# Patient Record
Sex: FEMALE | Race: WHITE | NOT HISPANIC OR LATINO | Employment: UNEMPLOYED | URBAN - METROPOLITAN AREA
[De-identification: names, ages, dates, MRNs, and addresses within clinical notes are randomized per-mention and may not be internally consistent; named-entity substitution may affect disease eponyms.]

---

## 2016-12-23 RX ORDER — MULTIVIT-MIN/IRON/FOLIC ACID/K 18-600-40
CAPSULE ORAL
COMMUNITY
End: 2018-02-06 | Stop reason: SDUPTHER

## 2016-12-23 RX ORDER — LANOLIN ALCOHOL/MO/W.PET/CERES
1000 CREAM (GRAM) TOPICAL DAILY
COMMUNITY

## 2017-01-03 ENCOUNTER — ANESTHESIA (OUTPATIENT)
Dept: PERIOP | Facility: HOSPITAL | Age: 62
End: 2017-01-03
Payer: COMMERCIAL

## 2017-01-30 ENCOUNTER — ALLSCRIPTS OFFICE VISIT (OUTPATIENT)
Dept: OTHER | Facility: OTHER | Age: 62
End: 2017-01-30

## 2017-01-31 ENCOUNTER — HOSPITAL ENCOUNTER (OUTPATIENT)
Facility: HOSPITAL | Age: 62
Setting detail: OUTPATIENT SURGERY
Discharge: HOME | End: 2017-01-31
Attending: ORTHOPAEDIC SURGERY | Admitting: ORTHOPAEDIC SURGERY
Payer: COMMERCIAL

## 2017-01-31 VITALS
OXYGEN SATURATION: 94 % | DIASTOLIC BLOOD PRESSURE: 80 MMHG | TEMPERATURE: 99.1 F | HEIGHT: 62 IN | SYSTOLIC BLOOD PRESSURE: 162 MMHG | BODY MASS INDEX: 36.25 KG/M2 | HEART RATE: 114 BPM | WEIGHT: 197 LBS | RESPIRATION RATE: 16 BRPM

## 2017-01-31 DIAGNOSIS — R20.2 PARESTHESIA OF SKIN: ICD-10-CM

## 2017-01-31 DIAGNOSIS — M77.8 OTHER ENTHESOPATHIES, NOT ELSEWHERE CLASSIFIED: ICD-10-CM

## 2017-01-31 PROBLEM — G56.01 RIGHT CARPAL TUNNEL SYNDROME: Status: ACTIVE | Noted: 2017-01-31

## 2017-01-31 LAB
GLUCOSE SERPL-MCNC: 218 MG/DL (ref 65–140)
GLUCOSE SERPL-MCNC: 292 MG/DL (ref 65–140)
GLUCOSE SERPL-MCNC: 344 MG/DL (ref 65–140)

## 2017-01-31 PROCEDURE — C9356 TENOGLIDE TENDON PROT, CM2: HCPCS | Performed by: ORTHOPAEDIC SURGERY

## 2017-01-31 PROCEDURE — 88305 TISSUE EXAM BY PATHOLOGIST: CPT | Performed by: ORTHOPAEDIC SURGERY

## 2017-01-31 PROCEDURE — 82948 REAGENT STRIP/BLOOD GLUCOSE: CPT

## 2017-01-31 DEVICE — TENOGLIDE TENDON PROTECTOR SHEET 4 IN X 5 IN (10CM X 12.5CM)
Type: IMPLANTABLE DEVICE | Site: HAND | Status: FUNCTIONAL
Brand: TENOGLIDE®

## 2017-01-31 RX ORDER — SODIUM CHLORIDE, SODIUM LACTATE, POTASSIUM CHLORIDE, CALCIUM CHLORIDE 600; 310; 30; 20 MG/100ML; MG/100ML; MG/100ML; MG/100ML
20 INJECTION, SOLUTION INTRAVENOUS CONTINUOUS
Status: DISCONTINUED | OUTPATIENT
Start: 2017-01-31 | End: 2017-01-31

## 2017-01-31 RX ORDER — HYDROCODONE BITARTRATE AND ACETAMINOPHEN 5; 325 MG/1; MG/1
1 TABLET ORAL EVERY 6 HOURS PRN
Status: DISCONTINUED | OUTPATIENT
Start: 2017-01-31 | End: 2017-01-31 | Stop reason: HOSPADM

## 2017-01-31 RX ORDER — ONDANSETRON 2 MG/ML
INJECTION INTRAMUSCULAR; INTRAVENOUS AS NEEDED
Status: DISCONTINUED | OUTPATIENT
Start: 2017-01-31 | End: 2017-01-31 | Stop reason: SURG

## 2017-01-31 RX ORDER — ONDANSETRON 2 MG/ML
4 INJECTION INTRAMUSCULAR; INTRAVENOUS EVERY 4 HOURS PRN
Status: DISCONTINUED | OUTPATIENT
Start: 2017-01-31 | End: 2017-01-31 | Stop reason: HOSPADM

## 2017-01-31 RX ORDER — CLINDAMYCIN PHOSPHATE 900 MG/50ML
900 INJECTION INTRAVENOUS ONCE
Status: COMPLETED | OUTPATIENT
Start: 2017-01-31 | End: 2017-01-31

## 2017-01-31 RX ORDER — LABETALOL HYDROCHLORIDE 5 MG/ML
5 INJECTION, SOLUTION INTRAVENOUS
Status: DISCONTINUED | OUTPATIENT
Start: 2017-01-31 | End: 2017-01-31 | Stop reason: HOSPADM

## 2017-01-31 RX ORDER — FENTANYL CITRATE/PF 50 MCG/ML
25 SYRINGE (ML) INJECTION
Status: DISCONTINUED | OUTPATIENT
Start: 2017-01-31 | End: 2017-01-31 | Stop reason: HOSPADM

## 2017-01-31 RX ORDER — ONDANSETRON 2 MG/ML
4 INJECTION INTRAMUSCULAR; INTRAVENOUS ONCE AS NEEDED
Status: DISCONTINUED | OUTPATIENT
Start: 2017-01-31 | End: 2017-01-31 | Stop reason: HOSPADM

## 2017-01-31 RX ORDER — SODIUM CHLORIDE, SODIUM LACTATE, POTASSIUM CHLORIDE, CALCIUM CHLORIDE 600; 310; 30; 20 MG/100ML; MG/100ML; MG/100ML; MG/100ML
100 INJECTION, SOLUTION INTRAVENOUS CONTINUOUS
Status: DISCONTINUED | OUTPATIENT
Start: 2017-01-31 | End: 2017-01-31 | Stop reason: HOSPADM

## 2017-01-31 RX ORDER — HYDROCODONE BITARTRATE AND ACETAMINOPHEN 5; 325 MG/1; MG/1
1 TABLET ORAL EVERY 4 HOURS PRN
Qty: 20 TABLET | Refills: 0 | Status: SHIPPED | OUTPATIENT
Start: 2017-01-31 | End: 2017-02-10

## 2017-01-31 RX ORDER — SODIUM CHLORIDE, SODIUM LACTATE, POTASSIUM CHLORIDE, CALCIUM CHLORIDE 600; 310; 30; 20 MG/100ML; MG/100ML; MG/100ML; MG/100ML
125 INJECTION, SOLUTION INTRAVENOUS CONTINUOUS
Status: DISCONTINUED | OUTPATIENT
Start: 2017-01-31 | End: 2017-01-31 | Stop reason: HOSPADM

## 2017-01-31 RX ORDER — ALBUTEROL SULFATE 2.5 MG/3ML
2.5 SOLUTION RESPIRATORY (INHALATION) ONCE AS NEEDED
Status: DISCONTINUED | OUTPATIENT
Start: 2017-01-31 | End: 2017-01-31 | Stop reason: HOSPADM

## 2017-01-31 RX ORDER — METOCLOPRAMIDE HYDROCHLORIDE 5 MG/ML
INJECTION INTRAMUSCULAR; INTRAVENOUS AS NEEDED
Status: DISCONTINUED | OUTPATIENT
Start: 2017-01-31 | End: 2017-01-31 | Stop reason: SURG

## 2017-01-31 RX ORDER — MEPERIDINE HYDROCHLORIDE 25 MG/ML
12.5 INJECTION INTRAMUSCULAR; INTRAVENOUS; SUBCUTANEOUS AS NEEDED
Status: DISCONTINUED | OUTPATIENT
Start: 2017-01-31 | End: 2017-01-31 | Stop reason: HOSPADM

## 2017-01-31 RX ORDER — KETOROLAC TROMETHAMINE 30 MG/ML
INJECTION, SOLUTION INTRAMUSCULAR; INTRAVENOUS AS NEEDED
Status: DISCONTINUED | OUTPATIENT
Start: 2017-01-31 | End: 2017-01-31 | Stop reason: SURG

## 2017-01-31 RX ADMIN — DEXAMETHASONE SODIUM PHOSPHATE 10 MG: 10 INJECTION INTRAMUSCULAR; INTRAVENOUS at 09:31

## 2017-01-31 RX ADMIN — CLINDAMYCIN PHOSPHATE 900 MG: 18 INJECTION, SOLUTION INTRAVENOUS at 09:25

## 2017-01-31 RX ADMIN — ONDANSETRON 4 MG: 2 INJECTION INTRAMUSCULAR; INTRAVENOUS at 09:31

## 2017-01-31 RX ADMIN — KETOROLAC TROMETHAMINE 30 MG: 30 INJECTION, SOLUTION INTRAMUSCULAR at 10:38

## 2017-01-31 RX ADMIN — SODIUM CHLORIDE, SODIUM LACTATE, POTASSIUM CHLORIDE, AND CALCIUM CHLORIDE: .6; .31; .03; .02 INJECTION, SOLUTION INTRAVENOUS at 10:32

## 2017-01-31 RX ADMIN — SODIUM CHLORIDE, SODIUM LACTATE, POTASSIUM CHLORIDE, AND CALCIUM CHLORIDE 125 ML/HR: .6; .31; .03; .02 INJECTION, SOLUTION INTRAVENOUS at 10:50

## 2017-01-31 RX ADMIN — METOCLOPRAMIDE HYDROCHLORIDE 10 MG: 5 INJECTION INTRAMUSCULAR; INTRAVENOUS at 09:31

## 2017-01-31 RX ADMIN — SODIUM CHLORIDE, SODIUM LACTATE, POTASSIUM CHLORIDE, AND CALCIUM CHLORIDE 20 ML/HR: .6; .31; .03; .02 INJECTION, SOLUTION INTRAVENOUS at 08:00

## 2017-01-31 RX ADMIN — INSULIN HUMAN 10 UNITS: 100 INJECTION, SOLUTION PARENTERAL at 07:58

## 2017-02-08 ENCOUNTER — ALLSCRIPTS OFFICE VISIT (OUTPATIENT)
Dept: OTHER | Facility: OTHER | Age: 62
End: 2017-02-08

## 2017-02-08 DIAGNOSIS — Z47.89 ENCOUNTER FOR OTHER ORTHOPEDIC AFTERCARE: ICD-10-CM

## 2017-03-04 ENCOUNTER — GENERIC CONVERSION - ENCOUNTER (OUTPATIENT)
Dept: OTHER | Facility: OTHER | Age: 62
End: 2017-03-04

## 2017-03-22 ENCOUNTER — GENERIC CONVERSION - ENCOUNTER (OUTPATIENT)
Dept: OTHER | Facility: OTHER | Age: 62
End: 2017-03-22

## 2017-04-24 ENCOUNTER — ALLSCRIPTS OFFICE VISIT (OUTPATIENT)
Dept: OTHER | Facility: OTHER | Age: 62
End: 2017-04-24

## 2017-05-01 ENCOUNTER — HOSPITAL ENCOUNTER (OUTPATIENT)
Dept: RADIOLOGY | Age: 62
Discharge: HOME | End: 2017-05-01
Payer: COMMERCIAL

## 2017-05-01 DIAGNOSIS — I10 ESSENTIAL (PRIMARY) HYPERTENSION: ICD-10-CM

## 2017-05-01 DIAGNOSIS — Z12.31 ENCOUNTER FOR SCREENING MAMMOGRAM FOR MALIGNANT NEOPLASM OF BREAST: ICD-10-CM

## 2017-05-01 DIAGNOSIS — K21.9 GASTRO-ESOPHAGEAL REFLUX DISEASE WITHOUT ESOPHAGITIS: ICD-10-CM

## 2017-05-01 DIAGNOSIS — E11.65 TYPE 2 DIABETES MELLITUS WITH HYPERGLYCEMIA (HCC): ICD-10-CM

## 2017-05-01 PROCEDURE — G0202 SCR MAMMO BI INCL CAD: HCPCS

## 2017-05-04 ENCOUNTER — ALLSCRIPTS OFFICE VISIT (OUTPATIENT)
Dept: OTHER | Facility: OTHER | Age: 62
End: 2017-05-04

## 2017-08-22 ENCOUNTER — ALLSCRIPTS OFFICE VISIT (OUTPATIENT)
Dept: OTHER | Facility: OTHER | Age: 62
End: 2017-08-22

## 2017-08-22 DIAGNOSIS — E11.65 TYPE 2 DIABETES MELLITUS WITH HYPERGLYCEMIA (HCC): ICD-10-CM

## 2017-08-22 DIAGNOSIS — R10.13 EPIGASTRIC PAIN: ICD-10-CM

## 2017-08-22 DIAGNOSIS — K21.9 GASTRO-ESOPHAGEAL REFLUX DISEASE WITHOUT ESOPHAGITIS: ICD-10-CM

## 2017-08-22 DIAGNOSIS — E03.9 HYPOTHYROIDISM: ICD-10-CM

## 2017-08-23 ENCOUNTER — TRANSCRIBE ORDERS (OUTPATIENT)
Dept: LAB | Age: 62
End: 2017-08-23

## 2017-08-23 ENCOUNTER — APPOINTMENT (OUTPATIENT)
Dept: LAB | Age: 62
End: 2017-08-23
Payer: COMMERCIAL

## 2017-08-23 DIAGNOSIS — E03.9 HYPOTHYROIDISM: ICD-10-CM

## 2017-08-23 DIAGNOSIS — K21.9 GASTRO-ESOPHAGEAL REFLUX DISEASE WITHOUT ESOPHAGITIS: ICD-10-CM

## 2017-08-23 DIAGNOSIS — R10.13 EPIGASTRIC PAIN: ICD-10-CM

## 2017-08-23 DIAGNOSIS — E11.65 TYPE 2 DIABETES MELLITUS WITH HYPERGLYCEMIA (HCC): ICD-10-CM

## 2017-08-23 LAB
25(OH)D3 SERPL-MCNC: 33.3 NG/ML (ref 30–100)
ALBUMIN SERPL BCP-MCNC: 3.3 G/DL (ref 3.5–5)
ALP SERPL-CCNC: 66 U/L (ref 46–116)
ALT SERPL W P-5'-P-CCNC: 35 U/L (ref 12–78)
AMYLASE SERPL-CCNC: 28 IU/L (ref 25–115)
ANION GAP SERPL CALCULATED.3IONS-SCNC: 6 MMOL/L (ref 4–13)
AST SERPL W P-5'-P-CCNC: 19 U/L (ref 5–45)
BILIRUB SERPL-MCNC: 0.46 MG/DL (ref 0.2–1)
BUN SERPL-MCNC: 14 MG/DL (ref 5–25)
CALCIUM SERPL-MCNC: 8.9 MG/DL (ref 8.3–10.1)
CHLORIDE SERPL-SCNC: 104 MMOL/L (ref 100–108)
CO2 SERPL-SCNC: 29 MMOL/L (ref 21–32)
CREAT SERPL-MCNC: 0.65 MG/DL (ref 0.6–1.3)
CREAT UR-MCNC: 208 MG/DL
ERYTHROCYTE [DISTWIDTH] IN BLOOD BY AUTOMATED COUNT: 14.5 % (ref 11.6–15.1)
EST. AVERAGE GLUCOSE BLD GHB EST-MCNC: 206 MG/DL
GFR SERPL CREATININE-BSD FRML MDRD: 95 ML/MIN/1.73SQ M
GLUCOSE P FAST SERPL-MCNC: 187 MG/DL (ref 65–99)
HBA1C MFR BLD: 8.8 % (ref 4.2–6.3)
HCT VFR BLD AUTO: 38.4 % (ref 34.8–46.1)
HGB BLD-MCNC: 12.8 G/DL (ref 11.5–15.4)
LIPASE SERPL-CCNC: 106 U/L (ref 73–393)
MCH RBC QN AUTO: 27.2 PG (ref 26.8–34.3)
MCHC RBC AUTO-ENTMCNC: 33.3 G/DL (ref 31.4–37.4)
MCV RBC AUTO: 82 FL (ref 82–98)
MICROALBUMIN UR-MCNC: 20.5 MG/L (ref 0–20)
MICROALBUMIN/CREAT 24H UR: 10 MG/G CREATININE (ref 0–30)
PLATELET # BLD AUTO: 233 THOUSANDS/UL (ref 149–390)
PMV BLD AUTO: 10.5 FL (ref 8.9–12.7)
POTASSIUM SERPL-SCNC: 3.7 MMOL/L (ref 3.5–5.3)
PROT SERPL-MCNC: 7.5 G/DL (ref 6.4–8.2)
RBC # BLD AUTO: 4.71 MILLION/UL (ref 3.81–5.12)
SODIUM SERPL-SCNC: 139 MMOL/L (ref 136–145)
TSH SERPL DL<=0.05 MIU/L-ACNC: 12.3 UIU/ML (ref 0.36–3.74)
WBC # BLD AUTO: 5.5 THOUSAND/UL (ref 4.31–10.16)

## 2017-08-23 PROCEDURE — 82150 ASSAY OF AMYLASE: CPT

## 2017-08-23 PROCEDURE — 84443 ASSAY THYROID STIM HORMONE: CPT

## 2017-08-23 PROCEDURE — 85027 COMPLETE CBC AUTOMATED: CPT

## 2017-08-23 PROCEDURE — 82570 ASSAY OF URINE CREATININE: CPT

## 2017-08-23 PROCEDURE — 83690 ASSAY OF LIPASE: CPT

## 2017-08-23 PROCEDURE — 82043 UR ALBUMIN QUANTITATIVE: CPT

## 2017-08-23 PROCEDURE — 36415 COLL VENOUS BLD VENIPUNCTURE: CPT

## 2017-08-23 PROCEDURE — 82306 VITAMIN D 25 HYDROXY: CPT

## 2017-08-23 PROCEDURE — 80053 COMPREHEN METABOLIC PANEL: CPT

## 2017-08-23 PROCEDURE — 83036 HEMOGLOBIN GLYCOSYLATED A1C: CPT

## 2017-09-05 ENCOUNTER — ALLSCRIPTS OFFICE VISIT (OUTPATIENT)
Dept: OTHER | Facility: OTHER | Age: 62
End: 2017-09-05

## 2017-11-06 ENCOUNTER — ALLSCRIPTS OFFICE VISIT (OUTPATIENT)
Dept: OTHER | Facility: OTHER | Age: 62
End: 2017-11-06

## 2017-11-06 ENCOUNTER — TRANSCRIBE ORDERS (OUTPATIENT)
Dept: ADMINISTRATIVE | Facility: HOSPITAL | Age: 62
End: 2017-11-06

## 2017-11-06 DIAGNOSIS — R11.2 NAUSEA AND VOMITING, INTRACTABILITY OF VOMITING NOT SPECIFIED, UNSPECIFIED VOMITING TYPE: Primary | ICD-10-CM

## 2017-11-07 NOTE — PROGRESS NOTES
Assessment  1  Nausea with vomiting (787 01) (R11 2)    Plan  Diabetes type 2, uncontrolled    · Januvia 100 MG Oral Tablet   · NovoLIN N 100 UNIT/ML Subcutaneous Suspension  Diabetic gastroparesis    · From  Metoclopramide HCl - 5 MG Oral Tablet TAKE 1 TABLET 30 MINUTES  BEFORE MEALS AND AT BEDTIME To Metoclopramide HCl - 10 MG Oral Tablet (Reglan)  TAKE 1 TABLET BY MOUTH AC AND HS  GERD (gastroesophageal reflux disease)    · Omeprazole 40 MG Oral Capsule Delayed Release; TAKE 1 CAPSULE DAILY  Nausea with vomiting    · * CT ABDOMEN PELVIS W CONTRAST; Status:Need Information - Financial Authorization; Requested [id number];     Discussion/Summary  The patient was counseled regarding diagnostic results,-- prognosis,-- impressions  Chief Complaint  pt  presents for vomiting and nausea  pt  states has been ongoing has gotten worse over the past month  pt  states not able to go to work ,feels nauseas  pt  has been injecting Novolin 10 units twice a day      History of Present Illness  Nausea:   Jane Xiong presents with complaints of gradual onset of constant episodes of moderate nausea  Symptoms are improved by meals  Associated symptoms include queasiness,-- emesis,-- abdominal pain-- and-- diarrhea, but-- no regurgitation,-- no anorexia,-- no bloating,-- no dehydration,-- no hematemesis,-- no melena,-- no jaundice,-- no vertigo,-- no pregnancy symptoms,-- no fatigue,-- no fever,-- no weight loss,-- no chest pain,-- no headache,-- no stiff neck,-- no weakness-- and-- no neurologic symptoms  Abdominal Pain:   WILLIAM BURSLEM presents with complaints of occasional episodes of moderate abdominal pain  Symptoms are worsening  Pertinent Medical History: GERD     Associated symptoms include nausea,-- vomiting-- and-- diarrhea, but-- no vaginal bleeding,-- no vaginal discharge,-- no missed menstrual period,-- no confirmed pregnancy,-- no fever,-- no anorexia,-- no jaundice,-- no hematemesis,-- no melena,-- no bloody stool,-- no flatulence,-- no dysuria,-- no hematuria-- and-- no dark urine  Review of Systems    Constitutional: no fever,-- not feeling poorly,-- no recent weight gain,-- no chills,-- not feeling tired-- and-- no recent weight loss  Eyes: no eye pain,-- no eyesight problems,-- no dryness of the eyes,-- eyes not red,-- no purulent discharge from the eyes-- and-- no itching of the eyes  ENT: no earache,-- no nosebleeds,-- no sore throat,-- no hearing loss,-- no nasal discharge-- and-- no hoarseness  Cardiovascular: no chest pain,-- no intermittent leg claudication,-- no palpitations-- and-- no lower extremity edema  Respiratory: no shortness of breath,-- no cough,-- no orthopnea,-- no wheezing,-- no shortness of breath during exertion-- and-- no PND  Gastrointestinal: no abdominal pain,-- no nausea,-- no vomiting,-- no constipation-- and-- no diarrhea  Musculoskeletal: no arthralgias,-- no joint swelling,-- no limb pain,-- no myalgias,-- no joint stiffness-- and-- no limb swelling  Integumentary: no rashes,-- no itching,-- no skin lesions-- and-- no skin wound  Neurological: no headache,-- no numbness,-- no tingling,-- no confusion,-- no dizziness,-- no limb weakness,-- no convulsions,-- no fainting-- and-- no difficulty walking  Psychiatric: not suicidal,-- no anxiety-- and-- no depression  Endocrine: no muscle weakness  Hematologic/Lymphatic: no tendency for easy bleeding-- and-- no tendency for easy bruising  Active Problems  1  Aftercare following surgery of the musculoskeletal system (V58 78) (Z47 89)   2  Anxiety (300 00) (F41 9)   3  Benign diastolic hypertension (358 5) (I10)   4  Delayed gastric emptying (536 8) (K30)   5  Diabetes type 2, uncontrolled (250 02) (E11 65)   6  Diabetic gastroparesis (250 60,536 3) (O38 25,P23 34)   7  Epigastric pain (789 06) (R10 13)   8  GERD (gastroesophageal reflux disease) (530 81) (K21 9)   9   Hand paresthesia (782 0) (R20 2) 10  Hypothyroidism (244 9) (E03 9)   11  Irritable bowel syndrome (564 1) (K58 9)   12  Osteoarthritis, hip, bilateral (715 95) (M16 0)   13  Tendonitis of wrist, right (727 05) (M77 8)   14  Trigger little finger of right hand (727 03) (M65 351)   15  Trigger middle finger of right hand (727 03) (M65 331)   16  Vitamin D insufficiency (268 9) (E55 9)    Social History   · Former smoker (V15 82) (U85 761)   · No alcohol use   · No illicit drug use    Current Meds   1  BD Insulin Syringe Ultrafine 30G X 1/2 0 3 ML Miscellaneous; USE WITH NOVILIN   INSULIN TWICE DAILY - DX  E11 65; Therapy: 04AJI1797 to (Last MX:90HJA0946)  Requested for: 14WIU0953 Ordered   2  FreeStyle Lancets Miscellaneous; TEST BLOOD GLUCOSE 3 TIMES DAILY - DX  E11 65; Therapy: 77PPL5477 to (Evaluate:87Omm8168)  Requested for: 10XTE4909; Last   AF:37IQX0973 Ordered   3  FreeStyle Lite Test In Vitro Strip; TEST BLOOD GLUCOSE 3 TIMES DAILY - DX  E11 65; Therapy: 29FUB2485 to (Evaluate:71Avx7969)  Requested for: 81ZHO3928; Last   EI:49ATN0946 Ordered   4  FreeStyle System KIT; USE AS DIRECTED 3-4 TIMES DAILY  DX:  E11 65; Therapy: 83YHW2332 to (Last Rx:12Ove1344)  Requested for: 35Zcg1166 Ordered   5  Glimepiride 4 MG Oral Tablet; TAKE 1 TABLET TWICE DAILY; Therapy: 99ZGA6074 to (Woodford Scarlet)  Requested for: 60Sxr6373; Last   Rx:96Pey9184 Ordered   6  Januvia 100 MG Oral Tablet; Take 1 tablet daily; Therapy: 90XQM7266 to (Perry Scarlet)  Requested for: 28Sur6830; Last   Rx:35Ovh7104 Ordered   7  Levoxyl 100 MCG Oral Tablet; TAKE 1 TABLET DAILY; Therapy: 78ANV1451 to (Evaluate:34Czv7872)  Requested for: 80Snb2283; Last   Rx:09Uqx2512 Ordered   8  Lisinopril-Hydrochlorothiazide 20-12 5 MG Oral Tablet; TAKE 1/2 TABLET DAILY; Therapy: 89ATJ9547 to (Perry Sun)  Requested for: 86Osg6418; Last   Rx:63Wad8496 Ordered   9   Metoclopramide HCl - 5 MG Oral Tablet; TAKE 1 TABLET 30 MINUTES BEFORE MEALS   AND AT BEDTIME; Therapy: 66Zfe0526 to ()  Requested for: 49Hge6403; Last   Rx:79Myw0688 Ordered   10  NovoLIN N 100 UNIT/ML Subcutaneous Suspension; INJECT 10 UNITS TWICE DAILY; Therapy: (169 9374) to Recorded   11  NovoLIN N 100 UNIT/ML Subcutaneous Suspension; INJECT 22  UNITS TWICE DAILY -    DX  E11 65; Therapy: 11ZAE2242 to (Juan Singh)  Requested for: 94Arr2495 Ordered   12  Omeprazole 40 MG Oral Capsule Delayed Release; TAKE 1 CAPSULE DAILY; Therapy: 12IWS5129 to (Millie Rm)  Requested for: 27Col2691; Last    Rx:14Nlo3653; Status: ACTIVE - Renewal Denied Ordered   13  Sertraline HCl - 100 MG Oral Tablet; Take 1 tablet daily; Therapy: 94RPG1101 to (Millie Rm)  Requested for: 90Sfb7282; Last    Rx:13Fey8324 Ordered   14  Vitamin D3 2000 UNIT Oral Capsule; take 1 capsule daily; Therapy: 91Vpt4751 to (Evaluate:07Zmc5742); Last VL:50ESI7192 Ordered    Allergies  1  MetFORMIN HCl TABS   2  Penicillins    Vitals   Recorded: 54PXA9254 01:21PM   Temperature 98 4 F, Tympanic   Heart Rate 65   Systolic 665, LUE, Sitting   Diastolic 82, LUE, Sitting   Height 5 ft 2 5 in   Weight 193 lb 8 oz   BMI Calculated 34 83   BSA Calculated 1 9   O2 Saturation 98, RA     Physical Exam    Constitutional   General appearance: No acute distress, well appearing and well nourished  well developed,-- well nourished-- and-- well hydrated  Eyes   Conjunctiva and lids: No swelling, erythema or discharge  Conjunctiva Findings: normal in both eyes  Eye Lids: normal bilaterally  Pupils and irises: Equal, round and reactive to light  Pupils: equal, round, and reactive to light bilaterally  Cornea, Lens, and Sclera:   Ears, Nose, Mouth, and Throat   Nasal mucosa, septum, and turbinates: Normal without edema or erythema  no nasal discharge-- and-- normal nasal mucosa  Oropharynx: Normal with no erythema, edema, exudate or lesions    The posterior pharynx was not erythematous-- and-- did not have an exudate  Pulmonary   Respiratory effort: No increased work of breathing or signs of respiratory distress  Respiratory rate: normal  Assessment of respiratory effort revealed normal rhythm and effort  Auscultation of lungs: Clear to auscultation  no rales or crackles were heard bilaterally  no rhonchi  no friction rub  no wheezing  no diminished breath sounds  no bronchial breath sounds  Cardiovascular   Auscultation of heart: Normal rate and rhythm, normal S1 and S2, without murmurs  The heart rate was normal  The rhythm was regular  Heart sounds: normal S1-- and-- normal S2  no murmurs were heard  Examination of extremities for edema and/or varicosities: Normal   no edema  Abdomen   Abdomen: Non-tender, no masses  Bowel sounds were normal    Lymphatic   Palpation of lymph nodes in neck: No lymphadenopathy  no anterior cervical node enlargement,-- no posterior cervical node enlargement,-- no submandibular node enlargement-- and-- no supraclavicular node enlargement  Musculoskeletal   Gait and station: Normal   Gait evaluation demonstrated a normal gait  Digits and nails: Normal without clubbing or cyanosis  Inspection/palpation of joints, bones, and muscles: Normal     Skin   Skin and subcutaneous tissue: Normal without rashes or lesions  Neurologic   Sensation: No sensory loss  Sensory exam: intact to light touch  Psychiatric   Orientation to person, place, and time: Normal   Mental Status: oriented to person, place, and time,-- oriented to person,-- oriented to place-- and-- oriented to time  Mood and affect: Normal   Mood and Affect: appropriate mood-- and-- appropriate affect          Future Appointments    Date/Time Provider Specialty Site   12/05/2017 03:45 PM Zachary Russell MD Internal Medicine [facility]     Signatures   Electronically signed by : Tony Bhakta MD; Nov 6 2017  1:40PM EST                       (Author)

## 2017-11-13 ENCOUNTER — HOSPITAL ENCOUNTER (OUTPATIENT)
Dept: RADIOLOGY | Age: 62
Discharge: HOME | End: 2017-11-13
Payer: COMMERCIAL

## 2017-11-13 DIAGNOSIS — R11.2 NAUSEA AND VOMITING, INTRACTABILITY OF VOMITING NOT SPECIFIED, UNSPECIFIED VOMITING TYPE: ICD-10-CM

## 2017-11-13 PROCEDURE — 74177 CT ABD & PELVIS W/CONTRAST: CPT

## 2017-11-13 RX ADMIN — IOHEXOL 100 ML: 350 INJECTION, SOLUTION INTRAVENOUS at 08:54

## 2017-12-05 ENCOUNTER — GENERIC CONVERSION - ENCOUNTER (OUTPATIENT)
Dept: OTHER | Facility: OTHER | Age: 62
End: 2017-12-05

## 2017-12-05 DIAGNOSIS — E11.43 TYPE 2 DIABETES MELLITUS WITH AUTONOMIC NEUROPATHY (HCC): ICD-10-CM

## 2018-01-10 NOTE — PROGRESS NOTES
Plan    1  DSMT/MNT Time Record; Status:Complete;   Done: 23DCR1172 12:27PM    Discussion/Summary    PATIENT EDUCATION RECORD   Indication for Services: type 2 Diabetes Mellitus  Living Well with Diabetes Class 2 Education Content: Macronutrients, Carbohydrate sources, What one serving of carbohydrate equals, Effects of diet on blood glucose levels, effects of carbohydrates on blood glucose levels, Basics of meal planning: balance, portions, and meal times, Measurements, Reading food labels to determine carbohydrates       Active Problems    1  Anxiety (300 00) (F41 9)   2  Cataract (366 9) (H26 9)   3  Depression screening (V79 0) (Z13 89)   4  Diabetes type 2, uncontrolled (250 02) (E11 65)   5  Diarrhea (787 91) (R19 7)   6  GERD (gastroesophageal reflux disease) (530 81) (K21 9)   7  Greater trochanteric bursitis of right hip (726 5) (M70 61)   8  Hypertension (401 9) (I10)   9  Hypothyroidism (244 9) (E03 9)   10  Irritable bowel syndrome (564 1) (K58 9)   11  Lateral pain of right hip (719 45) (M25 551)   12  Nausea with vomiting (787 01) (R11 2)   13  Need for influenza vaccination (V04 81) (Z23)   14  Osteoarthritis, hip, bilateral (715 95) (M16 0)   15  Screening for breast cancer (V76 10) (Z12 39)   16  Screening for colon cancer (V76 51) (Z12 11)    Past Medical History    1  History of Anxiety (300 00) (F41 9)   2  History of Dental infection (522 4) (K04 7)   3  History of esophageal reflux (V12 79) (Z87 19)    Surgical History    1  History of Cholecystotomy   2  History of Complete Colonoscopy   3  History of Dilation And Curettage   4  History of Hand Incision Tendon Sheath Of A Finger   5  History of Iridectomy By Laser   6  History of Total Abdominal Hysterectomy   7  History of Total Abdominal Hysterectomy With Removal Of Both Ovaries   8  History of Tubal Ligation    Family History    1  Family history of     2  Family history of    3   Family history of diabetes mellitus (V18 0) (Z83 3)    4  Family history of diabetes mellitus (V18 0) (Z83 3)    5  Family history of diabetes mellitus (V18 0) (Z83 3)    Social History    · Former smoker (V15 82) (U21 841)   · No alcohol use   · No drug use    Current Meds   1  Dicyclomine HCl - 20 MG Oral Tablet (Bentyl); TAKE 1 TABLET EVERY 6 HOURS AS   NEEDED; Therapy: 74PJR9695 to (Evaluate:47Jkk9072)  Requested for: 59XSL0890; Last   Rx:29Zhs3750 Ordered   2  FreeStyle Lite Test In Vitro Strip; TEST 3 TIMES DAILY; Therapy: 70QZY2194 to (Nathan Valles)  Requested for: 86IKZ8790; Last   Rx:18Nov2015 Ordered   3  Glimepiride 2 MG Oral Tablet; Take 1 tablet twice daily; Therapy: 66KVD9878 to (Sona Baeza)  Requested for: 93WTQ0818; Last   Rx:68Bhw7661 Ordered   4  Januvia 100 MG Oral Tablet; Take 1 tablet daily; Therapy: 45VEU9202 to (Evaluate:73Ine9019)  Requested for: 20Jan2016; Last   Rx:20Jan2016 Ordered   5  Levothyroxine Sodium 75 MCG Oral Tablet; TAKE 1 TABLET DAILY; Therapy: 62ECW5325 to (Evaluate:07Jun2016)  Requested for: 19CBW1749; Last   Rx:66Mkp1908 Ordered   6  Lisinopril-Hydrochlorothiazide 20-12 5 MG Oral Tablet; TAKE 1 TABLET DAILY; Therapy: 27KHQ5382 to (Evaluate:07Uzs7093) Recorded   7  Meloxicam 7 5 MG Oral Tablet (Mobic); TAKE 1 TABLET TWICE DAILY AS NEEDED; Therapy: 99FXD1624 to (Evaluate:09Jan2016)  Requested for: 20IDV9344; Last   Rx:87Qdf8867 Ordered   8  MoviPrep 100 GM Oral Solution Reconstituted; USE AS DIRECTED; Therapy: 04AZO1676 to (Last Rx:09Dec2015)  Requested for: 86ERR2524 Ordered   9  NovoLIN N 100 UNIT/ML Subcutaneous Suspension; INJECT 12 UNIT Twice daily; Therapy: 73TOZ5746 to (Last Rx:10Dec2015) Ordered   10  Omeprazole 40 MG Oral Capsule Delayed Release; TAKE 1 CAPSULE DAILY; Therapy: 10JLO2134 to (Sona Baeza)  Requested for: 23HCV4243; Last    Rx:37Ksh5827 Ordered   11  Ondansetron HCl - 4 MG Oral Tablet (Zofran); TAKE 1 TABLET Every 8 hours PRN;     Therapy: 74TNA9777 to (Evaluate:19Dec2015)  Requested for: 99JLL2383; Last    Rx:09Dec2015 Ordered   12  Sertraline HCl - 100 MG Oral Tablet (Zoloft); Take 1 tablet daily; Therapy: 29BVP2033 to (Last Rx:25Jan2016)  Requested for: 64PIX6815 Ordered    Allergies    1  MetFORMIN HCl TABS   2  Penicillins    End of Encounter Meds    1  Sertraline HCl - 100 MG Oral Tablet (Zoloft); Take 1 tablet daily; Therapy: 30DIC8196 to (Last Rx:25Jan2016)  Requested for: 25Jan2016 Ordered    2  FreeStyle Lite Test In Vitro Strip; TEST 3 TIMES DAILY; Therapy: 62NJX2337 to (Kendall Cantor)  Requested for: 45BPR8732; Last   Rx:18Nov2015 Ordered   3  NovoLIN N 100 UNIT/ML Subcutaneous Suspension; INJECT 12 UNIT Twice daily; Therapy: 79LUN8857 to (Last Rx:10Dec2015) Ordered    4  Januvia 100 MG Oral Tablet; Take 1 tablet daily; Therapy: 83IFT9937 to (Evaluate:42Rvq9734)  Requested for: 20Jan2016; Last   Rx:20Jan2016 Ordered    5  MoviPrep 100 GM Oral Solution Reconstituted; USE AS DIRECTED; Therapy: 41AGY0105 to (Last Rx:09Dec2015)  Requested for: 55ATZ1194 Ordered    6  Glimepiride 2 MG Oral Tablet; Take 1 tablet twice daily; Therapy: 24IDK5752 to (Meri Burkitt)  Requested for: 22SZF0705; Last   Rx:10Dec2015 Ordered   7  Lisinopril-Hydrochlorothiazide 20-12 5 MG Oral Tablet; TAKE 1 TABLET DAILY; Therapy: 92GMG2612 to (Evaluate:31Lqi7867) Recorded    8  Omeprazole 40 MG Oral Capsule Delayed Release; TAKE 1 CAPSULE DAILY; Therapy: 90OKM3033 to (Meri Burkitt)  Requested for: 85DYG3268; Last   Rx:19Cff5063 Ordered    9  Levothyroxine Sodium 75 MCG Oral Tablet; TAKE 1 TABLET DAILY; Therapy: 83STJ3357 to (Evaluate:07Jun2016)  Requested for: 52IZR6706; Last   Rx:38Ttl4018 Ordered    10  Dicyclomine HCl - 20 MG Oral Tablet (Bentyl); TAKE 1 TABLET EVERY 6 HOURS AS    NEEDED; Therapy: 15IPP5811 to (Evaluate:19Nwg6535)  Requested for: 19QAH4827; Last    Rx:25Gfv9128 Ordered    11   Ondansetron HCl - 4 MG Oral Tablet (Zofran); TAKE 1 TABLET Every 8 hours PRN; Therapy: 74BRU4777 to (Evaluate:91Tsv2831)  Requested for: 60CKD1059; Last    Rx:72Cxh3391 Ordered    12  Meloxicam 7 5 MG Oral Tablet (Mobic); TAKE 1 TABLET TWICE DAILY AS NEEDED; Therapy: 13KPZ7181 to (Evaluate:09Jan2016)  Requested for: 87DDM7320;  Last    Rx:34Vcm8532 Ordered    Future Appointments    Date/Time Provider Specialty Site   02/23/2016 09:30 AM Rom Angelo RD Diabetes Educator [address] DIABETES EDUCATION   02/16/2016 09:30 AM MARIE Fitzpatrick Diabetes Educator [address] DIABETES EDUCATION   [facility]26[facility]2016 02[facility]   02/11/2016 02:00 PM Li Rojas [facility] Gastroenterology Adult [place] [address]   Electronically signed by : Bethel Rivas RD; Feb 9 2016 12:28PM EST                       (Author)    Electronically signed by : JACINTA King ; Feb 10 2016 10:21AM EST

## 2018-01-11 NOTE — RESULT NOTES
Verified Results  (1) TISSUE EXAM [id number] 08:14AM Jayjay Desir   Gastric body - rule out H Pylori - Hx Dyspepsia     Test Name Result Flag Reference   LAB AP CASE REPORT (Report)     Surgical Pathology Report             Case: [id number]                   Authorizing Provider: Sabrina Mercer MD       Collected:      01/28/2016 0814        Ordering Location:   [address] [place] [address]   Received:      01/28/2016 [address] Endoscopy                               Pathologist:      Marcelino Mitchell MD                             Specimens:  A) - Stomach, Gastric Body - cold biopsy                                B) - Colon, Random Colon Biopsy - cold biopsy   LAB AP FINAL DIAGNOSIS (Report)     A  Gastric body, biopsy:    - Mild chronic inactive gastritis  - Negative for Helicobacter pylori organisms (immunohistochemical stain   with appropriate positive control)  - Negative for intestinal metaplasia, dysplasia and malignancy  B  Random colon, biopsy:    - Colonic mucosa without significant histologic abnormality     - Negative for active inflammation, features of microscopic colitis and   chronic inflammatory bowel disease     - Negative for dysplasia and malignancy  LAB AP SURGICAL ADDITIONAL INFORMATION (Report)     These tests were developed and their performance characteristics   determined by [address] [place] [address] or [address] [place] [address]  They may not be cleared or approved by the [place]  Food and   Drug Administration  The FDA has determined that such clearance or   approval is not necessary  These tests are used for clinical purposes  They should not be regarded as investigational or for research  This   laboratory has been approved by [place] [address], designated as a high-complexity   laboratory and is qualified to perform these tests  LAB AP GROSS DESCRIPTION (Report)     A   The specimen is received in formalin, labeled with the patient's name   and hospital number, and is designated gastric body biopsy  The   specimen consists of multiple tan soft tissue fragments measuring in   aggregate 0 3 x 0 3 x 0 1 cm  Entirely submitted  One cassette  Note: The estimated total formalin fixation time based upon information   provided by the submitting clinician and the standard processing schedule   is 20 25 hours  B  The specimen is received in formalin, labeled with the patient's name   and hospital number, and is designated random colon biopsy  The   specimen consists of multiple tan soft tissue fragments measuring in   aggregate 0 7 x 0 6 x 0 1 cm  Entirely submitted  One cassette  Note: The estimated total formalin fixation time based upon information   provided by the submitting clinician and the standard processing schedule   is 20 0 hours  MAC   LAB AP CLINICAL INFORMATION      Gastric body - rule out H Pylori - Hx Dyspepsia       Discussion/Summary   Biopsies in the stomach were negative for infection  Colon was normal and biopsies were negative for any inflammation  We will follow-up with a colonoscopy in 10 years  If symptoms are improved she can hold off on the follow-up with us and continue to follow-up with Dr Rayray Harrison       Signatures   Electronically signed by : Laureano Mcpherson MD; Feb 4 2016  1:11PM EST                       (Author)

## 2018-01-12 VITALS
DIASTOLIC BLOOD PRESSURE: 80 MMHG | TEMPERATURE: 98.6 F | SYSTOLIC BLOOD PRESSURE: 138 MMHG | HEART RATE: 72 BPM | WEIGHT: 197 LBS | RESPIRATION RATE: 18 BRPM | BODY MASS INDEX: 36.25 KG/M2 | HEIGHT: 62 IN | OXYGEN SATURATION: 97 %

## 2018-01-13 VITALS
TEMPERATURE: 98.4 F | HEART RATE: 65 BPM | OXYGEN SATURATION: 98 % | HEIGHT: 63 IN | SYSTOLIC BLOOD PRESSURE: 138 MMHG | WEIGHT: 193.5 LBS | DIASTOLIC BLOOD PRESSURE: 82 MMHG | BODY MASS INDEX: 34.29 KG/M2

## 2018-01-13 VITALS
BODY MASS INDEX: 36.64 KG/M2 | HEART RATE: 80 BPM | WEIGHT: 199.13 LBS | HEIGHT: 62 IN | SYSTOLIC BLOOD PRESSURE: 151 MMHG | DIASTOLIC BLOOD PRESSURE: 79 MMHG

## 2018-01-13 VITALS
HEIGHT: 63 IN | DIASTOLIC BLOOD PRESSURE: 70 MMHG | HEART RATE: 74 BPM | SYSTOLIC BLOOD PRESSURE: 124 MMHG | BODY MASS INDEX: 34.95 KG/M2 | WEIGHT: 197.25 LBS | TEMPERATURE: 98.7 F | OXYGEN SATURATION: 98 %

## 2018-01-14 VITALS
HEART RATE: 72 BPM | BODY MASS INDEX: 36.32 KG/M2 | DIASTOLIC BLOOD PRESSURE: 78 MMHG | WEIGHT: 197.38 LBS | HEIGHT: 62 IN | SYSTOLIC BLOOD PRESSURE: 138 MMHG

## 2018-01-14 VITALS
DIASTOLIC BLOOD PRESSURE: 62 MMHG | HEART RATE: 88 BPM | SYSTOLIC BLOOD PRESSURE: 108 MMHG | TEMPERATURE: 98.2 F | WEIGHT: 193.25 LBS | HEIGHT: 63 IN | BODY MASS INDEX: 34.24 KG/M2 | OXYGEN SATURATION: 95 %

## 2018-01-14 NOTE — PROGRESS NOTES
Assessment    1  Greater trochanteric bursitis of right hip (726 5) (M70 61)   2  Osteoarthritis, hip, bilateral (715 95) (M16 0)    Discussion/Summary  Impression: right hip pain and osteoarthritis of the right hip  Currently, the condition is mild  Treatment plan includes activity modification, physical therapy and corticosteroid injection  Patient discussion: discussed with the patient  Patient is doing much better  We recommended that she continue her PT for her hip and do home exercises after that  Patient can f/u prn for an injection for her trochanteric bursitis if she wishes  Patient is in agreement with our plan and we will have her f/u prn  Possible side effects of new medications were reviewed with the patient/guardian today  The treatment plan was reviewed with the patient/guardian  The patient/guardian understands and agrees with the treatment plan       The patient was counseled regarding instructions for management, risk factor reductions, prognosis, patient and family education, risks and benefits of treatment options, importance of compliance with treatment  Chief Complaint    1  Hip Problem    History of Present Illness  HPI: Patient is here for right hip f/u  Patient has been going to PT and had a cortisone shot this past December  Patient is having mild pain on the outside of her right hip in the similar spot, about 3/10 in intensity  SHe is able to sleep on it, it does not wake her up at night  Pain is burning in intensity, but again mild in nature  Pain is better with stretching it and heat, worse with overuse  No more radiation  No loss of bowel/bladder control  Patient was seen here this past December for a right hip injury  She had rIght hip pain for about 2 months prior  Patient was raking leaves and on uneven ground  She twisted and her right hip started hurting  Pain is mostly over the side of her hip and radiates into her groin         Hospital Based Practices Required Assessment:   Pain Assessment   the patient states they have pain  The patient describes the pain as burning  (on a scale of 0 to 10, the patient rates the pain at 3 )       Review of Systems    Constitutional: No fever, no chills, feels well, no tiredness, no recent weight gain or loss  Eyes: No complaints of eyesight problems, no red eyes  ENT: no loss of hearing, no nosebleeds, no sore throat  Cardiovascular: No complaints of chest pain, no palpitations, no leg claudication or lower extremity edema  Respiratory: no compliants of shortness of breath, no wheezing, no cough  Gastrointestinal: no complaints of abdominal pain, no constipation, no nausea or diarrhea, no vomiting, no bloody stools  Genitourinary: no complaints of dysuria, no incontinence  Musculoskeletal: as noted in HPI  Integumentary: no complaints of skin rash or lesion, no itching or dry skin, no skin wounds  Neurological: no complaints of headache, no confusion, no numbness or tingling, no dizziness  Endocrine: No complaints of muscle weakness, no feelings of weakness, no frequent urination, no excessive thirst    Psychiatric: anxiety  ROS reviewed  Active Problems    1  Anxiety (300 00) (F41 9)   2  Cataract (366 9) (H26 9)   3  Depression screening (V79 0) (Z13 89)   4  Diabetes type 2, uncontrolled (250 02) (E11 65)   5  Diarrhea (787 91) (R19 7)   6  GERD (gastroesophageal reflux disease) (530 81) (K21 9)   7  Greater trochanteric bursitis of right hip (726 5) (M70 61)   8  Hypertension (401 9) (I10)   9  Hypothyroidism (244 9) (E03 9)   10  Irritable bowel syndrome (564 1) (K58 9)   11  Lateral pain of right hip (719 45) (M25 551)   12  Nausea with vomiting (787 01) (R11 2)   13  Need for influenza vaccination (V04 81) (Z23)   14  Osteoarthritis, hip, bilateral (715 95) (M16 0)   15  Screening for breast cancer (V76 10) (Z12 39)   16   Screening for colon cancer (V76 51) (Z12 11)    Past Medical History    · History of Anxiety (300 00) (F41 9)   · History of Dental infection (522 4) (K04 7)   · History of esophageal reflux (V12 79) (Z87 19)    The active problems and past medical history were reviewed and updated today  Surgical History    · History of Cholecystotomy   · History of Complete Colonoscopy   · History of Dilation And Curettage   · History of Hand Incision Tendon Sheath Of A Finger   · History of Iridectomy By Laser   · History of Total Abdominal Hysterectomy   · History of Total Abdominal Hysterectomy With Removal Of Both Ovaries   · History of Tubal Ligation    The surgical history was reviewed and updated today  Family History    · Family history of     · Family history of    · Family history of diabetes mellitus (V18 0) (Z83 3)    · Family history of diabetes mellitus (V18 0) (Z83 3)    · Family history of diabetes mellitus (V18 0) (Z83 3)    The family history was reviewed and updated today  Social History    · Former smoker (P77 08) (K33 681)   · No alcohol use   · No drug use  The social history was reviewed and updated today  The social history was reviewed and is unchanged  Current Meds   1  Dicyclomine HCl - 20 MG Oral Tablet; TAKE 1 TABLET EVERY 6 HOURS AS NEEDED; Therapy: 02KKH6372 to (Evaluate:77Wez9462)  Requested for: 87KCL4030; Last   Rx:83Hgr4952 Ordered   2  FreeStyle Lite Test In Vitro Strip; TEST 3 TIMES DAILY; Therapy: 64YSN4993 to (Ernie Seip)  Requested for: 47VRV3962; Last   Rx:58Uxs5174 Ordered   3  Glimepiride 2 MG Oral Tablet; Take 1 tablet twice daily; Therapy: 70QDQ3663 to (Dheeraj Barrera)  Requested for: 97IHQ7816; Last   Rx:95Fxy4600 Ordered   4  Januvia 100 MG Oral Tablet; Take 1 tablet daily; Therapy: 09IXM8573 to (Evaluate:93Knw0098)  Requested for: 2016; Last   Rx:2016 Ordered   5  Levothyroxine Sodium 75 MCG Oral Tablet; TAKE 1 TABLET DAILY;    Therapy: 87RGY9015 to (Evaluate:2016) Requested for: 18HXL4411; Last   Rx:89Uke2604 Ordered   6  Lisinopril-Hydrochlorothiazide 20-12 5 MG Oral Tablet; TAKE 1 TABLET DAILY; Therapy: 06OHD4858 to (Evaluate:49Xem7217) Recorded   7  Meloxicam 7 5 MG Oral Tablet; TAKE 1 TABLET TWICE DAILY AS NEEDED; Therapy: 54WFQ9753 to (Evaluate:09Jan2016)  Requested for: 58PGN1901; Last   Rx:44Uim0704 Ordered   8  MoviPrep 100 GM Oral Solution Reconstituted; USE AS DIRECTED; Therapy: 21TAR6757 to (Last Rx:09Dec2015)  Requested for: 03QNR3703 Ordered   9  NovoLIN N 100 UNIT/ML Subcutaneous Suspension; INJECT 12 UNIT Twice daily; Therapy: 99YBH3397 to (Last Rx:10Dec2015) Ordered   10  Omeprazole 40 MG Oral Capsule Delayed Release; TAKE 1 CAPSULE DAILY; Therapy: 65DGT2172 to (Soledad Dent)  Requested for: 20FOW7193; Last    Rx:70Hzx3442 Ordered   11  Ondansetron HCl - 4 MG Oral Tablet; TAKE 1 TABLET Every 8 hours PRN; Therapy: 34ETN4052 to (Evaluate:19Dec2015)  Requested for: 97OWW6140; Last    Rx:96Ria8534 Ordered   12  Sertraline HCl - 100 MG Oral Tablet; Take 1 tablet daily; Therapy: 40YEA5345 to (Last Rx:25Jan2016)  Requested for: 25Jan2016 Ordered    The medication list was reviewed and updated today  Allergies    1  MetFORMIN HCl TABS   2  Penicillins    Vitals   Recorded: 63IIB4141 01:29PM   Heart Rate 76   Systolic 570   Diastolic 80   Height 5 ft 2 52 in   Weight 196 lb 3 33 oz   BMI Calculated 35 29   BSA Calculated 1 91   Pain Scale 3     Physical Exam    Right Hip: Appearance: Normal  Tenderness: gluteus queenie and greater trochanter and bursa, but not the AIIS, not the anterior hip joint, not the ASIS and not the ischeal tuberosity  Palpatory findings include no palpable clunk, no crepitus, no warmth and no snapping  ROM:  Full   Motor: Normal  Special Tests: negative REJI test and negative FADDIR test    Constitutional - General appearance: Normal    Musculoskeletal - Gait and station: Normal  Digits and nails: Normal  Cardiovascular - Pulses: Normal    Skin - Skin and subcutaneous tissue: Normal    Neurologic - Sensation: Normal    Psychiatric - Orientation to person, place, and time: Normal  Mood and affect: Normal    Eyes   Conjunctiva and lids: Normal        Attending Note  Attending Note [facility]: Attending Note: I interviewed, took the history and examined the patient, I discussed the case with the Resident and reviewed the Resident's note, I supervised the Resident and I agree with the Resident management plan as it was presented to me  Level of Participation: I was present in clinic and examined the patient  I agree with the Resident's note  Future Appointments    Date/Time Provider Specialty Site   02/09/2016 09:30 AM Rosy Concepcion RD Diabetes Educator [address] DIABETES EDUCATION   02/23/2016 09:30 AM Rosy Concepcion RD Diabetes Educator [address] DIABETES EDUCATION   02/16/2016 09:30 AM MARIE Hillman Diabetes Educator [address] DIABETES EDUCATION   02/26/2016 02:15 PM Olean Barthel, [facility] PRIMARY CARE   02/11/2016 02:00 PM Roseann Polo [facility] [address]     Signatures   Electronically signed by : Carly Hawthorne MD; Feb 1 2016  1:43PM EST                       (Author)    Electronically signed by :  JACINTA Russell ; Feb  3 2016  1:20PM EST                       (Author)

## 2018-01-15 NOTE — RESULT NOTES
Verified Results  (1) COMPREHENSIVE METABOLIC PANEL 80WRR7112 01:48KC Antonieta Ayon     Test Name Result Flag Reference   GLUCOSE 259 mg/dL H 65-99   Fasting reference interval   UREA NITROGEN (BUN) 12 mg/dL  7-25   CREATININE 0 61 mg/dL  0 50-0 99   For patients >52years of age, the reference limit  for Creatinine is approximately 13% higher for people  identified as -American  eGFR NON-AFR  AMERICAN 97 mL/min/1 73m2  > OR = 60   eGFR AFRICAN AMERICAN 113 mL/min/1 73m2  > OR = 60   BUN/CREATININE RATIO   8-31   NOT APPLICABLE (calc)   SODIUM 138 mmol/L  135-146   POTASSIUM 4 2 mmol/L  3 5-5 3   CHLORIDE 100 mmol/L     CARBON DIOXIDE 30 mmol/L  20-31   CALCIUM 9 1 mg/dL  8 6-10 4   PROTEIN, TOTAL 7 1 g/dL  6 1-8 1   ALBUMIN 4 0 g/dL  3 6-5 1   GLOBULIN 3 1 g/dL (calc)  1 9-3 7   ALBUMIN/GLOBULIN RATIO 1 3 (calc)  1 0-2 5   BILIRUBIN, TOTAL 0 3 mg/dL  0 2-1 2   ALKALINE PHOSPHATASE 60 U/L     AST 17 U/L  10-35   ALT 22 U/L  6-29     (Q) LIPID PANEL WITH REFLEX TO DIRECT LDL 26GSK4267 08:05AM Antonieta Ayon     Test Name Result Flag Reference   CHOLESTEROL, TOTAL 180 mg/dL  125-200   HDL CHOLESTEROL 44 mg/dL L > OR = 46   TRIGLICERIDES 98 mg/dL  <431   LDL-CHOLESTEROL 116 mg/dL (calc)  <130   Desirable range <100 mg/dL for patients with CHD or  diabetes and <70 mg/dL for diabetic patients with  known heart disease  CHOL/HDLC RATIO 4 1 (calc)  < OR = 5 0   NON HDL CHOLESTEROL 136 mg/dL (calc)     Target for non-HDL cholesterol is 30 mg/dL higher than   LDL cholesterol target       (Q) CBC (H/H, RBC, INDICES, WBC, PLT) 19GGL0353 08:05AM Antonieta Ayon   REPORT COMMENT:  FASTING:YES     Test Name Result Flag Reference   WHITE BLOOD CELL COUNT 6 3 Thousand/uL  3 8-10 8   RED BLOOD CELL COUNT 4 64 Million/uL  3 80-5 10   HEMOGLOBIN 12 3 g/dL  11 7-15 5   HEMATOCRIT 37 8 %  35 0-45 0   MCV 81 5 fL  80 0-100 0   MCH 26 4 pg L 27 0-33 0   MCHC 32 4 g/dL  32 0-36 0   RDW 14 9 %  11 0-15 0   PLATELET COUNT 204 Thousand/uL  140-400   MPV 8 1 fL  7 5-11 5     (Q) LIPID PANEL WITH REFLEX TO DIRECT LDL 72RSR1408 08:05AM PROFICIO     Test Name Result Flag Reference   CHOLESTEROL, TOTAL 180 mg/dL  125-200   HDL CHOLESTEROL 44 mg/dL L > OR = 46   TRIGLICERIDES 98 mg/dL  <129   LDL-CHOLESTEROL 116 mg/dL (calc)  <130   Desirable range <100 mg/dL for patients with CHD or  diabetes and <70 mg/dL for diabetic patients with  known heart disease  CHOL/HDLC RATIO 4 1 (calc)  < OR = 5 0   NON HDL CHOLESTEROL 136 mg/dL (calc)     Target for non-HDL cholesterol is 30 mg/dL higher than   LDL cholesterol target  (Q) CBC (H/H, RBC, INDICES, WBC, PLT) 44FJJ0092 08:05AM PROF[facility]IO     Test Name Result Flag Reference   WHITE BLOOD CELL COUNT 6 3 Thousand/uL  3 8-10 8   RED BLOOD CELL COUNT 4 64 Million/uL  3 80-5 10   HEMOGLOBIN 12 3 g/dL  11 7-15 5   HEMATOCRIT 37 8 %  35 0-45 0   MCV 81 5 fL  80 0-100 0   MCH 26 4 pg L 27 0-33 0   MCHC 32 4 g/dL  32 0-36 0   RDW 14 9 %  11 0-15 0   PLATELET COUNT 068 Thousand/uL  140-400   MPV 8 1 fL  7 5-11 5     (Q) TSH, 3RD GENERATION 10QII0179 08:05AM PROFICIO     Test Name Result Flag Reference   TSH 3 84 mIU/L  0 40-4 50     *(Q) VITAMIN D, 25-HYDROXY, LC/MS/MS 25IGX0293 08:05AM PROFICIO     Test Name Result Flag Reference   VITAMIN D, 25-OH, TOTAL 26 ng/mL L    Vitamin D Status         25-OH Vitamin D:     Deficiency:                    <20 ng/mL  Insufficiency:             20 - 29 ng/mL  Optimal:                 > or = 30 ng/mL     For 25-OH Vitamin D testing on patients on   D2-supplementation and patients for whom quantitation   of D2 and D3 fractions is required, the QuestAssureD(TM)  25-OH VIT D, (D2,D3), LC/MS/MS is recommended: order   code 79528 (patients >2yrs)  For more information on this test, go to:  http://ihush.com/faq/KLW537  (This link is being provided for   informational/educational purposes only )     (Q) HEMOGLOBIN A1c [id number] 08: Trena Sicard   REPORT COMMENT:  FASTING:YES     Test Name Result Flag Reference   HEMOGLOBIN A1c 8 9 % of total Hgb H <5 7   According to ADA guidelines, hemoglobin A1c <7 0%  represents optimal control in non-pregnant diabetic  patients  Different metrics may apply to specific  patient populations  Standards of Medical Care in    Diabetes Care  2013;36:s11-s66     For the purpose of screening for the presence of  diabetes  <5 7%       Consistent with the absence of diabetes  5 7-6 4%    Consistent with increased risk for diabetes              (prediabetes)  >or=6 5%    Consistent with diabetes     This assay result is consistent with diabetes  mellitus  Currently, no consensus exists for use of hemoglobin  A1c for diagnosis of diabetes for children

## 2018-01-16 NOTE — PROGRESS NOTES
Plan    1  DSMT/MNT Time Record; Status:Complete;   Done: 16XJD8094 08:17AM    Discussion/Summary    PATIENT EDUCATION RECORD   Indication for Services: type 2 Diabetes Mellitus  Living Well with Diabetes Class 4 Education Content: Types of cholesterol, Dietary sources of cholesterol, Types of fat, Types of fiber, Reading food labels- in depth, Healthy choices when dining out        Active Problems    1  Anxiety (300 00) (F41 9)   2  Cataract (366 9) (H26 9)   3  Delayed gastric emptying (536 8) (K30)   4  Depression screening (V79 0) (Z13 89)   5  Diabetes type 2, uncontrolled (250 02) (E11 65)   6  Diarrhea (787 91) (R19 7)   7  GERD (gastroesophageal reflux disease) (530 81) (K21 9)   8  Greater trochanteric bursitis of right hip (726 5) (M70 61)   9  Hypertension (401 9) (I10)   10  Hypothyroidism (244 9) (E03 9)   11  Irritable bowel syndrome (564 1) (K58 9)   12  Lateral pain of right hip (719 45) (M25 551)   13  Nausea with vomiting (787 01) (R11 2)   14  Need for influenza vaccination (V04 81) (Z23)   15  Osteoarthritis, hip, bilateral (715 95) (M16 0)   16  Screening for breast cancer (V76 10) (Z12 39)   17  Screening for colon cancer (V76 51) (Z12 11)    Past Medical History    1  History of Anxiety (300 00) (F41 9)   2  History of Dental infection (522 4) (K04 7)   3  History of esophageal reflux (V12 79) (Z87 19)    Surgical History    1  History of Cholecystotomy   2  History of Complete Colonoscopy   3  History of Dilation And Curettage   4  History of Hand Incision Tendon Sheath Of A Finger   5  History of Iridectomy By Laser   6  History of Total Abdominal Hysterectomy   7  History of Total Abdominal Hysterectomy With Removal Of Both Ovaries   8  History of Tubal Ligation    Family History    1  Family history of     2  Family history of    3  Family history of diabetes mellitus (V18 0) (Z83 3)    4  Family history of diabetes mellitus (V18 0) (Z83 3)    5   Family history of diabetes mellitus (V18 0) (Z83 3)    Social History    · Former smoker (V15 82) (E33 958)   · No alcohol use   · No drug use    Current Meds   1  Dicyclomine HCl - 20 MG Oral Tablet (Bentyl); TAKE 1 TABLET EVERY 6 HOURS AS   NEEDED; Therapy: 38MUX9793 to (Evaluate:95Wsv8473)  Requested for: 14IBO4508; Last   Rx:14Bms8162 Ordered   2  FreeStyle Lite Test In Vitro Strip; TEST 3 TIMES DAILY; Therapy: 02DSJ7031 to (Kwasi Lewis)  Requested for: 81BBN8295; Last   Rx:27Bed3989 Ordered   3  Glimepiride 2 MG Oral Tablet; Take 1 tablet twice daily; Therapy: 55VPQ2711 to (Amada Root)  Requested for: 48LBN9706; Last   Rx:20Jvt9224 Ordered   4  Januvia 100 MG Oral Tablet; Take 1 tablet daily; Therapy: 82WYP7157 to (Evaluate:00Ctw5852)  Requested for: 20Jan2016; Last   Rx:20Jan2016 Ordered   5  Levothyroxine Sodium 75 MCG Oral Tablet; TAKE 1 TABLET DAILY; Therapy: 94HXB3906 to (Evaluate:07Jun2016)  Requested for: 46JOX0474; Last   Rx:86Lta5173 Ordered   6  Lisinopril-Hydrochlorothiazide 20-12 5 MG Oral Tablet; TAKE 1 TABLET DAILY; Therapy: 71KXJ4366 to (Evaluate:26Num4416) Recorded   7  Meloxicam 7 5 MG Oral Tablet (Mobic); TAKE 1 TABLET TWICE DAILY AS NEEDED; Therapy: 71FMD5452 to (Evaluate:09Jan2016)  Requested for: 53ISE2926; Last   Rx:51Edc5402 Ordered   8  NovoLIN N 100 UNIT/ML Subcutaneous Suspension; INJECT 12 UNIT Twice daily; Therapy: 90SUN3913 to (Last Rx:08Wgf5648) Ordered   9  Omeprazole 40 MG Oral Capsule Delayed Release; TAKE 1 CAPSULE DAILY; Therapy: 42YVV5882 to (Bill Guzman)  Requested for: 99LZW9692; Last   Rx:57Huo3755 Ordered   10  Ondansetron HCl - 4 MG Oral Tablet (Zofran); TAKE 1 TABLET Every 8 hours PRN; Therapy: 03LYL6562 to (Evaluate:79Vzu5311)  Requested for: 59WWX4063; Last    Rx:65Vpn3603 Ordered   11  Sertraline HCl - 100 MG Oral Tablet (Zoloft); Take 1 tablet daily;     Therapy: 74DMB3684 to (Last Rx:83Zgw6818)  Requested for: 44VCP1215 Ordered    Allergies    1  MetFORMIN HCl TABS   2  Penicillins    End of Encounter Meds    1  Sertraline HCl - 100 MG Oral Tablet (Zoloft); Take 1 tablet daily; Therapy: 00HGR4620 to (Last Rx:17Zoe9906)  Requested for: 91MHF1789 Ordered    2  FreeStyle Lite Test In Vitro Strip; TEST 3 TIMES DAILY; Therapy: 03IAT7409 to (Kendall Cantor)  Requested for: 85AVH7866; Last   Rx:18Nov2015 Ordered   3  NovoLIN N 100 UNIT/ML Subcutaneous Suspension; INJECT 12 UNIT Twice daily; Therapy: 23TUC2228 to (Last Rx:06Dkg6697) Ordered    4  Januvia 100 MG Oral Tablet; Take 1 tablet daily; Therapy: 51DRE0241 to (Evaluate:45Xdd5533)  Requested for: 20Jan2016; Last   Rx:20Jan2016 Ordered    5  Glimepiride 2 MG Oral Tablet; Take 1 tablet twice daily; Therapy: 84QBT4699 to (Юлия Rubio)  Requested for: 87KEZ3553; Last   Rx:92Ulz8377 Ordered   6  Lisinopril-Hydrochlorothiazide 20-12 5 MG Oral Tablet; TAKE 1 TABLET DAILY; Therapy: 18IYM8973 to (Evaluate:35Xsv4697) Recorded    7  Omeprazole 40 MG Oral Capsule Delayed Release; TAKE 1 CAPSULE DAILY; Therapy: 48ZNT4796 to (Юлия Rubio)  Requested for: 07CPK2057; Last   Rx:16Xtz7092 Ordered    8  Levothyroxine Sodium 75 MCG Oral Tablet; TAKE 1 TABLET DAILY; Therapy: 26GHK2415 to (Evaluate:07Jun2016)  Requested for: 33JMW5849; Last   Rx:78Vle8002 Ordered    9  Dicyclomine HCl - 20 MG Oral Tablet (Bentyl); TAKE 1 TABLET EVERY 6 HOURS AS   NEEDED; Therapy: 43VBL8331 to (Evaluate:11Hmr3135)  Requested for: 50RJP5662; Last   Rx:34Nrq2699 Ordered    10  Ondansetron HCl - 4 MG Oral Tablet (Zofran); TAKE 1 TABLET Every 8 hours PRN; Therapy: 78PRT9919 to (Evaluate:23Fnn5194)  Requested for: 08XLR4214; Last    Rx:45Ghp3409 Ordered    11  Meloxicam 7 5 MG Oral Tablet (Mobic); TAKE 1 TABLET TWICE DAILY AS NEEDED; Therapy: 08QCR5080 to (Evaluate:09Jan2016)  Requested for: 74ZBM4384;  Last    Rx:29Trl0044 Ordered    Future Appointments    Date/Time Provider Specialty Site   02/26/2016 02:15 PM Aaron Ibarra [facility] Internal [facility] PRIMARY CARE     Signatures   Electronically signed by : Danyell Benavides RD; Feb 24 2016  8:18AM EST                       (Author)    Electronically signed by : JACINTA Condon ; Feb 24 2016 10:41AM EST

## 2018-01-17 NOTE — PROGRESS NOTES
Plan    1  DSMT/MNT Time Record; Status:Complete;   Done: [id number] 02:51PM    Discussion/Summary    PATIENT EDUCATION RECORD   Indication for Services: type 2 Diabetes Mellitus  She is ready to learn  She has no barriers to learning  Living Well with Diabetes Class 1 Education Content: What is diabetes, Types of diabetes, How is diabetes diagnosed, Management skills, Role of exercise, Glucose monitoring, Target range goals        Chief Complaint  Type 2 diabetes education      Active Problems    1  Anxiety (300 00) (F41 9)   2  Cataract (366 9) (H26 9)   3  Depression screening (V79 0) (Z13 89)   4  Diabetes type 2, uncontrolled (250 02) (E11 65)   5  Diarrhea (787 91) (R19 7)   6  GERD (gastroesophageal reflux disease) (530 81) (K21 9)   7  Greater trochanteric bursitis of right hip (726 5) (M70 61)   8  Hypertension (401 9) (I10)   9  Hypothyroidism (244 9) (E03 9)   10  Irritable bowel syndrome (564 1) (K58 9)   11  Lateral pain of right hip (719 45) (M25 551)   12  Nausea with vomiting (787 01) (R11 2)   13  Need for influenza vaccination (V04 81) (Z23)   14  Osteoarthritis, hip, bilateral (715 95) (M16 0)   15  Screening for breast cancer (V76 10) (Z12 39)   16  Screening for colon cancer (V76 51) (Z12 11)    Past Medical History    1  History of Anxiety (300 00) (F41 9)   2  History of Dental infection (522 4) (K04 7)   3  History of esophageal reflux (V12 79) (Z87 19)    Surgical History    1  History of Cholecystotomy   2  History of Complete Colonoscopy   3  History of Dilation And Curettage   4  History of Hand Incision Tendon Sheath Of A Finger   5  History of Iridectomy By Laser   6  History of Total Abdominal Hysterectomy   7  History of Total Abdominal Hysterectomy With Removal Of Both Ovaries   8  History of Tubal Ligation    Family History    1  Family history of     2  Family history of    3  Family history of diabetes mellitus (V18 0) (Z83 3)    4   Family history of diabetes mellitus (V18 0) (Z83 3)    5  Family history of diabetes mellitus (V18 0) (Z83 3)    Social History    · Former smoker (V15 82) (Z81 926)   · No alcohol use   · No drug use    Current Meds   1  Dicyclomine HCl - 20 MG Oral Tablet (Bentyl); TAKE 1 TABLET EVERY 6 HOURS AS   NEEDED; Therapy: 20WLH8449 to (Evaluate:41Oom0414)  Requested for: 86TOG7923; Last   Rx:17Qwo8884 Ordered   2  FreeStyle Lite Test In Vitro Strip; TEST 3 TIMES DAILY; Therapy: 33LRU4349 to (Nguyen Naidu)  Requested for: 65EKX4840; Last   Rx:18Nov2015 Ordered   3  Glimepiride 2 MG Oral Tablet; Take 1 tablet twice daily; Therapy: 53TJW4673 to (Milad Best)  Requested for: 40BVC5731; Last   Rx:31Oso7160 Ordered   4  Januvia 100 MG Oral Tablet; Take 1 tablet daily; Therapy: 02WDD6642 to (Evaluate:39Bic3502)  Requested for: 20Jan2016; Last   Rx:20Jan2016 Ordered   5  Levothyroxine Sodium 75 MCG Oral Tablet; TAKE 1 TABLET DAILY; Therapy: 36MTC8111 to (Evaluate:07Jun2016)  Requested for: 79LQI6273; Last   Rx:93Xkz7628 Ordered   6  Lisinopril-Hydrochlorothiazide 20-12 5 MG Oral Tablet; TAKE 1 TABLET DAILY; Therapy: 25AVJ4288 to (Evaluate:96Olp4910) Recorded   7  Meloxicam 7 5 MG Oral Tablet (Mobic); TAKE 1 TABLET TWICE DAILY AS NEEDED; Therapy: 48ECH5295 to (Evaluate:09Jan2016)  Requested for: 40BXJ3967; Last   Rx:94Zgw6846 Ordered   8  MoviPrep 100 GM Oral Solution Reconstituted; USE AS DIRECTED; Therapy: 89ETI0886 to (Last Rx:09Nte4057)  Requested for: 56BIL8105 Ordered   9  NovoLIN N 100 UNIT/ML Subcutaneous Suspension; INJECT 12 UNIT Twice daily; Therapy: 75OKS3516 to (Last Rx:89Dcf5759) Ordered   10  Omeprazole 40 MG Oral Capsule Delayed Release; TAKE 1 CAPSULE DAILY; Therapy: 48XUO3049 to (Milad Best)  Requested for: 02SLT0168; Last    Rx:59Uim2110 Ordered   11  Ondansetron HCl - 4 MG Oral Tablet (Zofran); TAKE 1 TABLET Every 8 hours PRN;     Therapy: 86VVC5740 to (Evaluate:74Nwz4828)  Requested for: 16YWP7078; Last    Rx:09Dec2015 Ordered   12  Sertraline HCl - 100 MG Oral Tablet (Zoloft); Take 1 tablet daily; Therapy: 96CYU1589 to (Last Rx:25Jan2016)  Requested for: 76DTD4722 Ordered    Allergies    1  MetFORMIN HCl TABS   2  Penicillins    Results/Data  Encounter Results   DSMT/MNT Time Record [id number] 02:51PM Murtaza Schafer     Test Name Result Flag Reference   Date of Service 2/2/16     Start - Stop Time 9:30 am - 11:30 am     Total MInutes 120     Group Or Individual Instruction grp       End of Encounter Meds    1  Sertraline HCl - 100 MG Oral Tablet (Zoloft); Take 1 tablet daily; Therapy: 81GTH3541 to (Last Rx:25Jan2016)  Requested for: 25Jan2016 Ordered    2  FreeStyle Lite Test In Vitro Strip; TEST 3 TIMES DAILY; Therapy: 51PMZ8128 to (Gil Plaza)  Requested for: 26ELL3590; Last   Rx:18Nov2015 Ordered   3  NovoLIN N 100 UNIT/ML Subcutaneous Suspension; INJECT 12 UNIT Twice daily; Therapy: 86HET6646 to (Last Rx:10Dec2015) Ordered    4  Januvia 100 MG Oral Tablet; Take 1 tablet daily; Therapy: 78ZRX1185 to (Evaluate:47Zkq4693)  Requested for: 20Jan2016; Last   Rx:20Jan2016 Ordered    5  MoviPrep 100 GM Oral Solution Reconstituted; USE AS DIRECTED; Therapy: 98BZT9434 to (Last Rx:88Nzs2737)  Requested for: 23LST8957 Ordered    6  Glimepiride 2 MG Oral Tablet; Take 1 tablet twice daily; Therapy: 58YNR7690 to (Sera Smallwood)  Requested for: 36MOF2853; Last   Rx:10Dec2015 Ordered   7  Lisinopril-Hydrochlorothiazide 20-12 5 MG Oral Tablet; TAKE 1 TABLET DAILY; Therapy: 76BVS5780 to (Evaluate:66Ypd2491) Recorded    8  Omeprazole 40 MG Oral Capsule Delayed Release; TAKE 1 CAPSULE DAILY; Therapy: 71RKQ6300 to (Sera Smallwood)  Requested for: 26JWF9496; Last   Rx:09Dec2015 Ordered    9  Levothyroxine Sodium 75 MCG Oral Tablet; TAKE 1 TABLET DAILY; Therapy: 57GDJ9533 to (Evaluate:07Jun2016)  Requested for: 14GLA1424; Last   Rx:00Ftz9869 Ordered    10   Dicyclomine HCl - 20 MG Oral Tablet (Bentyl); TAKE 1 TABLET EVERY 6 HOURS AS    NEEDED; Therapy: 10JLD4037 to (Evaluate:09Jev4122)  Requested for: 67BAU9265; Last    Rx:35Ezx6694 Ordered    11  Ondansetron HCl - 4 MG Oral Tablet (Zofran); TAKE 1 TABLET Every 8 hours PRN; Therapy: 30SKF9870 to (Evaluate:75Oti5727)  Requested for: 07CXA1946; Last    Rx:53Swr9814 Ordered    12  Meloxicam 7 5 MG Oral Tablet (Mobic); TAKE 1 TABLET TWICE DAILY AS NEEDED; Therapy: 65MKI6652 to (Evaluate:09Jan2016)  Requested for: 17VZK3908; Last    Rx:10Tai4535 Ordered    Future Appointments    Date/Time Provider Specialty Site   02/09/2016 09:30 AM Susy Tavarez RD [facility] DIABETES EDUCATION   02/23/2016 09:30 AM Susy Tavarez RD [facility] DIABETES EDUCATION   02/16/2016 09:30 AM MARIE Luna [facility] DIABETES EDUCATION   [facility]26[facility] 02[facility]   02/11/2016 02:00 PM [facility] [address]     Signatures   Electronically signed by : MARIE Almazan;  Feb 2 2016  2:54PM EST                       (Author)    Electronically signed by : JACINTA Condon ; Feb 2 2016  8:49PM EST

## 2018-01-17 NOTE — RESULT NOTES
Verified Results  (Q) CULTURE, URINE, SPECIAL [id number] 12:00AM Naomy Apgar     Test Name Result Flag Reference   CULTURE, URINE, SPECIAL  A    CULTURE, URINE, SPECIAL         MICRO NUMBER:      [id number]    TEST STATUS:       FINAL    SPECIMEN SOURCE:   URINE    SPECIMEN QUALITY:  ADEQUATE    RESULT:            50,000-100,000 CFU/mL of Escherichia coli                            E coli                            ----------------                            INT   STEPHANIE     AMOX/CLAVULANATE       S     4 0     AMPICILLIN             S     8 0     AMP/SULBACTAM          S     <=2 0     CEFAZOLIN              NR    <=4 0 **1     CEFEPIME               S     <=1 0     CEFTRIAXONE            S     <=1 0     CIPROFLOXACIN          S     <=0 25     ERTAPENEM              S     <=0 5     GENTAMICIN             S     <=1 0     IMIPENEM               S     <=0 25     LEVOFLOXACIN           S     <=0 12     NITROFURANTOIN         S     <=16 0     PIP/TAZOBACTAM         S     <=4 0     TOBRAMYCIN             S     <=1 0     TRIMETHOPRIM/SULFA     S     <=20 0  S=Susceptible  I=Intermediate  R=Resistant  * = Not Tested  NR = Not Reported  **NN = See Therapy Comments  THERAPY COMMENTS      Note 1:      ORAL therapy: A cefazolin STEPHANIE of < 32 predicts      susceptibility to the oral agents cefaclor,      cefdinir, cefpodoxime, cefprozil, cefuroxime,      cephalexin, and loracarbef when used for therapy      of uncomplicated UTIs due to E  coli,      K  pneumoniae, and P  mirabilis  PARENTERAL therapy: A cefazolin STEPHANIE of > 8      indicates resistance to parenteral cefazolin  An alternate test method must be performed to      to confirm susceptibility to parenteral cefazolin

## 2018-01-18 NOTE — MISCELLANEOUS
Message  spoke to pt about mild delay of gastric emptying at 2 hrs (normal rate at 4 hours)  She feels much better with zofran/bentyl  Explained importance of glycemic control  Will not start any prokinetic tx at this time  Has f/u appt in 2/11/15        Signatures   Electronically signed by : Isabell Chaidez, [facility]; Jan 11 2016 11:55AM EST                       (Author)

## 2018-01-24 VITALS
DIASTOLIC BLOOD PRESSURE: 68 MMHG | HEART RATE: 68 BPM | SYSTOLIC BLOOD PRESSURE: 124 MMHG | BODY MASS INDEX: 35.4 KG/M2 | OXYGEN SATURATION: 97 % | HEIGHT: 62 IN | WEIGHT: 192.38 LBS | TEMPERATURE: 97.9 F

## 2018-01-30 DIAGNOSIS — Z79.4 TYPE 2 DIABETES MELLITUS WITHOUT COMPLICATION, WITH LONG-TERM CURRENT USE OF INSULIN (HCC): Primary | ICD-10-CM

## 2018-01-30 DIAGNOSIS — E11.9 TYPE 2 DIABETES MELLITUS WITHOUT COMPLICATION, WITH LONG-TERM CURRENT USE OF INSULIN (HCC): Primary | ICD-10-CM

## 2018-02-01 RX ORDER — HUMAN INSULIN 100 [IU]/ML
INJECTION, SUSPENSION SUBCUTANEOUS
Qty: 10 ML | Refills: 0 | Status: SHIPPED | OUTPATIENT
Start: 2018-02-01 | End: 2018-03-05 | Stop reason: SDUPTHER

## 2018-02-02 DIAGNOSIS — E11.9 TYPE 2 DIABETES MELLITUS WITHOUT COMPLICATION, WITHOUT LONG-TERM CURRENT USE OF INSULIN (HCC): Primary | ICD-10-CM

## 2018-02-03 RX ORDER — SYRING-NEEDL,DISP,INSUL,0.3 ML 30 G X1/2"
SYRINGE, EMPTY DISPOSABLE MISCELLANEOUS
Qty: 90 EACH | Refills: 0 | Status: SHIPPED | OUTPATIENT
Start: 2018-02-03 | End: 2018-02-12 | Stop reason: SDUPTHER

## 2018-02-03 RX ORDER — LANCETS 28 GAUGE
EACH MISCELLANEOUS
Qty: 90 EACH | Refills: 0 | Status: SHIPPED | OUTPATIENT
Start: 2018-02-03 | End: 2019-11-08 | Stop reason: SDUPTHER

## 2018-02-05 ENCOUNTER — LAB (OUTPATIENT)
Dept: LAB | Age: 63
End: 2018-02-05
Payer: COMMERCIAL

## 2018-02-05 ENCOUNTER — TRANSCRIBE ORDERS (OUTPATIENT)
Dept: LAB | Age: 63
End: 2018-02-05

## 2018-02-05 DIAGNOSIS — E11.43 TYPE 2 DIABETES MELLITUS WITH AUTONOMIC NEUROPATHY (HCC): ICD-10-CM

## 2018-02-05 LAB
ANION GAP SERPL CALCULATED.3IONS-SCNC: 5 MMOL/L (ref 4–13)
BUN SERPL-MCNC: 11 MG/DL (ref 5–25)
CALCIUM SERPL-MCNC: 9.1 MG/DL (ref 8.3–10.1)
CHLORIDE SERPL-SCNC: 102 MMOL/L (ref 100–108)
CO2 SERPL-SCNC: 31 MMOL/L (ref 21–32)
CREAT SERPL-MCNC: 0.58 MG/DL (ref 0.6–1.3)
EST. AVERAGE GLUCOSE BLD GHB EST-MCNC: 203 MG/DL
GFR SERPL CREATININE-BSD FRML MDRD: 98 ML/MIN/1.73SQ M
GLUCOSE P FAST SERPL-MCNC: 238 MG/DL (ref 65–99)
HBA1C MFR BLD: 8.7 % (ref 4.2–6.3)
POTASSIUM SERPL-SCNC: 4.2 MMOL/L (ref 3.5–5.3)
SODIUM SERPL-SCNC: 138 MMOL/L (ref 136–145)

## 2018-02-05 PROCEDURE — 83036 HEMOGLOBIN GLYCOSYLATED A1C: CPT

## 2018-02-05 PROCEDURE — 80048 BASIC METABOLIC PNL TOTAL CA: CPT

## 2018-02-05 PROCEDURE — 36415 COLL VENOUS BLD VENIPUNCTURE: CPT

## 2018-02-06 ENCOUNTER — OFFICE VISIT (OUTPATIENT)
Dept: INTERNAL MEDICINE CLINIC | Age: 63
End: 2018-02-06
Payer: COMMERCIAL

## 2018-02-06 VITALS
SYSTOLIC BLOOD PRESSURE: 136 MMHG | HEIGHT: 62 IN | HEART RATE: 75 BPM | DIASTOLIC BLOOD PRESSURE: 78 MMHG | TEMPERATURE: 98.1 F | OXYGEN SATURATION: 97 % | BODY MASS INDEX: 36.33 KG/M2 | WEIGHT: 197.4 LBS

## 2018-02-06 DIAGNOSIS — Z23 NEED FOR INFLUENZA VACCINATION: ICD-10-CM

## 2018-02-06 DIAGNOSIS — E11.8 TYPE 2 DIABETES MELLITUS WITH COMPLICATION, UNSPECIFIED LONG TERM INSULIN USE STATUS: Primary | ICD-10-CM

## 2018-02-06 DIAGNOSIS — E03.9 ACQUIRED HYPOTHYROIDISM: ICD-10-CM

## 2018-02-06 PROBLEM — K31.84 DIABETIC GASTROPARESIS (HCC): Status: ACTIVE | Noted: 2017-08-22

## 2018-02-06 PROBLEM — E11.43 DIABETIC GASTROPARESIS (HCC): Status: ACTIVE | Noted: 2017-08-22

## 2018-02-06 PROCEDURE — 90686 IIV4 VACC NO PRSV 0.5 ML IM: CPT

## 2018-02-06 PROCEDURE — 90471 IMMUNIZATION ADMIN: CPT

## 2018-02-06 PROCEDURE — 99214 OFFICE O/P EST MOD 30 MIN: CPT | Performed by: INTERNAL MEDICINE

## 2018-02-06 RX ORDER — METOCLOPRAMIDE 10 MG/1
1 TABLET ORAL 2 TIMES DAILY PRN
COMMUNITY
Start: 2017-08-22 | End: 2018-05-30 | Stop reason: SDUPTHER

## 2018-02-06 RX ORDER — BLOOD-GLUCOSE METER
KIT MISCELLANEOUS
COMMUNITY
Start: 2018-02-05 | End: 2018-03-05 | Stop reason: SDUPTHER

## 2018-02-06 RX ORDER — LANCETS 28 GAUGE
EACH MISCELLANEOUS
COMMUNITY
Start: 2017-01-03 | End: 2018-02-06 | Stop reason: SDUPTHER

## 2018-02-06 RX ORDER — GLIMEPIRIDE 4 MG/1
4 TABLET ORAL 2 TIMES DAILY
COMMUNITY
Start: 2018-01-25 | End: 2018-05-30 | Stop reason: SDUPTHER

## 2018-02-06 RX ORDER — LISINOPRIL AND HYDROCHLOROTHIAZIDE 20; 12.5 MG/1; MG/1
1 TABLET ORAL DAILY
COMMUNITY
Start: 2018-02-05 | End: 2018-05-01 | Stop reason: SDUPTHER

## 2018-02-06 RX ORDER — OMEPRAZOLE 40 MG/1
1 CAPSULE, DELAYED RELEASE ORAL DAILY
COMMUNITY
Start: 2015-12-09 | End: 2018-03-05 | Stop reason: SDUPTHER

## 2018-02-06 RX ORDER — BLOOD-GLUCOSE METER
KIT MISCELLANEOUS
COMMUNITY
Start: 2016-07-26 | End: 2018-03-05 | Stop reason: CLARIF

## 2018-02-06 RX ORDER — SERTRALINE HYDROCHLORIDE 100 MG/1
1 TABLET, FILM COATED ORAL DAILY
COMMUNITY
Start: 2015-02-25 | End: 2018-03-05 | Stop reason: SDUPTHER

## 2018-02-06 RX ORDER — BLOOD-GLUCOSE METER
KIT MISCELLANEOUS
Qty: 1 EACH | Refills: 0 | Status: CANCELLED | OUTPATIENT
Start: 2018-02-06

## 2018-02-06 RX ORDER — LEVOTHYROXINE SODIUM 0.1 MG/1
1 TABLET ORAL DAILY
COMMUNITY
Start: 2015-12-10 | End: 2018-03-05 | Stop reason: SDUPTHER

## 2018-02-06 RX ORDER — ACETAMINOPHEN 160 MG
1 TABLET,DISINTEGRATING ORAL DAILY
COMMUNITY
Start: 2016-08-01 | End: 2018-05-01 | Stop reason: SDUPTHER

## 2018-02-06 RX ORDER — BLOOD-GLUCOSE METER
KIT MISCELLANEOUS
Qty: 150 EACH | Refills: 5 | Status: CANCELLED | OUTPATIENT
Start: 2018-02-06

## 2018-02-06 NOTE — PROGRESS NOTES
Assessment/Plan:         Diagnoses and all orders for this visit:    Type 2 diabetes mellitus with complication, unspecified long term insulin use status (HCC)  -     glucose monitoring kit (FREESTYLE) monitoring kit; by Does not apply route 4 (four) times a day (before meals and at bedtime)  -     FREESTYLE LITE test strip; Test glucose 4 times daily before meals and at bedtime    Need for influenza vaccination    Other orders  -     Discontinue: Insulin Syringe-Needle U-100 (B-D INS SYR ULTRAFINE  3CC/30G) 30G X 1/2" 0 3 ML MISC; by Does not apply route  -     Discontinue: Lancets (FREESTYLE) lancets; by Does not apply route  -     Discontinue: Glucose Blood (FREESTYLE LITE TEST VI); by In Vitro route 3 (three) times a day  -     glucose monitoring kit (FREESTYLE) monitoring kit; by Does not apply route  -     glimepiride (AMARYL) 4 mg tablet;   -     FREESTYLE LITE test strip;   -     lisinopril-hydrochlorothiazide (PRINZIDE,ZESTORETIC) 20-12 5 MG per tablet;   -     metoclopramide (REGLAN) 10 mg tablet; Take 1 tablet by mouth  -     Discontinue: insulin NPH (NOVOLIN N) 100 Units/mL subcutaneous injection; Inject under the skin  -     omeprazole (PriLOSEC) 40 MG capsule; Take 1 capsule by mouth daily  -     sertraline (ZOLOFT) 100 mg tablet; Take 1 tablet by mouth daily  -     Cholecalciferol (VITAMIN D3) 2000 units capsule; Take 1 capsule by mouth daily  -     levothyroxine (LEVOXYL) 100 mcg tablet; Take 1 tablet by mouth daily          Subjective:      Patient ID: Zhou Sow is a 60 y o  female  Type 2 diabetes mellitus poor controlled hemoglobin A1c is slightly and slowly coming down but not hot which should be it is 8 1 now  Patient was unable to tolerate Januvia because of the diabetic gastroparesis  We will try Jardance  For control of diabetes mellitus    2  Hypertension is controlled    3  Diabetic gastroparesis her symptoms are much better than before    4  Hypothyroidism TSH is normal    5  Anxiety is stable        The following portions of the patient's history were reviewed and updated as appropriate: allergies, current medications, past family history, past medical history, past social history, past surgical history and problem list     Review of Systems   Constitutional: Negative for chills and fatigue  HENT: Positive for sore throat  Negative for congestion, ear pain, hearing loss, postnasal drip, sinus pressure and voice change  Eyes: Negative for pain, discharge and visual disturbance  Respiratory: Negative for chest tightness and shortness of breath  Cardiovascular: Negative for chest pain, palpitations and leg swelling  Gastrointestinal: Negative for abdominal pain, blood in stool, diarrhea, nausea and rectal pain  Genitourinary: Negative for difficulty urinating, dysuria and urgency  Musculoskeletal: Negative for arthralgias and joint swelling  Skin: Negative for rash  Allergic/Immunologic: Negative for environmental allergies and food allergies  Neurological: Negative for dizziness, tremors, weakness, numbness and headaches  Hematological: Negative for adenopathy  Psychiatric/Behavioral: Negative for behavioral problems and hallucinations           Past Medical History:   Diagnosis Date    Acid reflux     Anxiety     Diabetes mellitus ([facility] [address] )     Hypertension     Hypothyroid          Current Outpatient Prescriptions:     B-D INS SYR ULTRAFINE  3CC/30G 30G X 1/2" 0 3 ML MISC, USE WITH INSULIN TWICE DAILY, Disp: 90 each, Rfl: 0    Cholecalciferol (VITAMIN D3) 2000 units capsule, Take 1 capsule by mouth daily, Disp: , Rfl:     cyanocobalamin (VITAMIN B-12) 1,000 mcg tablet, Take 1,000 mcg by mouth daily, Disp: , Rfl:     FREESTYLE LITE test strip, , Disp: , Rfl:     glimepiride (AMARYL) 4 mg tablet, , Disp: , Rfl:     glucose monitoring kit (FREESTYLE) monitoring kit, by Does not apply route, Disp: , Rfl:     Lancets (FREESTYLE) lancets, TEST BLOOD GLUCOSE 3 TIMES A DAY, Disp: 90 each, Rfl: 0    levothyroxine (LEVOXYL) 100 mcg tablet, Take 1 tablet by mouth daily, Disp: , Rfl:     lisinopril-hydrochlorothiazide (PRINZIDE,ZESTORETIC) 20-12 5 MG per tablet, , Disp: , Rfl:     metoclopramide (REGLAN) 10 mg tablet, Take 1 tablet by mouth, Disp: , Rfl:     NOVOLIN N 100 UNIT/ML subcutaneous injection, INJECT 10 UNITS SUBCUTANEOUSLY TWICE DAILY, Disp: 10 mL, Rfl: 0    omeprazole (PriLOSEC) 40 MG capsule, Take 1 capsule by mouth daily, Disp: , Rfl:     sertraline (ZOLOFT) 100 mg tablet, Take 1 tablet by mouth daily, Disp: , Rfl:     sitaGLIPtin (JANUVIA) 100 mg tablet, Take 100 mg by mouth daily  , Disp: , Rfl:     Allergies   Allergen Reactions    Metformin      Other reaction(s): Diarrhea  Other reaction(s): Diarrhea    Metformin And Related GI Intolerance    Penicillins Rash     Other reaction(s): Rash  Other reaction(s): Rash       Social History   Past Surgical History:   Procedure Laterality Date    CHOLECYSTECTOMY      DILATION AND CURETTAGE OF UTERUS      ESOPHAGOGASTRODUODENOSCOPY      HYSTERECTOMY      OK COLONOSCOPY FLX DX W/COLLJ SPEC WHEN PFRMD N/A 1/28/2016    Procedure: EGD AND COLONOSCOPY;  Surgeon: Kevin Ku MD;  Location: [facility];   Service: Gastroenterology    OK INCISE FINGER TENDON SHEATH Right 1/31/2017    Procedure: LONG FINGER TRIGGER RELEASE ;  Surgeon: Ernesto Sanchez MD;  [facility];  Service: Orthopedics    OK INCISE FINGER TENDON SHEATH Right 7/21/2016    Procedure: RELEASE TRIGGER FINGER - LONG FINGER;  Surgeon: Ernesto Sanchez MD;  Location:  MAIN OR;  Service: Orthopedics    OK REVISE MEDIAN N/CARPAL TUNNEL SURG Right 1/31/2017    Procedure: WRIST CARPAL TUNNEL RELEASE ; TENOLYSIS OF FPL, FDS 2-5, FDP 2-5;  APPLICATION OF Charm Dome;  Surgeon: Ernesto Sanchez MD;  [facility];  Service: Orthopedics     Family History   Problem Relation Age of Onset    Cancer Father     Arthritis Maternal Grandmother     No Known Problems Paternal Grandmother     Heart disease Paternal Grandfather     Stroke Paternal Grandfather        Objective:  /78 (BP Location: Left arm, Patient Position: Sitting, Cuff Size: Standard)   Pulse 75   Temp 98 1 °F (36 7 °C) (Tympanic)   Ht 5' 2 36" (1 584 m)   Wt 89 5 kg (197 lb 6 4 oz)   SpO2 97%   BMI 35 69 kg/m²        Physical Exam   Constitutional: She is oriented to person, place, and time  She appears well-developed and well-nourished  HENT:   Right Ear: External ear normal    Mouth/Throat: Oropharynx is clear and moist    Eyes: Conjunctivae and EOM are normal  Pupils are equal, round, and reactive to light  Neck: Normal range of motion  No JVD present  No thyromegaly present  Cardiovascular: Normal rate, regular rhythm, normal heart sounds and intact distal pulses  Pulmonary/Chest: Breath sounds normal    Abdominal: Soft  Bowel sounds are normal    Musculoskeletal: Normal range of motion  Lymphadenopathy:     She has no cervical adenopathy  Neurological: She is alert and oriented to person, place, and time  She has normal reflexes  Skin: Skin is dry  Psychiatric: She has a normal mood and affect   Her behavior is normal  Judgment and thought content normal

## 2018-02-12 DIAGNOSIS — E11.65 TYPE 2 DIABETES MELLITUS WITH HYPERGLYCEMIA, WITHOUT LONG-TERM CURRENT USE OF INSULIN (HCC): Primary | ICD-10-CM

## 2018-02-12 DIAGNOSIS — E11.9 TYPE 2 DIABETES MELLITUS WITHOUT COMPLICATION, WITHOUT LONG-TERM CURRENT USE OF INSULIN (HCC): ICD-10-CM

## 2018-02-12 RX ORDER — SYRING-NEEDL,DISP,INSUL,0.3 ML 30 G X1/2"
SYRINGE, EMPTY DISPOSABLE MISCELLANEOUS
Qty: 70 EACH | Refills: 0 | Status: SHIPPED | OUTPATIENT
Start: 2018-02-12 | End: 2018-04-09 | Stop reason: SDUPTHER

## 2018-03-05 DIAGNOSIS — K21.9 GASTROESOPHAGEAL REFLUX DISEASE, ESOPHAGITIS PRESENCE NOT SPECIFIED: ICD-10-CM

## 2018-03-05 DIAGNOSIS — E11.9 TYPE 2 DIABETES MELLITUS WITHOUT COMPLICATION, WITH LONG-TERM CURRENT USE OF INSULIN (HCC): ICD-10-CM

## 2018-03-05 DIAGNOSIS — E11.8 TYPE 2 DIABETES MELLITUS WITH COMPLICATION, WITH LONG-TERM CURRENT USE OF INSULIN (HCC): Primary | ICD-10-CM

## 2018-03-05 DIAGNOSIS — Z79.4 TYPE 2 DIABETES MELLITUS WITH COMPLICATION, WITH LONG-TERM CURRENT USE OF INSULIN (HCC): Primary | ICD-10-CM

## 2018-03-05 DIAGNOSIS — Z79.4 TYPE 2 DIABETES MELLITUS WITHOUT COMPLICATION, WITH LONG-TERM CURRENT USE OF INSULIN (HCC): ICD-10-CM

## 2018-03-05 DIAGNOSIS — F41.9 ANXIETY: ICD-10-CM

## 2018-03-05 DIAGNOSIS — E03.9 HYPOTHYROIDISM, UNSPECIFIED TYPE: ICD-10-CM

## 2018-03-05 RX ORDER — SERTRALINE HYDROCHLORIDE 100 MG/1
100 TABLET, FILM COATED ORAL DAILY
Qty: 90 TABLET | Refills: 1 | Status: SHIPPED | OUTPATIENT
Start: 2018-03-05 | End: 2018-08-16 | Stop reason: SDUPTHER

## 2018-03-05 RX ORDER — BLOOD-GLUCOSE METER
KIT MISCELLANEOUS
Qty: 450 EACH | Refills: 1 | Status: SHIPPED | OUTPATIENT
Start: 2018-03-05 | End: 2018-10-22 | Stop reason: SDUPTHER

## 2018-03-05 RX ORDER — BLOOD-GLUCOSE METER
KIT MISCELLANEOUS 4 TIMES DAILY
Qty: 1 EACH | Refills: 0 | Status: SHIPPED | OUTPATIENT
Start: 2018-03-05 | End: 2019-11-07

## 2018-03-05 RX ORDER — LEVOTHYROXINE SODIUM 0.1 MG/1
100 TABLET ORAL DAILY
Qty: 90 TABLET | Refills: 1 | Status: SHIPPED | OUTPATIENT
Start: 2018-03-05 | End: 2018-08-16 | Stop reason: SDUPTHER

## 2018-03-05 RX ORDER — OMEPRAZOLE 40 MG/1
40 CAPSULE, DELAYED RELEASE ORAL DAILY
Qty: 90 CAPSULE | Refills: 1 | Status: SHIPPED | OUTPATIENT
Start: 2018-03-05 | End: 2018-08-16 | Stop reason: SDUPTHER

## 2018-03-14 ENCOUNTER — TELEPHONE (OUTPATIENT)
Dept: INTERNAL MEDICINE CLINIC | Facility: CLINIC | Age: 63
End: 2018-03-14

## 2018-03-14 NOTE — TELEPHONE ENCOUNTER
Patient called and is stating that the Jardiance for her DM----she is having problem with going to the bathroom a lot----urinary incontinence---takes down her numbers but has to go to the bathroom a lot  Started the new med about a month ago  [facility]

## 2018-03-15 NOTE — TELEPHONE ENCOUNTER
Patient called states she stopped her jardiance proximally 1 month ago due to increased urinary frequency and incontinence at nighttime  Reports that her blood sugars have been elevated since stopping  She is currently taking  Novolin 20 units a m  And 20 units p m  Review patient's medication list additionally on Amaryl 4 mg and Januvia  Discussed with patient possibility of needing to start long-term insulin  Patient to schedule follow-up to review closer and discuss options  She will call back to schedule appointment    No questions

## 2018-04-02 RX ORDER — CHOLECALCIFEROL (VITAMIN D3) 50 MCG
TABLET ORAL
Qty: 30 TABLET | Refills: 0 | OUTPATIENT
Start: 2018-04-02

## 2018-04-02 RX ORDER — METOCLOPRAMIDE 10 MG/1
TABLET ORAL
Qty: 120 TABLET | Refills: 0 | OUTPATIENT
Start: 2018-04-02

## 2018-04-02 RX ORDER — LISINOPRIL AND HYDROCHLOROTHIAZIDE 20; 12.5 MG/1; MG/1
TABLET ORAL
Qty: 15 TABLET | Refills: 0 | OUTPATIENT
Start: 2018-04-02

## 2018-04-03 ENCOUNTER — OFFICE VISIT (OUTPATIENT)
Dept: INTERNAL MEDICINE CLINIC | Age: 63
End: 2018-04-03

## 2018-04-03 VITALS
WEIGHT: 198.2 LBS | HEART RATE: 71 BPM | OXYGEN SATURATION: 97 % | TEMPERATURE: 98.1 F | SYSTOLIC BLOOD PRESSURE: 120 MMHG | BODY MASS INDEX: 35.83 KG/M2 | DIASTOLIC BLOOD PRESSURE: 70 MMHG

## 2018-04-03 DIAGNOSIS — IMO0002 UNCONTROLLED TYPE 2 DIABETES MELLITUS WITH OTHER SPECIFIED COMPLICATION, WITH LONG-TERM CURRENT USE OF INSULIN: Primary | ICD-10-CM

## 2018-04-03 PROBLEM — R11.2 NAUSEA AND VOMITING: Status: ACTIVE | Noted: 2017-11-06

## 2018-04-03 PROBLEM — K76.0 FATTY LIVER: Status: ACTIVE | Noted: 2017-12-05

## 2018-04-03 PROBLEM — R16.1 SPLENOMEGALY: Status: ACTIVE | Noted: 2017-12-05

## 2018-04-03 PROBLEM — R10.13 EPIGASTRIC PAIN: Status: ACTIVE | Noted: 2017-08-22

## 2018-04-03 LAB
ALBUMIN SERPL-MCNC: 4 G/DL (ref 3.6–5.1)
ALBUMIN/GLOB SERPL: 1.2 (CALC) (ref 1–2.5)
ALP SERPL-CCNC: 66 U/L (ref 33–130)
ALT SERPL-CCNC: 22 U/L (ref 6–29)
AST SERPL-CCNC: 19 U/L (ref 10–35)
BILIRUB SERPL-MCNC: 0.4 MG/DL (ref 0.2–1.2)
BUN SERPL-MCNC: 16 MG/DL (ref 7–25)
BUN/CREAT SERPL: ABNORMAL (CALC) (ref 6–22)
CALCIUM SERPL-MCNC: 9.4 MG/DL (ref 8.6–10.4)
CHLORIDE SERPL-SCNC: 101 MMOL/L (ref 98–110)
CO2 SERPL-SCNC: 32 MMOL/L (ref 20–31)
CREAT SERPL-MCNC: 0.64 MG/DL (ref 0.5–0.99)
GLOBULIN SER CALC-MCNC: 3.4 G/DL (CALC) (ref 1.9–3.7)
GLUCOSE SERPL-MCNC: 223 MG/DL (ref 65–99)
POTASSIUM SERPL-SCNC: 4.3 MMOL/L (ref 3.5–5.3)
PROT SERPL-MCNC: 7.4 G/DL (ref 6.1–8.1)
SL AMB EGFR AFRICAN AMERICAN: 110 ML/MIN/1.73M2
SL AMB EGFR NON AFRICAN AMERICAN: 95 ML/MIN/1.73M2
SODIUM SERPL-SCNC: 140 MMOL/L (ref 135–146)

## 2018-04-03 PROCEDURE — 99213 OFFICE O/P EST LOW 20 MIN: CPT | Performed by: NURSE PRACTITIONER

## 2018-04-03 NOTE — PATIENT INSTRUCTIONS
Will have patient keep a blood sugar log and return in 3-7 days with lab results when they return  Patient in agreement with this plan and strategy with regards to glucose control can be developed  Return sooner as needed

## 2018-04-03 NOTE — PROGRESS NOTES
Assessment/Plan:    No problem-specific Assessment & Plan notes found for this encounter  Diagnoses and all orders for this visit:    Uncontrolled type 2 diabetes mellitus with other specified complication, with long-term current use of insulin ([facility] )      Will have patient keep a blood sugar log and return in 3-7 days with lab results when they return  Patient in agreement with this plan and strategy with regards to glucose control can be developed  Return sooner as needed  Subjective:      Patient ID: Gerard Arango is a 60 y o  female  Patient presents for a follow-up visit for diabetes  She reports that she saw Dr Key Holmna and was prescribed Jardiance in February  She reports that she was urinating too much with it and she was incontinent of urine at night with it  She was very unhappy with this effect and stopped taking it  She checked her blood sugars 4 times daily and her blood sugars have been in the 200's since stopping the Jardiance  She got labs done today at [place] and those results are not available yet  Diabetes   She presents for her follow-up diabetic visit  She has type 2 diabetes mellitus  Pertinent negatives for hypoglycemia include no confusion, dizziness, headaches, mood changes, nervousness/anxiousness or speech difficulty  (Patient reports that her hypoglycemia and hyperglycemia symptoms are very similar  ) Associated symptoms include fatigue, polydipsia and polyphagia  Pertinent negatives for diabetes include no blurred vision, no chest pain, no foot paresthesias, no foot ulcerations, no polyuria (has returned to baseline since d/c'ing jardiance per ector), no visual change and no weakness  Pertinent negatives for hypoglycemia complications include no blackouts and no required glucagon injection  Symptoms are worsening  Pertinent negatives for diabetic complications include no CVA or nephropathy   Risk factors for coronary artery disease include diabetes mellitus, obesity, post-menopausal and hypertension  Current diabetic treatment includes insulin injections and oral agent (monotherapy)  She is compliant with treatment all of the time  Her weight is stable  She is following a generally healthy diet  An ACE inhibitor/angiotensin II receptor blocker is being taken  The following portions of the patient's history were reviewed and updated as appropriate: allergies, current medications, past family history, past medical history, past social history, past surgical history and problem list     Review of Systems   Constitutional: Positive for fatigue  Negative for chills and fever  HENT: Negative for trouble swallowing  Eyes: Negative for blurred vision and pain  Respiratory: Negative for shortness of breath and wheezing  Cardiovascular: Negative for chest pain, palpitations and leg swelling  Gastrointestinal: Negative for abdominal pain, diarrhea, nausea and vomiting  Endocrine: Positive for polydipsia and polyphagia  Negative for polyuria (has returned to baseline since d/c'ing jardiance per patietn)  Genitourinary: Negative for dysuria  Musculoskeletal: Negative for gait problem  Skin: Negative for rash  Neurological: Negative for dizziness, speech difficulty, weakness and headaches  Psychiatric/Behavioral: Negative for confusion  The patient is not nervous/anxious            Past Medical History:   Diagnosis Date    Acid reflux     Anxiety     Diabetes mellitus ([facility] )     Hypertension     Hypothyroid          Current Outpatient Prescriptions:     B-D INS SYR ULTRAFINE  3CC/30G 30G X 1/2" 0 3 ML MISC, USE WITH INSULIN TWICE DAILY, Disp: 70 each, Rfl: 0    Blood Glucose Monitoring Suppl (FREESTYLE LITE) [facility], by Does not apply route 4 (four) times a day, Disp: 1 each, Rfl: 0    Cholecalciferol (VITAMIN D3) 2000 units capsule, Take 1 capsule by mouth daily, Disp: , Rfl:     cyanocobalamin (VITAMIN B-12) 1,000 mcg tablet, Take 1,000 mcg by mouth daily, Disp: , Rfl:     FREESTYLE LITE test strip, Test blood glucose 4 times daily, Disp: 450 each, Rfl: 1    glimepiride (AMARYL) 4 mg tablet, , Disp: , Rfl:     insulin NPH (NOVOLIN N) 100 Units/mL subcutaneous injection, Inject 10 units subcutaneously twice daily, Disp: 10 mL, Rfl: 1    Lancets (FREESTYLE) lancets, TEST BLOOD GLUCOSE 3 TIMES A DAY, Disp: 90 each, Rfl: 0    levothyroxine (LEVOXYL) 100 mcg tablet, Take 1 tablet (100 mcg total) by mouth daily, Disp: 90 tablet, Rfl: 1    lisinopril-hydrochlorothiazide (PRINZIDE,ZESTORETIC) 20-12 5 MG per tablet, , Disp: , Rfl:     metoclopramide (REGLAN) 10 mg tablet, Take 1 tablet by mouth, Disp: , Rfl:     omeprazole (PriLOSEC) 40 MG capsule, Take 1 capsule (40 mg total) by mouth daily, Disp: 90 capsule, Rfl: 1    sertraline (ZOLOFT) 100 mg tablet, Take 1 tablet (100 mg total) by mouth daily, Disp: 90 tablet, Rfl: 1    Allergies   Allergen Reactions    Metformin      Other reaction(s): Diarrhea  Other reaction(s): Diarrhea    Metformin And Related GI Intolerance    Penicillins Rash     Other reaction(s): Rash  Other reaction(s): Rash       Social History   Past Surgical History:   Procedure Laterality Date    CHOLECYSTECTOMY      DILATION AND CURETTAGE OF UTERUS      ESOPHAGOGASTRODUODENOSCOPY      HYSTERECTOMY      WY COLONOSCOPY FLX DX W/COLLJ SPEC WHEN PFRMD N/A 1/28/2016    Procedure: EGD AND COLONOSCOPY;  Surgeon: Jean Claude Jesus MD;  Location: [facility];   Service: Gastroenterology    WY INCISE FINGER TENDON SHEATH Right 1/31/2017    Procedure: LONG FINGER TRIGGER RELEASE ;  Surgeon: Cynthia Perez MD;  [facility];  Service: Orthopedics    WY INCISE FINGER TENDON SHEATH Right 7/21/2016    Procedure: RELEASE TRIGGER FINGER - LONG FINGER;  Surgeon: Cynthia Perez MD;  [facility];  Service: Orthopedics    WY REVISE MEDIAN N/CARPAL TUNNEL SURG Right 1/31/2017    Procedure: WRIST CARPAL TUNNEL RELEASE ; TENOLYSIS OF FPL, FDS 2-5, FDP 2-5;  APPLICATION OF Dante Chroman;  Surgeon: Sanam Lorenzo MD;  [facility];  Service: Orthopedics     Family History   Problem Relation Age of Onset    Cancer Father     Arthritis Maternal Grandmother     No Known Problems Paternal Grandmother     Heart disease Paternal Grandfather     Stroke Paternal Grandfather        Objective:  /70 (BP Location: Left arm, Patient Position: Sitting, Cuff Size: Standard)   Pulse 71   Temp 98 1 °F (36 7 °C) (Tympanic)   Wt 89 9 kg (198 lb 3 2 oz)   SpO2 97%   BMI 35 83 kg/m²        Physical Exam   Constitutional: She is oriented to person, place, and time  She appears well-developed and well-nourished  No distress  HENT:   Head: Normocephalic and atraumatic  Right Ear: External ear normal    Left Ear: External ear normal    Mouth/Throat: Oropharynx is clear and moist    Eyes: Conjunctivae are normal  Pupils are equal, round, and reactive to light  No scleral icterus  Neck: Normal range of motion  Neck supple  No thyromegaly present  Cardiovascular: Normal rate, regular rhythm and normal heart sounds  Pulmonary/Chest: Effort normal and breath sounds normal  No respiratory distress  Abdominal: Soft  Bowel sounds are normal  She exhibits no distension  Musculoskeletal: Normal range of motion  She exhibits no edema  Neurological: She is alert and oriented to person, place, and time  Skin: Skin is warm and dry  Psychiatric: She has a normal mood and affect  Her behavior is normal  Judgment and thought content normal    Vitals reviewed

## 2018-04-09 ENCOUNTER — OFFICE VISIT (OUTPATIENT)
Dept: INTERNAL MEDICINE CLINIC | Age: 63
End: 2018-04-09
Payer: COMMERCIAL

## 2018-04-09 VITALS
HEART RATE: 80 BPM | RESPIRATION RATE: 16 BRPM | SYSTOLIC BLOOD PRESSURE: 122 MMHG | HEIGHT: 63 IN | BODY MASS INDEX: 35.83 KG/M2 | OXYGEN SATURATION: 96 % | WEIGHT: 202.2 LBS | DIASTOLIC BLOOD PRESSURE: 64 MMHG | TEMPERATURE: 99 F

## 2018-04-09 DIAGNOSIS — E11.9 TYPE 2 DIABETES MELLITUS WITHOUT COMPLICATION, WITHOUT LONG-TERM CURRENT USE OF INSULIN (HCC): ICD-10-CM

## 2018-04-09 DIAGNOSIS — IMO0002 UNCONTROLLED TYPE 2 DIABETES MELLITUS WITH OTHER SPECIFIED COMPLICATION, WITH LONG-TERM CURRENT USE OF INSULIN: Primary | ICD-10-CM

## 2018-04-09 PROCEDURE — 99214 OFFICE O/P EST MOD 30 MIN: CPT | Performed by: NURSE PRACTITIONER

## 2018-04-09 NOTE — PATIENT INSTRUCTIONS
STOP TAKING NPH INSULIN  Start taking the Basaglar (insulin Glargine) 24 units in the morning  This is a long-acting insulin that works all day  You will also be using a short-acting insulin with each of your 3 meals  Take the Humalog (LISPRO) 8 units 3 times per day with each meal  If you do not eat a meal, do not take it  Check your blood sugar before each meal and at bedtime, or if you think your blood sugar is low  Call this office if blood sugar is below 70 or above 400, or if you have any questions or concerns  Keep a blood sugar log  Bring it with you to your next appointment  Hypoglycemia in a Person with Diabetes   WHAT YOU NEED TO KNOW:   What is hypoglycemia? Hypoglycemia is a serious condition that happens when your blood glucose (sugar) level drops too low  The blood sugar level is usually too high in a person with diabetes, but the level can also drop too low  It is important to follow your diabetes management plan to keep your blood sugar level steady  What increases my risk for hypoglycemia? · A missed meal, or a meal eaten later than usual     · Certain medicines, or too much insulin or other diabetes medicine     · More exercise than usual, without extra food     · Alcohol     · Pregnancy    · Decreased liver or kidney function  What are the signs and symptoms of hypoglycemia? · Headache, hunger, or nervousness     · Trouble thinking or moodiness     · Sweating, or a pounding heartbeat     · Forgetfulness, confusion, or double vision     · Weakness or trouble walking     · Numbness and tingling in your fingers or around your mouth     · Seizures or loss of consciousness  How do I manage hypoglycemia? · Check your blood sugar level right away if you have symptoms of hypoglycemia  Hypoglycemia is usually 70 mg/dL or below  Ask your healthcare provider what blood sugar level is too low for you      · If your blood sugar level is too low, eat or drink 15 grams of fast-acting carbohydrate  Examples of this amount of fast-acting carbohydrate are 4 ounces (½ cup) of fruit juice or 4 ounces of regular soda  Other examples are 2 tablespoons of raisins or 3 to 4 glucose tablets  Check your blood sugar level 15 minutes later  If the level is still low (less than 100 mg/dL), have another 15 grams of carbohydrate  When the level returns to 100 mg/dL, eat a snack or meal that contains carbohydrates  This will help prevent another drop in blood sugar  Always carefully follow your healthcare provider's instructions on how to treat low blood sugar levels  · Always carry a source of fast-acting carbohydrate  If you have symptoms of hypoglycemia and you do not have a blood glucose meter, have a source of fast-acting carbohydrate anyway  Avoid carbohydrate foods that are high in fat  The fat content may make it take longer to increase your blood sugar level  Ask your healthcare provider if you should carry a glucagon kit  Glucagon is a medicine that is injected when you develop severe hypoglycemia and become unconscious  Check the expiration date every month and replace it before it expires  · Teach others how to help you if you have symptoms of hypoglycemia  Tell them about the symptoms of hypoglycemia  Ask them to give you a source of fast-acting carbohydrate if you cannot get it yourself  Ask them to give you a glucagon injection if you have symptoms of hypoglycemia and you become unconscious or have a seizure  Ask them to call 911   This is an emergency  Tell them never to try to make you swallow anything if you faint or have a seizure  · Wear medical alert jewelry  or carry a card that says you have diabetes  Ask where to get these items  How do I prevent hypoglycemia? · Take diabetes medicine as directed  Take your medicine at the right time and in the right amount  Your healthcare provider may change your blood sugar goals if you get hypoglycemia often       · Eat regular meals and snacks  Talk to your dietitian or healthcare provider about a meal plan that is right for you  Do not skip meals  · Check your blood sugar level as directed  Ask your healthcare provider what your blood sugar levels should be before and after you eat  Ask when and how often to check your blood sugar level  You may need to check at least 3 times each day  Record your blood sugar level results and take the record with you when you see your healthcare provider  Your provider may use the record to make changes to your medicine, food, or exercise schedules  · Check your blood sugar level before you exercise  Exercise can decrease your blood sugar level  If your blood sugar level is less than 100 mg/dL, have a carbohydrate snack  Examples are 4 to 6 crackers, ½ banana, 8 ounces (1 cup) of nonfat or 1% milk, or 4 ounces (½ cup) of juice  If you will exercise for more than 1 hour, you may need to check your blood sugar level every 30 minutes  Your healthcare provider may also recommend that you check your blood sugar level after exercise  · Be aware of how alcohol affects your blood sugar level  Alcohol can cause your blood sugar level to drop for up to 12 hours after drinking  Ask your healthcare provider if alcohol is safe for you  If you drink alcohol, always have a snack or meal at the same time  Women should limit alcohol to 1 drink a day  Men should limit alcohol to 2 drinks a day  A drink of alcohol is 12 ounces of beer, 5 ounces of wine, or 1½ ounces of liquor  When should I or someone else call 911? · You have a seizure or pass out  · You feel you are going to pass out  · You have trouble thinking clearly  When should I seek immediate care? · Your blood sugar is less than 50 mg/dL and does not respond to treatment  When should I contact my healthcare provider? · You have had symptoms of low blood sugar several times       · You have questions about the amount of insulin or diabetes medicine you are taking  · You have questions or concerns about your condition or care  CARE AGREEMENT:   You have the right to help plan your care  Learn about your health condition and how it may be treated  Discuss treatment options with your caregivers to decide what care you want to receive  You always have the right to refuse treatment  The above information is an  only  It is not intended as medical advice for individual conditions or treatments  Talk to your doctor, nurse or pharmacist before following any medical regimen to see if it is safe and effective for you  © 2017 [employer] Information is for End User's use only and may not be sold, redistributed or otherwise used for commercial purposes  All illustrations and images included in CareNotes® are the copyrighted property of [employer]  or [employer]  Insulin Pens   WHAT YOU NEED TO KNOW:   What is an insulin pen? An insulin pen is a device used to inject insulin  The pen contains a cartridge of insulin  The pen may be reusable or disposable  You may need a different pen for each type of insulin you use  How do I get the insulin ready to use? Check the label and color of the insulin  Check that you have the correct type and strength of insulin  Insulin pens are available as U100 and U500 strengths  Also check the expiration date on the label  Use a new cartridge or pen if the expiration date has passed  Short or rapid-acting insulin should be clear, colorless, and free of particles or clumps  Use a new cartridge or pen if the insulin does not look right  Follow the pen 's instructions for inserting a new cartridge into a reusable pen  Mix cloudy insulin  Gently roll the pen back and forth between the palms of your hands  Repeat this 10 times  Do not shake the pen  This can make the insulin clump together  Next, gently tip the pen up and down 10 times   Do not use the insulin if there are clumps in it after you mix it  How do I get the pen ready to use? Remove a new pen from the refrigerator 30 minutes before you use it  Insulin should be injected at room temperature  Wash your hands  Use soap and water or an alcohol-based hand rub  This will help decrease your risk for an infection  Remove the cap from the pen  Wipe the needle attachment area with an alcohol swab  Attach a needle to the pen  Remove the tab from the needle  Do not remove the outer cap on the needle  Push the needle straight onto the pen  Turn the needle clockwise until you cannot turn it more  Make sure the needle is straight  Remove the needle caps  Remove the outer cap and save  Remove the inner cap and throw it away  Remove air from the pen  Air may cause pain during injection  Turn the dial to 2 units  For most insulin pens, you will hear a click for each unit of insulin that you dial  Hold the pen and point the needle up  Gently tap the pen to move air bubbles to the top of the pen  Press the injection button  You should see a drop of insulin on the tip of the pen  If you do not see a drop, change the needle and repeat this step  If you do not see a drop after you repeat this step 3 times, use a new pen  Select the correct dose on the pen  Turn the dial to the number of units you need to inject  The pointer on the side of your pen should line up with your dose  The dial can be turned in either direction to choose the correct dose  You cannot choose a dose larger than the number of units left in the cartridge  Insert another cartridge or use another disposable pen if there is not enough insulin  Instead you can inject part of your dose with the insulin that is left  Next, you can use a new cartridge or pen to inject the rest of your dose  Where do I inject insulin?    You can inject insulin into your abdomen, upper arm, buttocks, hip, and the front or side of the thigh  Insulin works fastest when it is injected into the abdomen  Do not inject insulin into areas where you have a wound or bruising  Insulin injected into wounds or bruises may not get into your body correctly  Use a different area within the site each time you inject insulin  For example, inject insulin into different areas in your abdomen  Insulin injected into the same area can cause lumps, swelling, or thickened skin  How do I inject insulin with a pen? Clean the skin where you will inject the insulin  You can use an alcohol pad or a cotton swab dipped in alcohol  Let the area dry before you inject  This will decrease pain  Grab a fold of your skin  Gently pinch the skin and fat between your thumb and first finger  Insert the needle straight into your skin  Do not hold the syringe at an angle  Make sure the needle is all the way into the skin  Let go of the pinched tissue  Push the injection button to inject the insulin  Continue to press on the injection button  Keep the needle in place for 10 seconds  Pull out the needle  Replace the needle cap  Press on your injection site for 5 to 10 seconds  Do not rub  This will keep insulin from leaking out  Remove the needle from the pen  Twist the capped needle counter clockwise  Place the needle in a heavy-duty laundry detergent bottle or a metal coffee can  The container should have a cap or lid that fits securely  Replace the pen cap  Store the pen as directed  What should I do with my used needles? Ask your local waste authority if you need to follow certain rules for getting rid of your needles  Bring your used needles home with you when you travel  Pack them in a plastic or metal container with a secure lid  How do I store an insulin pen? Do not store your pen with a needle attached  Follow the storage directions on the label or package insert that came with the insulin   Unopened pens can be stored in the refrigerator until you are ready to use them  Most insulin pens can be opened and kept at room temperature  Store your pen in a cool, dry place  Do not keep your pen in direct sunlight or in your car  Throw away pens that have been frozen or exposed to temperatures above 85° F (30° C)  If you travel, keep the pen in a cool pack  When should I contact my healthcare provider? You feel or see hard lumps in your skin where you inject your insulin  You think you gave yourself too much or not enough insulin  Your injections are very painful  You see blood or clear fluid on your injection site more than once after you inject insulin  You have questions about how to give the injection  You cannot afford to buy your diabetes supplies  You have questions or concerns about your condition or care  CARE AGREEMENT:   You have the right to help plan your care  Learn about your health condition and how it may be treated  Discuss treatment options with your caregivers to decide what care you want to receive  You always have the right to refuse treatment  The above information is an  only  It is not intended as medical advice for individual conditions or treatments  Talk to your doctor, nurse or pharmacist before following any medical regimen to see if it is safe and effective for you  © 2017 [employer]  Information is for End User's use only and may not be sold, redistributed or otherwise used for commercial purposes  All illustrations and images included in CareNotes® are the copyrighted property of [employer]  or [employer]

## 2018-04-09 NOTE — PROGRESS NOTES
Assessment/Plan:    No problem-specific Assessment & Plan notes found for this encounter  Diagnoses and all orders for this visit:    Uncontrolled type 2 diabetes mellitus with other specified complication, with long-term current use of insulin (HCC)  -     insulin glargine (LANTUS SOLOSTAR) injection pen 100 units/mL; Inject 0 24 mL (24 Units total) under the skin daily  -     Insulin Lispro 100 UNIT/ML SOCT; Inject 0 08 mL (8 Units total) under the skin 3 (three) times a day with meals    Type 2 diabetes mellitus without complication, without long-term current use of insulin (HCC)  -     Insulin Syringe-Needle U-100 (B-D INS SYR ULTRAFINE  3CC/30G) 30G X 1/2" 0 3 ML MISC; Inject under the skin 4 (four) times a day      Will discontinue NPH insulin and start basaglar 24 units (0 6u/kg /2) and humalog 8 units three times daily with meals  Discussed with Dr Roger Fermni  Patient to keep blood sugar log, check blood sugar before meals and at bedtime, and if she feels that blood sugar is low  Educated about signs and symptoms of hypoglycemia, and conditions to go to the ER and call the office  Patient to follow-up in 1 week or sooner as needed  Subjective:      Patient ID: Samson Mccarty is a 60 y o  female  Patient presents for a 1 week follow-up on diabetes  She kept a blood sugar log and brought it with her to this appointment  Her fasting blood sugars were 248-277, her 2 hour post-breakfast blood sugars were 300-365, and her 2 hour post-dinner blood sugars are 288-357  She reports that she is taking NPH 20 units twice daily and glimeperide 4mg twice daily  She has tried and failed metformin, Januvia and jardiance  She had labs done last week and her results were reviewed           The following portions of the patient's history were reviewed and updated as appropriate: allergies, current medications, past family history, past medical history, past social history, past surgical history and problem list     Review of Systems   Constitutional: Positive for fatigue  Negative for chills and fever  HENT: Negative for congestion, ear pain, hearing loss, sinus pain and trouble swallowing  Eyes: Negative for pain  Respiratory: Negative for chest tightness, shortness of breath and wheezing  Cardiovascular: Negative for chest pain, palpitations and leg swelling  Gastrointestinal: Negative for abdominal pain, diarrhea, nausea and vomiting  Genitourinary: Negative for dysuria  Musculoskeletal: Negative for gait problem  Skin: Negative for rash  Neurological: Negative for dizziness, syncope, numbness and headaches  Hematological: Does not bruise/bleed easily  Psychiatric/Behavioral: Negative for confusion and decreased concentration  The patient is not nervous/anxious            Past Medical History:   Diagnosis Date    Acid reflux     Anxiety     Diabetes mellitus ([facility] [address] )     Hypertension     Hypothyroid          Current Outpatient Prescriptions:     Blood Glucose Monitoring Suppl (FREESTYLE LITE) [facility], by Does not apply route 4 (four) times a day, Disp: 1 each, Rfl: 0    Cholecalciferol (VITAMIN D3) 2000 units capsule, Take 1 capsule by mouth daily, Disp: , Rfl:     cyanocobalamin (VITAMIN B-12) 1,000 mcg tablet, Take 1,000 mcg by mouth daily, Disp: , Rfl:     FREESTYLE LITE test strip, Test blood glucose 4 times daily, Disp: 450 each, Rfl: 1    glimepiride (AMARYL) 4 mg tablet, Take 4 mg by mouth 2 (two) times a day  , Disp: , Rfl:     Insulin Syringe-Needle U-100 (B-D INS SYR ULTRAFINE  3CC/30G) 30G X 1/2" 0 3 ML MISC, Inject under the skin 4 (four) times a day, Disp: 70 each, Rfl: 1    Lancets (FREESTYLE) lancets, TEST BLOOD GLUCOSE 3 TIMES A DAY, Disp: 90 each, Rfl: 0    levothyroxine (LEVOXYL) 100 mcg tablet, Take 1 tablet (100 mcg total) by mouth daily, Disp: 90 tablet, Rfl: 1    lisinopril-hydrochlorothiazide (PRINZIDE,ZESTORETIC) 20-12 5 MG per tablet, Take 1 tablet by mouth daily  , Disp: , Rfl:     metoclopramide (REGLAN) 10 mg tablet, Take 1 tablet by mouth 2 (two) times a day as needed  , Disp: , Rfl:     omeprazole (PriLOSEC) 40 MG capsule, Take 1 capsule (40 mg total) by mouth daily, Disp: 90 capsule, Rfl: 1    sertraline (ZOLOFT) 100 mg tablet, Take 1 tablet (100 mg total) by mouth daily, Disp: 90 tablet, Rfl: 1    insulin glargine (LANTUS SOLOSTAR) injection pen 100 units/mL, Inject 0 24 mL (24 Units total) under the skin daily, Disp: 5 pen, Rfl: 0    Insulin Lispro 100 UNIT/ML SOCT, Inject 0 08 mL (8 Units total) under the skin 3 (three) times a day with meals, Disp: 5 Cartridge, Rfl: 0    Allergies   Allergen Reactions    Metformin      Other reaction(s): Diarrhea  Other reaction(s): Diarrhea    Metformin And Related GI Intolerance    Penicillins Rash     Other reaction(s): Rash  Other reaction(s): Rash       Social History   Past Surgical History:   Procedure Laterality Date    CHOLECYSTECTOMY      DILATION AND CURETTAGE OF UTERUS      ESOPHAGOGASTRODUODENOSCOPY      HYSTERECTOMY      ND COLONOSCOPY FLX DX W/COLLJ SPEC WHEN PFRMD N/A 1/28/2016    Procedure: EGD AND COLONOSCOPY;  Surgeon: Henok Charles MD;  Location: [facility];   Service: Gastroenterology    ND INCISE FINGER TENDON SHEATH Right 1/31/2017    Procedure: LONG FINGER TRIGGER RELEASE ;  Surgeon: Branden Ron MD;  [facility];  Service: Orthopedics    ND INCISE FINGER TENDON SHEATH Right 7/21/2016    Procedure: RELEASE TRIGGER FINGER - LONG FINGER;  Surgeon: Branden Ron MD;  [facility];  Service: Orthopedics    ND REVISE MEDIAN N/CARPAL TUNNEL SURG Right 1/31/2017    Procedure: WRIST CARPAL TUNNEL RELEASE ; TENOLYSIS OF FPL, FDS 2-5, FDP 2-5;  APPLICATION OF Rupal Julia;  Surgeon: Branden Ron MD;  [facility];  Service: Orthopedics     Family History   Problem Relation Age of Onset    Cancer Father     Arthritis Maternal Grandmother     No Known Problems Paternal Grandmother     Heart disease Paternal Grandfather     Stroke Paternal Grandfather     Cirrhosis Mother        Objective:  /64 (BP Location: Left arm, Patient Position: Sitting, Cuff Size: Adult)   Pulse 80   Temp 99 °F (37 2 °C) (Tympanic)   Resp 16   Ht 5' 2 68" (1 592 m)   Wt 91 7 kg (202 lb 3 2 oz)   SpO2 96% Comment: room air  BMI 36 19 kg/m²        Physical Exam   Constitutional: She is oriented to person, place, and time  She appears well-developed and well-nourished  No distress  HENT:   Head: Normocephalic and atraumatic  Right Ear: External ear normal    Left Ear: External ear normal    Mouth/Throat: Oropharynx is clear and moist    Eyes: Conjunctivae are normal  Pupils are equal, round, and reactive to light  No scleral icterus  Neck: Normal range of motion  Neck supple  No thyromegaly present  Cardiovascular: Normal rate, regular rhythm and normal heart sounds  Pulmonary/Chest: Effort normal and breath sounds normal    Abdominal: Soft  Bowel sounds are normal  She exhibits no distension  Musculoskeletal: Normal range of motion  Neurological: She is alert and oriented to person, place, and time  Skin: Skin is warm and dry  No rash noted  She is not diaphoretic  Psychiatric: She has a normal mood and affect  Her behavior is normal  Judgment and thought content normal    Vitals reviewed

## 2018-04-11 ENCOUNTER — TELEPHONE (OUTPATIENT)
Dept: INTERNAL MEDICINE CLINIC | Age: 63
End: 2018-04-11

## 2018-04-11 DIAGNOSIS — E11.8 TYPE 2 DIABETES MELLITUS WITH COMPLICATION, UNSPECIFIED WHETHER LONG TERM INSULIN USE: Primary | ICD-10-CM

## 2018-04-11 NOTE — TELEPHONE ENCOUNTER
Pt  Needs needles for your insulin pens  Pt  Would like the script sent [facility] in [place]  Patient will leaver her information on the prescription refill line  She wants to start taking her insulin today  Please give the patient a call @ [phone] to let her know when the script has been sent        Thanks,

## 2018-04-17 ENCOUNTER — OFFICE VISIT (OUTPATIENT)
Dept: INTERNAL MEDICINE CLINIC | Age: 63
End: 2018-04-17
Payer: COMMERCIAL

## 2018-04-17 VITALS
DIASTOLIC BLOOD PRESSURE: 62 MMHG | TEMPERATURE: 96 F | WEIGHT: 198 LBS | HEIGHT: 62 IN | OXYGEN SATURATION: 96 % | HEART RATE: 68 BPM | SYSTOLIC BLOOD PRESSURE: 120 MMHG | BODY MASS INDEX: 36.44 KG/M2

## 2018-04-17 DIAGNOSIS — IMO0002 UNCONTROLLED TYPE 2 DIABETES MELLITUS WITH OTHER SPECIFIED COMPLICATION, WITH LONG-TERM CURRENT USE OF INSULIN: Primary | ICD-10-CM

## 2018-04-17 PROCEDURE — 99213 OFFICE O/P EST LOW 20 MIN: CPT | Performed by: NURSE PRACTITIONER

## 2018-04-17 RX ORDER — INSULIN LISPRO 100 [IU]/ML
12 INJECTION, SOLUTION INTRAVENOUS; SUBCUTANEOUS
Refills: 0 | COMMUNITY
Start: 2018-04-11 | End: 2018-05-01 | Stop reason: SDUPTHER

## 2018-04-17 NOTE — PATIENT INSTRUCTIONS
Increase Basaglar to 26 units each morning and increase mealtime Humalog to 10 units before each meal  Make sure to check blood sugars before each meal and at bedtime ( 4 times per day)  If in 3 days, fasting blood sugar is over 200, may increase Basaglar to 28 units  Monitor for low blood sugars, as previously discussed  Continue to keep a blood sugar log and return in 1 week  Make modifications to diet, which include limiting simple carbohydrates and eating complex carbohydrates  Call office with questions or concerns  Meal Planning with Diabetes Exchanges   AMBULATORY CARE:   Diabetes exchanges  are servings of food that contain similar amounts of carbohydrate, fat, protein, and calories within a food group  The exchanges can be used to develop a healthy meal plan that helps to keep your blood sugar within the recommended levels  A meal plan with the right amount of carbohydrates is especially important  Your blood sugar naturally rises after you eat carbohydrates  Too many carbohydrates in 1 meal or snack can raise your blood sugar level  Carbohydrates are found in starches, fruit, milk, yogurt, and sweets  How to create a meal plan with exchanges:  A dietitian will work with you to develop a healthy meal plan that is right for you  This meal plan will include the amount of exchanges you can have from each food group throughout the day  Follow your meal plan by keeping track of the amount of exchanges you eat for each meal and snack  Your meal plan will be based on your age, weight, blood sugar levels, medicine, and activity level  Starch food group exchanges:  Each exchange below contains about 15 grams of carbohydrate , 3 grams of protein, 1 gram of fat, and 80 calories  · 1 ounce of white, whole wheat or rye bread (1 slice)    · 1 ounce of bagel (about ¼ of a bagel)    · 1 6-inch flour or corn tortilla or 1 4-inch pancake (about ¼ inch thick)    · ?  cup of cooked pasta or rice    · ¾ cup of dry, ready-to-eat cereal with no sugar added     · ½ cup of cooked cereal, such as oatmeal    · 3 lubna cracker squares or 8 animal crackers    · 6 saltine-type crackers or     · 3 cups of popcorn or ¾ ounce of pretzels     · Starchy vegetables and cooked legumes:      ¨ ½ cup of corn, green peas, sweet potatoes, or mashed potatoes     ¨ ¼ of a large baked potato     ¨ 1 cup of acorn, butternut squash, or pumpkin     ¨ ½ cup of beans, lentils, or peas (such as amaro, kidney, or black-eyed)    ¨ ? cup of lima beans  Fruit group exchanges:  Each exchange contains about 15 grams of carbohydrate  and 60 calories  · 1 small (4 ounce) apple, banana orange, or nectarine    · ½ cup of canned or fresh fruit    · ½ cup (4 ounces) of unsweetened fruit juice    · 2 tablespoons of dried fruit  Milk group exchanges:  Each exchange contains about 12 grams of carbohydrate  and 8 grams of protein  The amount of fat and calories in each serving depends on the type of milk (such as whole, low-fat, or fat-free)  · 1 cup fat-free or low-fat milk    · ¾ cup of plain, nonfat yogurt    · 1 cup fat-free, flavored yogurt with artificial (no calorie) sweetener  Non-starchy vegetable group exchanges:  Each exchange contains about 5 grams of carbohydrate , 2 grams of protein, and 25 calories  Examples include beets, broccoli, cabbage, carrots, cauliflower, cucumber, mushrooms, tomatoes, and zucchini  · ½ cup of cooked vegetables or 1 cup of raw vegetables     · ½ cup of vegetable juice  Meat and meat substitute group exchanges:  Each exchange of a lean meat  listed below contains about 7 grams of protein, 0 to 3 grams of fat, and 45 calories  The meat and meat substitutes food group does not contain any carbohydrates  Medium and high-fat meats have more calories    · 1 ounce of chicken or turkey without skin, or 1 ounce of fish (not breaded or fried)     · 1 ounce of lean beef, pork, or lamb     · 1-inch cube or 1 ounce of low-fat cheese     · 2 egg whites or ¼ cup of egg substitute     · ½ cup of tofu  Sweets, desserts, and other carbohydrate group exchanges:   · Sweets and other desserts:  Each exchange has about 15 grams of carbohydrate   ¨ 1 ounce of susan food cake or 2-inch square cake (unfrosted)    ¨ 2 small cookies     ¨ ½ cup of sugar-free, fat-free ice cream    ¨ 1 tablespoon of syrup, jam, jelly, table sugar, or honey    · Combination foods:     ¨ 1 cup of an entrée, such as lasagna, spaghetti with meatballs, macaroni and cheese, and chili with beans (each serving counts as 2 carbohydrate exchanges )     ¨ 1 cup of tomato or vegetable beef soup (each serving counts as 1 carbohydrate exchange )  Fat group exchanges:  Each exchange contains 5 grams of fat and 45 calories  · 1 teaspoon of oil (such as canola, olive, or corn oil)     · 6 almonds or cashews, 10 peanuts, or 4 pecan halves     · 2 tablespoons of avocado     · ½ tablespoon of peanut butter     · 1 teaspoon of regular margarine or 2 teaspoons of low-fat margarine     · 1 teaspoon of regular butter or 1 tablespoon of low-fat butter     · 1 teaspoon of regular mayonnaise or 1 tablespoon of low-fat mayonnaise     · 1 tablespoon of regular salad dressing or 2 tablespoons of low-fat salad dressing  Free foods: The foods on this list are called free foods because they have very few calories  Free foods usually do not increase your blood sugar if you limit them  · 1 tablespoon of catsup or taco sauce     · ¼ cup of salsa     · 2 tablespoons of sugar-free syrup or 2 teaspoons of light jam or jelly     · 1 tablespoon of fat-free salad dressing     · 4 tablespoons of fat-free margarine or fat-free mayonnaise     · Sugar-free drinks: diet soda, sugar-free drink mixes, or mineral water     · Low-sodium bouillon or fat-free broth     · Mustard     · Seasonings such as spices, herbs, and garlic     · Sugar-free gelatin without added fruit  Other healthy nutrition guidelines:   · Eat more fiber  Choose foods that are good sources of fiber, such as fruits, vegetables, and whole grains  Cereals that contain 5 or more grams of fiber per serving are good sources of fiber  Legumes such as garbanzo, amaro beans, kidney beans, and lentils are also good sources  · Limit fat  Ask your dietitian or healthcare provider how much fat you should eat each day  Choose foods low in fat, saturated fat, trans fat, and cholesterol  Examples include turkey or chicken without the skin, fish, lean cuts of meat, and beans  Low-fat dairy foods, such as low-fat or fat-free milk and low-fat yogurt are also good choices  Omega-3 fatty acids are healthy fats that are found in canola oil, soybean oil and fatty fish  [place], albacore tuna, and sardines are good sources of omega 3 fatty acids  Eat 2 servings of these types of fish each week  Do not eat fried fish  · Limit sugar  Sugar and sweets must be counted toward the carbohydrate exchanges that you can have within your meal plan  Limit sugar and sweets because they are usually also high in calories and fat  Eat smaller portions of sweets by sharing a dessert or asking for a child-size portion at a restaurant  · Limit sodium  (salt) to about 2,300 mg per day  You may need to eat even less sodium if you have certain medical conditions  Foods high in sodium include soy sauce, potato chips, and soup  · Limit alcohol  Ask your healthcare provider if it is safe for you to drink alcohol  If alcohol is safe for you to have, eat a meal when you drink alcohol  If you drink alcohol on an empty stomach, your blood sugar may drop to a low level  Women should limit alcohol to 1 drink per day  Men should limit alcohol to 2 drinks per day  A drink of alcohol is 5 ounces of wine, 12 ounces of beer, or 1½ ounces of liquor  Other ways to manage your diabetes:   · Control your blood sugar level  Test your blood sugar level regularly and keep a record of the results   Ask your healthcare provider when and how often to test your blood sugar  You may need to check your blood sugar level at least 3 times each day  · Talk to your healthcare provider about your weight  Ask if you need to lose weight, and how much you need to lose  If you are overweight, you may need to make other changes to lose weight  Ask your healthcare provider to help you create a weight loss program      · Exercise  can help to control your blood sugar levels and decrease your risk of heart disease  It can also help you lose or maintain your weight  Get at least 30 minutes of exercise, 5 times each week  Do resistance training (using weights) 2 times each week  Do not sit for longer than 90 minutes  Work with your healthcare provider to plan the best exercise program for you  Contact your healthcare provider if:   · You have high blood sugar levels during a certain time of day, or almost all of the time  · You often have low blood sugar levels  · You have questions or concerns about your condition or care  © 2017 [facility] Information is for End User's use only and may not be sold, redistributed or otherwise used for commercial purposes  All illustrations and images included in CareNotes® are the copyrighted property of [employer]  or [address]  The above information is an  only  It is not intended as medical advice for individual conditions or treatments  Talk to your doctor, nurse or pharmacist before following any medical regimen to see if it is safe and effective for you

## 2018-04-17 NOTE — PROGRESS NOTES
Assessment/Plan:    No problem-specific Assessment & Plan notes found for this encounter  Diagnoses and all orders for this visit:    Uncontrolled type 2 diabetes mellitus with other specified complication, with long-term current use of insulin ([address] )    Other orders  -     HUMALOG KWIKPEN 100 UNIT/ML SOPN; INJECT 0 08ML (8 UNITS TOTAL) UNDER THE SKIN 3 (THREE) TIMES A DAY WITH MEALS  Increase Basaglar to 26 units each morning and increase mealtime Humalog to 10 units before each meal  Make sure to check blood sugars before each meal and at bedtime ( 4 times per day)  If in 3 days, fasting blood sugar is over 200, may increase Basaglar to 28 units  Monitor for low blood sugars, as previously discussed  Continue to keep a blood sugar log and return in 1 week  Make modifications to diet, which include limiting simple carbohydrates and eating complex carbohydrates  Call office with questions or concerns  Subjective:      Patient ID: Sarah Borden is a 60 y o  female  Patient presents for a 1 week follow up on diabetes  She was started on basaglar 24 units in the morning  and humalog 8 units 3 times daily with meal coverage  The patient has brought her blood sugar log with her to this appointment  Her fasting blood sugars were 188- 257  Her blood sugars before lunch were 191-365  Her blood sugars before dinner were 157-302  She did not check her blood sugars before bedtime  She reports that she has been eating sandwiches, toast, and hamburgers as her meals  She reports that her meals include bread, a protein, and vegetables  She reports that she home cooks the meals that she eats  She reports that she has gone through diabetes meal education classes in the past and that her daughter-in-law is a nurse who helps her, but she admits that she could be more cognizant of her meal and carb choices  She reports that she is feeling well   She reports that her stomach is feeling well and that she is having a lot less belching than she was before  The following portions of the patient's history were reviewed and updated as appropriate: allergies, current medications, past family history, past medical history, past social history, past surgical history and problem list     Review of Systems   Constitutional: Negative for chills and fever  HENT: Negative for trouble swallowing  Respiratory: Negative for shortness of breath and wheezing  Cardiovascular: Negative for chest pain and leg swelling  Gastrointestinal: Negative for abdominal pain, diarrhea, nausea and vomiting  Genitourinary: Negative for dysuria  Musculoskeletal: Negative for gait problem  Skin: Negative for rash  Neurological: Negative for light-headedness and headaches  Psychiatric/Behavioral: The patient is not nervous/anxious            Past Medical History:   Diagnosis Date    Acid reflux     Anxiety     Diabetes mellitus ([facility] [address] )     Hypertension     Hypothyroid          Current Outpatient Prescriptions:     Blood Glucose Monitoring Suppl (FREESTYLE LITE) [facility], by Does not apply route 4 (four) times a day, Disp: 1 each, Rfl: 0    Cholecalciferol (VITAMIN D3) 2000 units capsule, Take 1 capsule by mouth daily, Disp: , Rfl:     cyanocobalamin (VITAMIN B-12) 1,000 mcg tablet, Take 1,000 mcg by mouth daily, Disp: , Rfl:     FREESTYLE LITE test strip, Test blood glucose 4 times daily, Disp: 450 each, Rfl: 1    glimepiride (AMARYL) 4 mg tablet, Take 4 mg by mouth 2 (two) times a day  , Disp: , Rfl:     insulin glargine (LANTUS SOLOSTAR) injection pen 100 units/mL, Inject 0 24 mL (24 Units total) under the skin daily, Disp: 5 pen, Rfl: 0    Insulin Lispro 100 UNIT/ML SOCT, Inject 0 08 mL (8 Units total) under the skin 3 (three) times a day with meals, Disp: 5 Cartridge, Rfl: 0    Insulin Pen Needle 32G X 4 MM MISC, by Does not apply route 4 (four) times a day Pt uses 4 pen needles daily- basaglar once a day and humalog tid, Disp: 360 each, Rfl: 1    Insulin Syringe-Needle U-100 (B-D INS SYR ULTRAFINE  3CC/30G) 30G X 1/2" 0 3 ML MISC, Inject under the skin 4 (four) times a day, Disp: 70 each, Rfl: 1    Lancets (FREESTYLE) lancets, TEST BLOOD GLUCOSE 3 TIMES A DAY, Disp: 90 each, Rfl: 0    levothyroxine (LEVOXYL) 100 mcg tablet, Take 1 tablet (100 mcg total) by mouth daily, Disp: 90 tablet, Rfl: 1    lisinopril-hydrochlorothiazide (PRINZIDE,ZESTORETIC) 20-12 5 MG per tablet, Take 1 tablet by mouth daily  , Disp: , Rfl:     metoclopramide (REGLAN) 10 mg tablet, Take 1 tablet by mouth 2 (two) times a day as needed  , Disp: , Rfl:     omeprazole (PriLOSEC) 40 MG capsule, Take 1 capsule (40 mg total) by mouth daily, Disp: 90 capsule, Rfl: 1    sertraline (ZOLOFT) 100 mg tablet, Take 1 tablet (100 mg total) by mouth daily, Disp: 90 tablet, Rfl: 1    HUMALOG KWIKPEN 100 UNIT/ML SOPN, INJECT 0 08ML (8 UNITS TOTAL) UNDER THE SKIN 3 (THREE) TIMES A DAY WITH MEALS , Disp: , Rfl: 0    Allergies   Allergen Reactions    Metformin Diarrhea     Other reaction(s): Diarrhea  Other reaction(s): Diarrhea    Metformin And Related GI Intolerance    Penicillins Rash     Other reaction(s): Rash  Other reaction(s): Rash       Social History   Past Surgical History:   Procedure Laterality Date    CHOLECYSTECTOMY      DILATION AND CURETTAGE OF UTERUS      ESOPHAGOGASTRODUODENOSCOPY      HYSTERECTOMY      AL COLONOSCOPY FLX DX W/COLLJ SPEC WHEN PFRMD N/A 1/28/2016    Procedure: EGD AND COLONOSCOPY;  Surgeon: Dipak Brandon MD;  [facility];   Service: Gastroenterology    AL INCISE FINGER TENDON SHEATH Right 1/31/2017    Procedure: LONG FINGER TRIGGER RELEASE ;  Surgeon: Joseline Araiza MD;  [facility];  Service: Orthopedics    AL INCISE FINGER TENDON SHEATH Right 7/21/2016    Procedure: RELEASE TRIGGER FINGER - LONG FINGER;  Surgeon: Joseline Araiza MD;  [facility];  Service: Orthopedics    AL REVISE MEDIAN N/CARPAL TUNNEL SURG Right 1/31/2017    Procedure: WRIST CARPAL TUNNEL RELEASE ; TENOLYSIS OF FPL, FDS 2-5, FDP 2-5;  APPLICATION OF Vy Slider;  Surgeon: Tracie Venegas MD;  [facility];  Service: Orthopedics     Family History   Problem Relation Age of Onset    Cancer Father     Heart attack Father     Diabetes type II Father     Arthritis Maternal Grandmother     No Known Problems Paternal Grandmother     Heart disease Paternal Grandfather     Stroke Paternal Grandfather     Cirrhosis Mother        Objective:  /62 (BP Location: Left arm, Patient Position: Sitting, Cuff Size: Standard)   Pulse 68   Temp (!) 96 °F (35 6 °C) (Tympanic)   Ht 5' 2 21" (1 58 m)   Wt 89 8 kg (198 lb)   SpO2 96%   BMI 35 98 kg/m²        Physical Exam   Constitutional: She is oriented to person, place, and time  She appears well-developed and well-nourished  No distress  HENT:   Head: Normocephalic and atraumatic  Right Ear: External ear normal    Left Ear: External ear normal    Mouth/Throat: Oropharynx is clear and moist    Eyes: Conjunctivae are normal  Pupils are equal, round, and reactive to light  No scleral icterus  Neck: Normal range of motion  Neck supple  No thyromegaly present  Cardiovascular: Normal rate, regular rhythm and normal heart sounds  Pulmonary/Chest: Effort normal and breath sounds normal  No respiratory distress  Abdominal: Soft  Bowel sounds are normal  She exhibits no distension  Musculoskeletal: Normal range of motion  She exhibits no edema  Neurological: She is alert and oriented to person, place, and time  Skin: Skin is warm and dry  Psychiatric: She has a normal mood and affect  Her behavior is normal  Judgment and thought content normal    Vitals reviewed

## 2018-04-24 ENCOUNTER — OFFICE VISIT (OUTPATIENT)
Dept: INTERNAL MEDICINE CLINIC | Age: 63
End: 2018-04-24
Payer: COMMERCIAL

## 2018-04-24 VITALS
HEIGHT: 62 IN | HEART RATE: 76 BPM | BODY MASS INDEX: 36.55 KG/M2 | DIASTOLIC BLOOD PRESSURE: 60 MMHG | WEIGHT: 198.6 LBS | SYSTOLIC BLOOD PRESSURE: 116 MMHG | TEMPERATURE: 98.4 F | OXYGEN SATURATION: 97 %

## 2018-04-24 DIAGNOSIS — IMO0002 UNCONTROLLED TYPE 2 DIABETES MELLITUS WITH OTHER SPECIFIED COMPLICATION, WITH LONG-TERM CURRENT USE OF INSULIN: Primary | ICD-10-CM

## 2018-04-24 PROCEDURE — 99214 OFFICE O/P EST MOD 30 MIN: CPT | Performed by: NURSE PRACTITIONER

## 2018-04-24 NOTE — PROGRESS NOTES
Assessment/Plan:    No problem-specific Assessment & Plan notes found for this encounter  Diagnoses and all orders for this visit:    Uncontrolled type 2 diabetes mellitus with other specified complication, with long-term current use of insulin ([address] )  -     Ambulatory referral to medical nutrition therapy for diabetes; Future      Will increase patient's Lantus and Humalog based on her current blood sugars  Start Lantus 30 units in the morning and Take Humalog 12 units three times a day with meals  Continue to check blood sugars three times a day before meals and at bedtimes and bring logs to next appointments  Referred to Diabetic Nutritional Therapy services for additional counseling on diabetic diet  Will have patient return in 1-2 weeks for continued follow-up and management  Subjective:      Patient ID: Jake Sky is a 60 y o  female  Patient presents for a 1 week follow up on her diabetes  Last week, her Lantus was increased from 24 to 26 units ( she was told to titrate up to 28 units if her fasting blood sugars were over 200) and increased her mealtime Humalog from 8 units to 10 unts A C  She presents today reporting that she has been compliant with the changes to her dosing  She reports that her fasting blood sugars have been ranging from 190's-250's  She reports that her blood sugars before meals have been in the mid-200's  With regards to her diet, she reports that her meals are including a lot of simple carbohydrates  For breakfast, she is eating toast  For lunch, she is eating a cold-cut sandwich  For dinner, she is eating pasta  She also made mention of some events in the past week that involved desserts  As previously noted, she has taken a diabetes education course, which included nutrition education, but does seem open to having a refresher with a dietician  She does report that she has had some increased anxiety in the last week   She otherwise denies new symptoms or other side effects  She denies numbness or paresthesias, or symptoms of hypoglycemia  The following portions of the patient's history were reviewed and updated as appropriate: allergies, current medications, past family history, past medical history, past social history, past surgical history and problem list     Review of Systems   Constitutional: Negative for chills, fatigue and fever  HENT: Negative for hearing loss, sinus pain and trouble swallowing  Eyes: Negative for visual disturbance  Respiratory: Negative for chest tightness, shortness of breath and wheezing  Cardiovascular: Negative for chest pain and palpitations  Gastrointestinal: Positive for constipation (chronic, baseline)  Negative for abdominal pain, diarrhea, nausea and vomiting  Endocrine: Negative for polydipsia, polyphagia and polyuria  Genitourinary: Negative for dysuria, frequency and hematuria  Musculoskeletal: Negative for gait problem  Skin: Negative for rash  Neurological: Negative for dizziness, syncope, light-headedness, numbness and headaches  Hematological: Does not bruise/bleed easily  Psychiatric/Behavioral: The patient is nervous/anxious            Past Medical History:   Diagnosis Date    Acid reflux     Anxiety     Diabetes mellitus [address] [address] )     Hypertension     Hypothyroid          Current Outpatient Prescriptions:     Blood Glucose Monitoring Suppl (FREESTYLE LITE) [facility], by Does not apply route 4 (four) times a day, Disp: 1 each, Rfl: 0    Cholecalciferol (VITAMIN D3) 2000 units capsule, Take 1 capsule by mouth daily, Disp: , Rfl:     cyanocobalamin (VITAMIN B-12) 1,000 mcg tablet, Take 1,000 mcg by mouth daily, Disp: , Rfl:     FREESTYLE LITE test strip, Test blood glucose 4 times daily, Disp: 450 each, Rfl: 1    glimepiride (AMARYL) 4 mg tablet, Take 4 mg by mouth 2 (two) times a day  , Disp: , Rfl:     HUMALOG KWIKPEN 100 UNIT/ML SOPN, Inject 10 Units under the skin 3 (three) times a day before meals , Disp: , Rfl: 0    insulin glargine (LANTUS SOLOSTAR) injection pen 100 units/mL, Inject 0 26 mL (26 Units total) under the skin daily, Disp: 5 pen, Rfl: 0    Insulin Pen Needle 32G X 4 MM MISC, by Does not apply route 4 (four) times a day Pt uses 4 pen needles daily- basaglar once a day and humalog tid, Disp: 360 each, Rfl: 1    Insulin Syringe-Needle U-100 (B-D INS SYR ULTRAFINE  3CC/30G) 30G X 1/2" 0 3 ML MISC, Inject under the skin 4 (four) times a day, Disp: 70 each, Rfl: 1    Lancets (FREESTYLE) lancets, TEST BLOOD GLUCOSE 3 TIMES A DAY, Disp: 90 each, Rfl: 0    levothyroxine (LEVOXYL) 100 mcg tablet, Take 1 tablet (100 mcg total) by mouth daily, Disp: 90 tablet, Rfl: 1    lisinopril-hydrochlorothiazide (PRINZIDE,ZESTORETIC) 20-12 5 MG per tablet, Take 1 tablet by mouth daily  , Disp: , Rfl:     metoclopramide (REGLAN) 10 mg tablet, Take 1 tablet by mouth 2 (two) times a day as needed  , Disp: , Rfl:     omeprazole (PriLOSEC) 40 MG capsule, Take 1 capsule (40 mg total) by mouth daily, Disp: 90 capsule, Rfl: 1    sertraline (ZOLOFT) 100 mg tablet, Take 1 tablet (100 mg total) by mouth daily, Disp: 90 tablet, Rfl: 1    Insulin Lispro 100 UNIT/ML SOCT, Inject 0 08 mL (8 Units total) under the skin 3 (three) times a day with meals, Disp: 5 Cartridge, Rfl: 0    Allergies   Allergen Reactions    Metformin Diarrhea     Other reaction(s): Diarrhea  Other reaction(s): Diarrhea    Metformin And Related GI Intolerance    Penicillins Rash     Other reaction(s): Rash  Other reaction(s): Rash       Social History   Past Surgical History:   Procedure Laterality Date    CHOLECYSTECTOMY      DILATION AND CURETTAGE OF UTERUS      ESOPHAGOGASTRODUODENOSCOPY      HYSTERECTOMY      [place] COLONOSCOPY FLX DX W/COLLJ SPEC WHEN PFRMD N/A 1/28/2016    Procedure: EGD AND COLONOSCOPY;  Surgeon: Abhijeet Fishman MD;  [facility];   Service: Gastroenterology    [place] INCISE FINGER TENDON SHEATH Right 1/31/2017    Procedure: LONG FINGER TRIGGER RELEASE ;  Surgeon: Yaya Victor MD;  [facility];  Service: Orthopedics    PA INCISE FINGER TENDON SHEATH Right 7/21/2016    Procedure: RELEASE TRIGGER FINGER - LONG FINGER;  Surgeon: Yaya Victor MD;  [facility];  Service: Orthopedics    PA REVISE MEDIAN N/CARPAL TUNNEL SURG Right 1/31/2017    Procedure: WRIST CARPAL TUNNEL RELEASE ; TENOLYSIS OF FPL, FDS 2-5, FDP 2-5;  APPLICATION OF Amaro Sol;  Surgeon: Yaya Victor MD;  [facility];  Service: Orthopedics     Family History   Problem Relation Age of Onset    Cancer Father     Heart attack Father     Diabetes type II Father     Arthritis Maternal Grandmother     No Known Problems Paternal Grandmother     Heart disease Paternal Grandfather     Stroke Paternal Grandfather     Cirrhosis Mother        Objective:  /60 (BP Location: Left arm, Patient Position: Sitting, Cuff Size: Standard)   Pulse 76   Temp 98 4 °F (36 9 °C) (Tympanic)   Ht 5' 2 36" (1 584 m)   Wt 90 1 kg (198 lb 9 6 oz)   SpO2 97%   BMI 35 90 kg/m²        Physical Exam   Constitutional: She is oriented to person, place, and time  She appears well-developed and well-nourished  No distress  HENT:   Head: Normocephalic and atraumatic  Right Ear: External ear normal    Left Ear: External ear normal    Mouth/Throat: Oropharynx is clear and moist    Eyes: Conjunctivae are normal  Pupils are equal, round, and reactive to light  No scleral icterus  Neck: Normal range of motion  Neck supple  No thyromegaly present  Cardiovascular: Normal rate, regular rhythm and normal heart sounds  Pulmonary/Chest: Effort normal and breath sounds normal  No respiratory distress  Abdominal: Soft  Bowel sounds are normal  She exhibits no distension  Musculoskeletal: Normal range of motion  She exhibits no edema     Feet:   Right Foot:   Skin Integrity: Negative for ulcer, skin breakdown, erythema, warmth, callus or dry skin  Left Foot:   Skin Integrity: Negative for ulcer, skin breakdown, erythema, warmth, callus or dry skin  Neurological: She is alert and oriented to person, place, and time  Skin: Skin is warm and dry  Psychiatric: She has a normal mood and affect  Her behavior is normal  Judgment and thought content normal    Vitals reviewed  Patient's shoes and socks removed  Right Foot/Ankle   Right Foot Inspection  Skin Exam: skin normal and skin intact no dry skin, no warmth, no callus, no erythema, no maceration, no abnormal color, no pre-ulcer, no ulcer and no callus                          Toe Exam: ROM and strength within normal limits  Sensory       Monofilament testing: intact  Vascular  Capillary refills: < 3 seconds      Left Foot/Ankle  Left Foot Inspection  Skin Exam: skin normal and skin intactno dry skin, no warmth, no erythema, no maceration, normal color, no pre-ulcer, no ulcer and no callus                         Toe Exam: ROM and strength within normal limits                   Sensory       Monofilament: intact  Vascular  Capillary refills: < 3 seconds

## 2018-04-24 NOTE — PATIENT INSTRUCTIONS
Will increase patient's Lantus and Humalog based on her current blood sugars  Start Lantus 30 units in the morning and Take Humalog 12 units three times a day with meals  Continue to check blood sugars three times a day before meals and at bedtimes and bring logs to next appointments  Referred to Diabetic Nutritional Therapy services for additional counseling on diabetic diet  Will have patient return in 1-2 weeks for continued follow-up and management

## 2018-04-30 DIAGNOSIS — Z79.4 TYPE 2 DIABETES MELLITUS WITHOUT COMPLICATION, WITH LONG-TERM CURRENT USE OF INSULIN (HCC): ICD-10-CM

## 2018-04-30 DIAGNOSIS — E11.9 TYPE 2 DIABETES MELLITUS WITHOUT COMPLICATION, WITH LONG-TERM CURRENT USE OF INSULIN (HCC): ICD-10-CM

## 2018-04-30 DIAGNOSIS — E11.8 TYPE 2 DIABETES MELLITUS WITH COMPLICATION, WITH LONG-TERM CURRENT USE OF INSULIN (HCC): ICD-10-CM

## 2018-04-30 DIAGNOSIS — Z79.4 TYPE 2 DIABETES MELLITUS WITH COMPLICATION, WITH LONG-TERM CURRENT USE OF INSULIN (HCC): ICD-10-CM

## 2018-04-30 RX ORDER — METOCLOPRAMIDE 10 MG/1
TABLET ORAL
Qty: 120 TABLET | Refills: 0 | OUTPATIENT
Start: 2018-04-30

## 2018-04-30 RX ORDER — CHOLECALCIFEROL (VITAMIN D3) 50 MCG
TABLET ORAL
Qty: 30 TABLET | Refills: 0 | OUTPATIENT
Start: 2018-04-30

## 2018-04-30 RX ORDER — LISINOPRIL AND HYDROCHLOROTHIAZIDE 20; 12.5 MG/1; MG/1
TABLET ORAL
Qty: 15 TABLET | Refills: 0 | OUTPATIENT
Start: 2018-04-30

## 2018-04-30 RX ORDER — HUMAN INSULIN 100 [IU]/ML
INJECTION, SUSPENSION SUBCUTANEOUS
Qty: 10 ML | Refills: 0 | OUTPATIENT
Start: 2018-04-30

## 2018-05-01 DIAGNOSIS — IMO0002 UNCONTROLLED TYPE 2 DIABETES MELLITUS WITH OTHER SPECIFIED COMPLICATION, WITH LONG-TERM CURRENT USE OF INSULIN: ICD-10-CM

## 2018-05-01 DIAGNOSIS — E11.8 TYPE 2 DIABETES MELLITUS WITH COMPLICATION, UNSPECIFIED WHETHER LONG TERM INSULIN USE: ICD-10-CM

## 2018-05-01 DIAGNOSIS — I10 ESSENTIAL HYPERTENSION, BENIGN: Primary | ICD-10-CM

## 2018-05-01 DIAGNOSIS — E55.9 VITAMIN D DEFICIENCY: ICD-10-CM

## 2018-05-01 RX ORDER — ACETAMINOPHEN 160 MG
2000 TABLET,DISINTEGRATING ORAL DAILY
Qty: 90 CAPSULE | Refills: 0 | Status: SHIPPED | OUTPATIENT
Start: 2018-05-01 | End: 2018-06-04 | Stop reason: CLARIF

## 2018-05-01 RX ORDER — LISINOPRIL AND HYDROCHLOROTHIAZIDE 20; 12.5 MG/1; MG/1
TABLET ORAL
Qty: 45 TABLET | Refills: 0 | Status: SHIPPED | OUTPATIENT
Start: 2018-05-01 | End: 2018-08-16 | Stop reason: SDUPTHER

## 2018-05-01 RX ORDER — INSULIN LISPRO 100 [IU]/ML
12 INJECTION, SOLUTION INTRAVENOUS; SUBCUTANEOUS
Qty: 5 PEN | Refills: 1 | Status: SHIPPED | OUTPATIENT
Start: 2018-05-01 | End: 2018-05-08 | Stop reason: SDUPTHER

## 2018-05-07 DIAGNOSIS — Z12.39 SCREENING FOR BREAST CANCER: Primary | ICD-10-CM

## 2018-05-08 ENCOUNTER — OFFICE VISIT (OUTPATIENT)
Dept: INTERNAL MEDICINE CLINIC | Age: 63
End: 2018-05-08
Payer: COMMERCIAL

## 2018-05-08 VITALS
SYSTOLIC BLOOD PRESSURE: 120 MMHG | HEIGHT: 63 IN | WEIGHT: 200.4 LBS | DIASTOLIC BLOOD PRESSURE: 62 MMHG | OXYGEN SATURATION: 97 % | TEMPERATURE: 98.6 F | HEART RATE: 81 BPM | BODY MASS INDEX: 35.51 KG/M2

## 2018-05-08 DIAGNOSIS — Z12.31 VISIT FOR SCREENING MAMMOGRAM: ICD-10-CM

## 2018-05-08 DIAGNOSIS — E11.8 TYPE 2 DIABETES MELLITUS WITH COMPLICATION, UNSPECIFIED WHETHER LONG TERM INSULIN USE: ICD-10-CM

## 2018-05-08 DIAGNOSIS — IMO0002 UNCONTROLLED TYPE 2 DIABETES MELLITUS WITH OTHER SPECIFIED COMPLICATION, WITH LONG-TERM CURRENT USE OF INSULIN: Primary | ICD-10-CM

## 2018-05-08 DIAGNOSIS — Z13.5 SCREENING FOR GLAUCOMA: ICD-10-CM

## 2018-05-08 PROCEDURE — 99214 OFFICE O/P EST MOD 30 MIN: CPT | Performed by: NURSE PRACTITIONER

## 2018-05-08 RX ORDER — INSULIN LISPRO 100 [IU]/ML
10 INJECTION, SOLUTION INTRAVENOUS; SUBCUTANEOUS
Qty: 5 PEN | Refills: 0
Start: 2018-05-08 | End: 2018-06-26 | Stop reason: CLARIF

## 2018-05-08 NOTE — PROGRESS NOTES
Assessment/Plan:    No problem-specific Assessment & Plan notes found for this encounter  Diagnoses and all orders for this visit:    Uncontrolled type 2 diabetes mellitus with other specified complication, with long-term current use of insulin ([facility] )    Visit for screening mammogram  -     Mammo screening bilateral w cad; Future    Screening for glaucoma  -     Ambulatory referral to Ophthalmology; Future    Type 2 diabetes mellitus with complication, unspecified whether long term insulin use (HCC)  -     HUMALOG KWIKPEN 100 UNIT/ML SOPN; Inject 10 Units under the skin 3 (three) times a day before meals            Patient's blood sugar log reviewed at length  Given that her fasting blood sugars are elevated, will have patient increase her basic larger from 28 units to 30 units  Patient will continue with 10 units of Humalog with meals  Should the patient noticed that her blood sugars are trending over 200 with meals, she will increase her Humalog to 12 units if she has had 3 consecutive days with those blood sugars in that range  The patient was advised that if she finds that 1 particular meal, for example dinner, is always over 200, she can just increase that meals coverage of Humalog to 12 units  Patient will continue to keep a blood sugar log book and monitor her eating and carb intake  And will return in 3-4 weeks,  Or sooner should her blood sugars start to trend over 200, especially in the morning, for further adjustment of her insulin  Patient advised to call our office should her blood sugar dropped consistently below 100  Should the patient's blood sugar dropped below 100 then the patient can steadily decrease her insulin, starting with her basic wall are by 2 units every 3 days    Go immediately to the emergency department with any dizziness, lightheadedness, passing out, chest pain, or new onset confusion that is not relieved by drinking juice or eating something with carbs or sugar  Patient advised to call her gynecologist office to have them review her records to determine whether she still has a cervix status post her hysterectomy to determine whether she is a candidate for Pap smears  Should she still need Pap smears, the patient will schedule an appointment for us to do a Pap smear for her  Mammogram ordered  Patient is due for an ophthalmology exam    Referral given at this time  Subjective:      Patient ID: Cadence Toney is a 60 y o  female  Did 30/12 with meals, then got dizzy, dropped to 80 at evening reading, then moved it to 28 10 with meals       Numbers are improved,  Diet changes, tryig not to do carbs with every meal- steak and an onion-- simple vs whole wheat  Log-- fasting 130-190's, on 28 units basaglar, switched to 10 units from 12 units with eating a lot of salads that day thinks she could go up to       Patient reports that she received a call from her surgeon's company stating that she is due for a Pap smear  Patient reports that she had a complete hysterectomy in 2001  She is unsure if she still has a cervix  It is unsure if she has had a Pap after the hysterectomy  The following portions of the patient's history were reviewed and updated as appropriate: allergies, current medications, past family history, past medical history, past social history, past surgical history and problem list     Review of Systems   Constitutional: Negative for chills, fatigue and fever  HENT: Negative for hearing loss, sinus pain and trouble swallowing  Eyes: Negative for pain  Respiratory: Negative for chest tightness, shortness of breath and wheezing  Cardiovascular: Negative for chest pain, palpitations and leg swelling  Gastrointestinal: Negative for abdominal pain, constipation, diarrhea, nausea and vomiting  Endocrine: Positive for polyuria  Negative for polydipsia and polyphagia  Genitourinary: Negative for dysuria     Musculoskeletal: Negative for gait problem  Skin: Negative for rash  Neurological: Negative for dizziness, syncope, numbness and headaches  Hematological: Does not bruise/bleed easily  Psychiatric/Behavioral: Negative for confusion and decreased concentration  The patient is not nervous/anxious            Past Medical History:   Diagnosis Date    Acid reflux     Anxiety     Diabetes mellitus ([facility] )     Hypertension     Hypothyroid          Current Outpatient Prescriptions:     Blood Glucose Monitoring Suppl (FREESTYLE LITE) [facility], by Does not apply route 4 (four) times a day, Disp: 1 each, Rfl: 0    Cholecalciferol (VITAMIN D3) 2000 units capsule, Take 1 capsule (2,000 Units total) by mouth daily, Disp: 90 capsule, Rfl: 0    cyanocobalamin (VITAMIN B-12) 1,000 mcg tablet, Take 1,000 mcg by mouth daily, Disp: , Rfl:     FREESTYLE LITE test strip, Test blood glucose 4 times daily, Disp: 450 each, Rfl: 1    glimepiride (AMARYL) 4 mg tablet, Take 4 mg by mouth 2 (two) times a day  , Disp: , Rfl:     HUMALOG KWIKPEN 100 UNIT/ML SOPN, Inject 10 Units under the skin 3 (three) times a day before meals, Disp: 5 pen, Rfl: 0    insulin glargine (LANTUS SOLOSTAR) injection pen 100 units/mL, Basaglar 30 Units daily, Disp: 5 pen, Rfl: 1    Insulin Pen Needle 32G X 4 MM MISC, by Does not apply route 4 (four) times a day Pt uses 4 pen needles daily- basaglar once a day and humalog tid, Disp: 360 each, Rfl: 0    Insulin Syringe-Needle U-100 (B-D INS SYR ULTRAFINE  3CC/30G) 30G X 1/2" 0 3 ML MISC, Inject under the skin 4 (four) times a day, Disp: 70 each, Rfl: 1    Lancets (FREESTYLE) lancets, TEST BLOOD GLUCOSE 3 TIMES A DAY, Disp: 90 each, Rfl: 0    levothyroxine (LEVOXYL) 100 mcg tablet, Take 1 tablet (100 mcg total) by mouth daily, Disp: 90 tablet, Rfl: 1    lisinopril-hydrochlorothiazide (PRINZIDE,ZESTORETIC) 20-12 5 MG per tablet, 1/2 tablet daily, Disp: 45 tablet, Rfl: 0    metoclopramide (REGLAN) 10 mg tablet, Take 1 tablet by mouth 2 (two) times a day as needed  , Disp: , Rfl:     omeprazole (PriLOSEC) 40 MG capsule, Take 1 capsule (40 mg total) by mouth daily, Disp: 90 capsule, Rfl: 1    sertraline (ZOLOFT) 100 mg tablet, Take 1 tablet (100 mg total) by mouth daily, Disp: 90 tablet, Rfl: 1    Allergies   Allergen Reactions    Metformin Diarrhea     Other reaction(s): Diarrhea  Other reaction(s): Diarrhea    Metformin And Related GI Intolerance    Penicillins Rash     Other reaction(s): Rash  Other reaction(s): Rash       Social History   Past Surgical History:   Procedure Laterality Date    CHOLECYSTECTOMY      DILATION AND CURETTAGE OF UTERUS      ESOPHAGOGASTRODUODENOSCOPY      HYSTERECTOMY      IL COLONOSCOPY FLX DX W/COLLJ SPEC WHEN PFRMD N/A 1/28/2016    Procedure: EGD AND COLONOSCOPY;  Surgeon: Ashwini Benz MD;  [facility];   Service: Gastroenterology    IL INCISE FINGER TENDON SHEATH Right 1/31/2017    Procedure: LONG FINGER TRIGGER RELEASE ;  Surgeon: Eliseo Montano MD;  [facility];  Service: Orthopedics    IL INCISE FINGER TENDON SHEATH Right 7/21/2016    Procedure: RELEASE TRIGGER FINGER - LONG FINGER;  Surgeon: Eliseo Montano MD;  [facility];  Service: Orthopedics    IL REVISE MEDIAN N/CARPAL TUNNEL SURG Right 1/31/2017    Procedure: WRIST CARPAL TUNNEL RELEASE ; TENOLYSIS OF FPL, FDS 2-5, FDP 2-5;  APPLICATION OF Tabitha Holy;  Surgeon: Eliseo Montano MD;  [facility];  Service: Orthopedics     Family History   Problem Relation Age of Onset    Cancer Father     Heart attack Father     Diabetes type II Father     Arthritis Maternal Grandmother     No Known Problems Paternal Grandmother     Heart disease Paternal Grandfather     Stroke Paternal Grandfather     Cirrhosis Mother        Objective:  /62 (BP Location: Left arm, Patient Position: Sitting, Cuff Size: Large)   Pulse 81   Temp 98 6 °F (37 °C) (Tympanic)   Ht 5' 2 56" (1 589 m)   Wt 90 9 kg (200 lb 6 4 oz)   SpO2 97%   BMI 36 00 kg/m²        Physical Exam   Constitutional: She is oriented to person, place, and time  She appears well-developed and well-nourished  No distress  HENT:   Head: Normocephalic and atraumatic  Right Ear: External ear normal    Left Ear: External ear normal    Mouth/Throat: Oropharynx is clear and moist    Eyes: Conjunctivae are normal  Pupils are equal, round, and reactive to light  No scleral icterus  Neck: Normal range of motion  Neck supple  No thyromegaly present  Cardiovascular: Normal rate, regular rhythm and normal heart sounds  Pulmonary/Chest: Effort normal and breath sounds normal  No respiratory distress  Abdominal: Soft  Bowel sounds are normal  She exhibits no distension  Musculoskeletal: Normal range of motion  She exhibits no edema  Neurological: She is alert and oriented to person, place, and time  Skin: Skin is warm and dry  Psychiatric: She has a normal mood and affect  Her behavior is normal  Judgment and thought content normal    Vitals reviewed

## 2018-05-08 NOTE — PATIENT INSTRUCTIONS
Patient's blood sugar log reviewed at length  Given that her fasting blood sugars are elevated, will have patient increase her basic larger from 28 units to 30 units  Patient will continue with 10 units of Humalog with meals  Should the patient noticed that her blood sugars are trending over 200 with meals, she will increase her Humalog to 12 units if she has had 3 consecutive days with those blood sugars in that range  The patient was advised that if she finds that 1 particular meal, for example dinner, is always over 200, she can just increase that meals coverage of Humalog to 12 units  Patient will continue to keep a blood sugar log book and monitor her eating and carb intake  And will return in 3-4 weeks,  Or sooner should her blood sugars start to trend over 200, especially in the morning, for further adjustment of her insulin  Patient advised to call our office should her blood sugar dropped consistently below 100  Should the patient's blood sugar dropped below 100 then the patient can steadily decrease her insulin, starting with her basic wall are by 2 units every 3 days  Go immediately to the emergency department with any dizziness, lightheadedness, passing out, chest pain, or new onset confusion that is not relieved by drinking juice or eating something with carbs or sugar  Patient advised to call her gynecologist office to have them review her records to determine whether she still has a cervix status post her hysterectomy to determine whether she is a candidate for Pap smears  Should she still need Pap smears, the patient will schedule an appointment for us to do a Pap smear for her  Mammogram ordered  Patient is due for an ophthalmology exam    Referral given at this time

## 2018-05-15 ENCOUNTER — TELEPHONE (OUTPATIENT)
Dept: INTERNAL MEDICINE CLINIC | Age: 63
End: 2018-05-15

## 2018-05-15 ENCOUNTER — HOSPITAL ENCOUNTER (OUTPATIENT)
Dept: RADIOLOGY | Age: 63
Discharge: HOME | End: 2018-05-15
Payer: COMMERCIAL

## 2018-05-15 DIAGNOSIS — Z12.31 VISIT FOR SCREENING MAMMOGRAM: ICD-10-CM

## 2018-05-15 DIAGNOSIS — Z12.31 ENCOUNTER FOR SCREENING MAMMOGRAM FOR MALIGNANT NEOPLASM OF BREAST: ICD-10-CM

## 2018-05-15 PROCEDURE — 77067 SCR MAMMO BI INCL CAD: CPT

## 2018-05-30 DIAGNOSIS — Z79.4 TYPE 2 DIABETES MELLITUS WITH HYPERGLYCEMIA, WITH LONG-TERM CURRENT USE OF INSULIN (HCC): Primary | ICD-10-CM

## 2018-05-30 DIAGNOSIS — E55.9 VITAMIN D DEFICIENCY: Primary | ICD-10-CM

## 2018-05-30 DIAGNOSIS — E11.65 TYPE 2 DIABETES MELLITUS WITH HYPERGLYCEMIA, WITH LONG-TERM CURRENT USE OF INSULIN (HCC): Primary | ICD-10-CM

## 2018-05-30 DIAGNOSIS — K31.84 GASTROPARESIS: ICD-10-CM

## 2018-05-30 RX ORDER — METOCLOPRAMIDE 10 MG/1
TABLET ORAL
Qty: 120 TABLET | Refills: 0 | Status: SHIPPED | OUTPATIENT
Start: 2018-05-30 | End: 2018-07-17 | Stop reason: ALTCHOICE

## 2018-05-30 RX ORDER — CHOLECALCIFEROL (VITAMIN D3) 50 MCG
TABLET ORAL
Qty: 30 TABLET | Refills: 0 | Status: SHIPPED | OUTPATIENT
Start: 2018-05-30 | End: 2018-06-04 | Stop reason: SDUPTHER

## 2018-05-31 RX ORDER — GLIMEPIRIDE 4 MG/1
TABLET ORAL
Qty: 60 TABLET | Refills: 0 | Status: SHIPPED | OUTPATIENT
Start: 2018-05-31 | End: 2018-06-28 | Stop reason: SDUPTHER

## 2018-06-04 ENCOUNTER — OFFICE VISIT (OUTPATIENT)
Dept: INTERNAL MEDICINE CLINIC | Age: 63
End: 2018-06-04
Payer: COMMERCIAL

## 2018-06-04 VITALS
BODY MASS INDEX: 36.07 KG/M2 | OXYGEN SATURATION: 96 % | TEMPERATURE: 98.8 F | DIASTOLIC BLOOD PRESSURE: 64 MMHG | WEIGHT: 203.6 LBS | HEART RATE: 63 BPM | HEIGHT: 63 IN | SYSTOLIC BLOOD PRESSURE: 122 MMHG

## 2018-06-04 DIAGNOSIS — R35.0 FREQUENT URINATION: ICD-10-CM

## 2018-06-04 DIAGNOSIS — IMO0002 UNCONTROLLED TYPE 2 DIABETES MELLITUS WITH OTHER SPECIFIED COMPLICATION, WITH LONG-TERM CURRENT USE OF INSULIN: Primary | ICD-10-CM

## 2018-06-04 DIAGNOSIS — E55.9 VITAMIN D DEFICIENCY: ICD-10-CM

## 2018-06-04 DIAGNOSIS — R25.2 MUSCLE CRAMPS: ICD-10-CM

## 2018-06-04 PROCEDURE — 99214 OFFICE O/P EST MOD 30 MIN: CPT | Performed by: NURSE PRACTITIONER

## 2018-06-04 NOTE — PROGRESS NOTES
Assessment/Plan:    No problem-specific Assessment & Plan notes found for this encounter  Diagnoses and all orders for this visit:    Uncontrolled type 2 diabetes mellitus with other specified complication, with long-term current use of insulin (HCC)    Muscle cramps  -     Comprehensive metabolic panel; Future    Frequent urination  -     UA w Reflex to Microscopic w Reflex to Culture      discussed with patient that she needs to follow up with the diabetic dietitian as previously ordered  Will have her check her blood sugar at 3 a m  for 5 days to assess where the adjustments in her insulin needs to be  The patient will keep a more detailed log of her diet, her blood sugars, her symptoms, and other pertinent information  Will not make any adjustments to her medications today until additional information from this log is reviewed  The urinary frequency is not a new symptom, however it may be related to both her muscle cramping or the fact that she is on a low-dose diuretic  Will check a CMP to further investigate the patient's muscle cramps  Patient follow-up in 2-3 weeks to further assess her symptoms or sooner as needed  Patient verbalized understanding and detailed instructions given to patient on her after visit summary  Patient will call with any additional questions  Subjective:      Patient ID: Jake Sky is a 60 y o  female  Patient presents for a 4 week follow-up on diabetes  She has brought her blood sugar log for review  Her fasting blood sugars have ranged from 180-240  Her 2 hr postprandial blood sugars have ranged from 140-240  She is currently taking 30 units of Lantus in the morning and 12 units of Humalog with meals  She has reported some episodes of hypo and hyperglycemia where she has felt symptomatic  She reports episodes where she feels that she is nauseous, shaky, and weak  She checks her blood sugars and if they are low, she eats something    She reports that a couple of nights ago for dinner she had lettuce, turkey, and mayonaisse for dinner and then later that evening she felt shaky and her blood sugar was 70  Her blood sugar log does not reflect these episodes, therefore it is unclear how often they are happening  She believes they are happening about every 2 days  For breakfast, the patient eats a whole wheat English Muffin, sometimes 2  For lunch and dinner, she tries to have salads  She says that she is eating grilled chicken or turkey rather than her previous fried or saute'd meats  She has substituted her bread with her sandwiches for wraps  She does admit that these wraps are white wraps rather than whole wheat  The patient admits that she has to do a lot of substituting for her normal meals with healthier alternatives than what she is used to  She will eat unhealthy snacks at times  She has a son and daughter-in-law who work in healthcare who are trying to help her with her eating and healthy choices  She has been going to the [employer] pool which is which is 3 blocks from her house  The she reports that she used to drive there and now she walks there, and performs aerobic exercises while in the water  The patient denies numbness, tingling, paresthesias related to her diabetes at this time  The patient was previously having constipation, but now is having regular bowel movements  The patient reports that she is getting frequent muscle cramps in her legs  When asked about her hydration, she admits that she urinates frequently, about twice an hour  The patient has not called her previous gynecologist to determine whether she still has a cervix to determine her need for further Pap smears  She reports that she will follow up on this          The following portions of the patient's history were reviewed and updated as appropriate: allergies, current medications, past family history, past medical history, past social history, past surgical history and problem list     Review of Systems   Constitutional: Positive for unexpected weight change (Gained 5lbs in 2 months)  Negative for chills, fatigue and fever  HENT: Negative for congestion  Eyes: Negative for pain  Respiratory: Negative for cough, shortness of breath and wheezing  Cardiovascular: Negative for chest pain  Gastrointestinal: Negative for abdominal pain, diarrhea, nausea and vomiting  Genitourinary: Negative for dysuria  Musculoskeletal: Negative for gait problem  Skin: Negative for rash  Neurological: Negative for dizziness, weakness, light-headedness and headaches  Psychiatric/Behavioral: The patient is not nervous/anxious  Past Medical History:   Diagnosis Date    Acid reflux     Anxiety     Diabetes mellitus ([facility] )     Hypertension     Hypothyroid          Current Outpatient Prescriptions:     Blood Glucose Monitoring Suppl (FREESTYLE LITE) [facility], by Does not apply route 4 (four) times a day, Disp: 1 each, Rfl: 0    Cholecalciferol (VITAMIN D) 2000 units tablet, TAKE ONE TABLET BY MOUTH EVERY DAY, Disp: 30 tablet, Rfl: 0    cyanocobalamin (VITAMIN B-12) 1,000 mcg tablet, Take 1,000 mcg by mouth daily, Disp: , Rfl:     FREESTYLE LITE test strip, Test blood glucose 4 times daily, Disp: 450 each, Rfl: 1    glimepiride (AMARYL) 4 mg tablet, TAKE 1 TABLET BY MOUTH TWICE DAILY  , Disp: 60 tablet, Rfl: 0    HUMALOG KWIKPEN 100 UNIT/ML SOPN, Inject 10 Units under the skin 3 (three) times a day before meals, Disp: 5 pen, Rfl: 0    insulin glargine (LANTUS SOLOSTAR) injection pen 100 units/mL, Basaglar 30 Units daily, Disp: 5 pen, Rfl: 1    Insulin Pen Needle 32G X 4 MM MISC, by Does not apply route 4 (four) times a day Pt uses 4 pen needles daily- basaglar once a day and humalog tid, Disp: 360 each, Rfl: 0    Insulin Syringe-Needle U-100 (B-D INS SYR ULTRAFINE  3CC/30G) 30G X 1/2" 0 3 ML MISC, Inject under the skin 4 (four) times a day, Disp: 70 each, Rfl: 1    Lancets (FREESTYLE) lancets, TEST BLOOD GLUCOSE 3 TIMES A DAY, Disp: 90 each, Rfl: 0    levothyroxine (LEVOXYL) 100 mcg tablet, Take 1 tablet (100 mcg total) by mouth daily, Disp: 90 tablet, Rfl: 1    lisinopril-hydrochlorothiazide (PRINZIDE,ZESTORETIC) 20-12 5 MG per tablet, 1/2 tablet daily, Disp: 45 tablet, Rfl: 0    metoclopramide (REGLAN) 10 mg tablet, TAKE 1 TABLET BY MOUTH BEFORE MEALS AND AT BEDTIME, Disp: 120 tablet, Rfl: 0    omeprazole (PriLOSEC) 40 MG capsule, Take 1 capsule (40 mg total) by mouth daily, Disp: 90 capsule, Rfl: 1    sertraline (ZOLOFT) 100 mg tablet, Take 1 tablet (100 mg total) by mouth daily, Disp: 90 tablet, Rfl: 1    Allergies   Allergen Reactions    Metformin Diarrhea     Other reaction(s): Diarrhea  Other reaction(s): Diarrhea    Metformin And Related GI Intolerance    Penicillins Rash     Other reaction(s): Rash  Other reaction(s): Rash       Social History   Past Surgical History:   Procedure Laterality Date    CHOLECYSTECTOMY      DILATION AND CURETTAGE OF UTERUS      ESOPHAGOGASTRODUODENOSCOPY      HYSTERECTOMY      ID COLONOSCOPY FLX DX W/COLLJ SPEC WHEN PFRMD N/A 1/28/2016    Procedure: EGD AND COLONOSCOPY;  Surgeon: César Mathur MD;  [facility];   Service: Gastroenterology    ID INCISE FINGER TENDON SHEATH Right 1/31/2017    Procedure: LONG FINGER TRIGGER RELEASE ;  Surgeon: Kayleigh Hastings MD;  [facility];  Service: Orthopedics    ID INCISE FINGER TENDON SHEATH Right 7/21/2016    Procedure: RELEASE TRIGGER FINGER - LONG FINGER;  Surgeon: Kayleigh Hastings MD;  Location:  MAIN OR;  Service: Orthopedics    ID REVISE MEDIAN N/CARPAL TUNNEL SURG Right 1/31/2017    Procedure: WRIST CARPAL TUNNEL RELEASE ; TENOLYSIS OF FPL, FDS 2-5, FDP 2-5;  APPLICATION OF Brandy Mcnair;  Surgeon: Kayleigh Hastings MD;  [facility];  Service: Orthopedics     Family History   Problem Relation Age of Onset    Cancer Father     Heart attack Father     Diabetes type II Father     Arthritis Maternal Grandmother     No Known Problems Paternal Grandmother     Heart disease Paternal Grandfather     Stroke Paternal Grandfather     Cirrhosis Mother        Objective:  /64 (BP Location: Left arm, Patient Position: Sitting, Cuff Size: Adult)   Pulse 63   Temp 98 8 °F (37 1 °C) (Tympanic)   Ht 5' 2 6" (1 59 m)   Wt 92 4 kg (203 lb 9 6 oz)   SpO2 96%   BMI 36 53 kg/m²        Physical Exam   Constitutional: She is oriented to person, place, and time  She appears well-developed and well-nourished  No distress  HENT:   Head: Normocephalic and atraumatic  Right Ear: External ear normal    Left Ear: External ear normal    Mouth/Throat: Oropharynx is clear and moist    Eyes: Conjunctivae are normal  Pupils are equal, round, and reactive to light  No scleral icterus  Neck: Normal range of motion  Neck supple  No thyromegaly present  Cardiovascular: Normal rate, regular rhythm and normal heart sounds  Pulmonary/Chest: Effort normal and breath sounds normal  No respiratory distress  Abdominal: Soft  Bowel sounds are normal  She exhibits no distension  Musculoskeletal: Normal range of motion  She exhibits no edema  Neurological: She is alert and oriented to person, place, and time  Skin: Skin is warm and dry  Psychiatric: She has a normal mood and affect  Her behavior is normal  Judgment and thought content normal    Vitals reviewed

## 2018-06-06 RX ORDER — CHOLECALCIFEROL (VITAMIN D3) 50 MCG
TABLET ORAL
Qty: 120 TABLET | Refills: 0 | Status: SHIPPED | OUTPATIENT
Start: 2018-06-06 | End: 2018-06-28 | Stop reason: SDUPTHER

## 2018-06-08 ENCOUNTER — OFFICE VISIT (OUTPATIENT)
Dept: INTERNAL MEDICINE CLINIC | Age: 63
End: 2018-06-08
Payer: COMMERCIAL

## 2018-06-08 VITALS
BODY MASS INDEX: 37.06 KG/M2 | HEIGHT: 62 IN | WEIGHT: 201.4 LBS | TEMPERATURE: 98.7 F | HEART RATE: 86 BPM | SYSTOLIC BLOOD PRESSURE: 122 MMHG | RESPIRATION RATE: 20 BRPM | DIASTOLIC BLOOD PRESSURE: 68 MMHG | OXYGEN SATURATION: 96 %

## 2018-06-08 DIAGNOSIS — M27.3 INFECTION OF TOOTH SOCKET: Primary | ICD-10-CM

## 2018-06-08 PROCEDURE — 99213 OFFICE O/P EST LOW 20 MIN: CPT | Performed by: NURSE PRACTITIONER

## 2018-06-08 RX ORDER — CEPHALEXIN 500 MG/1
500 CAPSULE ORAL EVERY 12 HOURS SCHEDULED
Qty: 14 CAPSULE | Refills: 0 | Status: SHIPPED | OUTPATIENT
Start: 2018-06-08 | End: 2018-06-15

## 2018-06-08 NOTE — ASSESSMENT & PLAN NOTE
Sent script for Keflex 500 mg b I d  For 7 days  Patient is to take antibiotic with food, and take with either probiotic or yogurt to prevent yeast infection  Advised patient to get a hold of her dentist for tooth removal     Advised patient to eat soft foods or liquids prior to having her tooth extracted to prevent further irritation

## 2018-06-08 NOTE — PROGRESS NOTES
Assessment/Plan:    Infection of tooth socket   Sent script for Keflex 500 mg b I d  For 7 days  Patient is to take antibiotic with food, and take with either probiotic or yogurt to prevent yeast infection  Advised patient to get a hold of her dentist for tooth removal     Advised patient to eat soft foods or liquids prior to having her tooth extracted to prevent further irritation  Diagnoses and all orders for this visit:    Infection of tooth socket  -     cephalexin (KEFLEX) 500 mg capsule; Take 1 capsule (500 mg total) by mouth every 12 (twelve) hours for 7 days          Subjective:      Patient ID: Ammy Tyler is a 60 y o  female  The patient presents today for possible tooth infection on her right lower gums  Patient reports that about 7 months ago her dentist shoulder that she had a cavity and infection underneath her to and that it should be removed however patient could not afford the surgery at that time  Last night while she was eating a part of her tooth came out,  Her son had taken a look at the gumline and noted the route to the tooth was still in her gums  Patient states that at her last visit with her dentist they did take x-rays  Dental Pain    This is a new problem  The current episode started yesterday  The patient is experiencing no pain (painful when it came out, no pain now)  Pertinent negatives include no fever, oral bleeding or sinus pressure  She has tried nothing for the symptoms  The following portions of the patient's history were reviewed and updated as appropriate: allergies, current medications, past family history, past medical history, past social history, past surgical history and problem list     Review of Systems   Constitutional: Negative for activity change, appetite change, chills, diaphoresis and fever  HENT: Negative for congestion, ear discharge, ear pain, postnasal drip, rhinorrhea, sinus pain, sinus pressure and sore throat      Eyes: Negative for pain, discharge, itching and visual disturbance  Respiratory: Negative for cough, chest tightness, shortness of breath and wheezing  Cardiovascular: Negative for chest pain, palpitations and leg swelling  Gastrointestinal: Negative for abdominal pain, constipation, diarrhea, nausea and vomiting  Endocrine: Negative for polydipsia, polyphagia and polyuria  Genitourinary: Negative for difficulty urinating, dysuria and urgency  Musculoskeletal: Negative for arthralgias, back pain and neck pain  Skin: Negative for rash and wound  Neurological: Negative for dizziness, weakness, numbness and headaches  Past Medical History:   Diagnosis Date    Acid reflux     Anxiety     Diabetes mellitus ([facility] [address] )     Hypertension     Hypothyroid          Current Outpatient Prescriptions:     Blood Glucose Monitoring Suppl (FREESTYLE LITE) [facility], by Does not apply route 4 (four) times a day, Disp: 1 each, Rfl: 0    Cholecalciferol (VITAMIN D) 2000 units tablet, TAKE ONE TABLET BY MOUTH EVERY DAY, Disp: 120 tablet, Rfl: 0    cyanocobalamin (VITAMIN B-12) 1,000 mcg tablet, Take 1,000 mcg by mouth daily, Disp: , Rfl:     FREESTYLE LITE test strip, Test blood glucose 4 times daily, Disp: 450 each, Rfl: 1    glimepiride (AMARYL) 4 mg tablet, TAKE 1 TABLET BY MOUTH TWICE DAILY  , Disp: 60 tablet, Rfl: 0    HUMALOG KWIKPEN 100 UNIT/ML SOPN, Inject 10 Units under the skin 3 (three) times a day before meals, Disp: 5 pen, Rfl: 0    insulin glargine (LANTUS SOLOSTAR) injection pen 100 units/mL, Basaglar 30 Units daily, Disp: 5 pen, Rfl: 1    Insulin Pen Needle 32G X 4 MM MISC, by Does not apply route 4 (four) times a day Pt uses 4 pen needles daily- basaglar once a day and humalog tid, Disp: 360 each, Rfl: 0    Insulin Syringe-Needle U-100 (B-D INS SYR ULTRAFINE  3CC/30G) 30G X 1/2" 0 3 ML MISC, Inject under the skin 4 (four) times a day, Disp: 70 each, Rfl: 1    Lancets (FREESTYLE) lancets, TEST BLOOD GLUCOSE 3 TIMES A DAY, Disp: 90 each, Rfl: 0    levothyroxine (LEVOXYL) 100 mcg tablet, Take 1 tablet (100 mcg total) by mouth daily, Disp: 90 tablet, Rfl: 1    lisinopril-hydrochlorothiazide (PRINZIDE,ZESTORETIC) 20-12 5 MG per tablet, 1/2 tablet daily, Disp: 45 tablet, Rfl: 0    metoclopramide (REGLAN) 10 mg tablet, TAKE 1 TABLET BY MOUTH BEFORE MEALS AND AT BEDTIME, Disp: 120 tablet, Rfl: 0    omeprazole (PriLOSEC) 40 MG capsule, Take 1 capsule (40 mg total) by mouth daily, Disp: 90 capsule, Rfl: 1    sertraline (ZOLOFT) 100 mg tablet, Take 1 tablet (100 mg total) by mouth daily, Disp: 90 tablet, Rfl: 1    cephalexin (KEFLEX) 500 mg capsule, Take 1 capsule (500 mg total) by mouth every 12 (twelve) hours for 7 days, Disp: 14 capsule, Rfl: 0    Allergies   Allergen Reactions    Metformin Diarrhea     Other reaction(s): Diarrhea  Other reaction(s): Diarrhea    Metformin And Related GI Intolerance    Penicillins Rash     Other reaction(s): Rash  Other reaction(s): Rash       Social History   Past Surgical History:   Procedure Laterality Date    CHOLECYSTECTOMY      DILATION AND CURETTAGE OF UTERUS      ESOPHAGOGASTRODUODENOSCOPY      HYSTERECTOMY      UT COLONOSCOPY FLX DX W/COLLJ SPEC WHEN PFRMD N/A 1/28/2016    Procedure: EGD AND COLONOSCOPY;  Surgeon: Mikie Warner MD;  [facility];   Service: Gastroenterology    UT INCISE FINGER TENDON SHEATH Right 1/31/2017    Procedure: LONG FINGER TRIGGER RELEASE ;  Surgeon: Camila Silva MD;  [facility];  Service: Orthopedics    UT INCISE FINGER TENDON SHEATH Right 7/21/2016    Procedure: RELEASE TRIGGER FINGER - LONG FINGER;  Surgeon: Camila Silva MD;  [facility];  Service: Orthopedics    UT REVISE MEDIAN N/CARPAL TUNNEL SURG Right 1/31/2017    Procedure: WRIST CARPAL TUNNEL RELEASE ; TENOLYSIS OF FPL, FDS 2-5, FDP 2-5;  APPLICATION OF Nicanor Reagin;  Surgeon: Camila Silva MD;  [facility];  Service: Orthopedics     Family History   Problem Relation Age of Onset    Cancer Father     Heart attack Father     Diabetes type II Father     Arthritis Maternal Grandmother     No Known Problems Paternal Grandmother     Heart disease Paternal Grandfather     Stroke Paternal Grandfather     Cirrhosis Mother        Objective:  /68 (BP Location: Left arm, Patient Position: Sitting, Cuff Size: Large)   Pulse 86   Temp 98 7 °F (37 1 °C) (Tympanic)   Resp 20   Ht 5' 2 13" (1 578 m)   Wt 91 4 kg (201 lb 6 4 oz)   SpO2 96% Comment: room air  BMI 36 69 kg/m²     No results found for this or any previous visit (from the past 1344 hour(s))  Physical Exam   Constitutional: She is oriented to person, place, and time  She appears well-developed and well-nourished  No distress  HENT:   Head: Normocephalic and atraumatic  Right Ear: External ear normal    Left Ear: External ear normal    Nose: Nose normal    Mouth/Throat: Oropharynx is clear and moist  No oropharyngeal exudate  Eyes: Conjunctivae and EOM are normal  Pupils are equal, round, and reactive to light  Right eye exhibits no discharge  Left eye exhibits no discharge  Neck: Normal range of motion  Neck supple  No thyromegaly present  Cardiovascular: Normal rate, regular rhythm, normal heart sounds and intact distal pulses  Exam reveals no gallop and no friction rub  No murmur heard  Pulmonary/Chest: Effort normal and breath sounds normal  No stridor  No respiratory distress  She has no wheezes  She has no rales  Abdominal: Soft  Bowel sounds are normal  She exhibits no distension  There is no tenderness  Lymphadenopathy:     She has no cervical adenopathy  Neurological: She is alert and oriented to person, place, and time  Skin: Skin is warm and dry  No rash noted  She is not diaphoretic  No erythema  Psychiatric: She has a normal mood and affect   Her behavior is normal  Judgment and thought content normal

## 2018-06-25 DIAGNOSIS — E11.8 TYPE 2 DIABETES MELLITUS WITH COMPLICATION, UNSPECIFIED WHETHER LONG TERM INSULIN USE: ICD-10-CM

## 2018-06-26 ENCOUNTER — TELEPHONE (OUTPATIENT)
Dept: INTERNAL MEDICINE CLINIC | Age: 63
End: 2018-06-26

## 2018-06-26 DIAGNOSIS — IMO0002 UNCONTROLLED TYPE 2 DIABETES MELLITUS WITH OTHER SPECIFIED COMPLICATION, WITH LONG-TERM CURRENT USE OF INSULIN: Primary | ICD-10-CM

## 2018-06-26 NOTE — TELEPHONE ENCOUNTER
Called the patient and lmom to let the patient know that the medication was sent over to the [facility] pharmacy

## 2018-06-26 NOTE — TELEPHONE ENCOUNTER
Betty De Anda,    Patient called to state that the Humalog [place] is no longer covered under her insurance plan  The patient was given the one that is covered by her insurance  The name of the medication is Admelog which is the generic for Humalog     It is covered either with pens or vials    Patient would need it to be sent to [address] [place] [address] Ave order     Patient is scheduled with you on 07/02/2018 but will not have enough till then    Please call her when it is sent to pharmacy

## 2018-06-28 DIAGNOSIS — E55.9 VITAMIN D DEFICIENCY: ICD-10-CM

## 2018-06-28 DIAGNOSIS — E11.65 TYPE 2 DIABETES MELLITUS WITH HYPERGLYCEMIA, WITH LONG-TERM CURRENT USE OF INSULIN (HCC): ICD-10-CM

## 2018-06-28 DIAGNOSIS — Z79.4 TYPE 2 DIABETES MELLITUS WITH HYPERGLYCEMIA, WITH LONG-TERM CURRENT USE OF INSULIN (HCC): ICD-10-CM

## 2018-06-29 RX ORDER — GLIMEPIRIDE 4 MG/1
TABLET ORAL
Qty: 60 TABLET | Refills: 0 | Status: SHIPPED | OUTPATIENT
Start: 2018-06-29 | End: 2018-08-16 | Stop reason: SDUPTHER

## 2018-06-29 RX ORDER — CHOLECALCIFEROL (VITAMIN D3) 50 MCG
TABLET ORAL
Qty: 30 TABLET | Refills: 0 | Status: SHIPPED | OUTPATIENT
Start: 2018-06-29 | End: 2018-10-02 | Stop reason: SDUPTHER

## 2018-07-17 ENCOUNTER — OFFICE VISIT (OUTPATIENT)
Dept: INTERNAL MEDICINE CLINIC | Age: 63
End: 2018-07-17
Payer: COMMERCIAL

## 2018-07-17 VITALS
WEIGHT: 202 LBS | BODY MASS INDEX: 35.79 KG/M2 | SYSTOLIC BLOOD PRESSURE: 136 MMHG | HEART RATE: 72 BPM | TEMPERATURE: 97.5 F | HEIGHT: 63 IN | DIASTOLIC BLOOD PRESSURE: 78 MMHG | OXYGEN SATURATION: 97 %

## 2018-07-17 DIAGNOSIS — E11.9 TYPE 2 DIABETES MELLITUS WITHOUT COMPLICATION, WITHOUT LONG-TERM CURRENT USE OF INSULIN (HCC): ICD-10-CM

## 2018-07-17 DIAGNOSIS — M25.511 RIGHT SHOULDER PAIN, UNSPECIFIED CHRONICITY: ICD-10-CM

## 2018-07-17 DIAGNOSIS — IMO0002 UNCONTROLLED TYPE 2 DIABETES MELLITUS WITH OTHER SPECIFIED COMPLICATION, WITH LONG-TERM CURRENT USE OF INSULIN: Primary | ICD-10-CM

## 2018-07-17 PROCEDURE — 99214 OFFICE O/P EST MOD 30 MIN: CPT | Performed by: NURSE PRACTITIONER

## 2018-07-17 RX ORDER — MELOXICAM 15 MG/1
15 TABLET ORAL DAILY
Qty: 30 TABLET | Refills: 0 | Status: SHIPPED | OUTPATIENT
Start: 2018-07-17 | End: 2018-09-14 | Stop reason: ALTCHOICE

## 2018-07-17 NOTE — PATIENT INSTRUCTIONS
Will have patient increase Lantus to 34 units in the morning  Continue with insulin lispro 12 units 3 times daily  Continue to keep as much of a log with regards to food and blood sugars as possible, as this is helpful in being accountable with diet and monitoring how diet effects blood sugars  With regards to shoulder pain, will check an x-ray 1st and start meloxicam for pain and inflammation  Will consider physical therapy depending on results of x-ray  The importance of getting the blood work done was stressed to the patient and she will follow up in 1 month or sooner as needed  Start meloxicam for pain and inflammation  Take it once daily in the morning with food  Do not take any ibuprofen or aleve while taking this medication

## 2018-07-17 NOTE — PROGRESS NOTES
Assessment/Plan:    No problem-specific Assessment & Plan notes found for this encounter  Diagnoses and all orders for this visit:    Uncontrolled type 2 diabetes mellitus with other specified complication, with long-term current use of insulin (HCC)  -     insulin lispro (ADMELOG SOLOSTAR) 100 units/mL injection pen; Inject 12 Units under the skin 3 (three) times a day with meals  -     insulin glargine (LANTUS SOLOSTAR) 100 units/mL injection pen; Basaglar 34 Units daily    Right shoulder pain, unspecified chronicity  -     XR shoulder 2+ vw right; Future  -     meloxicam (MOBIC) 15 mg tablet; Take 1 tablet (15 mg total) by mouth daily for 30 days    Type 2 diabetes mellitus without complication, without long-term current use of insulin (HCC)  -     Insulin Syringe-Needle U-100 (B-D INS SYR ULTRAFINE  3CC/30G) 30G X 1/2" 0 3 ML MISC; Inject under the skin 4 (four) times a day        Will have patient increase Lantus to 34 units in the morning  Continue with insulin lispro 12 units 3 times daily  Continue to keep as much of a log with regards to food and blood sugars as possible, as this is helpful in being accountable with diet and monitoring how diet effects blood sugars  With regards to shoulder pain, will check an x-ray 1st and start meloxicam for pain and inflammation  Will consider physical therapy depending on results of x-ray  The importance of getting the blood work done was stressed to the patient and she will follow up in 1 month or sooner as needed  Subjective:      Patient ID: Jake Sky is a 58 y o  female  Patient presents for a follow-up on diabetes  She brought her   Blood sugar logs, as well as her food diary  She did check her blood sugars for 5 days at 3:00 a m , indicating that her 3:00 a m  Blood sugars ranged from  with her fasting blood sugars ranging from 118-193    Upon reviewing her food log, patterns within the patient's daily meals were noted and discussed with the patient  Her fasting blood sugars tend to be the most elevated in and meals with higher carbohydrate  Content directly relate to spikes in the patient's blood sugar to over 300 at times  The patient reports that she has not had her blood work done yet  She admits that she has had family visiting due to it being summer however admits that she will go to get the blood work done shortly  She also reports right shoulder pain for The past 2 months  She reports that she has limited range of motion and pain when she lays on her right side  She denies any known injury to her right shoulder  The following portions of the patient's history were reviewed and updated as appropriate: allergies, current medications, past family history, past medical history, past social history, past surgical history and problem list     Review of Systems   Constitutional: Negative for chills, fatigue and fever  HENT: Negative for congestion and hearing loss  Eyes: Negative for pain  Respiratory: Negative for shortness of breath  Cardiovascular: Negative for chest pain  Gastrointestinal: Negative for abdominal pain, diarrhea, nausea and vomiting  Endocrine: Negative for polydipsia, polyphagia and polyuria  Genitourinary: Negative for dysuria  Musculoskeletal:        Per HPI   Skin: Negative for rash  Neurological: Negative for dizziness, numbness and headaches  Psychiatric/Behavioral: The patient is not nervous/anxious            Past Medical History:   Diagnosis Date    Acid reflux     Anxiety     Diabetes mellitus ([facility] [address] )     Hypertension     Hypothyroid          Current Outpatient Prescriptions:     Blood Glucose Monitoring Suppl (FREESTYLE LITE) [facility], by Does not apply route 4 (four) times a day, Disp: 1 each, Rfl: 0    Cholecalciferol (VITAMIN D) 2000 units tablet, TAKE ONE TABLET BY MOUTH EVERY DAY, Disp: 30 tablet, Rfl: 0    cyanocobalamin (VITAMIN B-12) 1,000 mcg tablet, Take 1,000 mcg by mouth daily, Disp: , Rfl:     FREESTYLE LITE test strip, Test blood glucose 4 times daily, Disp: 450 each, Rfl: 1    glimepiride (AMARYL) 4 mg tablet, TAKE 1 TABLET BY MOUTH TWICE DAILY  , Disp: 60 tablet, Rfl: 0    insulin glargine (LANTUS SOLOSTAR) 100 units/mL injection pen, Basaglar 34 Units daily, Disp: 5 pen, Rfl: 0    insulin lispro (ADMELOG SOLOSTAR) 100 units/mL injection pen, Inject 12 Units under the skin 3 (three) times a day with meals, Disp: 5 pen, Rfl: 0    Insulin Pen Needle 32G X 4 MM MISC, Use 4 times daily with insulin injections, Disp: 90 each, Rfl: 0    Insulin Syringe-Needle U-100 (B-D INS SYR ULTRAFINE  3CC/30G) 30G X 1/2" 0 3 ML MISC, Inject under the skin 4 (four) times a day, Disp: 70 each, Rfl: 0    Lancets (FREESTYLE) lancets, TEST BLOOD GLUCOSE 3 TIMES A DAY, Disp: 90 each, Rfl: 0    levothyroxine (LEVOXYL) 100 mcg tablet, Take 1 tablet (100 mcg total) by mouth daily, Disp: 90 tablet, Rfl: 1    lisinopril-hydrochlorothiazide (PRINZIDE,ZESTORETIC) 20-12 5 MG per tablet, 1/2 tablet daily, Disp: 45 tablet, Rfl: 0    omeprazole (PriLOSEC) 40 MG capsule, Take 1 capsule (40 mg total) by mouth daily, Disp: 90 capsule, Rfl: 1    sertraline (ZOLOFT) 100 mg tablet, Take 1 tablet (100 mg total) by mouth daily, Disp: 90 tablet, Rfl: 1    meloxicam (MOBIC) 15 mg tablet, Take 1 tablet (15 mg total) by mouth daily for 30 days, Disp: 30 tablet, Rfl: 0    Allergies   Allergen Reactions    Metformin Diarrhea     Other reaction(s): Diarrhea  Other reaction(s): Diarrhea    Metformin And Related GI Intolerance    Penicillins Rash     Other reaction(s): Rash  Other reaction(s): Rash       Social History   Past Surgical History:   Procedure Laterality Date    CHOLECYSTECTOMY      DILATION AND CURETTAGE OF UTERUS      ESOPHAGOGASTRODUODENOSCOPY      HYSTERECTOMY      AR COLONOSCOPY FLX DX W/COLLJ SPEC WHEN PFRMD N/A 1/28/2016    Procedure: EGD AND COLONOSCOPY;  Surgeon: Abhijeet Fishman MD; [facility]; Service: Gastroenterology    WI INCISE FINGER TENDON SHEATH Right 1/31/2017    Procedure: LONG FINGER TRIGGER RELEASE ;  Surgeon: Edgar Ware MD;  [facility];  Service: Orthopedics    WI INCISE FINGER TENDON SHEATH Right 7/21/2016    Procedure: RELEASE TRIGGER FINGER - LONG FINGER;  Surgeon: Edgar Ware MD;  [facility];  Service: Orthopedics    WI REVISE MEDIAN N/CARPAL TUNNEL SURG Right 1/31/2017    Procedure: WRIST CARPAL TUNNEL RELEASE ; TENOLYSIS OF FPL, FDS 2-5, FDP 2-5;  APPLICATION OF Bedelia Cohen;  Surgeon: Edgar Ware MD;  [facility];  Service: Orthopedics     Family History   Problem Relation Age of Onset    Cancer Father     Heart attack Father     Diabetes type II Father     Arthritis Maternal Grandmother     No Known Problems Paternal Grandmother     Heart disease Paternal Grandfather     Stroke Paternal Grandfather     Cirrhosis Mother        Objective:  /78 (BP Location: Left arm, Patient Position: Sitting, Cuff Size: Standard)   Pulse 72   Temp 97 5 °F (36 4 °C) (Tympanic)   Ht 5' 2 6" (1 59 m)   Wt 91 6 kg (202 lb)   SpO2 97%   BMI 36 24 kg/m²        Physical Exam   Constitutional: She is oriented to person, place, and time  She appears well-developed and well-nourished  No distress  HENT:   Head: Normocephalic and atraumatic  Right Ear: External ear normal    Left Ear: External ear normal    Mouth/Throat: Oropharynx is clear and moist    Eyes: Conjunctivae are normal  Pupils are equal, round, and reactive to light  No scleral icterus  Neck: Normal range of motion  Neck supple  No thyromegaly present  Cardiovascular: Normal rate, regular rhythm and normal heart sounds  Pulmonary/Chest: Effort normal and breath sounds normal  No respiratory distress  Abdominal: Soft  Bowel sounds are normal  She exhibits no distension  Musculoskeletal: She exhibits no edema          Right shoulder: She exhibits decreased range of motion, tenderness, bony tenderness, pain and decreased strength  She exhibits no swelling and normal pulse  Arms:  Neurological: She is alert and oriented to person, place, and time  Skin: Skin is warm and dry  Psychiatric: She has a normal mood and affect  Her behavior is normal  Judgment and thought content normal    Vitals reviewed

## 2018-07-23 DIAGNOSIS — E11.00 UNCONTROLLED TYPE 2 DIABETES MELLITUS WITH HYPEROSMOLARITY WITHOUT COMA, WITHOUT LONG-TERM CURRENT USE OF INSULIN (HCC): Primary | ICD-10-CM

## 2018-07-23 DIAGNOSIS — E11.9 TYPE 2 DIABETES MELLITUS WITHOUT COMPLICATION, WITHOUT LONG-TERM CURRENT USE OF INSULIN (HCC): ICD-10-CM

## 2018-07-25 DIAGNOSIS — IMO0002 UNCONTROLLED TYPE 2 DIABETES MELLITUS WITH OTHER SPECIFIED COMPLICATION, WITH LONG-TERM CURRENT USE OF INSULIN: ICD-10-CM

## 2018-07-26 RX ORDER — INSULIN GLARGINE 100 [IU]/ML
INJECTION, SOLUTION SUBCUTANEOUS
Qty: 15 ML | Refills: 0 | OUTPATIENT
Start: 2018-07-26

## 2018-07-27 LAB
LEFT EYE DIABETIC RETINOPATHY: NORMAL
RIGHT EYE DIABETIC RETINOPATHY: NORMAL

## 2018-07-27 PROCEDURE — 3072F LOW RISK FOR RETINOPATHY: CPT | Performed by: INTERNAL MEDICINE

## 2018-08-03 ENCOUNTER — APPOINTMENT (OUTPATIENT)
Dept: RADIOLOGY | Age: 63
End: 2018-08-03
Payer: COMMERCIAL

## 2018-08-03 DIAGNOSIS — M25.511 RIGHT SHOULDER PAIN, UNSPECIFIED CHRONICITY: ICD-10-CM

## 2018-08-03 PROCEDURE — 73030 X-RAY EXAM OF SHOULDER: CPT

## 2018-08-04 LAB
ALBUMIN SERPL-MCNC: 4.1 G/DL (ref 3.6–5.1)
ALBUMIN/GLOB SERPL: 1.4 (CALC) (ref 1–2.5)
ALP SERPL-CCNC: 58 U/L (ref 33–130)
ALT SERPL-CCNC: 25 U/L (ref 6–29)
AST SERPL-CCNC: 19 U/L (ref 10–35)
BILIRUB SERPL-MCNC: 0.4 MG/DL (ref 0.2–1.2)
BUN SERPL-MCNC: 16 MG/DL (ref 7–25)
BUN/CREAT SERPL: ABNORMAL (CALC) (ref 6–22)
CALCIUM SERPL-MCNC: 9.4 MG/DL (ref 8.6–10.4)
CHLORIDE SERPL-SCNC: 102 MMOL/L (ref 98–110)
CO2 SERPL-SCNC: 30 MMOL/L (ref 20–31)
CREAT SERPL-MCNC: 0.71 MG/DL (ref 0.5–0.99)
GLOBULIN SER CALC-MCNC: 3 G/DL (CALC) (ref 1.9–3.7)
GLUCOSE SERPL-MCNC: 208 MG/DL (ref 65–99)
HBA1C MFR BLD: 7.3 % OF TOTAL HGB
POTASSIUM SERPL-SCNC: 4.2 MMOL/L (ref 3.5–5.3)
PROT SERPL-MCNC: 7.1 G/DL (ref 6.1–8.1)
SL AMB EGFR AFRICAN AMERICAN: 104 ML/MIN/1.73M2
SL AMB EGFR NON AFRICAN AMERICAN: 90 ML/MIN/1.73M2
SODIUM SERPL-SCNC: 138 MMOL/L (ref 135–146)
T4 FREE SERPL-MCNC: 1.1 NG/DL (ref 0.8–1.8)
TSH SERPL-ACNC: 5.42 MIU/L (ref 0.4–4.5)

## 2018-08-06 ENCOUNTER — TELEPHONE (OUTPATIENT)
Dept: INTERNAL MEDICINE CLINIC | Facility: CLINIC | Age: 63
End: 2018-08-06

## 2018-08-06 NOTE — TELEPHONE ENCOUNTER
Spoke with patient and reviewed results  Has appt 8/20  Has missed a few doses of levothyroxine, advised to take it every day

## 2018-08-07 ENCOUNTER — TELEPHONE (OUTPATIENT)
Dept: INTERNAL MEDICINE CLINIC | Facility: CLINIC | Age: 63
End: 2018-08-07

## 2018-08-07 NOTE — TELEPHONE ENCOUNTER
----- Message from Miguelito Roslaes sent at 8/6/2018 10:09 AM EDT -----  Regarding: Patient would like HbA1c results and x-ray results   Contact: [phone]  Patient would like a call back regarding HbA1c levels and x-ray results

## 2018-08-07 NOTE — TELEPHONE ENCOUNTER
Message left on [phone] voice mail requesting a call back to review results  She has an [facility] [employer]

## 2018-08-09 DIAGNOSIS — IMO0002 UNCONTROLLED TYPE 2 DIABETES MELLITUS WITH OTHER SPECIFIED COMPLICATION, WITH LONG-TERM CURRENT USE OF INSULIN: ICD-10-CM

## 2018-08-09 NOTE — TELEPHONE ENCOUNTER
I called patient  She stated that she received a call from a doctor  The results were reviewed and she has no questions at the time of this call

## 2018-08-10 RX ORDER — INSULIN GLARGINE 100 [IU]/ML
INJECTION, SOLUTION SUBCUTANEOUS
Qty: 15 ML | Refills: 0 | Status: SHIPPED | OUTPATIENT
Start: 2018-08-10 | End: 2018-10-03 | Stop reason: SDUPTHER

## 2018-08-12 RX ORDER — INSULIN LISPRO 100 U/ML
INJECTION, SOLUTION SUBCUTANEOUS
Qty: 90 ML | Refills: 0 | OUTPATIENT
Start: 2018-08-12

## 2018-08-14 DIAGNOSIS — F41.9 ANXIETY: ICD-10-CM

## 2018-08-14 DIAGNOSIS — K21.9 GASTROESOPHAGEAL REFLUX DISEASE, ESOPHAGITIS PRESENCE NOT SPECIFIED: ICD-10-CM

## 2018-08-14 DIAGNOSIS — E11.65 TYPE 2 DIABETES MELLITUS WITH HYPERGLYCEMIA, WITH LONG-TERM CURRENT USE OF INSULIN (HCC): ICD-10-CM

## 2018-08-14 DIAGNOSIS — Z79.4 TYPE 2 DIABETES MELLITUS WITH HYPERGLYCEMIA, WITH LONG-TERM CURRENT USE OF INSULIN (HCC): ICD-10-CM

## 2018-08-14 DIAGNOSIS — E03.9 HYPOTHYROIDISM, UNSPECIFIED TYPE: ICD-10-CM

## 2018-08-14 DIAGNOSIS — IMO0002 UNCONTROLLED TYPE 2 DIABETES MELLITUS WITH OTHER SPECIFIED COMPLICATION, WITH LONG-TERM CURRENT USE OF INSULIN: ICD-10-CM

## 2018-08-14 DIAGNOSIS — I10 ESSENTIAL HYPERTENSION, BENIGN: ICD-10-CM

## 2018-08-14 RX ORDER — OMEPRAZOLE 40 MG/1
CAPSULE, DELAYED RELEASE ORAL
Qty: 30 CAPSULE | Refills: 0 | OUTPATIENT
Start: 2018-08-14

## 2018-08-14 RX ORDER — SERTRALINE HYDROCHLORIDE 100 MG/1
TABLET, FILM COATED ORAL
Qty: 30 TABLET | Refills: 0 | OUTPATIENT
Start: 2018-08-14

## 2018-08-14 RX ORDER — INSULIN LISPRO 100 U/ML
INJECTION, SOLUTION SUBCUTANEOUS
Qty: 90 ML | Refills: 0 | Status: SHIPPED | OUTPATIENT
Start: 2018-08-14 | End: 2018-08-27 | Stop reason: CLARIF

## 2018-08-14 RX ORDER — LEVOTHYROXINE SODIUM 0.1 MG/1
TABLET ORAL
Qty: 30 TABLET | Refills: 0 | OUTPATIENT
Start: 2018-08-14

## 2018-08-14 RX ORDER — LISINOPRIL AND HYDROCHLOROTHIAZIDE 20; 12.5 MG/1; MG/1
TABLET ORAL
Qty: 15 TABLET | Refills: 0 | OUTPATIENT
Start: 2018-08-14

## 2018-08-15 RX ORDER — GLIMEPIRIDE 4 MG/1
TABLET ORAL
Qty: 60 TABLET | Refills: 0 | OUTPATIENT
Start: 2018-08-15

## 2018-08-16 DIAGNOSIS — Z79.4 TYPE 2 DIABETES MELLITUS WITH HYPERGLYCEMIA, WITH LONG-TERM CURRENT USE OF INSULIN (HCC): ICD-10-CM

## 2018-08-16 DIAGNOSIS — E03.9 HYPOTHYROIDISM, UNSPECIFIED TYPE: ICD-10-CM

## 2018-08-16 DIAGNOSIS — I10 ESSENTIAL HYPERTENSION, BENIGN: ICD-10-CM

## 2018-08-16 DIAGNOSIS — K21.9 GASTROESOPHAGEAL REFLUX DISEASE, ESOPHAGITIS PRESENCE NOT SPECIFIED: ICD-10-CM

## 2018-08-16 DIAGNOSIS — E11.65 TYPE 2 DIABETES MELLITUS WITH HYPERGLYCEMIA, WITH LONG-TERM CURRENT USE OF INSULIN (HCC): ICD-10-CM

## 2018-08-16 DIAGNOSIS — F41.9 ANXIETY: ICD-10-CM

## 2018-08-17 RX ORDER — OMEPRAZOLE 40 MG/1
CAPSULE, DELAYED RELEASE ORAL
Qty: 30 CAPSULE | Refills: 0 | Status: SHIPPED | OUTPATIENT
Start: 2018-08-17 | End: 2018-08-22 | Stop reason: SDUPTHER

## 2018-08-17 RX ORDER — LISINOPRIL AND HYDROCHLOROTHIAZIDE 20; 12.5 MG/1; MG/1
TABLET ORAL
Qty: 15 TABLET | Refills: 0 | Status: SHIPPED | OUTPATIENT
Start: 2018-08-17 | End: 2018-08-22 | Stop reason: SDUPTHER

## 2018-08-17 RX ORDER — LEVOTHYROXINE SODIUM 0.1 MG/1
TABLET ORAL
Qty: 30 TABLET | Refills: 0 | Status: SHIPPED | OUTPATIENT
Start: 2018-08-17 | End: 2018-09-20 | Stop reason: SDUPTHER

## 2018-08-17 RX ORDER — GLIMEPIRIDE 4 MG/1
TABLET ORAL
Qty: 60 TABLET | Refills: 0 | Status: SHIPPED | OUTPATIENT
Start: 2018-08-17 | End: 2018-09-19 | Stop reason: SDUPTHER

## 2018-08-20 RX ORDER — SERTRALINE HYDROCHLORIDE 100 MG/1
TABLET, FILM COATED ORAL
Qty: 30 TABLET | Refills: 0 | Status: SHIPPED | OUTPATIENT
Start: 2018-08-20 | End: 2018-08-22 | Stop reason: SDUPTHER

## 2018-08-22 DIAGNOSIS — I10 ESSENTIAL HYPERTENSION, BENIGN: ICD-10-CM

## 2018-08-22 DIAGNOSIS — F41.9 ANXIETY: ICD-10-CM

## 2018-08-22 DIAGNOSIS — K21.9 GASTROESOPHAGEAL REFLUX DISEASE, ESOPHAGITIS PRESENCE NOT SPECIFIED: ICD-10-CM

## 2018-08-26 RX ORDER — LISINOPRIL AND HYDROCHLOROTHIAZIDE 20; 12.5 MG/1; MG/1
TABLET ORAL
Qty: 15 TABLET | Refills: 0 | Status: SHIPPED | OUTPATIENT
Start: 2018-08-26 | End: 2018-10-02 | Stop reason: SDUPTHER

## 2018-08-26 RX ORDER — OMEPRAZOLE 40 MG/1
CAPSULE, DELAYED RELEASE ORAL
Qty: 30 CAPSULE | Refills: 0 | Status: SHIPPED | OUTPATIENT
Start: 2018-08-26 | End: 2018-10-02 | Stop reason: SDUPTHER

## 2018-08-26 RX ORDER — SERTRALINE HYDROCHLORIDE 100 MG/1
TABLET, FILM COATED ORAL
Qty: 30 TABLET | Refills: 0 | Status: SHIPPED | OUTPATIENT
Start: 2018-08-26 | End: 2018-10-02 | Stop reason: SDUPTHER

## 2018-08-27 ENCOUNTER — OFFICE VISIT (OUTPATIENT)
Dept: INTERNAL MEDICINE CLINIC | Facility: CLINIC | Age: 63
End: 2018-08-27
Payer: COMMERCIAL

## 2018-08-27 VITALS
BODY MASS INDEX: 36.68 KG/M2 | HEIGHT: 63 IN | WEIGHT: 207 LBS | TEMPERATURE: 98.8 F | OXYGEN SATURATION: 97 % | DIASTOLIC BLOOD PRESSURE: 76 MMHG | SYSTOLIC BLOOD PRESSURE: 122 MMHG | HEART RATE: 77 BPM

## 2018-08-27 DIAGNOSIS — K76.0 FATTY LIVER: ICD-10-CM

## 2018-08-27 DIAGNOSIS — E03.9 HYPOTHYROIDISM, UNSPECIFIED TYPE: ICD-10-CM

## 2018-08-27 DIAGNOSIS — IMO0002 UNCONTROLLED TYPE 2 DIABETES MELLITUS WITH OTHER SPECIFIED COMPLICATION, WITH LONG-TERM CURRENT USE OF INSULIN: ICD-10-CM

## 2018-08-27 DIAGNOSIS — I10 ESSENTIAL HYPERTENSION: Primary | ICD-10-CM

## 2018-08-27 PROCEDURE — 3078F DIAST BP <80 MM HG: CPT | Performed by: NURSE PRACTITIONER

## 2018-08-27 PROCEDURE — 3074F SYST BP LT 130 MM HG: CPT | Performed by: NURSE PRACTITIONER

## 2018-08-27 PROCEDURE — 99214 OFFICE O/P EST MOD 30 MIN: CPT | Performed by: NURSE PRACTITIONER

## 2018-08-27 NOTE — PATIENT INSTRUCTIONS
Will check labs and follow up in about 3 months  Get labs done about 1 week before next appointment  Keep monitoring blood sugars  Bring log with  to next appointment  Dietary adjustments discussed and encouraged  Return sooner than 3 months as needed

## 2018-08-27 NOTE — PROGRESS NOTES
Assessment/Plan:    No problem-specific Assessment & Plan notes found for this encounter  Diagnoses and all orders for this visit:    Essential hypertension  -     Microalbumin / creatinine urine ratio    Uncontrolled type 2 diabetes mellitus with other specified complication, with long-term current use of insulin (HCC)  -     CBC and differential; Future  -     HEMOGLOBIN A1C W/ EAG ESTIMATION; Future    Hypothyroidism, unspecified type  -     TSH, 3rd generation with Free T4 reflex; Future    Fatty liver  -     Comprehensive metabolic panel; Future      Will check labs and follow up in about 3 months  Get labs done about 1 week before next appointment  Keep monitoring blood sugars  Bring log with  to next appointment  Dietary adjustments discussed and encouraged  Return sooner than 3 months as needed  Subjective:      Patient ID: Tawnya Stafford is a 58 y o  female  Patient presents for a follow up on diabetes  She reports that when her insulin was changed due to her insurance coverage, she felt that it was initially not as effective in controlling her blood sugars  She then reports that she did not adhere to her diet as well  She "cheated" on her diet and reports that she gained 5lbs  She reports that the insulin seemed to start working better for her after about 10 days and then she started to eat better and feels that she is back on track with her diet and eating for her diabetes  She reports that this morning, her fasting blood sugar was 200 this morning, which she admits was bad  She reports that she had some fasting blood sugars in the 160's  She states that she knows that she was referred to a dietician, but she went to one when she was initially diagnosed with DM and feels that she knows what she is supposed to be doing, she just isn't fully implementing it  Her last A1C was 8/3/18 and was 7 3, which is an improvement from 8 7 in February 2018   She admits that she was doing so well, but had 3 weeks of poor eating that she wants to correct  When asked if she wants to increase her dosing of insulin to improve her blood sugars, she reports that she feels that she can manage this with her current dosing of insulin and improving her diet  Additionally, the patient reports that she had been forgetting some doses of her levothyroxine, which is why her TSH was elevated  She reports that she has been much more regimented in her dosing now  Diabetes   She has type 2 diabetes mellitus  No MedicAlert identification noted  Her disease course has been fluctuating  Hypoglycemia symptoms include nervousness/anxiousness and tremors  Pertinent negatives for hypoglycemia include no confusion, dizziness, headaches or hunger  (Patient admits that she gets these symptoms when she doesn't eat a healthy carb with a meal) Pertinent negatives for diabetes include no blurred vision, no chest pain, no fatigue, no foot paresthesias, no foot ulcerations, no polydipsia, no polyphagia, no polyuria, no visual change and no weakness  There are no hypoglycemic complications  Symptoms are stable  Current diabetic treatment includes insulin injections and oral agent (monotherapy)  She is compliant with treatment most of the time  Her weight is fluctuating minimally  Her home blood glucose trend is fluctuating minimally  The following portions of the patient's history were reviewed and updated as appropriate: allergies, current medications, past family history, past medical history, past social history, past surgical history and problem list     Review of Systems   Constitutional: Negative for chills, fatigue and fever  HENT: Negative for hearing loss and trouble swallowing  Eyes: Negative for blurred vision and pain  Respiratory: Negative for shortness of breath and wheezing  Cardiovascular: Negative for chest pain and leg swelling     Gastrointestinal: Positive for vomiting (x 2 with poor eating secondary to gastroparesis)  Negative for abdominal pain, diarrhea and nausea  Endocrine: Negative for polydipsia, polyphagia and polyuria  Genitourinary: Negative for dysuria  Musculoskeletal: Negative for gait problem  Skin: Negative for rash  Neurological: Positive for tremors  Negative for dizziness, weakness, light-headedness and headaches  Psychiatric/Behavioral: Negative for confusion and sleep disturbance  The patient is nervous/anxious  Past Medical History:   Diagnosis Date    Acid reflux     Anxiety     Diabetes mellitus ([facility] [address] )     Hypertension     Hypothyroid          Current Outpatient Prescriptions:     BASAGLAR KWIKPEN 100 units/mL injection pen, INJECT 30 UNITS SUBCUTANEOUSLY DAILY (Patient taking differently: INJECT 34 UNITS SUBCUTANEOUSLY DAILY), Disp: 15 mL, Rfl: 0    Blood Glucose Monitoring Suppl (FREESTYLE LITE) [facility], by Does not apply route 4 (four) times a day, Disp: 1 each, Rfl: 0    Cholecalciferol (VITAMIN D) 2000 units tablet, TAKE ONE TABLET BY MOUTH EVERY DAY, Disp: 30 tablet, Rfl: 0    cyanocobalamin (VITAMIN B-12) 1,000 mcg tablet, Take 1,000 mcg by mouth daily, Disp: , Rfl:     FREESTYLE LITE test strip, Test blood glucose 4 times daily, Disp: 450 each, Rfl: 1    glimepiride (AMARYL) 4 mg tablet, TAKE 1 TABLET BY MOUTH TWICE DAILY  , Disp: 60 tablet, Rfl: 0    insulin lispro (ADMELOG SOLOSTAR) 100 units/mL injection pen, Inject 12 Units under the skin 3 (three) times a day with meals, Disp: 5 pen, Rfl: 0    Insulin Pen Needle (BD PEN NEEDLE HECTOR U/F) 32G X 4 MM MISC, by Does not apply route 4 (four) times a day, Disp: 200 each, Rfl: 5    Insulin Syringe-Needle U-100 (B-D INS SYR ULTRAFINE  3CC/30G) 30G X 1/2" 0 3 ML MISC, Inject under the skin 4 (four) times a day, Disp: 100 each, Rfl: 4    Lancets (FREESTYLE) lancets, TEST BLOOD GLUCOSE 3 TIMES A DAY, Disp: 90 each, Rfl: 0    levothyroxine 100 mcg tablet, TAKE ONE TABLET BY MOUTH EVERY DAY, Disp: 30 tablet, Rfl: 0    lisinopril-hydrochlorothiazide (PRINZIDE,ZESTORETIC) 20-12 5 MG per tablet, TAKE 1/2 TABLET BY MOUTH EVERY DAY, Disp: 15 tablet, Rfl: 0    meloxicam (MOBIC) 15 mg tablet, Take 1 tablet (15 mg total) by mouth daily for 30 days, Disp: 30 tablet, Rfl: 0    omeprazole (PriLOSEC) 40 MG capsule, TAKE ONE CAPSULE BY MOUTH EVERY DAY, Disp: 30 capsule, Rfl: 0    sertraline (ZOLOFT) 100 mg tablet, TAKE ONE TABLET BY MOUTH EVERY DAY, Disp: 30 tablet, Rfl: 0    Allergies   Allergen Reactions    Metformin Diarrhea       Social History   Past Surgical History:   Procedure Laterality Date    CHOLECYSTECTOMY      DILATION AND CURETTAGE OF UTERUS      ESOPHAGOGASTRODUODENOSCOPY      HYSTERECTOMY      MS COLONOSCOPY FLX DX W/COLLJ SPEC WHEN PFRMD N/A 1/28/2016    Procedure: EGD AND COLONOSCOPY;  Surgeon: Anil Arriaga MD;  [facility];   Service: Gastroenterology    MS INCISE FINGER TENDON SHEATH Right 1/31/2017    Procedure: LONG FINGER TRIGGER RELEASE ;  Surgeon: Chemo May MD;  [facility];  Service: Orthopedics    MS INCISE FINGER TENDON SHEATH Right 7/21/2016    Procedure: RELEASE TRIGGER FINGER - LONG FINGER;  Surgeon: Chemo May MD;  Location:  MAIN OR;  Service: Orthopedics    MS REVISE MEDIAN N/CARPAL TUNNEL SURG Right 1/31/2017    Procedure: WRIST CARPAL TUNNEL RELEASE ; TENOLYSIS OF FPL, FDS 2-5, FDP 2-5;  APPLICATION OF [facility] Carrel;  Surgeon: Chemo May MD;  [facility];  Service: Orthopedics     Family History   Problem Relation Age of Onset    Cancer Father     Heart attack Father     Diabetes type II Father     Arthritis Maternal Grandmother     No Known Problems Paternal Grandmother     Heart disease Paternal Grandfather     Stroke Paternal Grandfather     Cirrhosis Mother        Objective:  /76   Pulse 77   Temp 98 8 °F (37 1 °C) (Oral)   Ht 5' 2 6" (1 59 m)   Wt 93 9 kg (207 lb)   SpO2 97%   BMI 37 14 kg/m²        Physical Exam   Constitutional: She is oriented to person, place, and time  She appears well-developed and well-nourished  No distress  HENT:   Head: Normocephalic and atraumatic  Right Ear: External ear normal    Left Ear: External ear normal    Mouth/Throat: Oropharynx is clear and moist    Eyes: Conjunctivae are normal  Pupils are equal, round, and reactive to light  No scleral icterus  Neck: Normal range of motion  Neck supple  No thyromegaly present  Cardiovascular: Normal rate, regular rhythm and normal heart sounds  Pulmonary/Chest: Effort normal and breath sounds normal  No respiratory distress  Abdominal: Soft  Bowel sounds are normal  She exhibits no distension  Musculoskeletal: Normal range of motion  She exhibits no edema  Neurological: She is alert and oriented to person, place, and time  Skin: Skin is warm and dry  Psychiatric: She has a normal mood and affect  Her behavior is normal  Judgment and thought content normal    Vitals reviewed

## 2018-09-14 ENCOUNTER — OFFICE VISIT (OUTPATIENT)
Dept: INTERNAL MEDICINE CLINIC | Age: 63
End: 2018-09-14
Payer: COMMERCIAL

## 2018-09-14 VITALS
SYSTOLIC BLOOD PRESSURE: 128 MMHG | OXYGEN SATURATION: 97 % | TEMPERATURE: 98.3 F | BODY MASS INDEX: 37.94 KG/M2 | HEART RATE: 78 BPM | HEIGHT: 62 IN | WEIGHT: 206.2 LBS | DIASTOLIC BLOOD PRESSURE: 74 MMHG

## 2018-09-14 DIAGNOSIS — J30.2 SEASONAL ALLERGIC RHINITIS, UNSPECIFIED TRIGGER: ICD-10-CM

## 2018-09-14 DIAGNOSIS — B37.89 CANDIDIASIS OF BREAST: ICD-10-CM

## 2018-09-14 DIAGNOSIS — B36.9 FUNGAL RASH OF TORSO: Primary | ICD-10-CM

## 2018-09-14 PROCEDURE — 3008F BODY MASS INDEX DOCD: CPT | Performed by: NURSE PRACTITIONER

## 2018-09-14 PROCEDURE — 99213 OFFICE O/P EST LOW 20 MIN: CPT | Performed by: NURSE PRACTITIONER

## 2018-09-14 RX ORDER — NYSTATIN 100000 [USP'U]/G
POWDER TOPICAL 4 TIMES DAILY
Qty: 56.7 G | Refills: 2 | Status: SHIPPED | OUTPATIENT
Start: 2018-09-14 | End: 2018-11-01 | Stop reason: ALTCHOICE

## 2018-09-14 NOTE — ASSESSMENT & PLAN NOTE
Advised patient to use over-the-counter Claritin or Zyrtec  Also advised patient to use Flonase 2 sprays each nostril daily

## 2018-09-14 NOTE — ASSESSMENT & PLAN NOTE
Will advised patient to keep breast clean and dry  Patient is to use nystatin powder 4 times a day for the next 2 weeks  I did advise patient that fungal infection under the breast may take a while to resolve, however I did encourage her to come back to the office if rash gets worse  Will try Diflucan at that time  Patient advised to either place paper towel in between folds to wick away perspiration  Also advised patient not to wear bra at night to keep breast open air

## 2018-09-14 NOTE — PROGRESS NOTES
Assessment/Plan:    Candidiasis of breast    Will advised patient to keep breast clean and dry  Patient is to use nystatin powder 4 times a day for the next 2 weeks  I did advise patient that fungal infection under the breast may take a while to resolve, however I did encourage her to come back to the office if rash gets worse  Will try Diflucan at that time  Patient advised to either place paper towel in between folds to wick away perspiration  Also advised patient not to wear bra at night to keep breast open air  Seasonal allergies    Advised patient to use over-the-counter Claritin or Zyrtec  Also advised patient to use Flonase 2 sprays each nostril daily  Diagnoses and all orders for this visit:    Fungal rash of torso  -     nystatin (MYCOSTATIN) powder; Apply topically 4 (four) times a day    Candidiasis of breast    Seasonal allergic rhinitis, unspecified trigger          Subjective:      Patient ID: Alcides Yuen is a 58 y o  female  Patient presents today with complaints of a rash underneath both breasts and on her anterior neck  She states it started around 2 weeks ago underneath the breasts and spread  Started during the really hot days from sweating a lot  Very pruritic rash that is worsened with hot showers and heat  Patient states she has had rashes similar to this before in previous summers, but never this bad  She is putting on medicated goldbond powder and a pad to try to relieve the pain, but she says it may be making it worse  Aquaphor cream has somewhat helped, but still irritated  Rash   This is a new problem  The current episode started 1 to 4 weeks ago  The problem is unchanged  Location: bilateral breasts and neck  The rash is characterized by pain, redness and itchiness  She was exposed to nothing  Pertinent negatives include no congestion, cough, diarrhea, eye pain, fatigue, fever, rhinorrhea, shortness of breath, sore throat or vomiting   Treatments tried: goldbond powder  Her past medical history is significant for allergies (seasonal)  The following portions of the patient's history were reviewed and updated as appropriate: allergies, current medications, past family history, past medical history, past social history, past surgical history and problem list     Review of Systems   Constitutional: Negative for activity change, appetite change, chills, diaphoresis, fatigue and fever  HENT: Positive for ear pain  Negative for congestion, ear discharge, postnasal drip, rhinorrhea, sinus pain, sinus pressure and sore throat  Eyes: Negative for pain, discharge, itching and visual disturbance  Respiratory: Negative for cough, chest tightness, shortness of breath and wheezing  Cardiovascular: Negative for chest pain, palpitations and leg swelling  Gastrointestinal: Negative for abdominal pain, constipation, diarrhea, nausea and vomiting  Endocrine: Negative for polydipsia, polyphagia and polyuria  Genitourinary: Negative for difficulty urinating, dysuria and urgency  Musculoskeletal: Negative for arthralgias, back pain, myalgias and neck pain  Skin: Positive for rash  Negative for wound  Allergic/Immunologic: Positive for environmental allergies (mild)  Neurological: Negative for dizziness, weakness, light-headedness, numbness and headaches  Objective:      /74 (BP Location: Left arm, Patient Position: Sitting, Cuff Size: Adult)   Pulse 78   Temp 98 3 °F (36 8 °C) (Tympanic)   Ht 5' 2 01" (1 575 m)   Wt 93 5 kg (206 lb 3 2 oz)   SpO2 97%   BMI 37 70 kg/m²          Physical Exam   Constitutional: She is oriented to person, place, and time  She appears well-developed and well-nourished  No distress  HENT:   Head: Normocephalic and atraumatic  Right Ear: External ear normal    Left Ear: External ear normal    Nose: Nose normal    Mouth/Throat: Oropharynx is clear and moist  No oropharyngeal exudate     Eyes: Conjunctivae and EOM are normal  Pupils are equal, round, and reactive to light  Right eye exhibits no discharge  Left eye exhibits no discharge  Neck: Normal range of motion  Neck supple  No thyromegaly present  Cardiovascular: Normal rate, regular rhythm, normal heart sounds and intact distal pulses  Exam reveals no gallop and no friction rub  No murmur heard  Pulmonary/Chest: Effort normal and breath sounds normal  No stridor  No respiratory distress  She has no wheezes  She has no rales  Abdominal: Soft  Bowel sounds are normal  She exhibits no distension  There is no tenderness  Lymphadenopathy:     She has no cervical adenopathy  Neurological: She is alert and oriented to person, place, and time  Skin: Skin is warm and dry  No rash noted  She is not diaphoretic  No erythema  Bilateral breast folds with erythematous area, shiny in appearance with satellite erythematous lesions, with irregular border (see picture below)   Psychiatric: She has a normal mood and affect   Her behavior is normal  Judgment and thought content normal

## 2018-09-19 DIAGNOSIS — E11.65 TYPE 2 DIABETES MELLITUS WITH HYPERGLYCEMIA, WITH LONG-TERM CURRENT USE OF INSULIN (HCC): ICD-10-CM

## 2018-09-19 DIAGNOSIS — Z79.4 TYPE 2 DIABETES MELLITUS WITH HYPERGLYCEMIA, WITH LONG-TERM CURRENT USE OF INSULIN (HCC): ICD-10-CM

## 2018-09-20 DIAGNOSIS — Z79.4 TYPE 2 DIABETES MELLITUS WITH HYPERGLYCEMIA, WITH LONG-TERM CURRENT USE OF INSULIN (HCC): ICD-10-CM

## 2018-09-20 DIAGNOSIS — E03.9 HYPOTHYROIDISM, UNSPECIFIED TYPE: ICD-10-CM

## 2018-09-20 DIAGNOSIS — E11.65 TYPE 2 DIABETES MELLITUS WITH HYPERGLYCEMIA, WITH LONG-TERM CURRENT USE OF INSULIN (HCC): ICD-10-CM

## 2018-09-22 RX ORDER — LEVOTHYROXINE SODIUM 0.1 MG/1
TABLET ORAL
Qty: 30 TABLET | Refills: 0 | Status: SHIPPED | OUTPATIENT
Start: 2018-09-22 | End: 2018-10-02 | Stop reason: SDUPTHER

## 2018-09-22 RX ORDER — GLIMEPIRIDE 4 MG/1
TABLET ORAL
Qty: 60 TABLET | Refills: 0 | Status: SHIPPED | OUTPATIENT
Start: 2018-09-22 | End: 2018-10-05 | Stop reason: SDUPTHER

## 2018-09-24 RX ORDER — GLIMEPIRIDE 4 MG/1
TABLET ORAL
Qty: 60 TABLET | Refills: 0 | Status: SHIPPED | OUTPATIENT
Start: 2018-09-24 | End: 2018-10-05 | Stop reason: SDUPTHER

## 2018-09-28 DIAGNOSIS — IMO0002 UNCONTROLLED TYPE 2 DIABETES MELLITUS WITH OTHER SPECIFIED COMPLICATION, WITH LONG-TERM CURRENT USE OF INSULIN: ICD-10-CM

## 2018-09-28 RX ORDER — INSULIN GLARGINE 100 [IU]/ML
INJECTION, SOLUTION SUBCUTANEOUS
Qty: 15 ML | Refills: 0 | OUTPATIENT
Start: 2018-09-28

## 2018-09-30 RX ORDER — INSULIN LISPRO 100 U/ML
INJECTION, SOLUTION SUBCUTANEOUS
Qty: 15 ML | Refills: 0 | OUTPATIENT
Start: 2018-09-30

## 2018-10-02 DIAGNOSIS — Z79.4 TYPE 2 DIABETES MELLITUS WITH COMPLICATION, WITH LONG-TERM CURRENT USE OF INSULIN (HCC): Primary | ICD-10-CM

## 2018-10-02 DIAGNOSIS — F41.9 ANXIETY: ICD-10-CM

## 2018-10-02 DIAGNOSIS — E55.9 VITAMIN D DEFICIENCY: ICD-10-CM

## 2018-10-02 DIAGNOSIS — K21.9 GASTROESOPHAGEAL REFLUX DISEASE, ESOPHAGITIS PRESENCE NOT SPECIFIED: ICD-10-CM

## 2018-10-02 DIAGNOSIS — E11.8 TYPE 2 DIABETES MELLITUS WITH COMPLICATION, WITH LONG-TERM CURRENT USE OF INSULIN (HCC): Primary | ICD-10-CM

## 2018-10-02 DIAGNOSIS — I10 ESSENTIAL HYPERTENSION, BENIGN: ICD-10-CM

## 2018-10-02 DIAGNOSIS — E03.9 HYPOTHYROIDISM, UNSPECIFIED TYPE: ICD-10-CM

## 2018-10-02 RX ORDER — LISINOPRIL AND HYDROCHLOROTHIAZIDE 20; 12.5 MG/1; MG/1
TABLET ORAL
Qty: 15 TABLET | Refills: 0 | Status: SHIPPED | OUTPATIENT
Start: 2018-10-02 | End: 2018-10-29 | Stop reason: SDUPTHER

## 2018-10-02 RX ORDER — GLIMEPIRIDE 4 MG/1
TABLET ORAL
Qty: 60 TABLET | Refills: 0 | Status: SHIPPED | OUTPATIENT
Start: 2018-10-02 | End: 2018-10-29 | Stop reason: SDUPTHER

## 2018-10-02 RX ORDER — SERTRALINE HYDROCHLORIDE 100 MG/1
TABLET, FILM COATED ORAL
Qty: 30 TABLET | Refills: 0 | Status: SHIPPED | OUTPATIENT
Start: 2018-10-02 | End: 2018-10-29 | Stop reason: SDUPTHER

## 2018-10-02 RX ORDER — LEVOTHYROXINE SODIUM 0.1 MG/1
TABLET ORAL
Qty: 30 TABLET | Refills: 0 | Status: SHIPPED | OUTPATIENT
Start: 2018-10-02 | End: 2018-12-20 | Stop reason: SDUPTHER

## 2018-10-02 RX ORDER — OMEPRAZOLE 40 MG/1
CAPSULE, DELAYED RELEASE ORAL
Qty: 30 CAPSULE | Refills: 0 | Status: SHIPPED | OUTPATIENT
Start: 2018-10-02 | End: 2018-10-29 | Stop reason: SDUPTHER

## 2018-10-03 DIAGNOSIS — E11.649 UNCONTROLLED TYPE 2 DIABETES MELLITUS WITH HYPOGLYCEMIA, UNSPECIFIED HYPOGLYCEMIA COMA STATUS (HCC): Primary | ICD-10-CM

## 2018-10-03 RX ORDER — CHOLECALCIFEROL (VITAMIN D3) 50 MCG
TABLET ORAL
Qty: 30 TABLET | Refills: 0 | Status: SHIPPED | OUTPATIENT
Start: 2018-10-03 | End: 2018-10-29 | Stop reason: SDUPTHER

## 2018-10-03 RX ORDER — INSULIN GLARGINE 100 [IU]/ML
INJECTION, SOLUTION SUBCUTANEOUS
Qty: 15 ML | Refills: 0 | Status: SHIPPED | OUTPATIENT
Start: 2018-10-03 | End: 2018-11-13 | Stop reason: SDUPTHER

## 2018-10-03 RX ORDER — INSULIN LISPRO 100 U/ML
INJECTION, SOLUTION SUBCUTANEOUS
Qty: 15 ML | Refills: 0 | Status: CANCELLED | OUTPATIENT
Start: 2018-10-03

## 2018-10-03 NOTE — TELEPHONE ENCOUNTER
[facility] patient is in need of her admelog and basalgar she has 2 days left  Farshad from [facility] we are agreeable to refilling requests from them bc they are [facility] I was not aware of this

## 2018-10-04 DIAGNOSIS — E11.8 TYPE 2 DIABETES MELLITUS WITH COMPLICATION, WITH LONG-TERM CURRENT USE OF INSULIN (HCC): Primary | ICD-10-CM

## 2018-10-04 DIAGNOSIS — Z79.4 TYPE 2 DIABETES MELLITUS WITH COMPLICATION, WITH LONG-TERM CURRENT USE OF INSULIN (HCC): Primary | ICD-10-CM

## 2018-10-05 DIAGNOSIS — M25.511 ACUTE PAIN OF RIGHT SHOULDER: Primary | ICD-10-CM

## 2018-10-05 RX ORDER — MELOXICAM 15 MG/1
15 TABLET ORAL DAILY
Qty: 14 TABLET | Refills: 0 | Status: SHIPPED | OUTPATIENT
Start: 2018-10-05 | End: 2018-11-01 | Stop reason: SDUPTHER

## 2018-10-05 NOTE — TELEPHONE ENCOUNTER
Patient is continuing to have shoulder pain  She should be re-seen  Will give a short two week supply  Please have patient be re-seen in that time  No refills after that     cc'ing clerical to schedule appt

## 2018-10-05 NOTE — TELEPHONE ENCOUNTER
Pt called requesting a refill for Meloxicam 15 mg  I see med was last prescribed 7/17/18 for 30 days  Please review and see if refill is appropriate  Pt states she is in a lot of pain  Shoulder x-ray was done on 8/3/18  Thank you!

## 2018-10-22 DIAGNOSIS — E11.8 TYPE 2 DIABETES MELLITUS WITH COMPLICATION, WITH LONG-TERM CURRENT USE OF INSULIN (HCC): ICD-10-CM

## 2018-10-22 DIAGNOSIS — Z79.4 TYPE 2 DIABETES MELLITUS WITH COMPLICATION, WITH LONG-TERM CURRENT USE OF INSULIN (HCC): ICD-10-CM

## 2018-10-22 PROBLEM — R11.2 NAUSEA AND VOMITING: Status: RESOLVED | Noted: 2017-11-06 | Resolved: 2018-10-22

## 2018-10-24 RX ORDER — BLOOD-GLUCOSE METER
KIT MISCELLANEOUS
Qty: 100 EACH | Refills: 0 | Status: SHIPPED | OUTPATIENT
Start: 2018-10-24 | End: 2018-11-23 | Stop reason: SDUPTHER

## 2018-10-29 DIAGNOSIS — Z79.4 TYPE 2 DIABETES MELLITUS WITH COMPLICATION, WITH LONG-TERM CURRENT USE OF INSULIN (HCC): ICD-10-CM

## 2018-10-29 DIAGNOSIS — I10 ESSENTIAL HYPERTENSION, BENIGN: ICD-10-CM

## 2018-10-29 DIAGNOSIS — K21.9 GASTROESOPHAGEAL REFLUX DISEASE, ESOPHAGITIS PRESENCE NOT SPECIFIED: ICD-10-CM

## 2018-10-29 DIAGNOSIS — F41.9 ANXIETY: ICD-10-CM

## 2018-10-29 DIAGNOSIS — E11.8 TYPE 2 DIABETES MELLITUS WITH COMPLICATION, WITH LONG-TERM CURRENT USE OF INSULIN (HCC): ICD-10-CM

## 2018-10-29 DIAGNOSIS — E55.9 VITAMIN D DEFICIENCY: ICD-10-CM

## 2018-10-29 RX ORDER — CHOLECALCIFEROL (VITAMIN D3) 50 MCG
TABLET ORAL
Qty: 30 TABLET | Refills: 0 | Status: SHIPPED | OUTPATIENT
Start: 2018-10-29 | End: 2018-11-19 | Stop reason: SDUPTHER

## 2018-10-30 RX ORDER — SERTRALINE HYDROCHLORIDE 100 MG/1
TABLET, FILM COATED ORAL
Qty: 30 TABLET | Refills: 0 | Status: SHIPPED | OUTPATIENT
Start: 2018-10-30 | End: 2018-11-19 | Stop reason: SDUPTHER

## 2018-10-30 RX ORDER — LISINOPRIL AND HYDROCHLOROTHIAZIDE 20; 12.5 MG/1; MG/1
TABLET ORAL
Qty: 30 TABLET | Refills: 0 | Status: SHIPPED | OUTPATIENT
Start: 2018-10-30 | End: 2018-11-23 | Stop reason: SDUPTHER

## 2018-10-30 RX ORDER — OMEPRAZOLE 40 MG/1
CAPSULE, DELAYED RELEASE ORAL
Qty: 30 CAPSULE | Refills: 0 | Status: SHIPPED | OUTPATIENT
Start: 2018-10-30 | End: 2018-11-23 | Stop reason: SDUPTHER

## 2018-10-30 RX ORDER — GLIMEPIRIDE 4 MG/1
TABLET ORAL
Qty: 60 TABLET | Refills: 0 | Status: SHIPPED | OUTPATIENT
Start: 2018-10-30 | End: 2019-05-20 | Stop reason: SDUPTHER

## 2018-11-01 ENCOUNTER — OFFICE VISIT (OUTPATIENT)
Dept: INTERNAL MEDICINE CLINIC | Age: 63
End: 2018-11-01
Payer: COMMERCIAL

## 2018-11-01 VITALS
BODY MASS INDEX: 38.2 KG/M2 | SYSTOLIC BLOOD PRESSURE: 128 MMHG | HEIGHT: 62 IN | TEMPERATURE: 98.2 F | OXYGEN SATURATION: 98 % | DIASTOLIC BLOOD PRESSURE: 66 MMHG | HEART RATE: 80 BPM | WEIGHT: 207.6 LBS

## 2018-11-01 DIAGNOSIS — H65.01 RIGHT ACUTE SEROUS OTITIS MEDIA, RECURRENCE NOT SPECIFIED: Primary | ICD-10-CM

## 2018-11-01 DIAGNOSIS — M25.511 ACUTE PAIN OF RIGHT SHOULDER: ICD-10-CM

## 2018-11-01 PROCEDURE — 99213 OFFICE O/P EST LOW 20 MIN: CPT | Performed by: PHYSICIAN ASSISTANT

## 2018-11-01 PROCEDURE — 1036F TOBACCO NON-USER: CPT | Performed by: PHYSICIAN ASSISTANT

## 2018-11-01 PROCEDURE — 3008F BODY MASS INDEX DOCD: CPT | Performed by: PHYSICIAN ASSISTANT

## 2018-11-01 RX ORDER — AMOXICILLIN AND CLAVULANATE POTASSIUM 500; 125 MG/1; MG/1
1 TABLET, FILM COATED ORAL EVERY 12 HOURS SCHEDULED
Qty: 14 TABLET | Refills: 0 | Status: SHIPPED | OUTPATIENT
Start: 2018-11-01 | End: 2018-11-08

## 2018-11-01 RX ORDER — MELOXICAM 15 MG/1
15 TABLET ORAL DAILY
Qty: 30 TABLET | Refills: 2 | Status: SHIPPED | OUTPATIENT
Start: 2018-11-01 | End: 2019-02-19

## 2018-11-01 NOTE — PROGRESS NOTES
Assessment/Plan:       Diagnoses and all orders for this visit:    Right acute serous otitis media, recurrence not specified  -     amoxicillin-clavulanate (AUGMENTIN) 500-125 mg per tablet; Take 1 tablet by mouth every 12 (twelve) hours for 7 days    Acute pain of right shoulder  -     meloxicam (MOBIC) 15 mg tablet; Take 1 tablet (15 mg total) by mouth daily          Subjective:      Patient ID: Tiffanie Francisco is a 58 y o  female  C/o R ear pain x 2 weeks  Intermittent sharp pains at times  No recent URI symptoms  Pt denies ST, fevers, night sweats, coughing   Denies pain in jaw but states her ear hurts when she chews gum   Was told at one time to take allegra for allergies but doesn't take it  Did not take anything for these symptoms         The following portions of the patient's history were reviewed and updated as appropriate: allergies, current medications, past family history, past medical history, past social history, past surgical history and problem list     Review of Systems   Constitutional: Negative for activity change, appetite change, chills, fatigue and fever  HENT: Positive for congestion and ear pain  Negative for facial swelling, postnasal drip, rhinorrhea, sore throat and trouble swallowing  Respiratory: Negative for cough, shortness of breath and wheezing  Cardiovascular: Negative for chest pain  Gastrointestinal: Negative for abdominal pain  Skin: Negative for rash  Neurological: Negative for dizziness, light-headedness and headaches  Hematological: Does not bruise/bleed easily  Psychiatric/Behavioral: Negative for sleep disturbance  The patient is not nervous/anxious            Past Medical History:   Diagnosis Date    Acid reflux     Anxiety     [facility] mellitus ([facility] [address] )     Hypertension     Hypothyroid          Current Outpatient Prescriptions:     BASAGLAR KWIKPEN 100 units/mL injection pen, INJECT 30 UNITS SUBCUTANEOUSLY DAILY (Patient taking differently: INJECT 34 UNITS SUBCUTANEOUSLY DAILY), Disp: 15 mL, Rfl: 0    Blood Glucose Monitoring Suppl (FREESTYLE LITE) [facility], by Does not apply route 4 (four) times a day, Disp: 1 each, Rfl: 0    Cholecalciferol (VITAMIN D) 2000 units tablet, TAKE ONE TABLET BY MOUTH EVERY DAY, Disp: 30 tablet, Rfl: 0    cyanocobalamin (VITAMIN B-12) 1,000 mcg tablet, Take 1,000 mcg by mouth daily, Disp: , Rfl:     FREESTYLE LITE test strip, TEST BLOOD GLUCOSE FOUR TIMES DAILY, Disp: 100 each, Rfl: 0    glimepiride (AMARYL) 4 mg tablet, TAKE 1 TABLET BY MOUTH TWICE DAILY  , Disp: 60 tablet, Rfl: 0    insulin lispro (ADMELOG SOLOSTAR) 100 units/mL injection pen, Inject 12 Units under the skin 3 (three) times a day with meals, Disp: 10 pen, Rfl: 1    Insulin Pen Needle (BD PEN NEEDLE HECTOR U/F) 32G X 4 MM MISC, by Does not apply route 4 (four) times a day, Disp: 200 each, Rfl: 5    Insulin Syringe-Needle U-100 (B-D INS SYR ULTRAFINE  3CC/30G) 30G X 1/2" 0 3 ML MISC, Inject under the skin 4 (four) times a day, Disp: 100 each, Rfl: 4    Lancets (FREESTYLE) lancets, TEST BLOOD GLUCOSE 3 TIMES A DAY, Disp: 90 each, Rfl: 0    levothyroxine 100 mcg tablet, TAKE ONE TABLET BY MOUTH EVERY DAY, Disp: 30 tablet, Rfl: 0    lisinopril-hydrochlorothiazide (PRINZIDE,ZESTORETIC) 20-12 5 MG per tablet, TAKE 1/2 TABLET BY MOUTH EVERY DAY, Disp: 30 tablet, Rfl: 0    omeprazole (PriLOSEC) 40 MG capsule, TAKE ONE CAPSULE BY MOUTH EVERY DAY, Disp: 30 capsule, Rfl: 0    sertraline (ZOLOFT) 100 mg tablet, TAKE ONE TABLET BY MOUTH EVERY DAY, Disp: 30 tablet, Rfl: 0    amoxicillin-clavulanate (AUGMENTIN) 500-125 mg per tablet, Take 1 tablet by mouth every 12 (twelve) hours for 7 days, Disp: 14 tablet, Rfl: 0    meloxicam (MOBIC) 15 mg tablet, Take 1 tablet (15 mg total) by mouth daily, Disp: 30 tablet, Rfl: 2    Allergies   Allergen Reactions    Metformin Diarrhea       Social History   Past Surgical History:   Procedure Laterality Date    CHOLECYSTECTOMY      DILATION AND CURETTAGE OF UTERUS      ESOPHAGOGASTRODUODENOSCOPY      HYSTERECTOMY      IL COLONOSCOPY FLX DX W/COLLJ SPEC WHEN PFRMD N/A 1/28/2016    Procedure: EGD AND COLONOSCOPY;  Surgeon: Felicita Nicolas MD;  [facility]; Service: Gastroenterology    IL INCISE FINGER TENDON SHEATH Right 1/31/2017    Procedure: LONG FINGER TRIGGER RELEASE ;  Surgeon: Shu Moffett MD;  [facility];  Service: Orthopedics    IL INCISE FINGER TENDON SHEATH Right 7/21/2016    Procedure: RELEASE TRIGGER FINGER - LONG FINGER;  Surgeon: Shu Moffett MD;  [facility];  Service: Orthopedics    IL REVISE MEDIAN N/CARPAL TUNNEL SURG Right 1/31/2017    Procedure: WRIST CARPAL TUNNEL RELEASE ; TENOLYSIS OF FPL, FDS 2-5, FDP 2-5;  APPLICATION OF [facility]y Montero;  Surgeon: Shu Moffett MD;  [facility];  Service: Orthopedics     Family History   Problem Relation Age of Onset    Cancer Father     Heart attack Father     Diabetes type II Father     Arthritis Maternal Grandmother     No Known Problems Paternal Grandmother     Heart disease Paternal Grandfather     Stroke Paternal Grandfather     Cirrhosis Mother        Objective:  /66 (BP Location: Left arm, Patient Position: Sitting, Cuff Size: Standard)   Pulse 80   Temp 98 2 °F (36 8 °C) (Tympanic)   Ht 5' 2 13" (1 578 m)   Wt 94 2 kg (207 lb 9 6 oz)   SpO2 98%   BMI 37 82 kg/m²        Physical Exam   Constitutional: She is oriented to person, place, and time  She appears well-developed and well-nourished  No distress  HENT:   Head: Normocephalic and atraumatic  Right Ear: External ear normal    Left Ear: External ear normal    Nose: Nose normal    Mouth/Throat: Oropharynx is clear and moist  No oropharyngeal exudate  R TM bulging, erythema, no perforations  Erythema along medial aspect of canal   No exudates    Eyes: Pupils are equal, round, and reactive to light   Conjunctivae and EOM are normal    Neck: Normal range of motion  Neck supple  Cardiovascular: Normal rate and regular rhythm  Pulmonary/Chest: Effort normal and breath sounds normal  No respiratory distress  She has no wheezes  She has no rales  Lymphadenopathy:     She has no cervical adenopathy  Neurological: She is alert and oriented to person, place, and time  Skin: No rash noted  She is not diaphoretic  Psychiatric: She has a normal mood and affect  Her behavior is normal  Judgment and thought content normal    Vitals reviewed

## 2018-11-01 NOTE — PATIENT INSTRUCTIONS
Take antibiotic with food and eat yogurt daily  Report any fevers or worsening pain/ loss of hearing

## 2018-11-09 RX ORDER — BLOOD-GLUCOSE METER
KIT MISCELLANEOUS
Refills: 0 | OUTPATIENT
Start: 2018-11-09

## 2018-11-13 DIAGNOSIS — E11.649 UNCONTROLLED TYPE 2 DIABETES MELLITUS WITH HYPOGLYCEMIA, UNSPECIFIED HYPOGLYCEMIA COMA STATUS (HCC): ICD-10-CM

## 2018-11-13 RX ORDER — INSULIN GLARGINE 100 [IU]/ML
INJECTION, SOLUTION SUBCUTANEOUS
Qty: 15 ML | Refills: 0 | Status: SHIPPED | OUTPATIENT
Start: 2018-11-13 | End: 2018-11-19 | Stop reason: SDUPTHER

## 2018-11-19 DIAGNOSIS — E55.9 VITAMIN D DEFICIENCY: ICD-10-CM

## 2018-11-19 DIAGNOSIS — E11.8 TYPE 2 DIABETES MELLITUS WITH COMPLICATION, WITH LONG-TERM CURRENT USE OF INSULIN (HCC): ICD-10-CM

## 2018-11-19 DIAGNOSIS — Z79.4 TYPE 2 DIABETES MELLITUS WITH COMPLICATION, WITH LONG-TERM CURRENT USE OF INSULIN (HCC): ICD-10-CM

## 2018-11-19 DIAGNOSIS — F41.9 ANXIETY: ICD-10-CM

## 2018-11-19 DIAGNOSIS — E11.649 UNCONTROLLED TYPE 2 DIABETES MELLITUS WITH HYPOGLYCEMIA, UNSPECIFIED HYPOGLYCEMIA COMA STATUS (HCC): ICD-10-CM

## 2018-11-19 RX ORDER — INSULIN GLARGINE 100 [IU]/ML
INJECTION, SOLUTION SUBCUTANEOUS
Qty: 15 ML | Refills: 0 | Status: SHIPPED | OUTPATIENT
Start: 2018-11-19 | End: 2018-12-20 | Stop reason: SDUPTHER

## 2018-11-19 RX ORDER — SERTRALINE HYDROCHLORIDE 100 MG/1
TABLET, FILM COATED ORAL
Qty: 30 TABLET | Refills: 0 | Status: SHIPPED | OUTPATIENT
Start: 2018-11-19 | End: 2018-12-20 | Stop reason: SDUPTHER

## 2018-11-19 RX ORDER — INSULIN LISPRO 100 U/ML
INJECTION, SOLUTION SUBCUTANEOUS
Qty: 15 ML | Refills: 0 | OUTPATIENT
Start: 2018-11-19

## 2018-11-20 RX ORDER — CHOLECALCIFEROL (VITAMIN D3) 50 MCG
TABLET ORAL
Qty: 30 TABLET | Refills: 0 | Status: SHIPPED | OUTPATIENT
Start: 2018-11-20 | End: 2018-12-20 | Stop reason: SDUPTHER

## 2018-11-23 DIAGNOSIS — K21.9 GASTROESOPHAGEAL REFLUX DISEASE, ESOPHAGITIS PRESENCE NOT SPECIFIED: ICD-10-CM

## 2018-11-23 DIAGNOSIS — E11.8 TYPE 2 DIABETES MELLITUS WITH COMPLICATION, WITH LONG-TERM CURRENT USE OF INSULIN (HCC): ICD-10-CM

## 2018-11-23 DIAGNOSIS — I10 ESSENTIAL HYPERTENSION, BENIGN: ICD-10-CM

## 2018-11-23 DIAGNOSIS — Z79.4 TYPE 2 DIABETES MELLITUS WITH COMPLICATION, WITH LONG-TERM CURRENT USE OF INSULIN (HCC): ICD-10-CM

## 2018-11-23 DIAGNOSIS — E11.00 UNCONTROLLED TYPE 2 DIABETES MELLITUS WITH HYPEROSMOLARITY WITHOUT COMA, WITHOUT LONG-TERM CURRENT USE OF INSULIN (HCC): ICD-10-CM

## 2018-11-25 RX ORDER — LISINOPRIL AND HYDROCHLOROTHIAZIDE 20; 12.5 MG/1; MG/1
TABLET ORAL
Qty: 15 TABLET | Refills: 0 | Status: SHIPPED | OUTPATIENT
Start: 2018-11-25 | End: 2018-12-20 | Stop reason: SDUPTHER

## 2018-11-25 RX ORDER — OMEPRAZOLE 40 MG/1
CAPSULE, DELAYED RELEASE ORAL
Qty: 30 CAPSULE | Refills: 0 | Status: SHIPPED | OUTPATIENT
Start: 2018-11-25 | End: 2018-12-20 | Stop reason: SDUPTHER

## 2018-11-26 RX ORDER — PEN NEEDLE, DIABETIC 32GX 5/32"
NEEDLE, DISPOSABLE MISCELLANEOUS
Qty: 200 EACH | Refills: 0 | Status: SHIPPED | OUTPATIENT
Start: 2018-11-26 | End: 2018-12-20 | Stop reason: SDUPTHER

## 2018-11-26 RX ORDER — BLOOD-GLUCOSE METER
KIT MISCELLANEOUS
Qty: 100 EACH | Refills: 1 | Status: SHIPPED | OUTPATIENT
Start: 2018-11-26 | End: 2019-11-07

## 2018-12-17 ENCOUNTER — OFFICE VISIT (OUTPATIENT)
Dept: INTERNAL MEDICINE CLINIC | Age: 63
End: 2018-12-17
Payer: COMMERCIAL

## 2018-12-17 VITALS
OXYGEN SATURATION: 95 % | TEMPERATURE: 97.1 F | WEIGHT: 211 LBS | HEART RATE: 75 BPM | BODY MASS INDEX: 38.44 KG/M2 | SYSTOLIC BLOOD PRESSURE: 116 MMHG | DIASTOLIC BLOOD PRESSURE: 78 MMHG

## 2018-12-17 DIAGNOSIS — L03.012 CELLULITIS OF FINGER OF LEFT HAND: Primary | ICD-10-CM

## 2018-12-17 PROCEDURE — 1036F TOBACCO NON-USER: CPT | Performed by: INTERNAL MEDICINE

## 2018-12-17 PROCEDURE — 99213 OFFICE O/P EST LOW 20 MIN: CPT | Performed by: INTERNAL MEDICINE

## 2018-12-17 RX ORDER — CEPHALEXIN 500 MG/1
500 CAPSULE ORAL EVERY 12 HOURS SCHEDULED
Qty: 14 CAPSULE | Refills: 0 | Status: SHIPPED | OUTPATIENT
Start: 2018-12-17 | End: 2018-12-24

## 2018-12-17 NOTE — PROGRESS NOTES
Assessment/Plan:    Cellulitis of finger of left hand  She has taken 1 day's worth of Keflex 500 mg however in reviewing the prescription bottle, it has  on 2018  Will treat with 7 day course of Keflex 500 mg twice a day  She is to contact office for worsening symptoms  Diagnoses and all orders for this visit:    Cellulitis of finger of left hand  -     cephalexin (KEFLEX) 500 mg capsule; Take 1 capsule (500 mg total) by mouth every 12 (twelve) hours for 7 days          Subjective:      Patient ID: Darrion Lr is a 58 y o  female  67-year-old female is seen today with acute symptoms of cellulitis of the left middle finger since yesterday  She has a wart on her finger to which he has tried multiple over the counter medications without resolution of wart  Most recent over-the-counter remedy was OTC cryotherapy approximately 2 weeks ago  No recent trauma to the wart  She denies any fevers, systemic symptoms otherwise  She had left over Keflex 500 mg capsules from a previous infection, to which she took one last night and one this morning  The following portions of the patient's history were reviewed and updated as appropriate: allergies, current medications, past family history, past medical history, past social history, past surgical history and problem list     Review of Systems   Constitutional: Negative for activity change, appetite change, chills, diaphoresis, fatigue and fever  HENT: Negative for congestion, postnasal drip, rhinorrhea, sinus pain, sinus pressure, sneezing and sore throat  Eyes: Negative for visual disturbance  Respiratory: Negative for apnea, cough, choking, chest tightness, shortness of breath and wheezing  Cardiovascular: Negative for chest pain, palpitations and leg swelling  Gastrointestinal: Negative for abdominal distention, abdominal pain, anal bleeding, blood in stool, constipation, diarrhea, nausea and vomiting     Endocrine: Negative for cold intolerance and heat intolerance  Genitourinary: Negative for difficulty urinating, dysuria and hematuria  Musculoskeletal: Negative  Skin: Negative  Neurological: Negative for dizziness, weakness, light-headedness, numbness and headaches  Hematological: Negative for adenopathy  Psychiatric/Behavioral: Negative for agitation, sleep disturbance and suicidal ideas  All other systems reviewed and are negative  Past Medical History:   Diagnosis Date    Acid reflux     Anxiety     Diabetes mellitus ([address] )     Hypertension     Hypothyroid          Current Outpatient Prescriptions:     BASAGLAR KWIKPEN 100 units/mL injection pen, INJECT 30 UNITS SUBCUTANEOUSLY DAILY (Patient taking differently: INJECT 34 UNITS SUBCUTANEOUSLY DAILY), Disp: 15 mL, Rfl: 0    BD PEN NEEDLE HECTOR U/F 32G X 4 MM MISC, USE 4 PEN NEEDLES DAILY AS DIRECTED, Disp: 200 each, Rfl: 0    Blood Glucose Monitoring Suppl (FREESTYLE LITE) [facility], by Does not apply route 4 (four) times a day, Disp: 1 each, Rfl: 0    Cholecalciferol (VITAMIN D) 2000 units tablet, TAKE ONE TABLET BY MOUTH EVERY DAY, Disp: 30 tablet, Rfl: 0    cyanocobalamin (VITAMIN B-12) 1,000 mcg tablet, Take 1,000 mcg by mouth daily, Disp: , Rfl:     FREESTYLE LITE test strip, TEST BLOOD GLUCOSE FOUR TIMES DAILY, Disp: 100 each, Rfl: 1    glimepiride (AMARYL) 4 mg tablet, TAKE 1 TABLET BY MOUTH TWICE DAILY  , Disp: 60 tablet, Rfl: 0    insulin lispro (ADMELOG SOLOSTAR) 100 units/mL injection pen, Inject 12 Units under the skin 3 (three) times a day with meals, Disp: 10 pen, Rfl: 1    Insulin Syringe-Needle U-100 (B-D INS SYR ULTRAFINE  3CC/30G) 30G X 1/2" 0 3 ML MISC, Inject under the skin 4 (four) times a day, Disp: 100 each, Rfl: 4    Lancets (FREESTYLE) lancets, TEST BLOOD GLUCOSE 3 TIMES A DAY, Disp: 90 each, Rfl: 0    levothyroxine 100 mcg tablet, TAKE ONE TABLET BY MOUTH EVERY DAY, Disp: 30 tablet, Rfl: 0    lisinopril-hydrochlorothiazide (PRINZIDE,ZESTORETIC) 20-12 5 MG per tablet, TAKE 1/2 TABLET BY MOUTH EVERY DAY, Disp: 15 tablet, Rfl: 0    meloxicam (MOBIC) 15 mg tablet, Take 1 tablet (15 mg total) by mouth daily, Disp: 30 tablet, Rfl: 2    omeprazole (PriLOSEC) 40 MG capsule, TAKE ONE CAPSULE BY MOUTH EVERY DAY, Disp: 30 capsule, Rfl: 0    sertraline (ZOLOFT) 100 mg tablet, TAKE ONE TABLET BY MOUTH EVERY DAY, Disp: 30 tablet, Rfl: 0    cephalexin (KEFLEX) 500 mg capsule, Take 1 capsule (500 mg total) by mouth every 12 (twelve) hours for 7 days, Disp: 14 capsule, Rfl: 0    Allergies   Allergen Reactions    Metformin Diarrhea       Social History   Past Surgical History:   Procedure Laterality Date    CHOLECYSTECTOMY      DILATION AND CURETTAGE OF UTERUS      ESOPHAGOGASTRODUODENOSCOPY      HYSTERECTOMY      OR COLONOSCOPY FLX DX W/COLLJ SPEC WHEN PFRMD N/A 1/28/2016    Procedure: EGD AND COLONOSCOPY;  Surgeon: Marina Castellon MD;  [facility];   Service: Gastroenterology    OR INCISE FINGER TENDON SHEATH Right 1/31/2017    Procedure: LONG FINGER TRIGGER RELEASE ;  Surgeon: Karen Ríos MD;  [facility];  Service: Orthopedics    OR INCISE FINGER TENDON SHEATH Right 7/21/2016    Procedure: RELEASE TRIGGER FINGER - LONG FINGER;  Surgeon: Karen Ríos MD;  Location:  MAIN OR;  Service: Orthopedics    OR REVISE MEDIAN N/CARPAL TUNNEL SURG Right 1/31/2017    Procedure: WRIST CARPAL TUNNEL RELEASE ; TENOLYSIS OF FPL, FDS 2-5, FDP 2-5;  APPLICATION OF Ulises Roper;  Surgeon: Karen Ríos MD;  [facility];  Service: Orthopedics     Family History   Problem Relation Age of Onset    Cancer Father     Heart attack Father     Diabetes type II Father     Arthritis Maternal Grandmother     No Known Problems Paternal Grandmother     Heart disease Paternal Grandfather     Stroke Paternal Grandfather     Cirrhosis Mother        Objective:  /78 (BP Location: Left arm, Patient Position: Sitting, Cuff Size: Standard)   Pulse 75   Temp (!) 97 1 °F (36 2 °C) (Tympanic)   Wt 95 7 kg (211 lb) Comment: shoes on  SpO2 95%   BMI 38 44 kg/m²     No results found for this or any previous visit (from the past 1344 hour(s))  Physical Exam   Constitutional: She is oriented to person, place, and time  She appears well-developed and well-nourished  No distress  HENT:   Head: Normocephalic and atraumatic  Eyes: Pupils are equal, round, and reactive to light  Conjunctivae and EOM are normal  Right eye exhibits no discharge  Left eye exhibits no discharge  Neck: Normal range of motion  Neck supple  No JVD present  No thyromegaly present  Cardiovascular: Normal rate, regular rhythm, normal heart sounds and intact distal pulses  Exam reveals no gallop and no friction rub  No murmur heard  Pulmonary/Chest: Effort normal and breath sounds normal  No respiratory distress  She has no wheezes  She has no rales  She exhibits no tenderness  Abdominal: Soft  She exhibits no distension  There is no tenderness  Musculoskeletal: Normal range of motion  She exhibits no edema, tenderness or deformity  Lymphadenopathy:     She has no cervical adenopathy  Neurological: She is alert and oriented to person, place, and time  No cranial nerve deficit  Coordination normal    Skin: Skin is warm and dry  No rash noted  She is not diaphoretic  No erythema  No pallor  Psychiatric: She has a normal mood and affect  Her behavior is normal  Judgment and thought content normal    Nursing note and vitals reviewed

## 2018-12-17 NOTE — ASSESSMENT & PLAN NOTE
She has taken 1 day's worth of Keflex 500 mg however in reviewing the prescription bottle, it has  on 2018  Will treat with 7 day course of Keflex 500 mg twice a day  She is to contact office for worsening symptoms

## 2018-12-20 DIAGNOSIS — K21.9 GASTROESOPHAGEAL REFLUX DISEASE, ESOPHAGITIS PRESENCE NOT SPECIFIED: ICD-10-CM

## 2018-12-20 DIAGNOSIS — E11.649 UNCONTROLLED TYPE 2 DIABETES MELLITUS WITH HYPOGLYCEMIA, UNSPECIFIED HYPOGLYCEMIA COMA STATUS (HCC): ICD-10-CM

## 2018-12-20 DIAGNOSIS — Z79.4 TYPE 2 DIABETES MELLITUS WITH COMPLICATION, WITH LONG-TERM CURRENT USE OF INSULIN (HCC): ICD-10-CM

## 2018-12-20 DIAGNOSIS — F41.9 ANXIETY: ICD-10-CM

## 2018-12-20 DIAGNOSIS — E55.9 VITAMIN D DEFICIENCY: ICD-10-CM

## 2018-12-20 DIAGNOSIS — E11.00 UNCONTROLLED TYPE 2 DIABETES MELLITUS WITH HYPEROSMOLARITY WITHOUT COMA, WITHOUT LONG-TERM CURRENT USE OF INSULIN (HCC): ICD-10-CM

## 2018-12-20 DIAGNOSIS — E11.8 TYPE 2 DIABETES MELLITUS WITH COMPLICATION, WITH LONG-TERM CURRENT USE OF INSULIN (HCC): ICD-10-CM

## 2018-12-20 DIAGNOSIS — I10 ESSENTIAL HYPERTENSION, BENIGN: ICD-10-CM

## 2018-12-20 DIAGNOSIS — E03.9 HYPOTHYROIDISM, UNSPECIFIED TYPE: ICD-10-CM

## 2018-12-20 RX ORDER — CHOLECALCIFEROL (VITAMIN D3) 50 MCG
TABLET ORAL
Qty: 90 TABLET | Refills: 0 | Status: SHIPPED | OUTPATIENT
Start: 2018-12-20 | End: 2019-01-18 | Stop reason: SDUPTHER

## 2018-12-20 RX ORDER — BLOOD-GLUCOSE METER
KIT MISCELLANEOUS
Qty: 90 EACH | Refills: 3 | Status: SHIPPED | OUTPATIENT
Start: 2018-12-20 | End: 2019-11-07

## 2018-12-27 RX ORDER — SERTRALINE HYDROCHLORIDE 100 MG/1
TABLET, FILM COATED ORAL
Qty: 30 TABLET | Refills: 0 | Status: SHIPPED | OUTPATIENT
Start: 2018-12-27 | End: 2019-01-18 | Stop reason: SDUPTHER

## 2018-12-27 RX ORDER — OMEPRAZOLE 40 MG/1
CAPSULE, DELAYED RELEASE ORAL
Qty: 30 CAPSULE | Refills: 0 | Status: SHIPPED | OUTPATIENT
Start: 2018-12-27 | End: 2019-01-18 | Stop reason: SDUPTHER

## 2018-12-27 RX ORDER — LEVOTHYROXINE SODIUM 0.1 MG/1
TABLET ORAL
Qty: 30 TABLET | Refills: 0 | Status: SHIPPED | OUTPATIENT
Start: 2018-12-27 | End: 2019-01-18 | Stop reason: SDUPTHER

## 2018-12-27 RX ORDER — INSULIN GLARGINE 100 [IU]/ML
INJECTION, SOLUTION SUBCUTANEOUS
Qty: 15 ML | Refills: 0 | Status: SHIPPED | OUTPATIENT
Start: 2018-12-27 | End: 2019-02-15 | Stop reason: SDUPTHER

## 2018-12-27 RX ORDER — LISINOPRIL AND HYDROCHLOROTHIAZIDE 20; 12.5 MG/1; MG/1
TABLET ORAL
Qty: 15 TABLET | Refills: 0 | Status: SHIPPED | OUTPATIENT
Start: 2018-12-27 | End: 2019-01-18 | Stop reason: SDUPTHER

## 2019-01-04 RX ORDER — PEN NEEDLE, DIABETIC 32GX 5/32"
NEEDLE, DISPOSABLE MISCELLANEOUS
Qty: 90 EACH | Refills: 0 | Status: SHIPPED | OUTPATIENT
Start: 2019-01-04 | End: 2019-04-17 | Stop reason: SDUPTHER

## 2019-01-04 RX ORDER — INSULIN LISPRO 100 U/ML
INJECTION, SOLUTION SUBCUTANEOUS
Qty: 10 PEN | Refills: 0 | Status: SHIPPED | OUTPATIENT
Start: 2019-01-04 | End: 2019-04-17 | Stop reason: SDUPTHER

## 2019-01-18 DIAGNOSIS — I10 ESSENTIAL HYPERTENSION, BENIGN: ICD-10-CM

## 2019-01-18 DIAGNOSIS — E55.9 VITAMIN D DEFICIENCY: ICD-10-CM

## 2019-01-18 DIAGNOSIS — E03.9 HYPOTHYROIDISM, UNSPECIFIED TYPE: ICD-10-CM

## 2019-01-18 DIAGNOSIS — F41.9 ANXIETY: ICD-10-CM

## 2019-01-18 DIAGNOSIS — K21.9 GASTROESOPHAGEAL REFLUX DISEASE, ESOPHAGITIS PRESENCE NOT SPECIFIED: ICD-10-CM

## 2019-01-20 RX ORDER — LEVOTHYROXINE SODIUM 0.1 MG/1
TABLET ORAL
Qty: 30 TABLET | Refills: 5 | Status: SHIPPED | OUTPATIENT
Start: 2019-01-20 | End: 2020-01-16 | Stop reason: SDUPTHER

## 2019-01-20 RX ORDER — OMEPRAZOLE 40 MG/1
CAPSULE, DELAYED RELEASE ORAL
Qty: 30 CAPSULE | Refills: 5 | Status: SHIPPED | OUTPATIENT
Start: 2019-01-20 | End: 2019-08-30 | Stop reason: SDUPTHER

## 2019-01-20 RX ORDER — SERTRALINE HYDROCHLORIDE 100 MG/1
TABLET, FILM COATED ORAL
Qty: 30 TABLET | Refills: 5 | Status: SHIPPED | OUTPATIENT
Start: 2019-01-20 | End: 2019-08-30 | Stop reason: SDUPTHER

## 2019-01-20 RX ORDER — LISINOPRIL AND HYDROCHLOROTHIAZIDE 20; 12.5 MG/1; MG/1
TABLET ORAL
Qty: 15 TABLET | Refills: 5 | Status: SHIPPED | OUTPATIENT
Start: 2019-01-20 | End: 2020-02-12 | Stop reason: SDUPTHER

## 2019-01-21 RX ORDER — CHOLECALCIFEROL (VITAMIN D3) 50 MCG
TABLET ORAL
Qty: 30 TABLET | Refills: 0 | Status: SHIPPED | OUTPATIENT
Start: 2019-01-21 | End: 2019-02-15 | Stop reason: SDUPTHER

## 2019-02-15 DIAGNOSIS — E11.649 UNCONTROLLED TYPE 2 DIABETES MELLITUS WITH HYPOGLYCEMIA, UNSPECIFIED HYPOGLYCEMIA COMA STATUS (HCC): ICD-10-CM

## 2019-02-15 DIAGNOSIS — E55.9 VITAMIN D DEFICIENCY: ICD-10-CM

## 2019-02-18 RX ORDER — INSULIN GLARGINE 100 [IU]/ML
INJECTION, SOLUTION SUBCUTANEOUS
Qty: 15 ML | Refills: 0 | Status: SHIPPED | OUTPATIENT
Start: 2019-02-18 | End: 2019-03-26

## 2019-02-18 RX ORDER — CHOLECALCIFEROL (VITAMIN D3) 50 MCG
TABLET ORAL
Qty: 30 TABLET | Refills: 0 | Status: SHIPPED | OUTPATIENT
Start: 2019-02-18 | End: 2019-03-21 | Stop reason: SDUPTHER

## 2019-02-18 RX ORDER — CHOLECALCIFEROL (VITAMIN D3) 50 MCG
TABLET ORAL
Qty: 30 TABLET | Refills: 0 | Status: SHIPPED | OUTPATIENT
Start: 2019-02-18 | End: 2019-02-19 | Stop reason: SDUPTHER

## 2019-02-19 ENCOUNTER — OFFICE VISIT (OUTPATIENT)
Dept: INTERNAL MEDICINE CLINIC | Facility: CLINIC | Age: 64
End: 2019-02-19
Payer: COMMERCIAL

## 2019-02-19 VITALS
HEIGHT: 63 IN | DIASTOLIC BLOOD PRESSURE: 70 MMHG | OXYGEN SATURATION: 98 % | TEMPERATURE: 98.4 F | HEART RATE: 69 BPM | WEIGHT: 209.6 LBS | BODY MASS INDEX: 37.14 KG/M2 | SYSTOLIC BLOOD PRESSURE: 130 MMHG

## 2019-02-19 DIAGNOSIS — E11.649 UNCONTROLLED TYPE 2 DIABETES MELLITUS WITH HYPOGLYCEMIA, UNSPECIFIED HYPOGLYCEMIA COMA STATUS (HCC): ICD-10-CM

## 2019-02-19 DIAGNOSIS — E11.43 DIABETIC GASTROPARESIS (HCC): Primary | ICD-10-CM

## 2019-02-19 DIAGNOSIS — M25.511 ACUTE PAIN OF RIGHT SHOULDER: ICD-10-CM

## 2019-02-19 DIAGNOSIS — E03.9 HYPOTHYROIDISM, UNSPECIFIED TYPE: ICD-10-CM

## 2019-02-19 DIAGNOSIS — I10 ESSENTIAL HYPERTENSION: ICD-10-CM

## 2019-02-19 DIAGNOSIS — K31.84 DIABETIC GASTROPARESIS (HCC): Primary | ICD-10-CM

## 2019-02-19 PROCEDURE — 3725F SCREEN DEPRESSION PERFORMED: CPT | Performed by: INTERNAL MEDICINE

## 2019-02-19 PROCEDURE — 99214 OFFICE O/P EST MOD 30 MIN: CPT | Performed by: INTERNAL MEDICINE

## 2019-02-19 PROCEDURE — 3078F DIAST BP <80 MM HG: CPT | Performed by: INTERNAL MEDICINE

## 2019-02-19 PROCEDURE — 1036F TOBACCO NON-USER: CPT | Performed by: INTERNAL MEDICINE

## 2019-02-19 PROCEDURE — 3008F BODY MASS INDEX DOCD: CPT | Performed by: INTERNAL MEDICINE

## 2019-02-19 PROCEDURE — 3075F SYST BP GE 130 - 139MM HG: CPT | Performed by: INTERNAL MEDICINE

## 2019-02-19 NOTE — PROGRESS NOTES
Assessment/Plan:    No problem-specific Assessment & Plan notes found for this encounter  Diagnoses and all orders for this visit:    Diabetic gastroparesis [facility])  Patient has a history of diabetic gastroparesis or decreased emptying of the stomach the the scan was done in December of 2015 she is complaining of a bloating stomach enlargement heartburns no vomiting even with the use of omeprazole heartburns remained the same  Uncontrolled type 2 diabetes mellitus with hypoglycemia, unspecified hypoglycemia coma status ([address] )  She was supposed to get the blood workup at least a week before the appointment but apparently she just took the blood test today I do not have any results and I cannot comment on her diabetic controlled  Hypothyroidism, unspecified type  SH is also pending  Essential hypertension  Hypertension is very well controlled  Acute pain of right shoulder        Subjective: Today's main symptoms are abdominal bloating, heartburns patient is not and smoker or use of alcohol   Patient ID: Venkatesh Mcclure is a 58 y o  female  HPI    The following portions of the patient's history were reviewed and updated as appropriate: allergies, current medications, past family history, past medical history, past social history, past surgical history and problem list     Review of Systems   Constitutional: Negative for activity change, appetite change, chills, diaphoresis, fatigue and fever  HENT: Negative for congestion, postnasal drip, rhinorrhea, sinus pressure, sinus pain, sneezing and sore throat  Eyes: Negative for visual disturbance  Respiratory: Negative for apnea, cough, choking, chest tightness, shortness of breath and wheezing  Cardiovascular: Negative for chest pain, palpitations and leg swelling  Gastrointestinal: Negative for abdominal distention, abdominal pain, anal bleeding, blood in stool, constipation, diarrhea, nausea and vomiting     Endocrine: Negative for cold intolerance and heat intolerance  Genitourinary: Negative for difficulty urinating, dysuria and hematuria  Musculoskeletal: Negative  Skin: Negative  Neurological: Negative for dizziness, weakness, light-headedness, numbness and headaches  Hematological: Negative for adenopathy  Psychiatric/Behavioral: Negative for agitation, sleep disturbance and suicidal ideas  All other systems reviewed and are negative  Past Medical History:   Diagnosis Date    Acid reflux     Anxiety     Diabetes mellitus ([address] )     Hypertension     Hypothyroid          Current Outpatient Medications:     ADMELOG SOLOSTAR 100 units/mL injection pen, INJECT 12 UNITS UNDER THE SKIN 3 TIMES DAILY WITH MEALS, Disp: 10 pen, Rfl: 0    BASAGLAR KWIKPEN 100 units/mL injection pen, INJECT 30 UNITS SUBCUTANEOUSLY DAILY (Patient taking differently: INJECT 34 UNITS SUBCUTANEOUSLY DAILY), Disp: 15 mL, Rfl: 0    BD PEN NEEDLE HECTOR U/F 32G X 4 MM MISC, USE 4 PEN NEEDLES DAILY AS DIRECTED, Disp: 90 each, Rfl: 0    Blood Glucose Monitoring Suppl (FREESTYLE LITE) [facility], by Does not apply route 4 (four) times a day, Disp: 1 each, Rfl: 0    Cholecalciferol (VITAMIN D) 2000 units tablet, TAKE ONE TABLET BY MOUTH EVERY DAY, Disp: 30 tablet, Rfl: 0    cyanocobalamin (VITAMIN B-12) 1,000 mcg tablet, Take 1,000 mcg by mouth daily, Disp: , Rfl:     FREESTYLE LITE test strip, TEST BLOOD GLUCOSE FOUR TIMES DAILY, Disp: 100 each, Rfl: 1    FREESTYLE LITE test strip, TEST BLOOD GLUCOSE FOUR TIMES DAILY, Disp: 90 each, Rfl: 3    glimepiride (AMARYL) 4 mg tablet, TAKE 1 TABLET BY MOUTH TWICE DAILY  , Disp: 60 tablet, Rfl: 0    Insulin Syringe-Needle U-100 (B-D INS SYR ULTRAFINE  3CC/30G) 30G X 1/2" 0 3 ML MISC, Inject under the skin 4 (four) times a day, Disp: 100 each, Rfl: 4    Lancets (FREESTYLE) lancets, TEST BLOOD GLUCOSE 3 TIMES A DAY, Disp: 90 each, Rfl: 0    levothyroxine 100 mcg tablet, TAKE ONE TABLET BY MOUTH EVERY DAY, Disp: 30 tablet, Rfl: 5    lisinopril-hydrochlorothiazide (PRINZIDE,ZESTORETIC) 20-12 5 MG per tablet, TAKE 1/2 TABLET BY MOUTH EVERY DAY, Disp: 15 tablet, Rfl: 5    omeprazole (PriLOSEC) 40 MG capsule, TAKE ONE CAPSULE BY MOUTH EVERY DAY, Disp: 30 capsule, Rfl: 5    sertraline (ZOLOFT) 100 mg tablet, TAKE ONE TABLET BY MOUTH EVERY DAY, Disp: 30 tablet, Rfl: 5    Allergies   Allergen Reactions    Metformin Diarrhea       Social History   Past Surgical History:   Procedure Laterality Date    CHOLECYSTECTOMY      DILATION AND CURETTAGE OF UTERUS      ESOPHAGOGASTRODUODENOSCOPY      HYSTERECTOMY      [place] COLONOSCOPY FLX DX W/COLLJ SPEC WHEN PFRMD N/A 1/28/2016    Procedure: EGD AND COLONOSCOPY;  Surgeon: Giuseppe Luu MD;  [facility];   Service: Gastroenterology    [place] INCISE FINGER TENDON SHEATH Right 1/31/2017    Procedure: LONG FINGER TRIGGER RELEASE ;  Surgeon: Osei Ye MD;  [facility];  Service: Orthopedics    KS INCISE FINGER TENDON SHEATH Right 7/21/2016    Procedure: RELEASE TRIGGER FINGER - LONG FINGER;  Surgeon: Osei Ye MD;  [facility];  Service: Orthopedics    [place] REVISE MEDIAN N/CARPAL TUNNEL SURG Right 1/31/2017    Procedure: WRIST CARPAL TUNNEL RELEASE ; TENOLYSIS OF FPL, FDS 2-5, FDP 2-5;  APPLICATION OF Jennifer Bellamy;  Surgeon: Osei Ye MD;  [facility];  Service: Orthopedics     Family History   Problem Relation Age of Onset    Cancer Father     Heart attack Father     Diabetes type II Father     Arthritis Maternal Grandmother     No Known Problems Paternal Grandmother     Heart disease Paternal Grandfather     Stroke Paternal Grandfather     Cirrhosis Mother        Objective:  /70 (BP Location: Right arm, Patient Position: Sitting, Cuff Size: Large)   Pulse 69   Temp 98 4 °F (36 9 °C) (Oral)   Ht 5' 2 5" (1 588 m)   Wt 95 1 kg (209 lb 9 6 oz)   SpO2 98%   BMI 37 73 kg/m²        Physical Exam   Constitutional: She is oriented to person, place, and time  She appears well-developed and well-nourished  No distress  HENT:   Head: Normocephalic and atraumatic  Eyes: Pupils are equal, round, and reactive to light  Conjunctivae and EOM are normal  Right eye exhibits no discharge  Left eye exhibits no discharge  Neck: Normal range of motion  Neck supple  No JVD present  No thyromegaly present  Cardiovascular: Normal rate, regular rhythm, normal heart sounds and intact distal pulses  Exam reveals no gallop and no friction rub  No murmur heard  Pulmonary/Chest: Effort normal and breath sounds normal  No respiratory distress  She has no wheezes  She has no rales  She exhibits no tenderness  Abdominal: Soft  She exhibits no distension  There is no tenderness  Musculoskeletal: Normal range of motion  She exhibits no edema, tenderness or deformity  Lymphadenopathy:     She has no cervical adenopathy  Neurological: She is alert and oriented to person, place, and time  No cranial nerve deficit  Coordination normal    Skin: Skin is warm and dry  No rash noted  She is not diaphoretic  No erythema  No pallor  Psychiatric: She has a normal mood and affect  Her behavior is normal  Judgment and thought content normal    Nursing note and vitals reviewed  No results found for this or any previous visit (from the past 672 hour(s))

## 2019-02-20 ENCOUNTER — TELEPHONE (OUTPATIENT)
Dept: INTERNAL MEDICINE CLINIC | Age: 64
End: 2019-02-20

## 2019-02-20 LAB
ALBUMIN SERPL-MCNC: 4.1 G/DL (ref 3.6–5.1)
ALBUMIN/CREAT UR: 12 MCG/MG CREAT
ALBUMIN/GLOB SERPL: 1.5 (CALC) (ref 1–2.5)
ALP SERPL-CCNC: 63 U/L (ref 33–130)
ALT SERPL-CCNC: 33 U/L (ref 6–29)
AST SERPL-CCNC: 31 U/L (ref 10–35)
BASOPHILS # BLD AUTO: 31 CELLS/UL (ref 0–200)
BASOPHILS NFR BLD AUTO: 0.5 %
BILIRUB SERPL-MCNC: 0.4 MG/DL (ref 0.2–1.2)
BUN SERPL-MCNC: 13 MG/DL (ref 7–25)
BUN/CREAT SERPL: ABNORMAL (CALC) (ref 6–22)
CALCIUM SERPL-MCNC: 9.3 MG/DL (ref 8.6–10.4)
CHLORIDE SERPL-SCNC: 102 MMOL/L (ref 98–110)
CO2 SERPL-SCNC: 29 MMOL/L (ref 20–32)
CREAT SERPL-MCNC: 0.67 MG/DL (ref 0.5–0.99)
CREAT UR-MCNC: 50 MG/DL (ref 20–275)
EOSINOPHIL # BLD AUTO: 189 CELLS/UL (ref 15–500)
EOSINOPHIL NFR BLD AUTO: 3.1 %
ERYTHROCYTE [DISTWIDTH] IN BLOOD BY AUTOMATED COUNT: 14 % (ref 11–15)
EST. AVERAGE GLUCOSE BLD GHB EST-MCNC: 217 (CALC)
EST. AVERAGE GLUCOSE BLD GHB EST-SCNC: 12 (CALC)
GLOBULIN SER CALC-MCNC: 2.8 G/DL (CALC) (ref 1.9–3.7)
GLUCOSE SERPL-MCNC: 386 MG/DL (ref 65–139)
HBA1C MFR BLD: 9.2 % OF TOTAL HGB
HCT VFR BLD AUTO: 36.9 % (ref 35–45)
HGB BLD-MCNC: 12 G/DL (ref 11.7–15.5)
LYMPHOCYTES # BLD AUTO: 1275 CELLS/UL (ref 850–3900)
LYMPHOCYTES NFR BLD AUTO: 20.9 %
MCH RBC QN AUTO: 27.1 PG (ref 27–33)
MCHC RBC AUTO-ENTMCNC: 32.5 G/DL (ref 32–36)
MCV RBC AUTO: 83.5 FL (ref 80–100)
MICROALBUMIN UR-MCNC: 0.6 MG/DL
MONOCYTES # BLD AUTO: 372 CELLS/UL (ref 200–950)
MONOCYTES NFR BLD AUTO: 6.1 %
NEUTROPHILS # BLD AUTO: 4233 CELLS/UL (ref 1500–7800)
NEUTROPHILS NFR BLD AUTO: 69.4 %
PLATELET # BLD AUTO: 193 THOUSAND/UL (ref 140–400)
PMV BLD REES-ECKER: 10.6 FL (ref 7.5–12.5)
POTASSIUM SERPL-SCNC: 4.3 MMOL/L (ref 3.5–5.3)
PROT SERPL-MCNC: 6.9 G/DL (ref 6.1–8.1)
RBC # BLD AUTO: 4.42 MILLION/UL (ref 3.8–5.1)
SL AMB EGFR AFRICAN AMERICAN: 108 ML/MIN/1.73M2
SL AMB EGFR NON AFRICAN AMERICAN: 93 ML/MIN/1.73M2
SODIUM SERPL-SCNC: 139 MMOL/L (ref 135–146)
TSH SERPL-ACNC: 2.89 MIU/L (ref 0.4–4.5)
WBC # BLD AUTO: 6.1 THOUSAND/UL (ref 3.8–10.8)

## 2019-02-20 NOTE — TELEPHONE ENCOUNTER
----- Message from Michael Narayanan, [address] sent at 2/20/2019  8:46 AM EST -----  Dr Denise Quiroz, you saw this patient yesterday for f/u on her DM  Her labs show that her A1c worsened significantly, I wanted to make you aware, as she is not seeing you again for another month   I am out of the office until Monday   Thanks,   Aldair   ----- Message -----  From: Shelley Torres Results In  Sent: 2/19/2019  11:30 PM  To: Brenna Almeida

## 2019-03-19 ENCOUNTER — HOSPITAL ENCOUNTER (OUTPATIENT)
Dept: NUCLEAR MEDICINE | Facility: HOSPITAL | Age: 64
Discharge: HOME | End: 2019-03-19
Attending: INTERNAL MEDICINE
Payer: COMMERCIAL

## 2019-03-19 DIAGNOSIS — E11.43 DIABETIC GASTROPARESIS (HCC): ICD-10-CM

## 2019-03-19 DIAGNOSIS — K31.84 DIABETIC GASTROPARESIS (HCC): ICD-10-CM

## 2019-03-19 PROCEDURE — 78264 GASTRIC EMPTYING IMG STUDY: CPT

## 2019-03-19 PROCEDURE — A9541 TC99M SULFUR COLLOID: HCPCS

## 2019-03-21 ENCOUNTER — OFFICE VISIT (OUTPATIENT)
Dept: INTERNAL MEDICINE CLINIC | Age: 64
End: 2019-03-21
Payer: COMMERCIAL

## 2019-03-21 VITALS
OXYGEN SATURATION: 97 % | WEIGHT: 210.2 LBS | HEART RATE: 84 BPM | SYSTOLIC BLOOD PRESSURE: 132 MMHG | HEIGHT: 62 IN | DIASTOLIC BLOOD PRESSURE: 82 MMHG | BODY MASS INDEX: 38.68 KG/M2 | TEMPERATURE: 97.8 F

## 2019-03-21 DIAGNOSIS — H10.32 ACUTE BACTERIAL CONJUNCTIVITIS OF LEFT EYE: Primary | ICD-10-CM

## 2019-03-21 DIAGNOSIS — E11.649 UNCONTROLLED TYPE 2 DIABETES MELLITUS WITH HYPOGLYCEMIA, UNSPECIFIED HYPOGLYCEMIA COMA STATUS (HCC): ICD-10-CM

## 2019-03-21 DIAGNOSIS — E55.9 VITAMIN D DEFICIENCY: ICD-10-CM

## 2019-03-21 PROCEDURE — 3008F BODY MASS INDEX DOCD: CPT | Performed by: NURSE PRACTITIONER

## 2019-03-21 PROCEDURE — 99213 OFFICE O/P EST LOW 20 MIN: CPT | Performed by: NURSE PRACTITIONER

## 2019-03-21 RX ORDER — CHOLECALCIFEROL (VITAMIN D3) 50 MCG
TABLET ORAL
Qty: 30 TABLET | Refills: 0 | Status: SHIPPED | OUTPATIENT
Start: 2019-03-21 | End: 2019-04-12 | Stop reason: SDUPTHER

## 2019-03-21 RX ORDER — POLYMYXIN B SULFATE AND TRIMETHOPRIM 1; 10000 MG/ML; [USP'U]/ML
1 SOLUTION OPHTHALMIC EVERY 4 HOURS
Qty: 10 ML | Refills: 0 | Status: SHIPPED | OUTPATIENT
Start: 2019-03-21 | End: 2019-11-12

## 2019-03-21 RX ORDER — INSULIN GLARGINE 100 [IU]/ML
INJECTION, SOLUTION SUBCUTANEOUS
Qty: 30 ML | Refills: 0 | Status: SHIPPED | OUTPATIENT
Start: 2019-03-21 | End: 2019-03-26

## 2019-03-21 NOTE — PROGRESS NOTES
Assessment/Plan:    Conjunctivitis  - start eye drops to both eyes as she reports R eye is starting to become irritated  - good handwashing    Return for new/worsening symptoms or not resolved    Keep follow-up with Dr Idalia Solorio 3/26     Diagnoses and all orders for this visit:    Acute bacterial conjunctivitis of left eye  -     polymyxin b-trimethoprim (POLYTRIM) ophthalmic solution; Administer 1 drop to both eyes every 4 (four) hours        Subjective:      Patient ID: Zhou Phoenix is a 58 y o  female  Conjunctivitis    The current episode started yesterday  The onset was gradual  The problem has been rapidly worsening  The problem is mild  Nothing relieves the symptoms  The symptoms are aggravated by light  Associated symptoms include eye itching, photophobia, congestion (mild clear), cough (dry mild), eye discharge, eye pain and eye redness  Pertinent negatives include no fever, no decreased vision, no double vision, no ear pain, no headaches, no rhinorrhea, no sore throat and no wheezing  The left (Right is starting to feel itchy) eye is affected  The following portions of the patient's history were reviewed and updated as appropriate: allergies, current medications, past family history, past medical history, past social history, past surgical history and problem list     Review of Systems   Constitutional: Negative for chills, fatigue and fever  HENT: Positive for congestion (mild clear) and postnasal drip  Negative for ear pain, rhinorrhea, sinus pressure, sinus pain and sore throat  Eyes: Positive for photophobia, pain, discharge, redness and itching  Negative for double vision and visual disturbance  Respiratory: Positive for cough (dry mild)  Negative for shortness of breath and wheezing  Neurological: Negative for headaches           Past Medical History:   Diagnosis Date    Acid reflux     Anxiety     Diabetes mellitus ([facility] [address] )     Hypertension     Hypothyroid          Current Outpatient Medications:     ADMELOG SOLOSTAR 100 units/mL injection pen, INJECT 12 UNITS UNDER THE SKIN 3 TIMES DAILY WITH MEALS, Disp: 10 pen, Rfl: 0    BASAGLAR KWIKPEN 100 units/mL injection pen, INJECT 30 UNITS SUBCUTANEOUSLY DAILY (Patient taking differently: INJECT 34 UNITS SUBCUTANEOUSLY DAILY), Disp: 15 mL, Rfl: 0    BASAGLAR KWIKPEN 100 units/mL injection pen, INJECT 30 UNITS SUBCUTANEOUSLY DAILY, Disp: 30 mL, Rfl: 0    BD PEN NEEDLE HECTOR U/F 32G X 4 MM MISC, USE 4 PEN NEEDLES DAILY AS DIRECTED, Disp: 90 each, Rfl: 0    Blood Glucose Monitoring Suppl (FREESTYLE LITE) [facility], by Does not apply route 4 (four) times a day, Disp: 1 each, Rfl: 0    Cholecalciferol (VITAMIN D) 2000 units tablet, TAKE ONE TABLET BY MOUTH EVERY DAY, Disp: 30 tablet, Rfl: 0    cyanocobalamin (VITAMIN B-12) 1,000 mcg tablet, Take 1,000 mcg by mouth daily, Disp: , Rfl:     FREESTYLE LITE test strip, TEST BLOOD GLUCOSE FOUR TIMES DAILY, Disp: 100 each, Rfl: 1    FREESTYLE LITE test strip, TEST BLOOD GLUCOSE FOUR TIMES DAILY, Disp: 90 each, Rfl: 3    glimepiride (AMARYL) 4 mg tablet, TAKE 1 TABLET BY MOUTH TWICE DAILY  , Disp: 60 tablet, Rfl: 0    Insulin Syringe-Needle U-100 (B-D INS SYR ULTRAFINE  3CC/30G) 30G X 1/2" 0 3 ML MISC, Inject under the skin 4 (four) times a day, Disp: 100 each, Rfl: 4    Lancets (FREESTYLE) lancets, TEST BLOOD GLUCOSE 3 TIMES A DAY, Disp: 90 each, Rfl: 0    levothyroxine 100 mcg tablet, TAKE ONE TABLET BY MOUTH EVERY DAY, Disp: 30 tablet, Rfl: 5    lisinopril-hydrochlorothiazide (PRINZIDE,ZESTORETIC) 20-12 5 MG per tablet, TAKE 1/2 TABLET BY MOUTH EVERY DAY, Disp: 15 tablet, Rfl: 5    omeprazole (PriLOSEC) 40 MG capsule, TAKE ONE CAPSULE BY MOUTH EVERY DAY, Disp: 30 capsule, Rfl: 5    sertraline (ZOLOFT) 100 mg tablet, TAKE ONE TABLET BY MOUTH EVERY DAY, Disp: 30 tablet, Rfl: 5    polymyxin b-trimethoprim (POLYTRIM) ophthalmic solution, Administer 1 drop to both eyes every 4 (four) hours, Disp: 10 mL, Rfl: 0    Allergies   Allergen Reactions    Metformin Diarrhea       Social History   Past Surgical History:   Procedure Laterality Date    CHOLECYSTECTOMY      DILATION AND CURETTAGE OF UTERUS      ESOPHAGOGASTRODUODENOSCOPY      HYSTERECTOMY      [place] COLONOSCOPY FLX DX W/COLLJ SPEC WHEN PFRMD N/A 1/28/2016    Procedure: EGD AND COLONOSCOPY;  Surgeon: Dylan Dorsey MD;  [facility]; Service: Gastroenterology    IA INCISE FINGER TENDON SHEATH Right 1/31/2017    Procedure: LONG FINGER TRIGGER RELEASE ;  Surgeon: Mariana Maki MD;  [facility];  Service: Orthopedics    IA INCISE FINGER TENDON SHEATH Right 7/21/2016    Procedure: RELEASE TRIGGER FINGER - LONG FINGER;  Surgeon: Mariana Maki MD;  [facility];  Service: Orthopedics    IA REVISE MEDIAN N/CARPAL TUNNEL SURG Right 1/31/2017    Procedure: WRIST CARPAL TUNNEL RELEASE ; TENOLYSIS OF FPL, FDS 2-5, FDP 2-5;  APPLICATION OF [place];  Surgeon: Mariana Maki MD;  [facility];  Service: Orthopedics     Family History   Problem Relation Age of Onset    Cancer Father     Heart attack Father     Diabetes type II Father     Arthritis Maternal Grandmother     No Known Problems Paternal Grandmother     Heart disease Paternal Grandfather     Stroke Paternal Grandfather     Cirrhosis Mother        Objective:  /82 (BP Location: Left arm, Patient Position: Sitting, Cuff Size: Large)   Pulse 84   Temp 97 8 °F (36 6 °C) (Tympanic)   Ht 5' 2 44" (1 586 m)   Wt 95 3 kg (210 lb 3 2 oz)   SpO2 97%   BMI 37 90 kg/m²      Physical Exam   Constitutional: She is oriented to person, place, and time  She appears well-developed and well-nourished  No distress  HENT:   Head: Normocephalic and atraumatic     Right Ear: Hearing, tympanic membrane, external ear and ear canal normal    Left Ear: Hearing, tympanic membrane, external ear and ear canal normal    Nose: Nose normal  No mucosal edema or rhinorrhea  Mouth/Throat: Uvula is midline, oropharynx is clear and moist and mucous membranes are normal  No oropharyngeal exudate  Eyes: Pupils are equal, round, and reactive to light  Lids are normal  Right eye exhibits no chemosis, no discharge, no exudate and no hordeolum  No foreign body present in the right eye  Left eye exhibits discharge  Left eye exhibits no chemosis, no exudate and no hordeolum  No foreign body present in the left eye  Right conjunctiva is not injected  Right conjunctiva has no hemorrhage  Left conjunctiva is injected  Left conjunctiva has no hemorrhage  No scleral icterus  Cardiovascular: Normal rate and regular rhythm  Pulmonary/Chest: Effort normal and breath sounds normal  No respiratory distress  She has no wheezes  Neurological: She is alert and oriented to person, place, and time  Skin: Skin is warm and dry  Psychiatric: She has a normal mood and affect  Her behavior is normal  Judgment and thought content normal    Nursing note and vitals reviewed

## 2019-03-26 ENCOUNTER — OFFICE VISIT (OUTPATIENT)
Dept: INTERNAL MEDICINE CLINIC | Facility: CLINIC | Age: 64
End: 2019-03-26
Payer: COMMERCIAL

## 2019-03-26 VITALS
TEMPERATURE: 98.1 F | HEART RATE: 71 BPM | HEIGHT: 63 IN | WEIGHT: 207.4 LBS | DIASTOLIC BLOOD PRESSURE: 80 MMHG | BODY MASS INDEX: 36.75 KG/M2 | SYSTOLIC BLOOD PRESSURE: 142 MMHG | OXYGEN SATURATION: 97 %

## 2019-03-26 DIAGNOSIS — K31.84 DIABETIC GASTROPARESIS (HCC): ICD-10-CM

## 2019-03-26 DIAGNOSIS — Z23 NEED FOR PNEUMOCOCCAL VACCINATION: ICD-10-CM

## 2019-03-26 DIAGNOSIS — Z11.59 ENCOUNTER FOR HEPATITIS C SCREENING TEST FOR LOW RISK PATIENT: ICD-10-CM

## 2019-03-26 DIAGNOSIS — Z23 NEED FOR TETANUS BOOSTER: ICD-10-CM

## 2019-03-26 DIAGNOSIS — Z23 NEED FOR INFLUENZA VACCINATION: Primary | ICD-10-CM

## 2019-03-26 DIAGNOSIS — E11.649 UNCONTROLLED TYPE 2 DIABETES MELLITUS WITH HYPOGLYCEMIA, UNSPECIFIED HYPOGLYCEMIA COMA STATUS (HCC): ICD-10-CM

## 2019-03-26 DIAGNOSIS — E11.43 DIABETIC GASTROPARESIS (HCC): ICD-10-CM

## 2019-03-26 PROCEDURE — 90471 IMMUNIZATION ADMIN: CPT

## 2019-03-26 PROCEDURE — 99214 OFFICE O/P EST MOD 30 MIN: CPT | Performed by: INTERNAL MEDICINE

## 2019-03-26 PROCEDURE — 90732 PPSV23 VACC 2 YRS+ SUBQ/IM: CPT

## 2019-03-26 RX ORDER — METOCLOPRAMIDE 10 MG/1
10 TABLET ORAL
Qty: 90 TABLET | Refills: 1 | Status: SHIPPED | OUTPATIENT
Start: 2019-03-26 | End: 2019-12-09 | Stop reason: SDUPTHER

## 2019-04-08 ENCOUNTER — TRANSCRIBE ORDERS (OUTPATIENT)
Dept: LAB | Age: 64
End: 2019-04-08

## 2019-04-08 ENCOUNTER — OFFICE VISIT (OUTPATIENT)
Dept: INTERNAL MEDICINE CLINIC | Age: 64
End: 2019-04-08
Payer: COMMERCIAL

## 2019-04-08 VITALS
BODY MASS INDEX: 36.61 KG/M2 | DIASTOLIC BLOOD PRESSURE: 78 MMHG | SYSTOLIC BLOOD PRESSURE: 138 MMHG | TEMPERATURE: 98.7 F | WEIGHT: 203.4 LBS | OXYGEN SATURATION: 97 % | HEART RATE: 81 BPM

## 2019-04-08 DIAGNOSIS — K31.84 DIABETIC GASTROPARESIS (HCC): ICD-10-CM

## 2019-04-08 DIAGNOSIS — E11.649 UNCONTROLLED TYPE 2 DIABETES MELLITUS WITH HYPOGLYCEMIA WITHOUT COMA (HCC): ICD-10-CM

## 2019-04-08 DIAGNOSIS — K52.9 GASTROENTERITIS: Primary | ICD-10-CM

## 2019-04-08 DIAGNOSIS — E11.43 DIABETIC GASTROPARESIS (HCC): ICD-10-CM

## 2019-04-08 DIAGNOSIS — K21.9 GASTROESOPHAGEAL REFLUX DISEASE, ESOPHAGITIS PRESENCE NOT SPECIFIED: ICD-10-CM

## 2019-04-08 PROCEDURE — 99214 OFFICE O/P EST MOD 30 MIN: CPT | Performed by: INTERNAL MEDICINE

## 2019-04-08 PROCEDURE — 1036F TOBACCO NON-USER: CPT | Performed by: INTERNAL MEDICINE

## 2019-04-08 RX ORDER — DICYCLOMINE HYDROCHLORIDE 10 MG/1
10 CAPSULE ORAL
Qty: 16 CAPSULE | Refills: 0 | Status: SHIPPED | OUTPATIENT
Start: 2019-04-08 | End: 2020-03-24

## 2019-04-08 RX ORDER — ONDANSETRON 4 MG/1
4 TABLET, FILM COATED ORAL EVERY 8 HOURS PRN
Qty: 20 TABLET | Refills: 0 | Status: SHIPPED | OUTPATIENT
Start: 2019-04-08 | End: 2020-03-24

## 2019-04-09 ENCOUNTER — APPOINTMENT (OUTPATIENT)
Dept: LAB | Age: 64
End: 2019-04-09
Payer: COMMERCIAL

## 2019-04-09 DIAGNOSIS — K52.9 GASTROENTERITIS: ICD-10-CM

## 2019-04-09 LAB — WBC STL QL MICRO: NORMAL

## 2019-04-09 PROCEDURE — 87205 SMEAR GRAM STAIN: CPT

## 2019-04-09 PROCEDURE — 87505 NFCT AGENT DETECTION GI: CPT

## 2019-04-10 ENCOUNTER — TELEPHONE (OUTPATIENT)
Dept: INTERNAL MEDICINE CLINIC | Age: 64
End: 2019-04-10

## 2019-04-10 LAB
CAMPYLOBACTER DNA SPEC NAA+PROBE: NORMAL
SALMONELLA DNA SPEC QL NAA+PROBE: NORMAL
SHIGA TOXIN STX GENE SPEC NAA+PROBE: NORMAL
SHIGELLA DNA SPEC QL NAA+PROBE: NORMAL

## 2019-04-11 ENCOUNTER — TELEPHONE (OUTPATIENT)
Dept: INTERNAL MEDICINE CLINIC | Age: 64
End: 2019-04-11

## 2019-04-12 DIAGNOSIS — E55.9 VITAMIN D DEFICIENCY: ICD-10-CM

## 2019-04-12 DIAGNOSIS — E11.649 UNCONTROLLED TYPE 2 DIABETES MELLITUS WITH HYPOGLYCEMIA, UNSPECIFIED HYPOGLYCEMIA COMA STATUS (HCC): ICD-10-CM

## 2019-04-15 RX ORDER — INSULIN GLARGINE 100 [IU]/ML
INJECTION, SOLUTION SUBCUTANEOUS
Qty: 15 ML | Refills: 0 | Status: SHIPPED | OUTPATIENT
Start: 2019-04-15 | End: 2019-05-16 | Stop reason: SDUPTHER

## 2019-04-15 RX ORDER — CHOLECALCIFEROL (VITAMIN D3) 50 MCG
TABLET ORAL
Qty: 30 TABLET | Refills: 0 | Status: SHIPPED | OUTPATIENT
Start: 2019-04-15 | End: 2019-05-16 | Stop reason: SDUPTHER

## 2019-04-17 DIAGNOSIS — Z79.4 TYPE 2 DIABETES MELLITUS WITH COMPLICATION, WITH LONG-TERM CURRENT USE OF INSULIN (HCC): ICD-10-CM

## 2019-04-17 DIAGNOSIS — E11.00 UNCONTROLLED TYPE 2 DIABETES MELLITUS WITH HYPEROSMOLARITY WITHOUT COMA, WITHOUT LONG-TERM CURRENT USE OF INSULIN (HCC): ICD-10-CM

## 2019-04-17 DIAGNOSIS — E11.8 TYPE 2 DIABETES MELLITUS WITH COMPLICATION, WITH LONG-TERM CURRENT USE OF INSULIN (HCC): ICD-10-CM

## 2019-05-16 DIAGNOSIS — E55.9 VITAMIN D DEFICIENCY: ICD-10-CM

## 2019-05-16 DIAGNOSIS — E11.649 UNCONTROLLED TYPE 2 DIABETES MELLITUS WITH HYPOGLYCEMIA, UNSPECIFIED HYPOGLYCEMIA COMA STATUS (HCC): ICD-10-CM

## 2019-05-17 RX ORDER — CHOLECALCIFEROL (VITAMIN D3) 50 MCG
TABLET ORAL
Qty: 30 TABLET | Refills: 0 | Status: SHIPPED | OUTPATIENT
Start: 2019-05-17

## 2019-05-17 RX ORDER — INSULIN GLARGINE 100 [IU]/ML
INJECTION, SOLUTION SUBCUTANEOUS
Qty: 15 ML | Refills: 0 | Status: SHIPPED | OUTPATIENT
Start: 2019-05-17 | End: 2019-05-20 | Stop reason: SDUPTHER

## 2019-05-20 ENCOUNTER — HOSPITAL ENCOUNTER (OUTPATIENT)
Dept: RADIOLOGY | Age: 64
Discharge: HOME | End: 2019-05-20
Payer: COMMERCIAL

## 2019-05-20 VITALS — WEIGHT: 207 LBS | BODY MASS INDEX: 36.68 KG/M2 | HEIGHT: 63 IN

## 2019-05-20 DIAGNOSIS — E11.00 UNCONTROLLED TYPE 2 DIABETES MELLITUS WITH HYPEROSMOLARITY WITHOUT COMA, WITHOUT LONG-TERM CURRENT USE OF INSULIN (HCC): ICD-10-CM

## 2019-05-20 DIAGNOSIS — Z12.31 ENCOUNTER FOR SCREENING MAMMOGRAM FOR MALIGNANT NEOPLASM OF BREAST: ICD-10-CM

## 2019-05-20 DIAGNOSIS — Z79.4 TYPE 2 DIABETES MELLITUS WITH COMPLICATION, WITH LONG-TERM CURRENT USE OF INSULIN (HCC): ICD-10-CM

## 2019-05-20 DIAGNOSIS — E11.649 UNCONTROLLED TYPE 2 DIABETES MELLITUS WITH HYPOGLYCEMIA, UNSPECIFIED HYPOGLYCEMIA COMA STATUS (HCC): ICD-10-CM

## 2019-05-20 DIAGNOSIS — E11.8 TYPE 2 DIABETES MELLITUS WITH COMPLICATION, WITH LONG-TERM CURRENT USE OF INSULIN (HCC): ICD-10-CM

## 2019-05-20 PROCEDURE — 77067 SCR MAMMO BI INCL CAD: CPT

## 2019-05-20 RX ORDER — GLIMEPIRIDE 4 MG/1
4 TABLET ORAL 2 TIMES DAILY
Qty: 180 TABLET | Refills: 1 | Status: SHIPPED | OUTPATIENT
Start: 2019-05-20 | End: 2019-12-09 | Stop reason: SDUPTHER

## 2019-07-09 ENCOUNTER — CONSULT (OUTPATIENT)
Dept: GASTROENTEROLOGY | Facility: CLINIC | Age: 64
End: 2019-07-09
Payer: MEDICARE

## 2019-07-09 VITALS
DIASTOLIC BLOOD PRESSURE: 77 MMHG | WEIGHT: 206.2 LBS | HEIGHT: 63 IN | BODY MASS INDEX: 36.54 KG/M2 | HEART RATE: 68 BPM | TEMPERATURE: 98.5 F | SYSTOLIC BLOOD PRESSURE: 134 MMHG

## 2019-07-09 DIAGNOSIS — E11.43 DIABETIC GASTROPARESIS (HCC): ICD-10-CM

## 2019-07-09 DIAGNOSIS — K31.84 DIABETIC GASTROPARESIS (HCC): ICD-10-CM

## 2019-07-09 DIAGNOSIS — E11.65 UNCONTROLLED TYPE 2 DIABETES MELLITUS WITH HYPERGLYCEMIA (HCC): Primary | ICD-10-CM

## 2019-07-09 PROCEDURE — 99204 OFFICE O/P NEW MOD 45 MIN: CPT | Performed by: STUDENT IN AN ORGANIZED HEALTH CARE EDUCATION/TRAINING PROGRAM

## 2019-07-09 NOTE — PATIENT INSTRUCTIONS
· Eat smaller meals more frequently  · Chew food thoroughly  · Eat well-cooked fruits and vegetables rather than raw fruits and vegetables  · Avoid fibrous fruits and vegetables, such as oranges and broccoli, which may cause bezoars  · Choose mostly low-fat foods, but if you can tolerate them, add small servings of fatty foods to your diet  · Try soups and pureed foods if liquids are easier for you to swallow  · Drink about 34 to 51 ounces (1 to 1 5 liters) of water a day  · Exercise gently after you eat, such as going for a walk  · Avoid carbonated drinks, alcohol and smoking  · Try to avoid lying down for 2 hours after a meal  · Take a multivitamin daily  Gastroparesis   AMBULATORY CARE:   Gastroparesis  is a condition that causes food to move more slowly than normal from the stomach to the intestines  Gastroparesis is not caused by blockage  Often, the cause may not be known  It may be caused by damage to a nerve that controls muscles used to move food to your small intestines  Common signs and symptoms include:   · Nausea and vomiting    · Feeling full sooner than normal or after eating less than usual    · Abdominal bloating and pain    · Weight loss or poor nutrition     · Frequent changes in blood sugar  You or someone else should call 911 if:   · Your heart is beating faster and you are breathing faster than usual     · You cannot be woken up  Seek care immediately if:   · You are confused or have trouble thinking clearly  · You are dizzy or very drowsy  Contact your healthcare provider if:   · You are urinating less than usual     · Your symptoms return or become worse  · The color of your urine is dark yellow  · You have questions or concerns about your condition or care  Treatment for gastroparesis  may include medicine to control your nausea and vomiting or to help food move through your stomach  You may need nutritional support   Gastric electrical stimulation (GES) may be done when symptoms cannot be controlled by other treatments  Surgery may be needed to place a feeding tube into your small intestines if other treatments do not work  Manage gastroparesis:  Your healthcare provider may suggest any of the following:  · Change your eating habits  Take small bites of food to make it easier for your body to digest  You may need to eat several small meals low in fiber and fat throughout the day  Ask your dietitian for help with planning your meals  · Do not eat raw fruits, vegetables, or whole grains  These can cause you to have undigested food in your stomach  The undigested food can form a blockage that can become life-threatening  · Drink liquids as directed  Liquids will prevent dehydration caused by vomiting  Slowly drink small amounts of liquids at a time  Ask your healthcare provider how much liquid to drink each day, and which liquids are best for you  You may also need to drink an oral rehydration solution (ORS)  An ORS has the right amounts of sugar, salt, and minerals in water to replace body fluids  · Do not lie down for 2 hours after your meals  Walking and sitting after meals help with digestion  · Control your blood sugar levels if you have diabetes  High blood sugar levels may make your symptoms worse  Ask your healthcare provider how to control your blood sugar levels  Follow up with your healthcare provider as directed: You may need tests to check if treatment is working  You may need to see a dietitian for help with a nutrition plan  Write down your questions so you remember to ask them during your visits  © 2017 [employer] Information is for End User's use only and may not be sold, redistributed or otherwise used for commercial purposes  All illustrations and images included in CareNotes® are the copyrighted property of [employer]  or [employer]  The above information is an  only   It is not intended as medical advice for individual conditions or treatments  Talk to your doctor, nurse or pharmacist before following any medical regimen to see if it is safe and effective for you

## 2019-07-09 NOTE — PROGRESS NOTES
[facility] - Outpatient Consultation  Preston Keenan 71 y o  female MRN: [id number]  Encounter: [id number]    ASSESSMENT AND PLAN:    Ms Yesenia Del Cid is a 70 y/o F with PMH: poorly controlled DM2, GERD, HTN, Hypothyroidism who presents to for assessment of gastroparesis  #Diabetic gastroparesis ([facility] [address] )  Patient with known hx of difficult to control DM2 with most recent A1c of 9 2 with continued symptoms of nausea,emesis in setting of slow gastric motility  Patient most recent gastric study showed 50% emptying at 4 hour leslee in March 2019 compared to 98% in 2015  She has not been on any anti-motility agents previous and is open to starting therapy  Patient will work to control her sugars better and we suggested using endocrinology assistance  Will send of H  Pylori and Celiac studies to rule out alternative causes  -maintain tight glucose control   -Ambulatory referral to Endocrinology; Future  -Tissue transglutaminase, IgA; Future  -IgA; Future  -H  pylori antigen, stool; Future    #GERD  -continue omeprazole 40mg qdaily    ______________________________________________________________________    HPI:      Patient referred by Dr Fern Bellamy  Patient was diagnosed with diabetes years ago and around that time she would have severe diarrhea and emesis associated with her symptoms  She states that sometimes as frequently as 3 times a month she would have emesis  Since that time her symptoms have improved but she still has flare ups  Denies abdominal pain but more so fullness  Denies melena, hematochezia  Patient with not as much emesis lately but she does have generalized bloating symptoms which bother her  Patient had recent flare of gastroenteritis which is now resolved  Patient with noted allergy to metformin so taking glimepiride and insulin  Her A1c increased from 7 3 to 9 2   She was prescribed Reglan last year but did not start therapy due to reading about the side effects  Gastric emptying study in 2015 which showed mildly delayed rate of gastric emptying of solids particularly through the 1st 2-3 hours of the examination  Repeat gastric in March 2019 showed diminshed rate compared to 2015  EGD/[place] in 2016 negative  Patient denies any weight loss  REVIEW OF SYSTEMS:  CONSTITUTIONAL: Denies any fever, chills, rigors, and weight loss  HEENT: No earache or tinnitus  Denies hearing loss or visual disturbances  CARDIOVASCULAR: No chest pain or palpitations  RESPIRATORY: Denies any cough, hemoptysis, shortness of breath or dyspnea on exertion  GASTROINTESTINAL: As noted in the History of Present Illness  GENITOURINARY: No problems with urination  Denies any hematuria or dysuria  NEUROLOGIC: No dizziness or vertigo, denies headaches  MUSCULOSKELETAL: Denies any muscle or joint pain  SKIN: Denies skin rashes or itching  ENDOCRINE: Denies excessive thirst  Denies intolerance to heat or cold  PSYCHOSOCIAL: Denies depression or anxiety  Denies any recent memory loss  Historical Information   Past Medical History:   Diagnosis Date    Acid reflux     Anxiety     Diabetes mellitus ([facility] [address]     Hypertension     Hypothyroid      Past Surgical History:   Procedure Laterality Date    CHOLECYSTECTOMY      DILATION AND CURETTAGE OF UTERUS      ESOPHAGOGASTRODUODENOSCOPY      HYSTERECTOMY      45    OOPHORECTOMY      39    SC COLONOSCOPY FLX DX W/COLLJ SPEC WHEN PFRMD N/A 1/28/2016    Procedure: EGD AND COLONOSCOPY;  Surgeon: Anil Arriaga MD;  [facility];   Service: Gastroenterology    SC INCISE FINGER TENDON SHEATH Right 1/31/2017    Procedure: LONG FINGER TRIGGER RELEASE ;  Surgeon: Chemo May MD;  [facility];  Service: Orthopedics    SC INCISE FINGER TENDON SHEATH Right 7/21/2016    Procedure: RELEASE TRIGGER FINGER - LONG FINGER;  Surgeon: Chemo May MD;  [facility];  Service: Orthopedics    SC REVISE MEDIAN N/CARPAL TUNNEL SURG Right 2017    Procedure: WRIST CARPAL TUNNEL RELEASE ; TENOLYSIS OF FPL, FDS 2-5, FDP 2-5;  APPLICATION OF TENOGLYIDE;  Surgeon: Chemo May MD;  [facility];  Service: Orthopedics     Social History   Social History     Substance and Sexual Activity   Alcohol Use Yes    Comment: Rarely     Social History     Substance and Sexual Activity   Drug Use No     Social History     Tobacco Use   Smoking Status Former Smoker    Packs/day: 2 00    Years: 10 00    Pack years: 20 00    Last attempt to quit:     Years since quittin 5   Smokeless Tobacco Never Used     Family History   Problem Relation Age of Onset    Cancer Father     Heart attack Father     Diabetes type II Father     Arthritis Maternal Grandmother     No Known Problems Paternal Grandmother     Heart disease Paternal Grandfather     Stroke Paternal Grandfather     Cirrhosis Mother     No Known Problems Sister     Breast cancer Paternal Aunt 62    Colon cancer Paternal Uncle 28    No Known Problems Sister        Meds/Allergies       Current Outpatient Medications:     Blood Glucose Monitoring Suppl (FREESTYLE LITE) [facility]    Cholecalciferol (VITAMIN D) 2000 units tablet    cyanocobalamin (VITAMIN B-12) 1,000 mcg tablet    dicyclomine (BENTYL) 10 mg capsule    FREESTYLE LITE test strip    FREESTYLE LITE test strip    glimepiride (AMARYL) 4 mg tablet    insulin glargine (BASAGLAR KWIKPEN) 100 units/mL injection pen    insulin lispro (ADMELOG SOLOSTAR) 100 units/mL injection pen    Insulin Pen Needle (BD PEN NEEDLE HECTOR U/F) 32G X 4 MM MISC    Insulin Syringe-Needle U-100 (B-D INS SYR ULTRAFINE  3CC/30G) 30G X 1/2" 0 3 ML MISC    Lancets (FREESTYLE) lancets    levothyroxine 100 mcg tablet    lisinopril-hydrochlorothiazide (PRINZIDE,ZESTORETIC) 20-12 5 MG per tablet    metoclopramide (REGLAN) 10 mg tablet    omeprazole (PriLOSEC) 40 MG capsule    ondansetron (ZOFRAN) 4 mg tablet   polymyxin b-trimethoprim (POLYTRIM) ophthalmic solution    sertraline (ZOLOFT) 100 mg tablet    Allergies   Allergen Reactions    Metformin Diarrhea     Objective     not currently breastfeeding  There is no height or weight on file to calculate BMI  PHYSICAL EXAM:    General Appearance:   Middle aged mildly obese female - alert, cooperative, no distress   HEENT:   Normocephalic, atraumatic, anicteric  Neck:  Supple, symmetrical, trachea midline   Lungs:   Clear to auscultation bilaterally; no rales, rhonchi or wheezing; respirations unlabored    Heart[de-identified]   Regular rate and rhythm; no murmur, rub, or gallop  Abdomen:   Soft, non-tender, non-distended; normal bowel sounds; no masses, no organomegaly    Genitalia:   Deferred    Rectal:   Deferred    Extremities:  No cyanosis, clubbing or edema    Pulses:  2+ and symmetric    Skin:  No jaundice, rashes, or lesions    Lymph nodes:  No palpable cervical lymphadenopathy      Lab Results:   No visits with results within 1 Day(s) from this visit  Latest known visit with results is:   Appointment on 04/09/2019   Component Date Value    Salmonella sp PCR 04/09/2019 None Detected     Shigella sp/Enteroinvasi* 04/09/2019 None Detected     Campylobacter sp (jejuni* 04/09/2019 None Detected     Shiga toxin 1/Shiga toxi* 04/09/2019 None Detected     Fecal Leukocytes 04/09/2019 Rare WBC's (<1/hpf)      Radiology Results:     CT A/P  FINDINGS:     ABDOMEN     LOWER CHEST:  No significant abnormalities identified in the lower chest      LIVER/BILIARY TREE:  Liver is diffusely decreased in density consistent with fatty change  No CT evidence of suspicious hepatic mass  The liver is mildly enlarged measuring 22 7 cm in craniocaudad dimension    No biliary dilatation      GALLBLADDER:  Gallbladder is surgically absent      SPLEEN:  The spleen is enlarged measuring 15 8 cm in length      PANCREAS:  Unremarkable      ADRENAL GLANDS:  Unremarkable      KIDNEYS/URETERS: Unremarkable  No hydronephrosis      STOMACH AND BOWEL:  Unremarkable      APPENDIX:  No findings to suggest appendicitis      ABDOMINOPELVIC CAVITY:  No ascites or free intraperitoneal air  No lymphadenopathy      VESSELS:  Unremarkable for patient's age      PELVIS     REPRODUCTIVE ORGANS:  Unremarkable for patient's age      URINARY BLADDER:  Unremarkable      ABDOMINAL WALL/INGUINAL REGIONS:  Unremarkable      OSSEOUS STRUCTURES:  No acute fracture or destructive osseous lesion      IMPRESSION:     No acute intra-abdominal abnormality  No free air or free fluid      Mild hepatosplenomegaly      ---------------------------------------------------  Note Electronically Signed By:    MD [facility] PGY-4  PI #: [id number]

## 2019-07-15 ENCOUNTER — APPOINTMENT (OUTPATIENT)
Dept: LAB | Age: 64
End: 2019-07-15
Payer: MEDICARE

## 2019-07-15 DIAGNOSIS — K31.84 DIABETIC GASTROPARESIS (HCC): ICD-10-CM

## 2019-07-15 DIAGNOSIS — E11.43 DIABETIC GASTROPARESIS (HCC): ICD-10-CM

## 2019-07-15 DIAGNOSIS — E11.649 UNCONTROLLED TYPE 2 DIABETES MELLITUS WITH HYPOGLYCEMIA, UNSPECIFIED HYPOGLYCEMIA COMA STATUS (HCC): ICD-10-CM

## 2019-07-15 LAB
EST. AVERAGE GLUCOSE BLD GHB EST-MCNC: 186 MG/DL
GLUCOSE P FAST SERPL-MCNC: 177 MG/DL (ref 65–99)
HBA1C MFR BLD: 8.1 % (ref 4.2–6.3)
HCV AB SER QL: NORMAL
IGA SERPL-MCNC: 484 MG/DL (ref 70–400)

## 2019-07-15 PROCEDURE — 36415 COLL VENOUS BLD VENIPUNCTURE: CPT | Performed by: INTERNAL MEDICINE

## 2019-07-15 PROCEDURE — 87338 HPYLORI STOOL AG IA: CPT

## 2019-07-15 PROCEDURE — 82947 ASSAY GLUCOSE BLOOD QUANT: CPT

## 2019-07-15 PROCEDURE — 83516 IMMUNOASSAY NONANTIBODY: CPT

## 2019-07-15 PROCEDURE — 82784 ASSAY IGA/IGD/IGG/IGM EACH: CPT

## 2019-07-15 PROCEDURE — 86803 HEPATITIS C AB TEST: CPT | Performed by: INTERNAL MEDICINE

## 2019-07-15 PROCEDURE — 83036 HEMOGLOBIN GLYCOSYLATED A1C: CPT

## 2019-07-16 ENCOUNTER — OFFICE VISIT (OUTPATIENT)
Dept: INTERNAL MEDICINE CLINIC | Age: 64
End: 2019-07-16
Payer: MEDICARE

## 2019-07-16 VITALS
HEIGHT: 62 IN | DIASTOLIC BLOOD PRESSURE: 68 MMHG | SYSTOLIC BLOOD PRESSURE: 116 MMHG | BODY MASS INDEX: 38.02 KG/M2 | WEIGHT: 206.6 LBS | HEART RATE: 73 BPM | OXYGEN SATURATION: 95 % | TEMPERATURE: 98.3 F

## 2019-07-16 DIAGNOSIS — E11.649 UNCONTROLLED TYPE 2 DIABETES MELLITUS WITH HYPOGLYCEMIA WITHOUT COMA (HCC): ICD-10-CM

## 2019-07-16 DIAGNOSIS — I10 ESSENTIAL HYPERTENSION: ICD-10-CM

## 2019-07-16 DIAGNOSIS — E11.43 DIABETIC GASTROPARESIS (HCC): ICD-10-CM

## 2019-07-16 DIAGNOSIS — K31.84 DIABETIC GASTROPARESIS (HCC): ICD-10-CM

## 2019-07-16 DIAGNOSIS — E03.9 HYPOTHYROIDISM, UNSPECIFIED TYPE: Primary | ICD-10-CM

## 2019-07-16 PROBLEM — G56.01 RIGHT CARPAL TUNNEL SYNDROME: Status: RESOLVED | Noted: 2017-01-31 | Resolved: 2019-07-16

## 2019-07-16 LAB
H PYLORI AG STL QL IA: NEGATIVE
TTG IGA SER-ACNC: <2 U/ML (ref 0–3)

## 2019-07-16 PROCEDURE — 99214 OFFICE O/P EST MOD 30 MIN: CPT | Performed by: INTERNAL MEDICINE

## 2019-07-16 NOTE — PROGRESS NOTES
Diabetic Foot Exam    Patient's shoes and socks removed  Right Foot/Ankle   Right Foot Inspection  Skin Exam: dry skin                          Toe Exam: ROM and strength within normal limits  Sensory   Vibration: intact  Proprioception: intact   Monofilament testing: intact  Vascular  Capillary refills: < 3 seconds  The right DP pulse is 1+  The right PT pulse is 1+  Left Foot/Ankle  Left Foot Inspection  Skin Exam: dry skin                         Toe Exam: ROM and strength within normal limits                   Sensory   Vibration: intact  Proprioception: intact  Monofilament: intact  Vascular  Capillary refills: < 3 seconds  The left DP pulse is 1+  The left PT pulse is 1+  Assign Risk Category:  No deformity present; No loss of protective sensation;  No weak pulses       Risk: 0

## 2019-07-16 NOTE — ASSESSMENT & PLAN NOTE
Lab Results   Component Value Date    HGBA1C 8 1 (H) 07/15/2019     Uncontrolled type 2 diabetes mellitus patient is on insulin, patient hemoglobin A1c is much better than the last time came down from 9 2 to 8 1 although patient's diabetic control is up and down she will be seeing the Endocrine doctor for further evaluation and treatment until then she will continue with the same doses of her insulin what she was taking  No results for input(s): POCGLU in the last 72 hours      Blood Sugar Average: Last 72 hrs:

## 2019-07-16 NOTE — PROGRESS NOTES
Assessment/Plan:    [facility]  Lab Results   Component Value Date    HGBA1C 8 1 (H) 07/15/2019    Patient has a history of diabetic gastroparesis she responded well to the Reglan 10 mg 3 times a day before meals all the patient was not taking it regularly, recent the gastric emptying  scan shows gastroparesis and she was seen by the Gastroenterology and I reviewed the note from the Gastroenterology  They recommended her to continue with the Reglan and also see the Endocrine doctor for better control of the diabetes    No results for input(s): POCGLU in the last 72 hours  Blood Sugar Average: Last 72 hrs:      Diabetes type 2, uncontrolled ([facility] )  Lab Results   Component Value Date    HGBA1C 8 1 (H) 07/15/2019     Uncontrolled type 2 diabetes mellitus patient is on insulin, patient hemoglobin A1c is much better than the last time came down from 9 2 to 8 1 although patient's diabetic control is up and down she will be seeing the Endocrine doctor for further evaluation and treatment until then she will continue with the same doses of her insulin what she was taking  No results for input(s): POCGLU in the last 72 hours  Blood Sugar Average: Last 72 hrs:      Hypothyroidism  Last TSH in February was normal continue with same dose of levothyroxine as before I will get an follow-up TSH before her next visit       Diagnoses and all orders for this visit:    Hypothyroidism, unspecified type    Diabetic gastroparesis ([facility] )    Essential hypertension    Uncontrolled type 2 diabetes mellitus with hypoglycemia without coma ([facility] )  -     TSH, 3rd generation; Future  -     Basic metabolic panel; Future  -     Hemoglobin A1C; Future        Subjective:      Patient ID: Chidi Hansen is a 71 y o  female    The following portions of the patient's history were reviewed and updated as appropriate: allergies, current medications, past family history, past medical history, past social history, past surgical history and problem list   HPI        Review of Systems   Constitutional: Negative for activity change, appetite change, chills, diaphoresis, fatigue and fever  HENT: Negative for congestion, postnasal drip, rhinorrhea, sinus pressure, sinus pain, sneezing and sore throat  Eyes: Negative for visual disturbance  Respiratory: Negative for apnea, cough, choking, chest tightness, shortness of breath and wheezing  Cardiovascular: Negative for chest pain, palpitations and leg swelling  Gastrointestinal: Negative for abdominal distention, abdominal pain, anal bleeding, blood in stool, constipation, diarrhea, nausea and vomiting  Endocrine: Negative for cold intolerance and heat intolerance  Genitourinary: Negative for difficulty urinating, dysuria and hematuria  Musculoskeletal: Negative  Skin: Negative  Neurological: Negative for dizziness, weakness, light-headedness, numbness and headaches  Hematological: Negative for adenopathy  Psychiatric/Behavioral: Negative for agitation, sleep disturbance and suicidal ideas  All other systems reviewed and are negative          Past Medical History:   Diagnosis Date    Acid reflux     Anxiety     Diabetes mellitus ([facility] [address] )     Hypertension     Hypothyroid          Current Outpatient Medications:     Blood Glucose Monitoring Suppl (FREESTYLE LITE) [facility], by Does not apply route 4 (four) times a day, Disp: 1 each, Rfl: 0    Cholecalciferol (VITAMIN D) 2000 units tablet, TAKE ONE TABLET BY MOUTH EVERY DAY, Disp: 30 tablet, Rfl: 0    cyanocobalamin (VITAMIN B-12) 1,000 mcg tablet, Take 1,000 mcg by mouth daily, Disp: , Rfl:     dicyclomine (BENTYL) 10 mg capsule, Take 1 capsule (10 mg total) by mouth 4 (four) times a day (before meals and at bedtime), Disp: 16 capsule, Rfl: 0    FREESTYLE LITE test strip, TEST BLOOD GLUCOSE FOUR TIMES DAILY, Disp: 100 each, Rfl: 1    FREESTYLE LITE test strip, TEST BLOOD GLUCOSE FOUR TIMES DAILY, Disp: 90 each, Rfl: 3    glimepiride (AMARYL) 4 mg tablet, Take 1 tablet (4 mg total) by mouth 2 (two) times a day, Disp: 180 tablet, Rfl: 1    insulin glargine (BASAGLAR KWIKPEN) 100 units/mL injection pen, Inject 20 Units under the skin 2 (two) times a day, Disp: 45 mL, Rfl: 1    insulin lispro (ADMELOG SOLOSTAR) 100 units/mL injection pen, Inject 12 Units under the skin 4 (four) times a day, Disp: 15 pen, Rfl: 1    Insulin Pen Needle (BD PEN NEEDLE HECTOR U/F) 32G X 4 MM MISC, by Other route 4 (four) times a day, Disp: 400 each, Rfl: 1    Insulin Syringe-Needle U-100 (B-D INS SYR ULTRAFINE  3CC/30G) 30G X 1/2" 0 3 ML MISC, Inject under the skin 4 (four) times a day, Disp: 100 each, Rfl: 4    Lancets (FREESTYLE) lancets, TEST BLOOD GLUCOSE 3 TIMES A DAY, Disp: 90 each, Rfl: 0    levothyroxine 100 mcg tablet, TAKE ONE TABLET BY MOUTH EVERY DAY, Disp: 30 tablet, Rfl: 5    lisinopril-hydrochlorothiazide (PRINZIDE,ZESTORETIC) 20-12 5 MG per tablet, TAKE 1/2 TABLET BY MOUTH EVERY DAY, Disp: 15 tablet, Rfl: 5    metoclopramide (REGLAN) 10 mg tablet, Take 1 tablet (10 mg total) by mouth 3 (three) times a day before meals, Disp: 90 tablet, Rfl: 1    omeprazole (PriLOSEC) 40 MG capsule, TAKE ONE CAPSULE BY MOUTH EVERY DAY, Disp: 30 capsule, Rfl: 5    ondansetron (ZOFRAN) 4 mg tablet, Take 1 tablet (4 mg total) by mouth every 8 (eight) hours as needed for nausea or vomiting, Disp: 20 tablet, Rfl: 0    sertraline (ZOLOFT) 100 mg tablet, TAKE ONE TABLET BY MOUTH EVERY DAY, Disp: 30 tablet, Rfl: 5    polymyxin b-trimethoprim (POLYTRIM) ophthalmic solution, Administer 1 drop to both eyes every 4 (four) hours (Patient not taking: Reported on 7/16/2019), Disp: 10 mL, Rfl: 0    Allergies   Allergen Reactions    Metformin Diarrhea       Social History   Past Surgical History:   Procedure Laterality Date    CHOLECYSTECTOMY      COLONOSCOPY      DILATION AND CURETTAGE OF UTERUS      ESOPHAGOGASTRODUODENOSCOPY      HYSTERECTOMY      45  OOPHORECTOMY      45    PA COLONOSCOPY FLX DX W/COLLJ SPEC WHEN PFRMD N/A 1/28/2016    Procedure: EGD AND COLONOSCOPY;  Surgeon: Parth Contreras MD;  [facility]; Service: Gastroenterology    PA INCISE FINGER TENDON SHEATH Right 1/31/2017    Procedure: LONG FINGER TRIGGER RELEASE ;  Surgeon: Arik Decker MD;  [facility];  Service: Orthopedics    PA INCISE FINGER TENDON SHEATH Right 7/21/2016    Procedure: RELEASE TRIGGER FINGER - LONG FINGER;  Surgeon: Arik Decker MD;  [facility];  Service: Orthopedics    PA REVISE MEDIAN N/CARPAL TUNNEL SURG Right 1/31/2017    Procedure: WRIST CARPAL TUNNEL RELEASE ; TENOLYSIS OF FPL, FDS 2-5, FDP 2-5;  APPLICATION OF Maci Darner;  Surgeon: Arik Decker MD;  [facility];  Service: Orthopedics    UPPER GASTROINTESTINAL ENDOSCOPY       Family History   Problem Relation Age of Onset    Cancer Father     Heart attack Father     Diabetes type II Father     Arthritis Maternal Grandmother     No Known Problems Paternal Grandmother     Heart disease Paternal Grandfather     Stroke Paternal Grandfather     Cirrhosis Mother     No Known Problems Sister     Breast cancer Paternal Aunt 62    Colon cancer Paternal Uncle 28    No Known Problems Sister        Objective:  /68 (BP Location: Left arm, Patient Position: Sitting, Cuff Size: Standard)   Pulse 73   Temp 98 3 °F (36 8 °C) (Tympanic)   Ht 5' 2 21" (1 58 m) Comment: shoes off  Wt 93 7 kg (206 lb 9 6 oz) Comment: shoes off  SpO2 95%   BMI 37 54 kg/m²        Physical Exam   Constitutional: She is oriented to person, place, and time  She appears well-developed and well-nourished  HENT:   Right Ear: External ear normal    Mouth/Throat: Oropharynx is clear and moist    Eyes: Pupils are equal, round, and reactive to light  Conjunctivae and EOM are normal    Neck: Normal range of motion  No JVD present  No thyromegaly present     Cardiovascular: Normal rate, regular rhythm, normal heart sounds and intact distal pulses  Pulmonary/Chest: Breath sounds normal    Abdominal: Soft  Bowel sounds are normal    Musculoskeletal: Normal range of motion  Lymphadenopathy:     She has no cervical adenopathy  Neurological: She is alert and oriented to person, place, and time  She has normal reflexes  Skin: Skin is dry  Psychiatric: She has a normal mood and affect   Her behavior is normal  Judgment and thought content normal          Recent Results (from the past 672 hour(s))   Hepatitis C antibody    Collection Time: 07/15/19  7:18 AM   Result Value Ref Range    Hepatitis C Ab Non-reactive Non-reactive   Glucose, fasting    Collection Time: 07/15/19  7:18 AM   Result Value Ref Range    Glucose, Fasting 177 (H) 65 - 99 mg/dL   Hemoglobin A1C    Collection Time: 07/15/19  7:18 AM   Result Value Ref Range    Hemoglobin A1C 8 1 (H) 4 2 - 6 3 %     mg/dl   Tissue transglutaminase, IgA    Collection Time: 07/15/19  7:18 AM   Result Value Ref Range    TISSUE TRANSGLUTAMINASE IGA <2 0 - 3 U/mL   IgA    Collection Time: 07/15/19  7:18 AM   Result Value Ref Range     0 (H) 70 0 - 400 0 mg/dL

## 2019-07-16 NOTE — ASSESSMENT & PLAN NOTE
Lab Results   Component Value Date    HGBA1C 8 1 (H) 07/15/2019    Patient has a history of diabetic gastroparesis she responded well to the Reglan 10 mg 3 times a day before meals all the patient was not taking it regularly, recent the gastric emptying  scan shows gastroparesis and she was seen by the Gastroenterology and I reviewed the note from the Gastroenterology  They recommended her to continue with the Reglan and also see the Endocrine doctor for better control of the diabetes    No results for input(s): POCGLU in the last 72 hours      Blood Sugar Average: Last 72 hrs:

## 2019-07-16 NOTE — ASSESSMENT & PLAN NOTE
Last TSH in February was normal continue with same dose of levothyroxine as before I will get an follow-up TSH before her next visit

## 2019-08-30 DIAGNOSIS — F41.9 ANXIETY: ICD-10-CM

## 2019-08-30 DIAGNOSIS — K21.9 GASTROESOPHAGEAL REFLUX DISEASE, ESOPHAGITIS PRESENCE NOT SPECIFIED: ICD-10-CM

## 2019-08-30 RX ORDER — OMEPRAZOLE 40 MG/1
40 CAPSULE, DELAYED RELEASE ORAL DAILY
Qty: 30 CAPSULE | Refills: 5 | Status: SHIPPED | OUTPATIENT
Start: 2019-08-30 | End: 2020-03-12 | Stop reason: SDUPTHER

## 2019-08-30 RX ORDER — SERTRALINE HYDROCHLORIDE 100 MG/1
100 TABLET, FILM COATED ORAL DAILY
Qty: 30 TABLET | Refills: 5 | Status: SHIPPED | OUTPATIENT
Start: 2019-08-30 | End: 2020-02-12 | Stop reason: SDUPTHER

## 2019-09-03 RX ORDER — SERTRALINE HYDROCHLORIDE 100 MG/1
TABLET, FILM COATED ORAL
Qty: 30 TABLET | Refills: 1 | OUTPATIENT
Start: 2019-09-03

## 2019-09-18 ENCOUNTER — TELEPHONE (OUTPATIENT)
Dept: INTERNAL MEDICINE CLINIC | Facility: CLINIC | Age: 64
End: 2019-09-18

## 2019-09-18 NOTE — TELEPHONE ENCOUNTER
Received a call from the patients pharmacy stating that his insurance does not cover his prescription for [address]  They are asking for a different script to be faxed over possibly for Lantus as an alternative  If you have any further questions you can reach [place] at [phone]

## 2019-09-19 DIAGNOSIS — E11.649 UNCONTROLLED TYPE 2 DIABETES MELLITUS WITH HYPOGLYCEMIA WITHOUT COMA (HCC): Primary | ICD-10-CM

## 2019-09-19 NOTE — TELEPHONE ENCOUNTER
Dr Oanh Romeo,     Please see message below  Can you put in and rx for Lantus instead?        She was prescribed Basaglar as follows:

## 2019-10-03 NOTE — TELEPHONE ENCOUNTER
Sent to [facility] provider pool to sign off on
URGENT!!!! Patient ran out of needles that go on the pens for the insulin------  Can you order a supply of them to go to [facility] for her      She says she runs out every month
not examined

## 2019-11-07 DIAGNOSIS — E11.649 UNCONTROLLED TYPE 2 DIABETES MELLITUS WITH HYPOGLYCEMIA WITHOUT COMA (HCC): Primary | ICD-10-CM

## 2019-11-07 RX ORDER — BLOOD-GLUCOSE METER
EACH MISCELLANEOUS
Qty: 1 KIT | Refills: 0 | Status: SHIPPED | OUTPATIENT
Start: 2019-11-07

## 2019-11-07 NOTE — TELEPHONE ENCOUNTER
Pt called reports that Freestyle is no longer being covered by her insurance and needs a new meter and test streps sent to pharmacy

## 2019-11-08 DIAGNOSIS — E11.9 TYPE 2 DIABETES MELLITUS WITHOUT COMPLICATION, WITHOUT LONG-TERM CURRENT USE OF INSULIN (HCC): ICD-10-CM

## 2019-11-08 RX ORDER — LANCETS 28 GAUGE
EACH MISCELLANEOUS 3 TIMES DAILY
Qty: 90 EACH | Refills: 5 | Status: SHIPPED | OUTPATIENT
Start: 2019-11-08 | End: 2021-07-28 | Stop reason: SDUPTHER

## 2019-11-08 NOTE — TELEPHONE ENCOUNTER
Received notification accu-check is not covered by insurance     Left message for pt to call back with what was covered and would send in new order

## 2019-11-12 ENCOUNTER — OFFICE VISIT (OUTPATIENT)
Dept: INTERNAL MEDICINE CLINIC | Age: 64
End: 2019-11-12
Payer: COMMERCIAL

## 2019-11-12 VITALS
HEIGHT: 63 IN | DIASTOLIC BLOOD PRESSURE: 60 MMHG | TEMPERATURE: 98.7 F | OXYGEN SATURATION: 95 % | SYSTOLIC BLOOD PRESSURE: 102 MMHG | HEART RATE: 74 BPM | WEIGHT: 209.2 LBS | BODY MASS INDEX: 37.07 KG/M2

## 2019-11-12 DIAGNOSIS — E11.649 UNCONTROLLED TYPE 2 DIABETES MELLITUS WITH HYPOGLYCEMIA WITHOUT COMA (HCC): ICD-10-CM

## 2019-11-12 DIAGNOSIS — K31.84 DIABETIC GASTROPARESIS (HCC): Primary | ICD-10-CM

## 2019-11-12 DIAGNOSIS — M25.511 ACUTE PAIN OF RIGHT SHOULDER: ICD-10-CM

## 2019-11-12 DIAGNOSIS — Z23 NEED FOR INFLUENZA VACCINATION: ICD-10-CM

## 2019-11-12 DIAGNOSIS — R51.9 LEFT FACIAL PAIN: ICD-10-CM

## 2019-11-12 DIAGNOSIS — E11.43 DIABETIC GASTROPARESIS (HCC): Primary | ICD-10-CM

## 2019-11-12 PROCEDURE — 90662 IIV NO PRSV INCREASED AG IM: CPT

## 2019-11-12 PROCEDURE — 99214 OFFICE O/P EST MOD 30 MIN: CPT | Performed by: INTERNAL MEDICINE

## 2019-11-12 PROCEDURE — G0008 ADMIN INFLUENZA VIRUS VAC: HCPCS

## 2019-11-12 RX ORDER — MELOXICAM 15 MG/1
15 TABLET ORAL DAILY
Qty: 30 TABLET | Refills: 2 | Status: SHIPPED | OUTPATIENT
Start: 2019-11-12 | End: 2021-02-04 | Stop reason: HOSPADM

## 2019-11-12 NOTE — PROGRESS NOTES
Assessment/Plan:  BMI Counseling: Body mass index is 36 84 kg/m²  The BMI is above normal  Nutrition recommendations include reducing portion sizes, decreasing overall calorie intake, 3-5 servings of fruits/vegetables daily, reducing fast food intake, consuming healthier snacks, decreasing soda and/or juice intake and moderation in carbohydrate intake  Exercise recommendations include exercising 3-5 times per week  No problem-specific Assessment & Plan notes found for this encounter  Diagnoses and all orders for this visit:    Diabetic gastroparesis ([address] )    Acute pain of right shoulder  -     meloxicam (MOBIC) 15 mg tablet; Take 1 tablet (15 mg total) by mouth daily    Uncontrolled type 2 diabetes mellitus with hypoglycemia without coma (HCC)  -     Hemoglobin A1C; Future  -     Basic metabolic panel; Future  -     Hemoglobin A1C; Future  -     Microalbumin / creatinine urine ratio  -     Lipid panel; Future  -     TSH, 3rd generation with Free T4 reflex; Future    Left facial pain  -     Sedimentation rate, automated; Future  -     C-reactive protein; Future        Subjective:   Chief Complaint   Patient presents with    Facial Pain     left side of face feels tender and swollen, has a lump on left side of face in front of ear     Cold Like Symptoms     reports had "bad chest cold" still had a little bit of a cough     Shoulder Pain     right side should pain     HM     BMI       Patient ID: Danni Paz is a 71 y o  female  HPI  This is a very pleasant 71 years young lady who is here today for the regular follow-up for her diabetes but she did not get any of her blood workup I told her that we can't see her in next couple of week for her diabetic follow-up she is complaining of left facial pain since he had the upper respiratory tract symptoms the pain is mostly anterior part of her jaw and anterior to her ear this looks like TMJ I examined her and her pain is on the TMJ joint    She was not using her meloxicam I recommended her to take the meloxicam and then we will follow also I will order a sed rate and C-reactive protein I doubt it is a temporal arteritis because there is no tenderness in the temporal artery area and the symptoms does not coincide with that I will see her in couple of week with blood workup and see how she does with the use of meloxicam see she certainly does not have any signs or symptoms of acute sinusitis and her ENT examination is essentially unremarkable  The following portions of the patient's history were reviewed and updated as appropriate: allergies, current medications, past family history, past medical history, past social history, past surgical history and problem list     Review of Systems   Constitutional: Negative for chills and fatigue  HENT: Negative for congestion, ear pain, hearing loss, postnasal drip, sinus pressure, sore throat and voice change  Left-sided facial pain in the temporomandibular joint area   Eyes: Negative for pain, discharge and visual disturbance  Respiratory: Positive for cough  Negative for chest tightness and shortness of breath  Cardiovascular: Negative for chest pain, palpitations and leg swelling  Gastrointestinal: Negative for abdominal pain, blood in stool, diarrhea, nausea and rectal pain  Genitourinary: Negative for difficulty urinating, dysuria and urgency  Musculoskeletal: Negative for arthralgias and joint swelling  Skin: Negative for rash  Allergic/Immunologic: Negative for environmental allergies and food allergies  Neurological: Negative for dizziness, tremors, weakness, numbness and headaches  Hematological: Negative for adenopathy  Psychiatric/Behavioral: Negative for behavioral problems and hallucinations           Past Medical History:   Diagnosis Date    Acid reflux     Anxiety     Diabetes mellitus ([facility] [address] )     Hypertension     Hypothyroid          Current Outpatient Medications:     Blood Glucose Monitoring Suppl (ACCU-CHEK MAT PLUS) w/Device KIT, Test 4 times daily, Disp: 1 kit, Rfl: 0    Cholecalciferol (VITAMIN D) 2000 units tablet, TAKE ONE TABLET BY MOUTH EVERY DAY, Disp: 30 tablet, Rfl: 0    cyanocobalamin (VITAMIN B-12) 1,000 mcg tablet, Take 1,000 mcg by mouth daily, Disp: , Rfl:     glimepiride (AMARYL) 4 mg tablet, Take 1 tablet (4 mg total) by mouth 2 (two) times a day, Disp: 180 tablet, Rfl: 1    glucose blood (ACCU-CHEK MAT PLUS) test strip, Test 4 times daily, Disp: 400 each, Rfl: 1    insulin glargine (LANTUS SOLOSTAR) 100 units/mL injection pen, Inject 20 Units under the skin every 12 (twelve) hours, Disp: 5 pen, Rfl: 3    insulin lispro (ADMELOG SOLOSTAR) 100 units/mL injection pen, Inject 12 Units under the skin 4 (four) times a day, Disp: 15 pen, Rfl: 1    Insulin Pen Needle (BD PEN NEEDLE HECTOR U/F) 32G X 4 MM MISC, by Other route 4 (four) times a day, Disp: 400 each, Rfl: 1    Insulin Syringe-Needle U-100 (B-D INS SYR ULTRAFINE  3CC/30G) 30G X 1/2" 0 3 ML MISC, Inject under the skin 4 (four) times a day, Disp: 100 each, Rfl: 4    Lancets (FREESTYLE) lancets, by Other route 3 (three) times a day Test blood glucose, Disp: 90 each, Rfl: 5    levothyroxine 100 mcg tablet, TAKE ONE TABLET BY MOUTH EVERY DAY, Disp: 30 tablet, Rfl: 5    lisinopril-hydrochlorothiazide (PRINZIDE,ZESTORETIC) 20-12 5 MG per tablet, TAKE 1/2 TABLET BY MOUTH EVERY DAY, Disp: 15 tablet, Rfl: 5    metoclopramide (REGLAN) 10 mg tablet, Take 1 tablet (10 mg total) by mouth 3 (three) times a day before meals, Disp: 90 tablet, Rfl: 1    omeprazole (PriLOSEC) 40 MG capsule, Take 1 capsule (40 mg total) by mouth daily, Disp: 30 capsule, Rfl: 5    ondansetron (ZOFRAN) 4 mg tablet, Take 1 tablet (4 mg total) by mouth every 8 (eight) hours as needed for nausea or vomiting, Disp: 20 tablet, Rfl: 0    sertraline (ZOLOFT) 100 mg tablet, Take 1 tablet (100 mg total) by mouth daily, Disp: 30 tablet, Rfl: 5    dicyclomine (BENTYL) 10 mg capsule, Take 1 capsule (10 mg total) by mouth 4 (four) times a day (before meals and at bedtime) (Patient not taking: Reported on 11/12/2019), Disp: 16 capsule, Rfl: 0    Allergies   Allergen Reactions    Metformin Diarrhea       Social History   Past Surgical History:   Procedure Laterality Date    CHOLECYSTECTOMY      COLONOSCOPY      DILATION AND CURETTAGE OF UTERUS      ESOPHAGOGASTRODUODENOSCOPY      HYSTERECTOMY      45    OOPHORECTOMY      45    ND COLONOSCOPY FLX DX W/COLLJ SPEC WHEN PFRMD N/A 1/28/2016    Procedure: EGD AND COLONOSCOPY;  Surgeon: Isabela Minor MD;  [facility];   Service: Gastroenterology    ND INCISE FINGER TENDON SHEATH Right 1/31/2017    Procedure: LONG FINGER TRIGGER RELEASE ;  Surgeon: Natalee Mathew MD;  [facility];  Service: Orthopedics    ND INCISE FINGER TENDON SHEATH Right 7/21/2016    Procedure: RELEASE TRIGGER FINGER - LONG FINGER;  Surgeon: Natalee Mathew MD;  [facility];  Service: Orthopedics    ND REVISE MEDIAN N/CARPAL TUNNEL SURG Right 1/31/2017    Procedure: WRIST CARPAL TUNNEL RELEASE ; TENOLYSIS OF FPL, FDS 2-5, FDP 2-5;  APPLICATION OF Magdalene Axel;  Surgeon: Natalee Mathew MD;  [facility];  Service: Orthopedics    UPPER GASTROINTESTINAL ENDOSCOPY       Family History   Problem Relation Age of Onset    Cancer Father     Heart attack Father     Diabetes type II Father     Arthritis Maternal Grandmother     No Known Problems Paternal Grandmother     Heart disease Paternal Grandfather     Stroke Paternal Grandfather     Cirrhosis Mother     No Known Problems Sister     Breast cancer Paternal Aunt 62    Colon cancer Paternal Uncle 28    No Known Problems Sister        Objective:  /60 (BP Location: Left arm, Patient Position: Sitting, Cuff Size: Large)   Pulse 74   Temp 98 7 °F (37 1 °C) (Tympanic)   Ht 5' 3 19" (1 605 m) Comment: shoes on  Wt 94 9 kg (209 lb 3 2 oz) Comment: shoes on  SpO2 95%   BMI 36 84 kg/m²        Physical Exam   Constitutional: She is oriented to person, place, and time  She appears well-developed and well-nourished  HENT:   Right Ear: External ear normal    Mouth/Throat: Oropharynx is clear and moist    Eyes: Pupils are equal, round, and reactive to light  Conjunctivae and EOM are normal    Neck: Normal range of motion  No JVD present  No thyromegaly present  Cardiovascular: Normal rate, regular rhythm, normal heart sounds and intact distal pulses  Pulmonary/Chest: Breath sounds normal    Abdominal: Soft  Bowel sounds are normal    Musculoskeletal: Normal range of motion  Tenderness of the temporal mandibular area both on opening the mouth and closing the mouth  Maybe little bit of swelling there are no lymph nodes ENT examination is essentially unremarkable   Lymphadenopathy:     She has no cervical adenopathy  Neurological: She is alert and oriented to person, place, and time  She has normal reflexes  Skin: Skin is dry  Psychiatric: She has a normal mood and affect  Her behavior is normal  Judgment and thought content normal          No results found for this or any previous visit (from the past 672 hour(s))

## 2019-11-13 ENCOUNTER — TELEPHONE (OUTPATIENT)
Dept: INTERNAL MEDICINE CLINIC | Age: 64
End: 2019-11-13

## 2019-11-13 ENCOUNTER — OFFICE VISIT (OUTPATIENT)
Dept: INTERNAL MEDICINE CLINIC | Age: 64
End: 2019-11-13
Payer: COMMERCIAL

## 2019-11-13 VITALS
SYSTOLIC BLOOD PRESSURE: 140 MMHG | DIASTOLIC BLOOD PRESSURE: 68 MMHG | TEMPERATURE: 98.9 F | HEART RATE: 79 BPM | WEIGHT: 209 LBS | BODY MASS INDEX: 37.03 KG/M2 | HEIGHT: 63 IN | OXYGEN SATURATION: 97 %

## 2019-11-13 DIAGNOSIS — B02.30 HERPES ZOSTER WITH OPHTHALMIC COMPLICATION, UNSPECIFIED HERPES ZOSTER EYE DISEASE: Primary | ICD-10-CM

## 2019-11-13 PROCEDURE — 99213 OFFICE O/P EST LOW 20 MIN: CPT | Performed by: INTERNAL MEDICINE

## 2019-11-13 RX ORDER — FAMCICLOVIR 500 MG/1
500 TABLET, FILM COATED ORAL 3 TIMES DAILY
Qty: 21 TABLET | Refills: 0 | Status: SHIPPED | OUTPATIENT
Start: 2019-11-13 | End: 2020-07-29 | Stop reason: ALTCHOICE

## 2019-11-13 NOTE — PROGRESS NOTES
Assessment/Plan:  No problem-specific Assessment & Plan notes found for this encounter  Diagnoses and all orders for this visit:    Herpes zoster with ophthalmic complication, unspecified herpes zoster eye disease  -     famciclovir (FAMVIR) 500 mg tablet; Take 1 tablet (500 mg total) by mouth 3 (three) times a day for 7 days  -     Ambulatory referral to Ophthalmology; Future        Subjective:   Chief Complaint   Patient presents with    Rash     on left side of face         Patient ID: Matilda Hernandez is a 71 y o  female  HPI  Patient is here for the rash on her left side of the face she was seen yesterday for the regular office visit and was complaining of pain on the left side of the face at that time I did not notice any rash or any other problem  Today the patient call that she had a rash specially in the upper part of the scalp in the frontal area  She does not have any visual problems she does not have any pain in the eye or any problems with watering of the eye  She does not have any ear pain I looked into her ear there is no rash on the year I was concerned about Moncada Velarde syndrome  Patient is diabetic and history of hypertension  The following portions of the patient's history were reviewed and updated as appropriate: allergies, current medications, past family history, past medical history, past social history, past surgical history and problem list     Review of Systems   Constitutional: Negative for chills and fatigue  HENT: Negative for congestion, ear pain, hearing loss, postnasal drip, sinus pressure, sore throat and voice change  Pain on the left side of the face   Eyes: Negative for pain, discharge and visual disturbance  Respiratory: Negative for cough, chest tightness and shortness of breath  Cardiovascular: Negative for chest pain, palpitations and leg swelling     Gastrointestinal: Negative for abdominal pain, blood in stool, diarrhea, nausea and rectal pain    Genitourinary: Negative for difficulty urinating, dysuria and urgency  Musculoskeletal: Negative for arthralgias and joint swelling  Skin: Positive for rash  Allergic/Immunologic: Negative for environmental allergies and food allergies  Neurological: Negative for dizziness, tremors, weakness, numbness and headaches  Hematological: Negative for adenopathy  Psychiatric/Behavioral: Negative for behavioral problems and hallucinations           Past Medical History:   Diagnosis Date    Acid reflux     Anxiety     Diabetes mellitus ([facility] [address] )     Hypertension     Hypothyroid          Current Outpatient Medications:     Blood Glucose Monitoring Suppl (ACCU-CHEK MAT PLUS) w/Device KIT, Test 4 times daily, Disp: 1 kit, Rfl: 0    Cholecalciferol (VITAMIN D) 2000 units tablet, TAKE ONE TABLET BY MOUTH EVERY DAY, Disp: 30 tablet, Rfl: 0    cyanocobalamin (VITAMIN B-12) 1,000 mcg tablet, Take 1,000 mcg by mouth daily, Disp: , Rfl:     glimepiride (AMARYL) 4 mg tablet, Take 1 tablet (4 mg total) by mouth 2 (two) times a day, Disp: 180 tablet, Rfl: 1    glucose blood (ACCU-CHEK MAT PLUS) test strip, Test 4 times daily, Disp: 400 each, Rfl: 1    insulin glargine (LANTUS SOLOSTAR) 100 units/mL injection pen, Inject 20 Units under the skin every 12 (twelve) hours, Disp: 5 pen, Rfl: 3    insulin lispro (ADMELOG SOLOSTAR) 100 units/mL injection pen, Inject 12 Units under the skin 4 (four) times a day, Disp: 15 pen, Rfl: 1    Insulin Pen Needle (BD PEN NEEDLE HECTOR U/F) 32G X 4 MM MISC, by Other route 4 (four) times a day, Disp: 400 each, Rfl: 1    Insulin Syringe-Needle U-100 (B-D INS SYR ULTRAFINE  3CC/30G) 30G X 1/2" 0 3 ML MISC, Inject under the skin 4 (four) times a day, Disp: 100 each, Rfl: 4    Lancets (FREESTYLE) lancets, by Other route 3 (three) times a day Test blood glucose, Disp: 90 each, Rfl: 5    levothyroxine 100 mcg tablet, TAKE ONE TABLET BY MOUTH EVERY DAY, Disp: 30 tablet, Rfl: 5   lisinopril-hydrochlorothiazide (PRINZIDE,ZESTORETIC) 20-12 5 MG per tablet, TAKE 1/2 TABLET BY MOUTH EVERY DAY, Disp: 15 tablet, Rfl: 5    meloxicam (MOBIC) 15 mg tablet, Take 1 tablet (15 mg total) by mouth daily, Disp: 30 tablet, Rfl: 2    metoclopramide (REGLAN) 10 mg tablet, Take 1 tablet (10 mg total) by mouth 3 (three) times a day before meals, Disp: 90 tablet, Rfl: 1    omeprazole (PriLOSEC) 40 MG capsule, Take 1 capsule (40 mg total) by mouth daily, Disp: 30 capsule, Rfl: 5    ondansetron (ZOFRAN) 4 mg tablet, Take 1 tablet (4 mg total) by mouth every 8 (eight) hours as needed for nausea or vomiting, Disp: 20 tablet, Rfl: 0    sertraline (ZOLOFT) 100 mg tablet, Take 1 tablet (100 mg total) by mouth daily, Disp: 30 tablet, Rfl: 5    dicyclomine (BENTYL) 10 mg capsule, Take 1 capsule (10 mg total) by mouth 4 (four) times a day (before meals and at bedtime) (Patient not taking: Reported on 11/12/2019), Disp: 16 capsule, Rfl: 0    famciclovir (FAMVIR) 500 mg tablet, Take 1 tablet (500 mg total) by mouth 3 (three) times a day for 7 days, Disp: 21 tablet, Rfl: 0    Allergies   Allergen Reactions    Metformin Diarrhea       Social History   Past Surgical History:   Procedure Laterality Date    CHOLECYSTECTOMY      COLONOSCOPY      DILATION AND CURETTAGE OF UTERUS      ESOPHAGOGASTRODUODENOSCOPY      HYSTERECTOMY      45    OOPHORECTOMY      45    CO COLONOSCOPY FLX DX W/COLLJ SPEC WHEN PFRMD N/A 1/28/2016    Procedure: EGD AND COLONOSCOPY;  Surgeon: Caesar Ward MD;  [facility];   Service: Gastroenterology    CO INCISE FINGER TENDON SHEATH Right 1/31/2017    Procedure: LONG FINGER TRIGGER RELEASE ;  Surgeon: Humberto Oliveira MD;  [facility];  Service: Orthopedics    CO INCISE FINGER TENDON SHEATH Right 7/21/2016    Procedure: RELEASE TRIGGER FINGER - LONG FINGER;  Surgeon: Humberto Oliveira MD;  [facility];  Service: Orthopedics    CO REVISE MEDIAN N/CARPAL TUNNEL SURG Right 1/31/2017    Procedure: WRIST CARPAL TUNNEL RELEASE ; TENOLYSIS OF FPL, FDS 2-5, FDP 2-5;  APPLICATION OF Magdalene Axel;  Surgeon: Natalee Mathew MD;  [facility];  Service: Orthopedics    UPPER GASTROINTESTINAL ENDOSCOPY       Family History   Problem Relation Age of Onset    Cancer Father     Heart attack Father     Diabetes type II Father     Arthritis Maternal Grandmother     No Known Problems Paternal Grandmother     Heart disease Paternal Grandfather     Stroke Paternal Grandfather     Cirrhosis Mother     No Known Problems Sister     Breast cancer Paternal Aunt 62    Colon cancer Paternal Uncle 28    No Known Problems Sister        Objective:  /68 (BP Location: Left arm, Patient Position: Sitting, Cuff Size: Large)   Pulse 79   Temp 98 9 °F (37 2 °C) (Tympanic)   Ht 5' 3 19" (1 605 m)   Wt 94 8 kg (209 lb)   SpO2 97%   BMI 36 80 kg/m²        Physical Exam   Constitutional: She is oriented to person, place, and time  She appears well-developed and well-nourished  HENT:   Right Ear: External ear normal    Mouth/Throat: Oropharynx is clear and moist    Eyes: Pupils are equal, round, and reactive to light  Conjunctivae and EOM are normal    Neck: Normal range of motion  No JVD present  No thyromegaly present  Cardiovascular: Normal rate, regular rhythm, normal heart sounds and intact distal pulses  Pulmonary/Chest: Breath sounds normal    Abdominal: Soft  Bowel sounds are normal    Musculoskeletal: Normal range of motion  Lymphadenopathy:     She has no cervical adenopathy  Neurological: She is alert and oriented to person, place, and time  She has normal reflexes  Skin: Skin is dry  On the left side of the face but mostly on forehead area and also in the scalp there is a vesicular lesions in angry gets on the face slight bit of redness no lesions on the eye area no lesion on the her ear area   Psychiatric: She has a normal mood and affect   Her behavior is normal  Judgment and thought content normal          No results found for this or any previous visit (from the past 672 hour(s))

## 2019-11-13 NOTE — TELEPHONE ENCOUNTER
Patient is calling to state that she may have shingles now  She woke up to a rash and pimples on the left side of the face  It is very sensitive and burning as well  She states that it is going up near her eye  Can we prescribe her something or do you need to have her come back to see you for this      Phone number mobile    [facility]

## 2019-11-13 NOTE — TELEPHONE ENCOUNTER
Spoke with Dr Florin New and he stated that she needs to come in today for him to see this  Called patient to let her know to come in today and she is coming in to see him at 2 p m

## 2019-11-14 LAB
LEFT EYE DIABETIC RETINOPATHY: NORMAL
LEFT EYE DIABETIC RETINOPATHY: NORMAL
RIGHT EYE DIABETIC RETINOPATHY: NORMAL
RIGHT EYE DIABETIC RETINOPATHY: NORMAL
SEVERITY (EYE EXAM): NORMAL

## 2019-11-18 ENCOUNTER — APPOINTMENT (OUTPATIENT)
Dept: LAB | Age: 64
End: 2019-11-18
Payer: COMMERCIAL

## 2019-11-18 DIAGNOSIS — E11.649 UNCONTROLLED TYPE 2 DIABETES MELLITUS WITH HYPOGLYCEMIA WITHOUT COMA (HCC): ICD-10-CM

## 2019-11-18 DIAGNOSIS — R51.9 LEFT FACIAL PAIN: ICD-10-CM

## 2019-11-18 LAB
AMORPH URATE CRY URNS QL MICRO: NORMAL /HPF
ANION GAP SERPL CALCULATED.3IONS-SCNC: 7 MMOL/L (ref 4–13)
BACTERIA UR QL AUTO: NORMAL /HPF
BILIRUB UR QL STRIP: NEGATIVE
BUN SERPL-MCNC: 23 MG/DL (ref 5–25)
CALCIUM SERPL-MCNC: 9.5 MG/DL (ref 8.3–10.1)
CHLORIDE SERPL-SCNC: 105 MMOL/L (ref 100–108)
CHOLEST SERPL-MCNC: 199 MG/DL (ref 50–200)
CLARITY UR: ABNORMAL
CO2 SERPL-SCNC: 26 MMOL/L (ref 21–32)
COLOR UR: YELLOW
CREAT SERPL-MCNC: 0.9 MG/DL (ref 0.6–1.3)
CREAT UR-MCNC: 196 MG/DL
CRP SERPL QL: 10.2 MG/L
ERYTHROCYTE [SEDIMENTATION RATE] IN BLOOD: 34 MM/HOUR (ref 0–20)
EST. AVERAGE GLUCOSE BLD GHB EST-MCNC: 209 MG/DL
GFR SERPL CREATININE-BSD FRML MDRD: 67 ML/MIN/1.73SQ M
GLUCOSE P FAST SERPL-MCNC: 271 MG/DL (ref 65–99)
GLUCOSE UR STRIP-MCNC: ABNORMAL MG/DL
HBA1C MFR BLD: 8.9 % (ref 4.2–6.3)
HDLC SERPL-MCNC: 42 MG/DL
HGB UR QL STRIP.AUTO: NEGATIVE
KETONES UR STRIP-MCNC: NEGATIVE MG/DL
LDLC SERPL CALC-MCNC: 129 MG/DL (ref 0–100)
LEUKOCYTE ESTERASE UR QL STRIP: ABNORMAL
MICROALBUMIN UR-MCNC: 20.6 MG/L (ref 0–20)
MICROALBUMIN/CREAT 24H UR: 11 MG/G CREATININE (ref 0–30)
NITRITE UR QL STRIP: NEGATIVE
NON-SQ EPI CELLS URNS QL MICRO: NORMAL /HPF
NONHDLC SERPL-MCNC: 157 MG/DL
PH UR STRIP.AUTO: 6 [PH]
POTASSIUM SERPL-SCNC: 4.4 MMOL/L (ref 3.5–5.3)
PROT UR STRIP-MCNC: NEGATIVE MG/DL
RBC #/AREA URNS AUTO: NORMAL /HPF
SODIUM SERPL-SCNC: 138 MMOL/L (ref 136–145)
SP GR UR STRIP.AUTO: 1.04 (ref 1–1.03)
T4 FREE SERPL-MCNC: 0.93 NG/DL (ref 0.76–1.46)
TRIGL SERPL-MCNC: 142 MG/DL
TSH SERPL DL<=0.05 MIU/L-ACNC: 4.33 UIU/ML (ref 0.36–3.74)
UROBILINOGEN UR QL STRIP.AUTO: 0.2 E.U./DL
WBC #/AREA URNS AUTO: NORMAL /HPF

## 2019-11-18 PROCEDURE — 82570 ASSAY OF URINE CREATININE: CPT | Performed by: NURSE PRACTITIONER

## 2019-11-18 PROCEDURE — 3061F NEG MICROALBUMINURIA REV: CPT | Performed by: INTERNAL MEDICINE

## 2019-11-18 PROCEDURE — 85652 RBC SED RATE AUTOMATED: CPT

## 2019-11-18 PROCEDURE — 83036 HEMOGLOBIN GLYCOSYLATED A1C: CPT

## 2019-11-18 PROCEDURE — 80048 BASIC METABOLIC PNL TOTAL CA: CPT

## 2019-11-18 PROCEDURE — 84443 ASSAY THYROID STIM HORMONE: CPT

## 2019-11-18 PROCEDURE — 86140 C-REACTIVE PROTEIN: CPT

## 2019-11-18 PROCEDURE — 81001 URINALYSIS AUTO W/SCOPE: CPT | Performed by: NURSE PRACTITIONER

## 2019-11-18 PROCEDURE — 84439 ASSAY OF FREE THYROXINE: CPT

## 2019-11-18 PROCEDURE — 82043 UR ALBUMIN QUANTITATIVE: CPT | Performed by: NURSE PRACTITIONER

## 2019-11-18 PROCEDURE — 80061 LIPID PANEL: CPT

## 2019-11-18 PROCEDURE — 36415 COLL VENOUS BLD VENIPUNCTURE: CPT

## 2019-11-20 ENCOUNTER — OFFICE VISIT (OUTPATIENT)
Dept: INTERNAL MEDICINE CLINIC | Age: 64
End: 2019-11-20
Payer: COMMERCIAL

## 2019-11-20 VITALS
SYSTOLIC BLOOD PRESSURE: 130 MMHG | HEIGHT: 63 IN | BODY MASS INDEX: 37.28 KG/M2 | WEIGHT: 210.4 LBS | OXYGEN SATURATION: 94 % | TEMPERATURE: 97.9 F | DIASTOLIC BLOOD PRESSURE: 78 MMHG | HEART RATE: 78 BPM

## 2019-11-20 DIAGNOSIS — E11.649 UNCONTROLLED TYPE 2 DIABETES MELLITUS WITH HYPOGLYCEMIA WITHOUT COMA (HCC): ICD-10-CM

## 2019-11-20 DIAGNOSIS — B02.9 HERPES ZOSTER WITHOUT COMPLICATION: ICD-10-CM

## 2019-11-20 DIAGNOSIS — E03.9 HYPOTHYROIDISM, UNSPECIFIED TYPE: Primary | ICD-10-CM

## 2019-11-20 PROCEDURE — 99214 OFFICE O/P EST MOD 30 MIN: CPT | Performed by: INTERNAL MEDICINE

## 2019-11-20 PROCEDURE — 1036F TOBACCO NON-USER: CPT | Performed by: INTERNAL MEDICINE

## 2019-11-20 NOTE — PROGRESS NOTES
Assessment/Plan:  No problem-specific Assessment & Plan notes found for this encounter  Diagnoses and all orders for this visit:    Hypothyroidism, unspecified type    Uncontrolled type 2 diabetes mellitus with hypoglycemia without coma ([facility] [address] )  -     insulin glargine (LANTUS SOLOSTAR) 100 units/mL injection pen; Inject 25 Units under the skin every 12 (twelve) hours  -     Hemoglobin A1C; Future  -     Basic metabolic panel; Future  -     Lipid panel; Future    Herpes zoster without complication        Subjective:   Chief Complaint   Patient presents with    Follow-up     pt  presents for follow up herpes zoster  review labs         Patient ID: Frankey Beckmann is a 71 y o  female  HPI  This is a 71 years young lady who is here today after the diagnosis of left forehead herpes zoster infection she was sent to the Ophthalmology to rule out the herpes zoster ophthalmicus  Which was ruled out patient was given Famvir 3 times a day patient tolerated the medication well her symptoms are better the pain is still there but the rash is slowly him resolving  Other problem is uncontrolled diabetes mellitus with a hemoglobin A1c over 8 higher than the last time    I recommended to increase the Lantus insulin to and will check the hemoglobin A1c again in 3 months  Patient's lipid panel was discussed with her her LDL cholesterol is 126 the goal should be less than 80 in diabetics we will recommend her to be on statin she is not very much interested in using the medication for cholesterol problem right now will follow her up in 3 months and check the lipid panel again with the diet if it is not better I will highly recommend her to decrease her cardiovascular risk as of right now her risk is 18 3% for 10 years cardiovascular event   The following portions of the patient's history were reviewed and updated as appropriate: allergies, current medications, past family history, past medical history, past social history, past surgical history and problem list     Review of Systems   Constitutional: Negative for chills and fatigue  HENT: Negative for congestion, ear pain, hearing loss, postnasal drip, sinus pressure, sore throat and voice change  Eyes: Negative for pain, discharge and visual disturbance  Respiratory: Negative for cough, chest tightness and shortness of breath  Cardiovascular: Negative for chest pain, palpitations and leg swelling  Gastrointestinal: Negative for abdominal pain, blood in stool, diarrhea, nausea and rectal pain  Genitourinary: Negative for difficulty urinating, dysuria and urgency  Musculoskeletal: Negative for arthralgias and joint swelling  Skin: Negative for rash  Allergic/Immunologic: Negative for environmental allergies and food allergies  Neurological: Negative for dizziness, tremors, weakness, numbness and headaches  Hematological: Negative for adenopathy  Psychiatric/Behavioral: Negative for behavioral problems and hallucinations           Past Medical History:   Diagnosis Date    Acid reflux     Anxiety     Diabetes mellitus ([facility] [address] )     Hypertension     Hypothyroid          Current Outpatient Medications:     Blood Glucose Monitoring Suppl (ACCU-CHEK MAT PLUS) w/Device KIT, Test 4 times daily, Disp: 1 kit, Rfl: 0    Cholecalciferol (VITAMIN D) 2000 units tablet, TAKE ONE TABLET BY MOUTH EVERY DAY, Disp: 30 tablet, Rfl: 0    cyanocobalamin (VITAMIN B-12) 1,000 mcg tablet, Take 1,000 mcg by mouth daily, Disp: , Rfl:     famciclovir (FAMVIR) 500 mg tablet, Take 1 tablet (500 mg total) by mouth 3 (three) times a day for 7 days, Disp: 21 tablet, Rfl: 0    glimepiride (AMARYL) 4 mg tablet, Take 1 tablet (4 mg total) by mouth 2 (two) times a day, Disp: 180 tablet, Rfl: 1    glucose blood (ACCU-CHEK MAT PLUS) test strip, Test 4 times daily, Disp: 400 each, Rfl: 1    insulin glargine (LANTUS SOLOSTAR) 100 units/mL injection pen, Inject 25 Units under the skin every 12 (twelve) hours, Disp: 5 pen, Rfl: 3    insulin lispro (ADMELOG SOLOSTAR) 100 units/mL injection pen, Inject 12 Units under the skin 4 (four) times a day, Disp: 15 pen, Rfl: 1    Insulin Pen Needle (BD PEN NEEDLE HECTOR U/F) 32G X 4 MM MISC, by Other route 4 (four) times a day, Disp: 400 each, Rfl: 1    Insulin Syringe-Needle U-100 (B-D INS SYR ULTRAFINE  3CC/30G) 30G X 1/2" 0 3 ML MISC, Inject under the skin 4 (four) times a day, Disp: 100 each, Rfl: 4    Lancets (FREESTYLE) lancets, by Other route 3 (three) times a day Test blood glucose, Disp: 90 each, Rfl: 5    levothyroxine 100 mcg tablet, TAKE ONE TABLET BY MOUTH EVERY DAY, Disp: 30 tablet, Rfl: 5    lisinopril-hydrochlorothiazide (PRINZIDE,ZESTORETIC) 20-12 5 MG per tablet, TAKE 1/2 TABLET BY MOUTH EVERY DAY, Disp: 15 tablet, Rfl: 5    meloxicam (MOBIC) 15 mg tablet, Take 1 tablet (15 mg total) by mouth daily, Disp: 30 tablet, Rfl: 2    metoclopramide (REGLAN) 10 mg tablet, Take 1 tablet (10 mg total) by mouth 3 (three) times a day before meals, Disp: 90 tablet, Rfl: 1    omeprazole (PriLOSEC) 40 MG capsule, Take 1 capsule (40 mg total) by mouth daily, Disp: 30 capsule, Rfl: 5    sertraline (ZOLOFT) 100 mg tablet, Take 1 tablet (100 mg total) by mouth daily, Disp: 30 tablet, Rfl: 5    dicyclomine (BENTYL) 10 mg capsule, Take 1 capsule (10 mg total) by mouth 4 (four) times a day (before meals and at bedtime) (Patient not taking: Reported on 11/12/2019), Disp: 16 capsule, Rfl: 0    ondansetron (ZOFRAN) 4 mg tablet, Take 1 tablet (4 mg total) by mouth every 8 (eight) hours as needed for nausea or vomiting (Patient not taking: Reported on 11/20/2019), Disp: 20 tablet, Rfl: 0    Allergies   Allergen Reactions    Metformin Diarrhea       Social History   Past Surgical History:   Procedure Laterality Date    CHOLECYSTECTOMY      COLONOSCOPY      DILATION AND CURETTAGE OF UTERUS      ESOPHAGOGASTRODUODENOSCOPY      HYSTERECTOMY      45  OOPHORECTOMY      45    CO COLONOSCOPY FLX DX W/COLLJ SPEC WHEN PFRMD N/A 1/28/2016    Procedure: EGD AND COLONOSCOPY;  Surgeon: Julianne Arango MD;  [facility]; Service: Gastroenterology    CO INCISE FINGER TENDON SHEATH Right 1/31/2017    Procedure: LONG FINGER TRIGGER RELEASE ;  Surgeon: Sunil Valentin MD;  [facility];  Service: Orthopedics    CO INCISE FINGER TENDON SHEATH Right 7/21/2016    Procedure: RELEASE TRIGGER FINGER - LONG FINGER;  Surgeon: Sunil Valentin MD;  [facility];  Service: Orthopedics    CO REVISE MEDIAN N/CARPAL TUNNEL SURG Right 1/31/2017    Procedure: WRIST CARPAL TUNNEL RELEASE ; TENOLYSIS OF FPL, FDS 2-5, FDP 2-5;  APPLICATION OF [place] ;  Surgeon: Sunil Valentin MD;  [facility];  Service: Orthopedics    UPPER GASTROINTESTINAL ENDOSCOPY       Family History   Problem Relation Age of Onset    Cancer Father     Heart attack Father     Diabetes type II Father     Arthritis Maternal Grandmother     No Known Problems Paternal Grandmother     Heart disease Paternal Grandfather     Stroke Paternal Grandfather     Cirrhosis Mother     No Known Problems Sister     Breast cancer Paternal Aunt 62    Colon cancer Paternal Uncle 28    No Known Problems Sister        Objective:  /78 (BP Location: Left arm, Patient Position: Sitting, Cuff Size: Standard)   Pulse 78   Temp 97 9 °F (36 6 °C) (Tympanic)   Ht 5' 3 19" (1 605 m)   Wt 95 4 kg (210 lb 6 4 oz)   SpO2 94%   BMI 37 05 kg/m²        Physical Exam   Constitutional: She is oriented to person, place, and time  She appears well-developed and well-nourished  HENT:   Right Ear: External ear normal    Mouth/Throat: Oropharynx is clear and moist    Eyes: Pupils are equal, round, and reactive to light  Conjunctivae and EOM are normal    Neck: Normal range of motion  No JVD present  No thyromegaly present     Cardiovascular: Normal rate, regular rhythm, normal heart sounds and intact distal pulses  Pulmonary/Chest: Breath sounds normal    Abdominal: Soft  Bowel sounds are normal    Musculoskeletal: Normal range of motion  Lymphadenopathy:     She has no cervical adenopathy  Neurological: She is alert and oriented to person, place, and time  She has normal reflexes  Skin:   Patient skin rash is better as compared to before   Psychiatric: She has a normal mood and affect  Her behavior is normal  Judgment and thought content normal          Recent Results (from the past 672 hour(s))    Diabetes Eye Exam    Collection Time: 11/14/19  2:45 PM   Result Value Ref Range    Severity      Right Eye Diabetic Retinopathy None     Left Eye Diabetic Retinopathy None    UA w Reflex to Microscopic w Reflex to Culture    Collection Time: 11/18/19  8:38 AM   Result Value Ref Range    Color, UA Yellow     Clarity, UA Turbid     Specific [place], UA 1 036 (H) 1 003 - 1 030    pH, UA 6 0 4 5, 5 0, 5 5, 6 0, 6 5, 7 0, 7 5, 8 0    Leukocytes, UA Trace (A) Negative    Nitrite, UA Negative Negative    Protein, UA Negative Negative mg/dl    Glucose, UA >=1000 (1%) (A) Negative mg/dl    Ketones, UA Negative Negative mg/dl    Urobilinogen, UA 0 2 0 2, 1 0 E U /dl E U /dl    Bilirubin, UA Negative Negative    Blood, UA Negative Negative   Microalbumin / creatinine urine ratio    Collection Time: 11/18/19  8:38 AM   Result Value Ref Range    Creatinine, Ur 196 0 mg/dL    Microalbum  ,U,Random 20 6 (H) 0 0 - 20 0 mg/L    Microalb Creat Ratio 11 0 - 30 mg/g creatinine   Basic metabolic panel    Collection Time: 11/18/19  8:38 AM   Result Value Ref Range    Sodium 138 136 - 145 mmol/L    Potassium 4 4 3 5 - 5 3 mmol/L    Chloride 105 100 - 108 mmol/L    CO2 26 21 - 32 mmol/L    ANION GAP 7 4 - 13 mmol/L    BUN 23 5 - 25 mg/dL    Creatinine 0 90 0 60 - 1 30 mg/dL    Glucose, Fasting 271 (H) 65 - 99 mg/dL    Calcium 9 5 8 3 - 10 1 mg/dL    eGFR 67 ml/min/1 73sq m   Hemoglobin A1C    Collection Time: 11/18/19 8:38 AM   Result Value Ref Range    Hemoglobin A1C 8 9 (H) 4 2 - 6 3 %     mg/dl   Sedimentation rate, automated    Collection Time: 11/18/19  8:38 AM   Result Value Ref Range    Sed Rate 34 (H) 0 - 20 mm/hour   C-reactive protein    Collection Time: 11/18/19  8:38 AM   Result Value Ref Range    CRP 10 2 (H) <3 0 mg/L   Lipid panel    Collection Time: 11/18/19  8:38 AM   Result Value Ref Range    Cholesterol 199 50 - 200 mg/dL    Triglycerides 142 <=150 mg/dL    HDL, Direct 42 >=40 mg/dL    LDL Calculated 129 (H) 0 - 100 mg/dL    Non-HDL-Chol (CHOL-HDL) 157 mg/dl   TSH, 3rd generation with Free T4 reflex    Collection Time: 11/18/19  8:38 AM   Result Value Ref Range    TSH 3RD GENERATON 4 330 (H) 0 358 - 3 740 uIU/mL   Urine Microscopic    Collection Time: 11/18/19  8:38 AM   Result Value Ref Range    RBC, UA None Seen None Seen, 0-5 /hpf    WBC, UA None Seen None Seen, 0-5, 5-55, 5-65 /hpf    Epithelial Cells Occasional None Seen, Occasional /hpf    Bacteria, UA None Seen None Seen, Occasional /hpf    AMORPH URATES Innumerable /hpf   T4, free    Collection Time: 11/18/19  8:38 AM   Result Value Ref Range    Free T4 0 93 0 76 - 1 46 ng/dL

## 2019-12-09 DIAGNOSIS — E11.43 DIABETIC GASTROPARESIS (HCC): ICD-10-CM

## 2019-12-09 DIAGNOSIS — K31.84 DIABETIC GASTROPARESIS (HCC): ICD-10-CM

## 2019-12-09 DIAGNOSIS — Z79.4 TYPE 2 DIABETES MELLITUS WITH COMPLICATION, WITH LONG-TERM CURRENT USE OF INSULIN (HCC): ICD-10-CM

## 2019-12-09 DIAGNOSIS — E11.8 TYPE 2 DIABETES MELLITUS WITH COMPLICATION, WITH LONG-TERM CURRENT USE OF INSULIN (HCC): ICD-10-CM

## 2019-12-09 RX ORDER — METOCLOPRAMIDE 10 MG/1
10 TABLET ORAL
Qty: 270 TABLET | Refills: 1 | Status: SHIPPED | OUTPATIENT
Start: 2019-12-09 | End: 2020-09-23 | Stop reason: ALTCHOICE

## 2019-12-09 RX ORDER — GLIMEPIRIDE 4 MG/1
4 TABLET ORAL 2 TIMES DAILY
Qty: 180 TABLET | Refills: 1 | Status: SHIPPED | OUTPATIENT
Start: 2019-12-09 | End: 2020-03-12 | Stop reason: SDUPTHER

## 2020-01-15 DIAGNOSIS — E11.00 UNCONTROLLED TYPE 2 DIABETES MELLITUS WITH HYPEROSMOLARITY WITHOUT COMA, WITHOUT LONG-TERM CURRENT USE OF INSULIN (HCC): ICD-10-CM

## 2020-01-16 DIAGNOSIS — E03.9 HYPOTHYROIDISM, UNSPECIFIED TYPE: ICD-10-CM

## 2020-01-16 RX ORDER — LEVOTHYROXINE SODIUM 0.1 MG/1
100 TABLET ORAL DAILY
Qty: 30 TABLET | Refills: 5 | Status: SHIPPED | OUTPATIENT
Start: 2020-01-16 | End: 2020-10-14 | Stop reason: SDUPTHER

## 2020-01-20 DIAGNOSIS — E11.649 UNCONTROLLED TYPE 2 DIABETES MELLITUS WITH HYPOGLYCEMIA WITHOUT COMA (HCC): Primary | ICD-10-CM

## 2020-01-20 NOTE — TELEPHONE ENCOUNTER
Spoke to pharmacist, he ran [employer] and it was covered under insurance  Can you please send to [facility]?     Thank you

## 2020-01-20 NOTE — TELEPHONE ENCOUNTER
Order sent, no change to dosing, she has been on this before if I remember correctly  Please let the patient know   Thanks

## 2020-01-20 NOTE — TELEPHONE ENCOUNTER
Pharmacy said they will not fill her Admelog Solostar (insulin lispro) syringe Rx and they are waiting to hear from us  She uses [facility] in [place]  Patient only has 1 syringe left  Patient's next appt is 3/17/2020 (had to be moved because Dr Sohail Rose is out of the office)

## 2020-01-20 NOTE — TELEPHONE ENCOUNTER
Tried calling member services to find out Alternatives that are covered under insurance but they are closed today  [phone]  Tried calling the pharmacy if they could give me alternatives that are covered but none were given with PA request   Please advise what to switch pt to and will call pharmacy to see if covered under insurance      Thank you

## 2020-01-21 NOTE — TELEPHONE ENCOUNTER
Called pt  This morning to let her know of change, but she stated the Units were changed wrong  She said it should be 12 Units 4 times daily to take place of the Cinsay pen  Please advise if Units should be changed to 12 units 4 times daily? Pt  Stated she does take Lantus solostar 25 units 2 times a day also      Thank you

## 2020-01-21 NOTE — TELEPHONE ENCOUNTER
Klaus Rowe at [employer] and verified Novolog at [employer], went through under insurance  Spoke to pt   Understands

## 2020-01-21 NOTE — TELEPHONE ENCOUNTER
My error, I thought her lantus wasn't being covered anymore  I must have misread it from before  Please advise the patient to continue her lantus as prescribed, disregard basaglar  Can we call today to see formulary alternatives? I am so sorry

## 2020-02-12 DIAGNOSIS — I10 ESSENTIAL HYPERTENSION, BENIGN: ICD-10-CM

## 2020-02-12 DIAGNOSIS — F41.9 ANXIETY: ICD-10-CM

## 2020-02-12 NOTE — TELEPHONE ENCOUNTER
Pt is requesting refill on sertraline, lisinopril-hydrochlorothiazide      Last OV:11/20/19  Future OV:03/17/2020

## 2020-02-13 RX ORDER — LISINOPRIL AND HYDROCHLOROTHIAZIDE 20; 12.5 MG/1; MG/1
0.5 TABLET ORAL DAILY
Qty: 15 TABLET | Refills: 5 | Status: SHIPPED | OUTPATIENT
Start: 2020-02-13 | End: 2020-07-29 | Stop reason: SDUPTHER

## 2020-02-13 RX ORDER — SERTRALINE HYDROCHLORIDE 100 MG/1
100 TABLET, FILM COATED ORAL DAILY
Qty: 30 TABLET | Refills: 5 | Status: SHIPPED | OUTPATIENT
Start: 2020-02-13 | End: 2020-07-29 | Stop reason: SDUPTHER

## 2020-03-12 DIAGNOSIS — E11.8 TYPE 2 DIABETES MELLITUS WITH COMPLICATION, WITH LONG-TERM CURRENT USE OF INSULIN (HCC): ICD-10-CM

## 2020-03-12 DIAGNOSIS — Z79.4 TYPE 2 DIABETES MELLITUS WITH COMPLICATION, WITH LONG-TERM CURRENT USE OF INSULIN (HCC): ICD-10-CM

## 2020-03-12 DIAGNOSIS — K21.9 GASTROESOPHAGEAL REFLUX DISEASE, ESOPHAGITIS PRESENCE NOT SPECIFIED: ICD-10-CM

## 2020-03-12 RX ORDER — OMEPRAZOLE 40 MG/1
40 CAPSULE, DELAYED RELEASE ORAL DAILY
Qty: 30 CAPSULE | Refills: 5 | Status: SHIPPED | OUTPATIENT
Start: 2020-03-12 | End: 2020-09-15 | Stop reason: SDUPTHER

## 2020-03-12 RX ORDER — GLIMEPIRIDE 4 MG/1
4 TABLET ORAL 2 TIMES DAILY
Qty: 180 TABLET | Refills: 1 | Status: SHIPPED | OUTPATIENT
Start: 2020-03-12 | End: 2021-03-08 | Stop reason: SDUPTHER

## 2020-03-21 DIAGNOSIS — E11.649 UNCONTROLLED TYPE 2 DIABETES MELLITUS WITH HYPOGLYCEMIA WITHOUT COMA (HCC): ICD-10-CM

## 2020-03-21 NOTE — TELEPHONE ENCOUNTER
PT said that she needs refills for NOVOLOG Flexpen 100 Units  She also needs a refill on Lantus Solostar (25 units every 12 hours) but this is not on her med list as reported

## 2020-03-23 ENCOUNTER — TRANSCRIBE ORDERS (OUTPATIENT)
Dept: ADMINISTRATIVE | Age: 65
End: 2020-03-23

## 2020-03-23 ENCOUNTER — APPOINTMENT (OUTPATIENT)
Dept: LAB | Age: 65
End: 2020-03-23
Payer: COMMERCIAL

## 2020-03-23 DIAGNOSIS — E11.649 UNCONTROLLED TYPE 2 DIABETES MELLITUS WITH HYPOGLYCEMIA WITHOUT COMA (HCC): ICD-10-CM

## 2020-03-23 LAB
ANION GAP SERPL CALCULATED.3IONS-SCNC: 3 MMOL/L (ref 4–13)
BUN SERPL-MCNC: 11 MG/DL (ref 5–25)
CALCIUM SERPL-MCNC: 9.2 MG/DL (ref 8.3–10.1)
CHLORIDE SERPL-SCNC: 106 MMOL/L (ref 100–108)
CHOLEST SERPL-MCNC: 189 MG/DL (ref 50–200)
CO2 SERPL-SCNC: 29 MMOL/L (ref 21–32)
CREAT SERPL-MCNC: 0.68 MG/DL (ref 0.6–1.3)
EST. AVERAGE GLUCOSE BLD GHB EST-MCNC: 197 MG/DL
GFR SERPL CREATININE-BSD FRML MDRD: 92 ML/MIN/1.73SQ M
GLUCOSE P FAST SERPL-MCNC: 232 MG/DL (ref 65–99)
HBA1C MFR BLD: 8.5 %
HDLC SERPL-MCNC: 45 MG/DL
LDLC SERPL CALC-MCNC: 121 MG/DL (ref 0–100)
NONHDLC SERPL-MCNC: 144 MG/DL
POTASSIUM SERPL-SCNC: 4 MMOL/L (ref 3.5–5.3)
SODIUM SERPL-SCNC: 138 MMOL/L (ref 136–145)
TRIGL SERPL-MCNC: 117 MG/DL

## 2020-03-23 PROCEDURE — 83036 HEMOGLOBIN GLYCOSYLATED A1C: CPT

## 2020-03-23 PROCEDURE — 36415 COLL VENOUS BLD VENIPUNCTURE: CPT

## 2020-03-23 PROCEDURE — 3052F HG A1C>EQUAL 8.0%<EQUAL 9.0%: CPT | Performed by: INTERNAL MEDICINE

## 2020-03-23 PROCEDURE — 80061 LIPID PANEL: CPT

## 2020-03-23 PROCEDURE — 80048 BASIC METABOLIC PNL TOTAL CA: CPT

## 2020-03-24 ENCOUNTER — TELEMEDICINE (OUTPATIENT)
Dept: INTERNAL MEDICINE CLINIC | Age: 65
End: 2020-03-24
Payer: COMMERCIAL

## 2020-03-24 DIAGNOSIS — E11.43 DIABETIC GASTROPARESIS (HCC): ICD-10-CM

## 2020-03-24 DIAGNOSIS — R16.1 SPLENOMEGALY: ICD-10-CM

## 2020-03-24 DIAGNOSIS — E11.649 UNCONTROLLED TYPE 2 DIABETES MELLITUS WITH HYPOGLYCEMIA WITHOUT COMA (HCC): Primary | ICD-10-CM

## 2020-03-24 DIAGNOSIS — K31.84 DIABETIC GASTROPARESIS (HCC): ICD-10-CM

## 2020-03-24 DIAGNOSIS — K76.0 FATTY LIVER: ICD-10-CM

## 2020-03-24 DIAGNOSIS — E03.9 HYPOTHYROIDISM, UNSPECIFIED TYPE: ICD-10-CM

## 2020-03-24 PROBLEM — L03.012 CELLULITIS OF FINGER OF LEFT HAND: Status: RESOLVED | Noted: 2018-12-17 | Resolved: 2020-03-24

## 2020-03-24 PROBLEM — M27.3 INFECTION OF TOOTH SOCKET: Status: RESOLVED | Noted: 2018-06-08 | Resolved: 2020-03-24

## 2020-03-24 PROBLEM — B37.89 CANDIDIASIS OF BREAST: Status: RESOLVED | Noted: 2018-09-14 | Resolved: 2020-03-24

## 2020-03-24 PROBLEM — B02.9 HERPES ZOSTER WITHOUT COMPLICATION: Status: RESOLVED | Noted: 2019-03-21 | Resolved: 2020-03-24

## 2020-03-24 PROBLEM — R10.13 EPIGASTRIC PAIN: Status: RESOLVED | Noted: 2017-08-22 | Resolved: 2020-03-24

## 2020-03-24 PROCEDURE — G2012 BRIEF CHECK IN BY MD/QHP: HCPCS | Performed by: INTERNAL MEDICINE

## 2020-03-24 NOTE — PROGRESS NOTES
Virtual Regular Visit    Problem List Items Addressed This Visit        Digestive    Diabetic gastroparesis ([facility] [address] )    Fatty liver       Endocrine    Diabetes type 2, uncontrolled ([facility] [address] ) - Primary    Hypothyroidism       Other    Splenomegaly      The the 2 diabetes mellitus is very poorly controlled, hemoglobin A1c is over 8 0 fasting blood sugar is 235 patient is doing some exercises in weight loss I discussed with her and I reviewed her medication I suggest that she should take the same amount of insulin before meals and the Lantus will be increased from 20 units to 35 units at night and 20 units in the morning, she will let me know a if there is any episodes of hypoglycemia she is not able to take metformin because she is allergic to that  She is denying any polyuria polydipsia or any kind of symptoms of hyperglycemia  Diabetic gastroparesis is doing much better she has no nausea vomiting or diarrhea right now  Patient check her blood pressure at home and her blood pressure is normal  Anxiety is well controlled  We will follow-up with fatty liver disease         Reason for visit is  Regular visit for follow-up of uncontrolled diabetes mellitus and hypercholesterolemia I suggest that the lipid panel will be repeated once the diabetes is better controlled  Encounter provider Adriel Vasquez MD    Provider located at [place] [address]      Recent Visits  No visits were found meeting these conditions  Showing recent visits within past 7 days and meeting all other requirements     Future Appointments  No visits were found meeting these conditions  Showing future appointments within next 150 days and meeting all other requirements        After connecting through [employer], the patient was identified by name and date of birth   Patricia Kathyfarideh was informed that this is a telemedicine visit and that the visit is being conducted through telephone which may not be secure and therefore, might not be HIPAA-compliant  My office door was closed  No one else was in the room  She acknowledged consent and understanding of privacy and security of the video platform  The patient has agreed to participate and understands they can discontinue the visit at any time  Subjective  Caryle Parma is a 71 y o  female   No new complaints doing well    Past Medical History:   Diagnosis Date    Acid reflux     Anxiety     Diabetes mellitus ([address] )     Hypertension     Hypothyroid        Past Surgical History:   Procedure Laterality Date    CHOLECYSTECTOMY      COLONOSCOPY      DILATION AND CURETTAGE OF UTERUS      ESOPHAGOGASTRODUODENOSCOPY      HYSTERECTOMY      45    OOPHORECTOMY      39    VA COLONOSCOPY FLX DX W/COLLJ SPEC WHEN PFRMD N/A 1/28/2016    Procedure: EGD AND COLONOSCOPY;  Surgeon: Mary Donohue MD;  [facility];   Service: Gastroenterology    VA INCISE FINGER TENDON SHEATH Right 1/31/2017    Procedure: LONG FINGER TRIGGER RELEASE ;  Surgeon: Vandy Runner, MD;  [facility];  Service: Orthopedics    VA INCISE FINGER TENDON SHEATH Right 7/21/2016    Procedure: RELEASE TRIGGER FINGER - LONG FINGER;  Surgeon: Vandy Runner, MD;  [facility];  Service: Orthopedics    VA REVISE MEDIAN N/CARPAL TUNNEL SURG Right 1/31/2017    Procedure: WRIST CARPAL TUNNEL RELEASE ; TENOLYSIS OF FPL, FDS 2-5, FDP 2-5;  APPLICATION OF [place];  Surgeon: Vandy Runner, MD;  [facility];  Service: Orthopedics    UPPER GASTROINTESTINAL ENDOSCOPY         Current Outpatient Medications   Medication Sig Dispense Refill    Blood Glucose Monitoring Suppl (ACCU-CHEK MAT PLUS) w/Device KIT Test 4 times daily 1 kit 0    Cholecalciferol (VITAMIN D) 2000 units tablet TAKE ONE TABLET BY MOUTH EVERY DAY 30 tablet 0    cyanocobalamin (VITAMIN B-12) 1,000 mcg tablet Take 1,000 mcg by mouth daily      famciclovir (FAMVIR) 500 mg tablet Take 1 tablet (500 mg total) by mouth 3 (three) times a day for 7 days 21 tablet 0    glimepiride (AMARYL) 4 mg tablet Take 1 tablet (4 mg total) by mouth 2 (two) times a day 180 tablet 1    glucose blood (ACCU-CHEK MAT PLUS) test strip Test 4 times daily 400 each 1    insulin aspart (NovoLOG) 100 Units/mL injection pen Inject 12 Units under the skin 4 (four) times a day 15 pen 0    insulin glargine (BASAGLAR KWIKPEN) 100 units/mL injection pen Inject 25 Units under the skin every 12 (twelve) hours 5 pen 2    insulin glargine (Lantus SoloStar) 100 units/mL injection pen Inject 25 Units under the skin every 12 (twelve) hours 5 pen 1    insulin lispro (ADMELOG SOLOSTAR) 100 units/mL injection pen Inject 12 Units under the skin 4 (four) times a day 15 pen 1    Insulin Pen Needle (BD PEN NEEDLE HECTOR U/F) 32G X 4 MM MISC by Other route 4 (four) times a day 400 each 1    Insulin Syringe-Needle U-100 (B-D INS SYR ULTRAFINE  3CC/30G) 30G X 1/2" 0 3 ML MISC Inject under the skin 4 (four) times a day 100 each 4    Lancets (FREESTYLE) lancets by Other route 3 (three) times a day Test blood glucose 90 each 5    levothyroxine 100 mcg tablet Take 1 tablet (100 mcg total) by mouth daily 30 tablet 5    lisinopril-hydrochlorothiazide (PRINZIDE,ZESTORETIC) 20-12 5 MG per tablet Take 0 5 tablets by mouth daily 15 tablet 5    meloxicam (MOBIC) 15 mg tablet Take 1 tablet (15 mg total) by mouth daily 30 tablet 2    metoclopramide (REGLAN) 10 mg tablet Take 1 tablet (10 mg total) by mouth 3 (three) times a day before meals 270 tablet 1    omeprazole (PriLOSEC) 40 MG capsule Take 1 capsule (40 mg total) by mouth daily 30 capsule 5    sertraline (ZOLOFT) 100 mg tablet Take 1 tablet (100 mg total) by mouth daily 30 tablet 5     No current facility-administered medications for this visit           Allergies   Allergen Reactions    Metformin Diarrhea       Review of Systems   Constitutional: Positive for fatigue  Negative for chills  HENT: Negative for congestion, ear pain, hearing loss, postnasal drip, sinus pressure, sore throat and voice change  Eyes: Negative for pain, discharge and visual disturbance  Respiratory: Negative for cough, chest tightness and shortness of breath  Cardiovascular: Negative for chest pain, palpitations and leg swelling  Gastrointestinal: Negative for abdominal pain, blood in stool, diarrhea, nausea and rectal pain  Genitourinary: Negative for difficulty urinating, dysuria and urgency  Musculoskeletal: Negative for arthralgias and joint swelling  Skin: Negative for rash  Allergic/Immunologic: Negative for environmental allergies and food allergies  Neurological: Negative for dizziness, tremors, weakness, numbness and headaches  Hematological: Negative for adenopathy  Psychiatric/Behavioral: Negative for behavioral problems and hallucinations  I spent 15 minutes with the patient during this visit

## 2020-03-28 ENCOUNTER — NURSE TRIAGE (OUTPATIENT)
Dept: OTHER | Facility: OTHER | Age: 65
End: 2020-03-28

## 2020-03-28 ENCOUNTER — TELEPHONE (OUTPATIENT)
Dept: OTHER | Facility: OTHER | Age: 65
End: 2020-03-28

## 2020-03-28 DIAGNOSIS — R30.0 DYSURIA: Primary | ICD-10-CM

## 2020-03-28 RX ORDER — PHENAZOPYRIDINE HYDROCHLORIDE 100 MG/1
100 TABLET, FILM COATED ORAL 3 TIMES DAILY PRN
Qty: 12 TABLET | Refills: 0 | Status: SHIPPED | OUTPATIENT
Start: 2020-03-28 | End: 2020-09-23 | Stop reason: ALTCHOICE

## 2020-03-28 NOTE — TELEPHONE ENCOUNTER
Dr Basilio Lyn returned my call and I explained this patient to him  He said that he will call in something for her burning and discomfort but that it is not the policy of this office to just call in an antibiotic  To increase fluids and follow up with the office on Monday  I called this patient back and made her aware  She will comply

## 2020-03-28 NOTE — TELEPHONE ENCOUNTER
Reason for Disposition   Side (flank) or lower back pain present    Answer Assessment - Initial Assessment Questions  1  SYMPTOM: "What's the main symptom you're concerned about?" (e g , frequency, incontinence)      Frequency and lower back pain  2  ONSET: "When did the urinary problem  start?"      Last Tuesday pressure with urination and noticed dark urine but it has gotten worse now  She had a virtual visit last Tuesday but forgot to mention it to the Dr   3  PAIN: "Is there any pain?" If so, ask: "How bad is it?" (Scale: 1-10; mild, moderate, severe)      #8 - lower back pain now  4  CAUSE: "What do you think is causing the symptoms?"      UTI  5  OTHER SYMPTOMS: "Do you have any other symptoms?" (e g , fever, flank pain, blood in urine, pain with urination)      No fever, no blood, pressure in her lower abdomen with urination, no flank pain, no urgency just frequency but going in full amounts  Blood sugar today - #167  6   PREGNANCY: "Is there any chance you are pregnant?" "When was your last menstrual period?"      NA    Protocols used: [facility]

## 2020-03-28 NOTE — TELEPHONE ENCOUNTER
Patient does not want to go anywhere to be seen due to COVID-19 requesting emd to be called in  I TT DR Lowe and then I called him on his cell phone and left a VM

## 2020-03-28 NOTE — TELEPHONE ENCOUNTER
Regarding: Possible bladder infection   ----- Message from Bethel Mcdonnell RN sent at 3/28/2020 12:51 PM EDT -----  "I believe I have a bladder infection "

## 2020-03-30 ENCOUNTER — TELEPHONE (OUTPATIENT)
Dept: INTERNAL MEDICINE CLINIC | Facility: CLINIC | Age: 65
End: 2020-03-30

## 2020-03-30 DIAGNOSIS — N30.00 ACUTE CYSTITIS WITHOUT HEMATURIA: Primary | ICD-10-CM

## 2020-03-30 RX ORDER — CIPROFLOXACIN 250 MG/1
250 TABLET, FILM COATED ORAL EVERY 12 HOURS SCHEDULED
Qty: 6 TABLET | Refills: 0 | Status: SHIPPED | OUTPATIENT
Start: 2020-03-30 | End: 2020-04-02

## 2020-03-30 NOTE — TELEPHONE ENCOUNTER
As per call from the weekend   "Dr Rommel eLger returned my call and I explained this patient to him  He said that he will call in something for her burning and discomfort but that it is not the policy of this office to just call in an antibiotic  To increase fluids and follow up with the office on Monday  I called this patient back and made her aware  She will comply "    Please call and schedule virtual visit to discuss

## 2020-03-30 NOTE — TELEPHONE ENCOUNTER
Patient is calling to see if we can get an antibiotic sent over to the pharmacy for her  She called over the weekend and spoke with the on call Dr  About the UTI that she is having  He called into the pharmacy only the [facility]      Please send over the [facility] pharmacy in [place] on [phone]      Please call her back at the mobile phone

## 2020-05-15 ENCOUNTER — TELEPHONE (OUTPATIENT)
Dept: INTERNAL MEDICINE CLINIC | Age: 65
End: 2020-05-15

## 2020-05-18 DIAGNOSIS — E11.649 UNCONTROLLED TYPE 2 DIABETES MELLITUS WITH HYPOGLYCEMIA WITHOUT COMA (HCC): ICD-10-CM

## 2020-05-19 DIAGNOSIS — E11.649 UNCONTROLLED TYPE 2 DIABETES MELLITUS WITH HYPOGLYCEMIA WITHOUT COMA (HCC): ICD-10-CM

## 2020-05-26 ENCOUNTER — HOSPITAL ENCOUNTER (OUTPATIENT)
Dept: RADIOLOGY | Age: 65
Discharge: HOME | End: 2020-05-26
Payer: COMMERCIAL

## 2020-05-26 VITALS — HEIGHT: 62 IN | BODY MASS INDEX: 38.46 KG/M2 | WEIGHT: 209 LBS

## 2020-05-26 DIAGNOSIS — Z12.31 ENCOUNTER FOR SCREENING MAMMOGRAM FOR MALIGNANT NEOPLASM OF BREAST: ICD-10-CM

## 2020-05-26 PROCEDURE — 77063 BREAST TOMOSYNTHESIS BI: CPT

## 2020-05-26 PROCEDURE — 77067 SCR MAMMO BI INCL CAD: CPT

## 2020-05-28 ENCOUNTER — TELEPHONE (OUTPATIENT)
Dept: INTERNAL MEDICINE CLINIC | Facility: CLINIC | Age: 65
End: 2020-05-28

## 2020-06-23 DIAGNOSIS — E11.00 UNCONTROLLED TYPE 2 DIABETES MELLITUS WITH HYPEROSMOLARITY WITHOUT COMA, WITHOUT LONG-TERM CURRENT USE OF INSULIN (HCC): ICD-10-CM

## 2020-06-27 DIAGNOSIS — E11.649 UNCONTROLLED TYPE 2 DIABETES MELLITUS WITH HYPOGLYCEMIA WITHOUT COMA (HCC): ICD-10-CM

## 2020-06-28 RX ORDER — INSULIN ASPART INJECTION 100 [IU]/ML
INJECTION, SOLUTION SUBCUTANEOUS
Qty: 15 ML | Refills: 0 | OUTPATIENT
Start: 2020-06-28

## 2020-07-08 DIAGNOSIS — E11.649 UNCONTROLLED TYPE 2 DIABETES MELLITUS WITH HYPOGLYCEMIA WITHOUT COMA (HCC): ICD-10-CM

## 2020-07-29 ENCOUNTER — OFFICE VISIT (OUTPATIENT)
Dept: INTERNAL MEDICINE CLINIC | Age: 65
End: 2020-07-29
Payer: COMMERCIAL

## 2020-07-29 VITALS
BODY MASS INDEX: 38.68 KG/M2 | HEIGHT: 62 IN | TEMPERATURE: 97.4 F | SYSTOLIC BLOOD PRESSURE: 138 MMHG | WEIGHT: 210.2 LBS | OXYGEN SATURATION: 96 % | DIASTOLIC BLOOD PRESSURE: 76 MMHG | HEART RATE: 74 BPM

## 2020-07-29 DIAGNOSIS — M79.672 PAIN IN BOTH FEET: Primary | ICD-10-CM

## 2020-07-29 DIAGNOSIS — F41.9 ANXIETY: ICD-10-CM

## 2020-07-29 DIAGNOSIS — I10 ESSENTIAL HYPERTENSION, BENIGN: ICD-10-CM

## 2020-07-29 DIAGNOSIS — M79.671 PAIN IN BOTH FEET: Primary | ICD-10-CM

## 2020-07-29 DIAGNOSIS — E11.649 UNCONTROLLED TYPE 2 DIABETES MELLITUS WITH HYPOGLYCEMIA WITHOUT COMA (HCC): ICD-10-CM

## 2020-07-29 PROCEDURE — 3075F SYST BP GE 130 - 139MM HG: CPT | Performed by: INTERNAL MEDICINE

## 2020-07-29 PROCEDURE — 3052F HG A1C>EQUAL 8.0%<EQUAL 9.0%: CPT | Performed by: INTERNAL MEDICINE

## 2020-07-29 PROCEDURE — 99214 OFFICE O/P EST MOD 30 MIN: CPT | Performed by: INTERNAL MEDICINE

## 2020-07-29 PROCEDURE — 1036F TOBACCO NON-USER: CPT | Performed by: INTERNAL MEDICINE

## 2020-07-29 PROCEDURE — 3288F FALL RISK ASSESSMENT DOCD: CPT | Performed by: INTERNAL MEDICINE

## 2020-07-29 PROCEDURE — 2022F DILAT RTA XM EVC RTNOPTHY: CPT | Performed by: INTERNAL MEDICINE

## 2020-07-29 PROCEDURE — 4040F PNEUMOC VAC/ADMIN/RCVD: CPT | Performed by: INTERNAL MEDICINE

## 2020-07-29 PROCEDURE — 1101F PT FALLS ASSESS-DOCD LE1/YR: CPT | Performed by: INTERNAL MEDICINE

## 2020-07-29 PROCEDURE — 1160F RVW MEDS BY RX/DR IN RCRD: CPT | Performed by: INTERNAL MEDICINE

## 2020-07-29 PROCEDURE — 3008F BODY MASS INDEX DOCD: CPT | Performed by: INTERNAL MEDICINE

## 2020-07-29 PROCEDURE — 4010F ACE/ARB THERAPY RXD/TAKEN: CPT | Performed by: INTERNAL MEDICINE

## 2020-07-29 PROCEDURE — 3078F DIAST BP <80 MM HG: CPT | Performed by: INTERNAL MEDICINE

## 2020-07-29 RX ORDER — SERTRALINE HYDROCHLORIDE 100 MG/1
100 TABLET, FILM COATED ORAL DAILY
Qty: 30 TABLET | Refills: 5 | Status: SHIPPED | OUTPATIENT
Start: 2020-07-29 | End: 2021-03-22 | Stop reason: SDUPTHER

## 2020-07-29 RX ORDER — PENICILLIN V POTASSIUM 500 MG/1
TABLET ORAL
COMMUNITY
Start: 2020-07-09 | End: 2020-07-29 | Stop reason: ALTCHOICE

## 2020-07-29 RX ORDER — LISINOPRIL AND HYDROCHLOROTHIAZIDE 20; 12.5 MG/1; MG/1
0.5 TABLET ORAL DAILY
Qty: 15 TABLET | Refills: 5 | Status: SHIPPED | OUTPATIENT
Start: 2020-07-29 | End: 2021-01-27 | Stop reason: SDUPTHER

## 2020-07-29 NOTE — PROGRESS NOTES
Assessment/Plan:     Diagnoses and all orders for this visit:    Pain in both feet  -     Ambulatory referral to Podiatry; Future    Uncontrolled type 2 diabetes mellitus with hypoglycemia without coma (HCC)  -     Hemoglobin A1C; Future  -     CBC and differential; Future  -     Comprehensive metabolic panel; Future  -     Lipid panel; Future  -     TSH, 3rd generation with Free T4 reflex; Future  -     Vitamin D 25 hydroxy; Future  -     Hemoglobin A1C; Future  -     Microalbumin / creatinine urine ratio    Anxiety  -     sertraline (ZOLOFT) 100 mg tablet; Take 1 tablet (100 mg total) by mouth daily    Essential hypertension, benign  -     lisinopril-hydrochlorothiazide (PRINZIDE,ZESTORETIC) 20-12 5 MG per tablet; Take 0 5 tablets by mouth daily  -     CBC and differential; Future  -     Vitamin D 25 hydroxy; Future    Other orders  -     Discontinue: penicillin V potassium (VEETID) 500 mg tablet; TAKE 1 TABLET BY MOUTH FOUR TIMES DAILY UNTIL FINISHED             Subjective:   Chief Complaint   Patient presents with    Follow-up     pt  presents for follow up for DM II  Started Foot exam   pt  has lump on  [facility]   Health maint     Due for Dm eye exam, [facility]        Patient ID: Josue Villasenor is a 76 y o  female  HPI    The following portions of the patient's history were reviewed and updated as appropriate: allergies, current medications, past family history, past medical history, past social history, past surgical history and problem list     Review of Systems   Constitutional: Negative for chills and fatigue  HENT: Negative for congestion, ear pain, hearing loss, postnasal drip, sinus pressure, sore throat and voice change  Eyes: Negative for pain, discharge and visual disturbance  Respiratory: Negative for cough, chest tightness and shortness of breath  Cardiovascular: Negative for chest pain, palpitations and leg swelling     Gastrointestinal: Negative for abdominal pain, blood in stool, diarrhea, nausea and rectal pain  Genitourinary: Negative for difficulty urinating, dysuria and urgency  Musculoskeletal: Negative for arthralgias and joint swelling  Skin: Negative for rash  Allergic/Immunologic: Negative for environmental allergies and food allergies  Neurological: Negative for dizziness, tremors, weakness, numbness and headaches  Hematological: Negative for adenopathy  Psychiatric/Behavioral: Negative for behavioral problems and hallucinations           Past Medical History:   Diagnosis Date    Acid reflux     Anxiety     Diabetes mellitus ([facility] [address] )     Hypertension     Hypothyroid          Current Outpatient Medications:     Blood Glucose Monitoring Suppl (ACCU-CHEK MAT PLUS) w/Device KIT, Test 4 times daily, Disp: 1 kit, Rfl: 0    Cholecalciferol (VITAMIN D) 2000 units tablet, TAKE ONE TABLET BY MOUTH EVERY DAY, Disp: 30 tablet, Rfl: 0    cyanocobalamin (VITAMIN B-12) 1,000 mcg tablet, Take 1,000 mcg by mouth daily, Disp: , Rfl:     glimepiride (AMARYL) 4 mg tablet, Take 1 tablet (4 mg total) by mouth 2 (two) times a day, Disp: 180 tablet, Rfl: 1    glucose blood (ACCU-CHEK MAT PLUS) test strip, Test 4 times daily, Disp: 400 each, Rfl: 1    insulin aspart, w/niacinamide, (FIASP) 100 Units/mL injection pen, Inject 12 Units under the skin 4 (four) times a day, Disp: 15 pen, Rfl: 0    insulin glargine (Lantus SoloStar) 100 units/mL injection pen, 25 units in AM and 35 units in PM, Disp: 15 mL, Rfl: 2    Insulin Pen Needle (BD Pen Needle Kelly U/F) 32G X 4 MM MISC, by Other route 4 (four) times a day, Disp: 400 each, Rfl: 1    Insulin Syringe-Needle U-100 (B-D INS SYR ULTRAFINE  3CC/30G) 30G X 1/2" 0 3 ML MISC, Inject under the skin 4 (four) times a day, Disp: 100 each, Rfl: 4    Lancets (FREESTYLE) lancets, by Other route 3 (three) times a day Test blood glucose, Disp: 90 each, Rfl: 5    levothyroxine 100 mcg tablet, Take 1 tablet (100 mcg total) by mouth daily, Disp: 30 tablet, Rfl: 5    lisinopril-hydrochlorothiazide (PRINZIDE,ZESTORETIC) 20-12 5 MG per tablet, Take 0 5 tablets by mouth daily, Disp: 15 tablet, Rfl: 5    meloxicam (MOBIC) 15 mg tablet, Take 1 tablet (15 mg total) by mouth daily, Disp: 30 tablet, Rfl: 2    omeprazole (PriLOSEC) 40 MG capsule, Take 1 capsule (40 mg total) by mouth daily, Disp: 30 capsule, Rfl: 5    sertraline (ZOLOFT) 100 mg tablet, Take 1 tablet (100 mg total) by mouth daily, Disp: 30 tablet, Rfl: 5    metoclopramide (REGLAN) 10 mg tablet, Take 1 tablet (10 mg total) by mouth 3 (three) times a day before meals (Patient not taking: Reported on 7/29/2020), Disp: 270 tablet, Rfl: 1    phenazopyridine (PYRIDIUM) 100 mg tablet, Take 1 tablet (100 mg total) by mouth 3 (three) times a day as needed for bladder spasms (Patient not taking: Reported on 7/29/2020), Disp: 12 tablet, Rfl: 0    Allergies   Allergen Reactions    Metformin Diarrhea       Social History   Past Surgical History:   Procedure Laterality Date    CHOLECYSTECTOMY      COLONOSCOPY      DILATION AND CURETTAGE OF UTERUS      ESOPHAGOGASTRODUODENOSCOPY      HYSTERECTOMY      45    OOPHORECTOMY      45    AL COLONOSCOPY FLX DX W/COLLJ SPEC WHEN PFRMD N/A 1/28/2016    Procedure: EGD AND COLONOSCOPY;  Surgeon: Marilynn Turpin MD;  [facility];   Service: Gastroenterology    AL INCISE FINGER TENDON SHEATH Right 1/31/2017    Procedure: LONG FINGER TRIGGER RELEASE ;  Surgeon: Deanna Wilson MD;  [facility];  Service: Orthopedics    AL INCISE FINGER TENDON SHEATH Right 7/21/2016    Procedure: RELEASE TRIGGER FINGER - LONG FINGER;  Surgeon: Deanna Wilson MD;  [facility];  Service: Orthopedics    AL REVISE MEDIAN N/CARPAL TUNNEL SURG Right 1/31/2017    Procedure: WRIST CARPAL TUNNEL RELEASE ; TENOLYSIS OF FPL, FDS 2-5, FDP 2-5;  APPLICATION OF Jerome Suffern;  Surgeon: Deanna Wilson MD;  [facility];  Service: Orthopedics    UPPER GASTROINTESTINAL ENDOSCOPY       Family History   Problem Relation Age of Onset    Cancer Father     Heart attack Father     Diabetes type II Father     Arthritis Maternal Grandmother     No Known Problems Paternal Grandmother     Heart disease Paternal Grandfather     Stroke Paternal Grandfather     Cirrhosis Mother     No Known Problems Sister     No Known Problems Brother     No Known Problems Maternal Grandfather     Breast cancer Paternal Aunt 62    Colon cancer Paternal Uncle 28    No Known Problems Sister     No Known Problems Maternal Aunt     No Known Problems Maternal Aunt        Objective:  /76 (BP Location: Left arm, Patient Position: Sitting, Cuff Size: Standard)   Pulse 74   Temp (!) 97 4 °F (36 3 °C) (Temporal)   Ht 5' 2" (1 575 m)   Wt 95 3 kg (210 lb 3 2 oz)   SpO2 96%   BMI 38 45 kg/m²        Physical Exam   Constitutional: She is oriented to person, place, and time  She appears well-developed and well-nourished  HENT:   Right Ear: External ear normal    Mouth/Throat: Oropharynx is clear and moist    Eyes: Pupils are equal, round, and reactive to light  Conjunctivae and EOM are normal    Neck: Normal range of motion  No JVD present  No thyromegaly present  Cardiovascular: Normal rate, regular rhythm, normal heart sounds and intact distal pulses  Pulmonary/Chest: Breath sounds normal    Abdominal: Soft  Bowel sounds are normal    Musculoskeletal: Normal range of motion  Lymphadenopathy:     She has no cervical adenopathy  Neurological: She is alert and oriented to person, place, and time  She has normal reflexes  Skin: Skin is dry  Psychiatric: She has a normal mood and affect   Her behavior is normal  Judgment and thought content normal

## 2020-07-30 ENCOUNTER — TELEPHONE (OUTPATIENT)
Dept: ADMINISTRATIVE | Facility: OTHER | Age: 65
End: 2020-07-30

## 2020-07-30 NOTE — TELEPHONE ENCOUNTER
Upon review of the In Basket request we were able to locate, review, and update the patient chart as requested for Diabetic Eye Exam     Any additional questions or concerns should be emailed to the Practice Liaisons via [email or web address] email, please do not reply via In Basket      Thank you  Everette Cushing

## 2020-07-30 NOTE — TELEPHONE ENCOUNTER
----- Message from Anish Cortés MA sent at 7/30/2020  8:32 AM EDT -----  Regarding: DM eye exam  Contact: [phone]  07/30/20 8:33 AM    Hello, our patient Maria Elena Weber has had Diabetic Eye Exam completed/performed  Please assist in updating the patient chart by pulling the document from the Media Tab  The date of service is 11/14/2019       Thank you,  Anish Cortés MA  [facility]

## 2020-08-25 DIAGNOSIS — E11.649 UNCONTROLLED TYPE 2 DIABETES MELLITUS WITH HYPOGLYCEMIA WITHOUT COMA (HCC): ICD-10-CM

## 2020-08-25 NOTE — TELEPHONE ENCOUNTER
Patient called back stating that she has been taking the insulin 12 units 3 times daily instead of the 4 times daily  She wants the script sent to the pharmacy as it is on her med list 4 times daily  She wants a box of 5 sent now so that she does not run out  She can't tell me if there is 200 units or 300 units/pen  She said that a pen only lasts her 3 days  I asked that she bring her blood sugar log along to her appt and she has not yet had her A1C done        Next OV 9/1/2020

## 2020-08-25 NOTE — TELEPHONE ENCOUNTER
Next OV : 9/1/2020  LM for patient to call the office  Her med list states that she takes this insulin 4 times daily  If she is taking it only 3 times daily  She should have enough to last until her office visit 9/1/2020

## 2020-08-25 NOTE — TELEPHONE ENCOUNTER
This patient called the office and needs a refill on her insulin Aspart pen with refills  She is down to one pen  She takes is 3 times a day 12 units each time   The pharmacy is [facility] in [place]

## 2020-09-15 DIAGNOSIS — E11.00 UNCONTROLLED TYPE 2 DIABETES MELLITUS WITH HYPEROSMOLARITY WITHOUT COMA, WITHOUT LONG-TERM CURRENT USE OF INSULIN (HCC): ICD-10-CM

## 2020-09-15 DIAGNOSIS — E11.649 UNCONTROLLED TYPE 2 DIABETES MELLITUS WITH HYPOGLYCEMIA WITHOUT COMA (HCC): ICD-10-CM

## 2020-09-15 DIAGNOSIS — K21.9 GASTROESOPHAGEAL REFLUX DISEASE, ESOPHAGITIS PRESENCE NOT SPECIFIED: ICD-10-CM

## 2020-09-15 RX ORDER — BLOOD-GLUCOSE METER
1 KIT MISCELLANEOUS 4 TIMES DAILY
COMMUNITY
End: 2020-09-15 | Stop reason: SDUPTHER

## 2020-09-15 NOTE — TELEPHONE ENCOUNTER
LS 7/29/20, NA 9/23/20- pt is having AWV - states she needs meds before AWV - pt will be out of insulin

## 2020-09-16 RX ORDER — OMEPRAZOLE 40 MG/1
40 CAPSULE, DELAYED RELEASE ORAL DAILY
Qty: 30 CAPSULE | Refills: 5 | Status: SHIPPED | OUTPATIENT
Start: 2020-09-16 | End: 2021-05-19 | Stop reason: SDUPTHER

## 2020-09-16 RX ORDER — BLOOD-GLUCOSE METER
1 KIT MISCELLANEOUS 4 TIMES DAILY
Qty: 400 EACH | Refills: 1 | Status: SHIPPED | OUTPATIENT
Start: 2020-09-16 | End: 2021-05-17 | Stop reason: SDUPTHER

## 2020-09-16 RX ORDER — PEN NEEDLE, DIABETIC 32GX 5/32"
NEEDLE, DISPOSABLE MISCELLANEOUS 4 TIMES DAILY
Qty: 400 EACH | Refills: 1 | Status: SHIPPED | OUTPATIENT
Start: 2020-09-16 | End: 2021-02-15 | Stop reason: SDUPTHER

## 2020-09-16 RX ORDER — INSULIN GLARGINE 100 [IU]/ML
INJECTION, SOLUTION SUBCUTANEOUS
Qty: 15 ML | Refills: 5 | Status: SHIPPED | OUTPATIENT
Start: 2020-09-16 | End: 2021-02-15 | Stop reason: SDUPTHER

## 2020-09-17 LAB
25(OH)D3 SERPL-MCNC: 39 NG/ML (ref 30–100)
ALBUMIN SERPL-MCNC: 3.9 G/DL (ref 3.6–5.1)
ALBUMIN/GLOB SERPL: 1.3 (CALC) (ref 1–2.5)
ALP SERPL-CCNC: 58 U/L (ref 37–153)
ALT SERPL-CCNC: 53 U/L (ref 6–29)
AST SERPL-CCNC: 47 U/L (ref 10–35)
BASOPHILS # BLD AUTO: 29 CELLS/UL (ref 0–200)
BASOPHILS NFR BLD AUTO: 0.5 %
BILIRUB SERPL-MCNC: 0.4 MG/DL (ref 0.2–1.2)
BUN SERPL-MCNC: 17 MG/DL (ref 7–25)
BUN/CREAT SERPL: ABNORMAL (CALC) (ref 6–22)
CALCIUM SERPL-MCNC: 9.2 MG/DL (ref 8.6–10.4)
CHLORIDE SERPL-SCNC: 100 MMOL/L (ref 98–110)
CHOLEST SERPL-MCNC: 179 MG/DL
CHOLEST/HDLC SERPL: 4.1 (CALC)
CO2 SERPL-SCNC: 29 MMOL/L (ref 20–32)
CREAT SERPL-MCNC: 0.6 MG/DL (ref 0.5–0.99)
EOSINOPHIL # BLD AUTO: 211 CELLS/UL (ref 15–500)
EOSINOPHIL NFR BLD AUTO: 3.7 %
ERYTHROCYTE [DISTWIDTH] IN BLOOD BY AUTOMATED COUNT: 14 % (ref 11–15)
GLOBULIN SER CALC-MCNC: 3.1 G/DL (CALC) (ref 1.9–3.7)
GLUCOSE SERPL-MCNC: 208 MG/DL (ref 65–99)
HBA1C MFR BLD: 8 % OF TOTAL HGB
HCT VFR BLD AUTO: 36.6 % (ref 35–45)
HDLC SERPL-MCNC: 44 MG/DL
HGB BLD-MCNC: 11.9 G/DL (ref 11.7–15.5)
LDLC SERPL CALC-MCNC: 115 MG/DL (CALC)
LYMPHOCYTES # BLD AUTO: 1345 CELLS/UL (ref 850–3900)
LYMPHOCYTES NFR BLD AUTO: 23.6 %
MCH RBC QN AUTO: 27.7 PG (ref 27–33)
MCHC RBC AUTO-ENTMCNC: 32.5 G/DL (ref 32–36)
MCV RBC AUTO: 85.1 FL (ref 80–100)
MONOCYTES # BLD AUTO: 331 CELLS/UL (ref 200–950)
MONOCYTES NFR BLD AUTO: 5.8 %
NEUTROPHILS # BLD AUTO: 3785 CELLS/UL (ref 1500–7800)
NEUTROPHILS NFR BLD AUTO: 66.4 %
NONHDLC SERPL-MCNC: 135 MG/DL (CALC)
PLATELET # BLD AUTO: 173 THOUSAND/UL (ref 140–400)
PMV BLD REES-ECKER: 10.8 FL (ref 7.5–12.5)
POTASSIUM SERPL-SCNC: 4.2 MMOL/L (ref 3.5–5.3)
PROT SERPL-MCNC: 7 G/DL (ref 6.1–8.1)
RBC # BLD AUTO: 4.3 MILLION/UL (ref 3.8–5.1)
SL AMB EGFR AFRICAN AMERICAN: 110 ML/MIN/1.73M2
SL AMB EGFR NON AFRICAN AMERICAN: 95 ML/MIN/1.73M2
SODIUM SERPL-SCNC: 135 MMOL/L (ref 135–146)
T4 FREE SERPL-MCNC: 0.9 NG/DL (ref 0.8–1.8)
TRIGL SERPL-MCNC: 96 MG/DL
TSH SERPL-ACNC: 5.48 MIU/L (ref 0.4–4.5)
WBC # BLD AUTO: 5.7 THOUSAND/UL (ref 3.8–10.8)

## 2020-09-17 PROCEDURE — 3052F HG A1C>EQUAL 8.0%<EQUAL 9.0%: CPT | Performed by: INTERNAL MEDICINE

## 2020-09-23 ENCOUNTER — OFFICE VISIT (OUTPATIENT)
Dept: INTERNAL MEDICINE CLINIC | Facility: CLINIC | Age: 65
End: 2020-09-23
Payer: COMMERCIAL

## 2020-09-23 ENCOUNTER — TELEPHONE (OUTPATIENT)
Dept: INTERNAL MEDICINE CLINIC | Facility: CLINIC | Age: 65
End: 2020-09-23

## 2020-09-23 VITALS
DIASTOLIC BLOOD PRESSURE: 64 MMHG | HEART RATE: 86 BPM | HEIGHT: 63 IN | SYSTOLIC BLOOD PRESSURE: 130 MMHG | WEIGHT: 209 LBS | OXYGEN SATURATION: 97 % | BODY MASS INDEX: 37.03 KG/M2 | TEMPERATURE: 98 F

## 2020-09-23 DIAGNOSIS — Z13.820 ENCOUNTER FOR OSTEOPOROSIS SCREENING IN ASYMPTOMATIC POSTMENOPAUSAL PATIENT: Primary | ICD-10-CM

## 2020-09-23 DIAGNOSIS — E03.9 HYPOTHYROIDISM, UNSPECIFIED TYPE: ICD-10-CM

## 2020-09-23 DIAGNOSIS — I10 HYPERTENSION, UNSPECIFIED TYPE: ICD-10-CM

## 2020-09-23 DIAGNOSIS — Z00.00 MEDICARE ANNUAL WELLNESS VISIT, INITIAL: ICD-10-CM

## 2020-09-23 DIAGNOSIS — R79.89 LFT ELEVATION: ICD-10-CM

## 2020-09-23 DIAGNOSIS — E11.649 UNCONTROLLED TYPE 2 DIABETES MELLITUS WITH HYPOGLYCEMIA WITHOUT COMA (HCC): ICD-10-CM

## 2020-09-23 DIAGNOSIS — K76.0 FATTY LIVER: ICD-10-CM

## 2020-09-23 DIAGNOSIS — M79.672 PAIN IN BOTH FEET: Primary | ICD-10-CM

## 2020-09-23 DIAGNOSIS — M79.671 PAIN IN BOTH FEET: Primary | ICD-10-CM

## 2020-09-23 DIAGNOSIS — Z78.0 ENCOUNTER FOR OSTEOPOROSIS SCREENING IN ASYMPTOMATIC POSTMENOPAUSAL PATIENT: Primary | ICD-10-CM

## 2020-09-23 PROCEDURE — 1101F PT FALLS ASSESS-DOCD LE1/YR: CPT | Performed by: INTERNAL MEDICINE

## 2020-09-23 PROCEDURE — 99214 OFFICE O/P EST MOD 30 MIN: CPT | Performed by: INTERNAL MEDICINE

## 2020-09-23 PROCEDURE — G0438 PPPS, INITIAL VISIT: HCPCS | Performed by: INTERNAL MEDICINE

## 2020-09-23 PROCEDURE — 1160F RVW MEDS BY RX/DR IN RCRD: CPT | Performed by: INTERNAL MEDICINE

## 2020-09-23 PROCEDURE — 1170F FXNL STATUS ASSESSED: CPT | Performed by: INTERNAL MEDICINE

## 2020-09-23 PROCEDURE — 1125F AMNT PAIN NOTED PAIN PRSNT: CPT | Performed by: INTERNAL MEDICINE

## 2020-09-23 PROCEDURE — 1036F TOBACCO NON-USER: CPT | Performed by: INTERNAL MEDICINE

## 2020-09-23 PROCEDURE — 3725F SCREEN DEPRESSION PERFORMED: CPT | Performed by: INTERNAL MEDICINE

## 2020-09-23 PROCEDURE — 3078F DIAST BP <80 MM HG: CPT | Performed by: INTERNAL MEDICINE

## 2020-09-23 PROCEDURE — 3288F FALL RISK ASSESSMENT DOCD: CPT | Performed by: INTERNAL MEDICINE

## 2020-09-23 PROCEDURE — 93000 ELECTROCARDIOGRAM COMPLETE: CPT | Performed by: INTERNAL MEDICINE

## 2020-09-23 PROCEDURE — 3075F SYST BP GE 130 - 139MM HG: CPT | Performed by: INTERNAL MEDICINE

## 2020-09-23 NOTE — PROGRESS NOTES
Assessment and Plan:     Problem List Items Addressed This Visit     None      Visit Diagnoses     Encounter for osteoporosis screening in asymptomatic postmenopausal patient    -  Primary           Preventive health issues were discussed with patient, and age appropriate screening tests were ordered as noted in patient's After Visit Summary  Personalized health advice and appropriate referrals for health education or preventive services given if needed, as noted in patient's After Visit Summary  History of Present Illness:     Patient presents for Medicare Annual Wellness visit    Patient Care Team:  Horacio Jara MD as PCP - General (Internal Medicine)  MD Kevin Szymanski MD as Endoscopist     Problem List:     Patient Active Problem List   Diagnosis    Anxiety    Diabetes type 2, uncontrolled ([facility] [address]    Diabetic gastroparesis ([facility] [address]    GERD (gastroesophageal reflux disease)    Hypothyroidism    Fatty liver    Hand paresthesia    Irritable bowel syndrome    Obesity    Osteoarthritis, hip, bilateral    Splenomegaly    Essential hypertension    Seasonal allergies    Gastroenteritis      Past Medical and Surgical History:     Past Medical History:   Diagnosis Date    Acid reflux     Anxiety     Diabetes mellitus ([facility] [address] )     Hypertension     Hypothyroid      Past Surgical History:   Procedure Laterality Date    CHOLECYSTECTOMY      COLONOSCOPY      DILATION AND CURETTAGE OF UTERUS      ESOPHAGOGASTRODUODENOSCOPY      HYSTERECTOMY      45    OOPHORECTOMY      39    IA COLONOSCOPY FLX DX W/COLLJ SPEC WHEN PFRMD N/A 1/28/2016    Procedure: EGD AND COLONOSCOPY;  Surgeon: Kevin Ku MD;  [facility];   Service: Gastroenterology    IA INCISE FINGER TENDON SHEATH Right 1/31/2017    Procedure: LONG FINGER TRIGGER RELEASE ;  Surgeon: Ernesto Sanchez MD;  [facility];  Service: Orthopedics    IA INCISE FINGER TENDON SHEATH Right 7/21/2016    Procedure: RELEASE TRIGGER FINGER - LONG FINGER;  Surgeon: Seda Ramirez MD;  Location:  MAIN OR;  Service: Orthopedics    [place] REVISE MEDIAN N/CARPAL TUNNEL SURG Right 2017    Procedure: WRIST CARPAL TUNNEL RELEASE ; TENOLYSIS OF FPL, FDS 2-5, FDP 2-5;  APPLICATION OF Effie Cabrales;  Surgeon: Seda Ramirez MD;  Location:  MAIN OR;  Service: Orthopedics    ROOT CANAL      UPPER GASTROINTESTINAL ENDOSCOPY        Family History:     Family History   Problem Relation Age of Onset    Cancer Father     Heart attack Father     Diabetes type II Father     Arthritis Maternal Grandmother     No Known Problems Paternal Grandmother     Heart disease Paternal Grandfather     Stroke Paternal Grandfather     Cirrhosis Mother     No Known Problems Sister     No Known Problems Brother     No Known Problems Maternal Grandfather     Breast cancer Paternal Aunt 62    Colon cancer Paternal Uncle 28    No Known Problems Sister     No Known Problems Maternal Aunt     No Known Problems Maternal Aunt       Social History:     E-Cigarette/Vaping    E-Cigarette Use Never User      E-Cigarette/Vaping Substances    Nicotine No     THC No     CBD No     Flavoring No     Other No     Unknown No      Social History     Socioeconomic History    Marital status: Legally      Spouse name: None    Number of children: None    Years of education: None    Highest education level: None   Occupational History    None   Social Needs    Financial resource strain: None    Food insecurity     Worry: None     Inability: None    Transportation needs     Medical: None     Non-medical: None   Tobacco Use    Smoking status: Former Smoker     Packs/day: 2 00     Years: 10 00     Pack years: 20 00     Types: Cigarettes     Last attempt to quit:      Years since quittin 7    Smokeless tobacco: Never Used   Substance and Sexual Activity    Alcohol use: Yes     Comment: Rarely    Drug use: No    Sexual activity: Not Currently   Lifestyle    Physical activity     Days per week: None     Minutes per session: None    Stress: None   Relationships    Social connections     Talks on phone: None     Gets together: None     Attends Yazidi service: None     Active member of club or organization: None     Attends meetings of clubs or organizations: None     Relationship status: None    Intimate partner violence     Fear of current or ex partner: None     Emotionally abused: None     Physically abused: None     Forced sexual activity: None   Other Topics Concern    None   Social History Narrative    None      Medications and Allergies:     Current Outpatient Medications   Medication Sig Dispense Refill    Blood Glucose Monitoring Suppl (ACCU-CHEK MAT PLUS) w/Device KIT Test 4 times daily 1 kit 0    Cholecalciferol (VITAMIN D) 2000 units tablet TAKE ONE TABLET BY MOUTH EVERY DAY 30 tablet 0    cyanocobalamin (VITAMIN B-12) 1,000 mcg tablet Take 1,000 mcg by mouth daily      glimepiride (AMARYL) 4 mg tablet Take 1 tablet (4 mg total) by mouth 2 (two) times a day 180 tablet 1    glucose blood (FREESTYLE LITE) test strip 1 each by Other route 4 (four) times a day Use 4 times daily DM2-- free style lite test strips 400 each 1    insulin aspart, w/niacinamide, (FIASP) 100 Units/mL injection pen Inject 12 Units under the skin 4 (four) times a day 3 pen 5    insulin glargine (Lantus SoloStar) 100 units/mL injection pen 25 units in AM and 35 units in PM 15 mL 5    Insulin Pen Needle (BD Pen Needle Kelly U/F) 32G X 4 MM MISC by Other route 4 (four) times a day 400 each 1    Insulin Syringe-Needle U-100 (B-D INS SYR ULTRAFINE  3CC/30G) 30G X 1/2" 0 3 ML MISC Inject under the skin 4 (four) times a day 100 each 4    Lancets (FREESTYLE) lancets by Other route 3 (three) times a day Test blood glucose 90 each 5    levothyroxine 100 mcg tablet Take 1 tablet (100 mcg total) by mouth daily 30 tablet 5    lisinopril-hydrochlorothiazide (PRINZIDE,ZESTORETIC) 20-12 5 MG per tablet Take 0 5 tablets by mouth daily 15 tablet 5    meloxicam (MOBIC) 15 mg tablet Take 1 tablet (15 mg total) by mouth daily 30 tablet 2    omeprazole (PriLOSEC) 40 MG capsule Take 1 capsule (40 mg total) by mouth daily 30 capsule 5    sertraline (ZOLOFT) 100 mg tablet Take 1 tablet (100 mg total) by mouth daily 30 tablet 5     No current facility-administered medications for this visit  Allergies   Allergen Reactions    Metformin Diarrhea      Immunizations:     Immunization History   Administered Date(s) Administered     Influenza (IM) Preservative Free 02/06/2018    Influenza Quadrivalent Preservative Free 3 years and older IM 11/05/2015    Influenza, high dose seasonal 0 7 mL 11/12/2019    Pneumococcal Conjugate 13-Valent 07/26/2016    Pneumococcal Polysaccharide PPV23 03/26/2019    influenza, trivalent, adjuvanted 09/21/2020      Health Maintenance:         Topic Date Due    DXA SCAN  1955    MAMMOGRAM  05/26/2021    Hepatitis C Screening  Completed         Topic Date Due    DTaP,Tdap,and Td Vaccines (1 - Tdap) 07/03/1975    Influenza Vaccine  07/01/2020      Medicare Health Risk Assessment:     /64 (BP Location: Left arm, Patient Position: Sitting, Cuff Size: Adult)   Pulse 86   Temp 98 °F (36 7 °C) (Tympanic)   Ht 5' 3 25" (1 607 m)   Wt 94 8 kg (209 lb)   SpO2 97% Comment: ra  BMI 36 73 kg/m²      Yamilex Whitmore is here for her Initial Wellness visit  Health Risk Assessment:   Patient rates overall health as good  Patient feels that their physical health rating is same  Eyesight was rated as same  Hearing was rated as same  Patient feels that their emotional and mental health rating is same  Pain experienced in the last 7 days has been none  Patient states that she has experienced no weight loss or gain in last 6 months  Depression Screening:   PHQ-2 Score: 0      Fall Risk Screening:    In the past year, patient has experienced: no history of falling in past year      Urinary Incontinence Screening:   Patient has not leaked urine accidently in the last six months  Home Safety:  Patient does not have trouble with stairs inside or outside of their home  Patient has working smoke alarms and has working carbon monoxide detector  Home safety hazards include: none  Nutrition:   Current diet is Diabetic  Medications:   Patient is currently taking over-the-counter supplements  OTC medications include: see medication list  Patient is able to manage medications  Activities of Daily Living (ADLs)/Instrumental Activities of Daily Living (IADLs):   Walk and transfer into and out of bed and chair?: Yes  Dress and groom yourself?: Yes    Bathe or shower yourself?: Yes    Feed yourself?  Yes  Do your laundry/housekeeping?: Yes  Manage your money, pay your bills and track your expenses?: Yes  Make your own meals?: Yes    Do your own shopping?: Yes    Durable Medical Equipment Suppliers  no preference    Previous Hospitalizations:   Any hospitalizations or ED visits within the last 12 months?: No      Advance Care Planning:   Living will: No    Durable POA for healthcare: No    Advanced directive: No    Advanced directive counseling given: Yes      Cognitive Screening:   Provider or family/friend/caregiver concerned regarding cognition?: No    PREVENTIVE SCREENINGS      Cardiovascular Screening:    General: Screening Current      Diabetes Screening:     General: Screening Not Indicated and History Diabetes      Colorectal Cancer Screening:     General: Screening Current      Breast Cancer Screening:     General: Screening Current      Cervical Cancer Screening:    General: Screening Not Indicated      Osteoporosis Screening:      Due for: DXA Axial      Abdominal Aortic Aneurysm (AAA) Screening:        General: Screening Not Indicated      Lung Cancer Screening:     General: Screening Not Indicated      Hepatitis C Screening:    General: Screening Current    Other Counseling Topics:   Calcium and vitamin D intake and regular weightbearing exercise         Horacio Jara MD

## 2020-09-23 NOTE — PROGRESS NOTES
Assessment/Plan:     Diagnoses and all orders for this visit:    Encounter for osteoporosis screening in asymptomatic postmenopausal patient  -     DXA bone density spine hip and pelvis; Future    Uncontrolled type 2 diabetes mellitus with hypoglycemia without coma (HCC)  -     Glucose, fasting; Future  -     Hemoglobin A1C; Future  -     Microalbumin / creatinine urine ratio    LFT elevation  -     Cancel: US liver; Future  -     US elastography; Future    Fatty liver  -     US elastography; Future    Hypothyroidism, unspecified type        BMI Counseling: Body mass index is 36 73 kg/m²  The BMI is above normal  Nutrition recommendations include decreasing portion sizes, encouraging healthy choices of fruits and vegetables and moderation in carbohydrate intake  Exercise recommendations include moderate physical activity 150 minutes/week  Subjective:   Chief Complaint   Patient presents with    Medicare Wellness Visit     awv initial  medicare effective 8/1/19    Follow-up     pt here for a follow up for DM2   Review labs   Health maintenance     CRC UTD, Mammo UTD, not a canditate for pap, eye exam UTD, dexa:  added edit modifer and pended order  foot exam and a1c UTD    health maintenance     bmi f/u plan due   microalbumin sample due   can collect today    Immunizations     flu and PNA UTD    podiatrist     needs new referral for lianet  Informed pt i will look into it and get back to her  Patient ID: Vane Bloom is a 76 y o  female  HPI  This is a very pleasant 76 years young lady here for the regular follow-up visit and also for the initial Medicare wellness visit she is doing well no new complaints  1  Type 2 diabetes mellitus is poor controlled with hemoglobin A1c is 8 0 it was higher 8 5 before so it is improving she change her insulin    She denying any chest pain or shortness of breath or any polyuria polydipsia  Obesity discussed about the weight diet exercise  Hypertension is very well controlled continue with the same medication no chest  Hypercholesterolemia very well controlled continue with the medication no changes   Increased LFTs but they are stable will get the ultrasound of the liver  No change in medication follow-up in 3 months  The following portions of the patient's history were reviewed and updated as appropriate: allergies, current medications, past family history, past medical history, past social history, past surgical history and problem list     Review of Systems   Constitutional: Negative for chills and fatigue  HENT: Negative for congestion, ear pain, hearing loss, postnasal drip, sinus pressure, sore throat and voice change  Eyes: Negative for pain, discharge and visual disturbance  Respiratory: Negative for cough, chest tightness and shortness of breath  Cardiovascular: Negative for chest pain, palpitations and leg swelling  Gastrointestinal: Negative for abdominal pain, blood in stool, diarrhea, nausea and rectal pain  Genitourinary: Negative for difficulty urinating, dysuria and urgency  Musculoskeletal: Negative for arthralgias and joint swelling  Skin: Negative for rash  Allergic/Immunologic: Negative for environmental allergies and food allergies  Neurological: Negative for dizziness, tremors, weakness, numbness and headaches  Hematological: Negative for adenopathy  Psychiatric/Behavioral: Negative for behavioral problems and hallucinations           Past Medical History:   Diagnosis Date    Acid reflux     Anxiety     Diabetes mellitus ([facility] [address] )     Hypertension     Hypothyroid          Current Outpatient Medications:     Blood Glucose Monitoring Suppl (ACCU-CHEK MAT PLUS) w/Device KIT, Test 4 times daily, Disp: 1 kit, Rfl: 0    Cholecalciferol (VITAMIN D) 2000 units tablet, TAKE ONE TABLET BY MOUTH EVERY DAY, Disp: 30 tablet, Rfl: 0    cyanocobalamin (VITAMIN B-12) 1,000 mcg tablet, Take 1,000 mcg by mouth daily, Disp: , Rfl:     glimepiride (AMARYL) 4 mg tablet, Take 1 tablet (4 mg total) by mouth 2 (two) times a day, Disp: 180 tablet, Rfl: 1    glucose blood (FREESTYLE LITE) test strip, 1 each by Other route 4 (four) times a day Use 4 times daily DM2-- free style lite test strips, Disp: 400 each, Rfl: 1    insulin aspart, w/niacinamide, (FIASP) 100 Units/mL injection pen, Inject 12 Units under the skin 4 (four) times a day, Disp: 3 pen, Rfl: 5    insulin glargine (Lantus SoloStar) 100 units/mL injection pen, 25 units in AM and 35 units in PM, Disp: 15 mL, Rfl: 5    Insulin Pen Needle (BD Pen Needle Kelly U/F) 32G X 4 MM MISC, by Other route 4 (four) times a day, Disp: 400 each, Rfl: 1    Insulin Syringe-Needle U-100 (B-D INS SYR ULTRAFINE  3CC/30G) 30G X 1/2" 0 3 ML MISC, Inject under the skin 4 (four) times a day, Disp: 100 each, Rfl: 4    Lancets (FREESTYLE) lancets, by Other route 3 (three) times a day Test blood glucose, Disp: 90 each, Rfl: 5    levothyroxine 100 mcg tablet, Take 1 tablet (100 mcg total) by mouth daily, Disp: 30 tablet, Rfl: 5    lisinopril-hydrochlorothiazide (PRINZIDE,ZESTORETIC) 20-12 5 MG per tablet, Take 0 5 tablets by mouth daily, Disp: 15 tablet, Rfl: 5    meloxicam (MOBIC) 15 mg tablet, Take 1 tablet (15 mg total) by mouth daily, Disp: 30 tablet, Rfl: 2    omeprazole (PriLOSEC) 40 MG capsule, Take 1 capsule (40 mg total) by mouth daily, Disp: 30 capsule, Rfl: 5    sertraline (ZOLOFT) 100 mg tablet, Take 1 tablet (100 mg total) by mouth daily, Disp: 30 tablet, Rfl: 5    Allergies   Allergen Reactions    Metformin Diarrhea       Social History   Past Surgical History:   Procedure Laterality Date    CHOLECYSTECTOMY      COLONOSCOPY      DILATION AND CURETTAGE OF UTERUS      ESOPHAGOGASTRODUODENOSCOPY      HYSTERECTOMY      45    OOPHORECTOMY      45    IN COLONOSCOPY FLX DX W/COLLJ SPEC WHEN PFRMD N/A 1/28/2016    Procedure: EGD AND COLONOSCOPY;  Surgeon: Osmany Kearns Fidelina Lopez MD;  [facility]; Service: Gastroenterology    MI INCISE FINGER TENDON SHEATH Right 1/31/2017    Procedure: LONG FINGER TRIGGER RELEASE ;  Surgeon: Osei Ye MD;  [facility];  Service: Orthopedics    MI INCISE FINGER TENDON SHEATH Right 7/21/2016    Procedure: RELEASE TRIGGER FINGER - LONG FINGER;  Surgeon: Osei Ye MD;  [facility];  Service: Orthopedics    MI REVISE MEDIAN N/CARPAL TUNNEL SURG Right 1/31/2017    Procedure: WRIST CARPAL TUNNEL RELEASE ; TENOLYSIS OF FPL, FDS 2-5, FDP 2-5;  APPLICATION OF Jennifer Bellamy;  Surgeon: Osei Ye MD;  [facility];  Service: Orthopedics    ROOT CANAL      UPPER GASTROINTESTINAL ENDOSCOPY       Family History   Problem Relation Age of Onset    Cancer Father     Heart attack Father     Diabetes type II Father     Arthritis Maternal Grandmother     No Known Problems Paternal Grandmother     Heart disease Paternal Grandfather     Stroke Paternal Grandfather     Cirrhosis Mother     No Known Problems Sister     No Known Problems Brother     No Known Problems Maternal Grandfather     Breast cancer Paternal Aunt 62    Colon cancer Paternal Uncle 28    No Known Problems Sister     No Known Problems Maternal Aunt     No Known Problems Maternal Aunt        Objective:  /64 (BP Location: Left arm, Patient Position: Sitting, Cuff Size: Adult)   Pulse 86   Temp 98 °F (36 7 °C) (Tympanic)   Ht 5' 3 25" (1 607 m)   Wt 94 8 kg (209 lb)   SpO2 97% Comment: ra  BMI 36 73 kg/m²        Physical Exam  Constitutional:       Appearance: She is well-developed  HENT:      Right Ear: External ear normal    Eyes:      Conjunctiva/sclera: Conjunctivae normal       Pupils: Pupils are equal, round, and reactive to light  Neck:      Musculoskeletal: Normal range of motion  Thyroid: No thyromegaly  Vascular: No JVD  Cardiovascular:      Rate and Rhythm: Normal rate and regular rhythm  Heart sounds: Normal heart sounds  Pulmonary:      Breath sounds: Normal breath sounds  Abdominal:      General: Bowel sounds are normal       Palpations: Abdomen is soft  Musculoskeletal: Normal range of motion  Lymphadenopathy:      Cervical: No cervical adenopathy  Skin:     General: Skin is dry  Neurological:      Mental Status: She is alert and oriented to person, place, and time  Deep Tendon Reflexes: Reflexes are normal and symmetric  Psychiatric:         Behavior: Behavior normal          Thought Content:  Thought content normal          Judgment: Judgment normal

## 2020-10-14 DIAGNOSIS — E03.9 HYPOTHYROIDISM, UNSPECIFIED TYPE: ICD-10-CM

## 2020-10-14 RX ORDER — LEVOTHYROXINE SODIUM 0.1 MG/1
100 TABLET ORAL DAILY
Qty: 30 TABLET | Refills: 5 | Status: SHIPPED | OUTPATIENT
Start: 2020-10-14 | End: 2021-01-27 | Stop reason: SDUPTHER

## 2020-11-17 ENCOUNTER — TRANSCRIBE ORDERS (OUTPATIENT)
Dept: LAB | Facility: CLINIC | Age: 65
End: 2020-11-17

## 2020-11-20 ENCOUNTER — HOSPITAL ENCOUNTER (OUTPATIENT)
Dept: RADIOLOGY | Age: 65
Discharge: HOME | End: 2020-11-20
Payer: COMMERCIAL

## 2020-11-20 DIAGNOSIS — Z13.820 ENCOUNTER FOR OSTEOPOROSIS SCREENING IN ASYMPTOMATIC POSTMENOPAUSAL PATIENT: ICD-10-CM

## 2020-11-20 DIAGNOSIS — Z78.0 ENCOUNTER FOR OSTEOPOROSIS SCREENING IN ASYMPTOMATIC POSTMENOPAUSAL PATIENT: ICD-10-CM

## 2020-11-20 PROCEDURE — 77080 DXA BONE DENSITY AXIAL: CPT

## 2020-12-02 ENCOUNTER — TELEMEDICINE (OUTPATIENT)
Dept: INTERNAL MEDICINE CLINIC | Age: 65
End: 2020-12-02
Payer: COMMERCIAL

## 2020-12-02 DIAGNOSIS — J06.9 VIRAL UPPER RESPIRATORY TRACT INFECTION: Primary | ICD-10-CM

## 2020-12-02 PROCEDURE — 1160F RVW MEDS BY RX/DR IN RCRD: CPT | Performed by: INTERNAL MEDICINE

## 2020-12-02 PROCEDURE — 99213 OFFICE O/P EST LOW 20 MIN: CPT | Performed by: INTERNAL MEDICINE

## 2020-12-02 PROCEDURE — 1036F TOBACCO NON-USER: CPT | Performed by: INTERNAL MEDICINE

## 2020-12-07 ENCOUNTER — TELEPHONE (OUTPATIENT)
Dept: INTERNAL MEDICINE CLINIC | Age: 65
End: 2020-12-07

## 2020-12-08 DIAGNOSIS — J20.8 ACUTE BRONCHITIS DUE TO OTHER SPECIFIED ORGANISMS: Primary | ICD-10-CM

## 2020-12-08 RX ORDER — AZITHROMYCIN 250 MG/1
TABLET, FILM COATED ORAL
Qty: 6 TABLET | Refills: 0 | Status: SHIPPED | OUTPATIENT
Start: 2020-12-08 | End: 2020-12-12

## 2021-01-19 ENCOUNTER — HOSPITAL ENCOUNTER (OUTPATIENT)
Dept: RADIOLOGY | Facility: HOSPITAL | Age: 66
Discharge: HOME | End: 2021-01-19
Attending: INTERNAL MEDICINE
Payer: COMMERCIAL

## 2021-01-19 DIAGNOSIS — K76.0 FATTY LIVER: ICD-10-CM

## 2021-01-19 DIAGNOSIS — R79.89 LFT ELEVATION: ICD-10-CM

## 2021-01-19 PROCEDURE — 76981 USE PARENCHYMA: CPT

## 2021-01-25 DIAGNOSIS — K76.0 NAFLD (NONALCOHOLIC FATTY LIVER DISEASE): Primary | ICD-10-CM

## 2021-01-27 ENCOUNTER — TELEMEDICINE (OUTPATIENT)
Dept: INTERNAL MEDICINE CLINIC | Facility: CLINIC | Age: 66
End: 2021-01-27
Payer: COMMERCIAL

## 2021-01-27 VITALS — HEIGHT: 62 IN | WEIGHT: 198 LBS | BODY MASS INDEX: 36.44 KG/M2

## 2021-01-27 DIAGNOSIS — K21.9 GASTROESOPHAGEAL REFLUX DISEASE, UNSPECIFIED WHETHER ESOPHAGITIS PRESENT: ICD-10-CM

## 2021-01-27 DIAGNOSIS — E03.9 HYPOTHYROIDISM, UNSPECIFIED TYPE: ICD-10-CM

## 2021-01-27 DIAGNOSIS — I10 ESSENTIAL HYPERTENSION, BENIGN: ICD-10-CM

## 2021-01-27 DIAGNOSIS — K76.0 FATTY LIVER: ICD-10-CM

## 2021-01-27 DIAGNOSIS — E11.649 UNCONTROLLED TYPE 2 DIABETES MELLITUS WITH HYPOGLYCEMIA WITHOUT COMA (HCC): Primary | ICD-10-CM

## 2021-01-27 DIAGNOSIS — K76.0 NAFLD (NONALCOHOLIC FATTY LIVER DISEASE): ICD-10-CM

## 2021-01-27 DIAGNOSIS — R79.89 LFT ELEVATION: ICD-10-CM

## 2021-01-27 PROBLEM — J06.9 VIRAL UPPER RESPIRATORY TRACT INFECTION: Status: RESOLVED | Noted: 2020-12-02 | Resolved: 2021-01-27

## 2021-01-27 PROBLEM — J20.8 ACUTE BRONCHITIS DUE TO OTHER SPECIFIED ORGANISMS: Status: RESOLVED | Noted: 2020-12-08 | Resolved: 2021-01-27

## 2021-01-27 PROCEDURE — 99214 OFFICE O/P EST MOD 30 MIN: CPT | Performed by: INTERNAL MEDICINE

## 2021-01-27 PROCEDURE — 1036F TOBACCO NON-USER: CPT | Performed by: INTERNAL MEDICINE

## 2021-01-27 PROCEDURE — 1160F RVW MEDS BY RX/DR IN RCRD: CPT | Performed by: INTERNAL MEDICINE

## 2021-01-27 PROCEDURE — 3008F BODY MASS INDEX DOCD: CPT | Performed by: INTERNAL MEDICINE

## 2021-01-27 RX ORDER — LISINOPRIL AND HYDROCHLOROTHIAZIDE 20; 12.5 MG/1; MG/1
0.5 TABLET ORAL DAILY
Qty: 15 TABLET | Refills: 5 | Status: SHIPPED | OUTPATIENT
Start: 2021-01-27 | End: 2021-07-28 | Stop reason: SDUPTHER

## 2021-01-27 RX ORDER — LEVOTHYROXINE SODIUM 0.1 MG/1
100 TABLET ORAL DAILY
Qty: 90 TABLET | Refills: 1 | Status: SHIPPED | OUTPATIENT
Start: 2021-01-27 | End: 2021-08-27 | Stop reason: SDUPTHER

## 2021-01-27 RX ORDER — BLOOD-GLUCOSE,RECEIVER,CONT
1 EACH MISCELLANEOUS
Qty: 1 DEVICE | Refills: 1 | Status: SHIPPED | OUTPATIENT
Start: 2021-01-27 | End: 2021-07-28

## 2021-01-27 RX ORDER — BLOOD-GLUCOSE SENSOR
1 EACH MISCELLANEOUS
Qty: 3 EACH | Refills: 5 | Status: SHIPPED | OUTPATIENT
Start: 2021-01-27 | End: 2021-07-28

## 2021-01-27 RX ORDER — BLOOD-GLUCOSE TRANSMITTER
1 EACH MISCELLANEOUS
Qty: 1 EACH | Refills: 5 | Status: SHIPPED | OUTPATIENT
Start: 2021-01-27 | End: 2021-11-10

## 2021-01-27 NOTE — PROGRESS NOTES
Assessment/Plan:     Diagnoses and all orders for this visit:    Uncontrolled type 2 diabetes mellitus with hypoglycemia without coma ([facility] [address] )  -     Continuous Blood Gluc  (Dexcom G6 ) [facility]; Use 1 each 5 (five) times a day  -     Continuous Blood Gluc Sensor (Dexcom G6 Sensor) MISC; Use 1 each 5 (five) times a day  -     Continuous Blood Gluc Transmit (Dexcom G6 Transmitter) MISC; Use 1 each 5 (five) times a day    Essential hypertension, benign  -     lisinopril-hydrochlorothiazide (PRINZIDE,ZESTORETIC) 20-12 5 MG per tablet; Take 0 5 tablets by mouth daily  Hypertension is well controlled  Hypothyroidism, unspecified type  -     levothyroxine 100 mcg tablet; Take 1 tablet (100 mcg total) by mouth daily    Gastroesophageal reflux disease, unspecified whether esophagitis present    Fatty liver    LFT elevation  NAFLD patient is scheduled to be seen by the GI  NAFLD (nonalcoholic fatty liver disease)  -     Ambulatory referral to Gastroenterology; Future      Right arm pain most likely muscular       Subjective:   Chief Complaint   Patient presents with    Virtual Regular Visit     [employer]--[phone]--7 mo follow up and liver scan results from last week  Left arm pain tingaling         Patient ID: Mary Maki is a 76 y o  female  HPI    The following portions of the patient's history were reviewed and updated as appropriate: allergies, current medications, past family history, past medical history, past social history, past surgical history and problem list     Review of Systems   Constitutional: Positive for fatigue  Negative for chills  HENT: Negative for congestion, ear pain, hearing loss, postnasal drip, sinus pressure, sore throat and voice change  Eyes: Negative for pain, discharge and visual disturbance  Respiratory: Negative for cough, chest tightness and shortness of breath  Cardiovascular: Negative for chest pain, palpitations and leg swelling     Gastrointestinal: Negative for abdominal pain, blood in stool, diarrhea, nausea and rectal pain  Genitourinary: Negative for difficulty urinating, dysuria and urgency  Musculoskeletal: Negative for arthralgias and joint swelling  Left arm pain just underneath the shoulder only when she has the lying down   Skin: Negative for rash  Allergic/Immunologic: Negative for environmental allergies and food allergies  Neurological: Negative for dizziness, tremors, weakness, numbness and headaches  Hematological: Negative for adenopathy  Psychiatric/Behavioral: Negative for behavioral problems and hallucinations           Past Medical History:   Diagnosis Date    Acid reflux     Anxiety     Diabetes mellitus ([facility] [address] [facility]     Hypertension     Hypothyroid          Current Outpatient Medications:     Cholecalciferol (VITAMIN D) 2000 units tablet, TAKE ONE TABLET BY MOUTH EVERY DAY, Disp: 30 tablet, Rfl: 0    cyanocobalamin (VITAMIN B-12) 1,000 mcg tablet, Take 1,000 mcg by mouth daily, Disp: , Rfl:     glimepiride (AMARYL) 4 mg tablet, Take 1 tablet (4 mg total) by mouth 2 (two) times a day, Disp: 180 tablet, Rfl: 1    glucose blood (FREESTYLE LITE) test strip, 1 each by Other route 4 (four) times a day Use 4 times daily DM2-- free style lite test strips, Disp: 400 each, Rfl: 1    insulin aspart, w/niacinamide, (FIASP) 100 Units/mL injection pen, Inject 12 Units under the skin 4 (four) times a day, Disp: 3 pen, Rfl: 5    insulin glargine (Lantus SoloStar) 100 units/mL injection pen, 25 units in AM and 35 units in PM, Disp: 15 mL, Rfl: 5    Insulin Pen Needle (BD Pen Needle Kelly U/F) 32G X 4 MM MISC, by Other route 4 (four) times a day, Disp: 400 each, Rfl: 1    Insulin Syringe-Needle U-100 (B-D INS SYR ULTRAFINE  3CC/30G) 30G X 1/2" 0 3 ML MISC, Inject under the skin 4 (four) times a day, Disp: 100 each, Rfl: 4    Lancets (FREESTYLE) lancets, by Other route 3 (three) times a day Test blood glucose, Disp: 90 each, Rfl: 5   levothyroxine 100 mcg tablet, Take 1 tablet (100 mcg total) by mouth daily, Disp: 90 tablet, Rfl: 1    lisinopril-hydrochlorothiazide (PRINZIDE,ZESTORETIC) 20-12 5 MG per tablet, Take 0 5 tablets by mouth daily, Disp: 15 tablet, Rfl: 5    meloxicam (MOBIC) 15 mg tablet, Take 1 tablet (15 mg total) by mouth daily, Disp: 30 tablet, Rfl: 2    omeprazole (PriLOSEC) 40 MG capsule, Take 1 capsule (40 mg total) by mouth daily, Disp: 30 capsule, Rfl: 5    sertraline (ZOLOFT) 100 mg tablet, Take 1 tablet (100 mg total) by mouth daily, Disp: 30 tablet, Rfl: 5    Blood Glucose Monitoring Suppl (ACCU-CHEK MAT PLUS) w/Device KIT, Test 4 times daily (Patient not taking: Reported on 1/27/2021), Disp: 1 kit, Rfl: 0    Continuous Blood Gluc  (Dexcom G6 ) [facility], Use 1 each 5 (five) times a day, Disp: 1 Device, Rfl: 1    Continuous Blood Gluc Sensor (Dexcom G6 Sensor) MISC, Use 1 each 5 (five) times a day, Disp: 3 each, Rfl: 5    Continuous Blood Gluc Transmit (Dexcom G6 Transmitter) MISC, Use 1 each 5 (five) times a day, Disp: 1 each, Rfl: 5    Allergies   Allergen Reactions    Metformin Diarrhea       Social History   Past Surgical History:   Procedure Laterality Date    CHOLECYSTECTOMY      COLONOSCOPY      DILATION AND CURETTAGE OF UTERUS      ESOPHAGOGASTRODUODENOSCOPY      HYSTERECTOMY      45    OOPHORECTOMY      45    FL COLONOSCOPY FLX DX W/COLLJ SPEC WHEN PFRMD N/A 1/28/2016    Procedure: EGD AND COLONOSCOPY;  Surgeon: Emilie Guevara MD;  [facility];   Service: Gastroenterology    FL INCISE FINGER TENDON SHEATH Right 1/31/2017    Procedure: LONG FINGER TRIGGER RELEASE ;  Surgeon: Traci Wilson MD;  [facility];  Service: Orthopedics    FL INCISE FINGER TENDON SHEATH Right 7/21/2016    Procedure: RELEASE TRIGGER FINGER - LONG FINGER;  Surgeon: Traci Wilson MD;  [facility];  Service: Orthopedics    FL REVISE MEDIAN N/CARPAL TUNNEL SURG Right 1/31/2017 Procedure: WRIST CARPAL TUNNEL RELEASE ; TENOLYSIS OF FPL, FDS 2-5, FDP 2-5;  APPLICATION OF TENOGLYIDE;  Surgeon: Lisa Chamberlain MD;  [facility];  Service: Orthopedics    ROOT CANAL      UPPER GASTROINTESTINAL ENDOSCOPY       Family History   Problem Relation Age of Onset    Cancer Father     Heart attack Father     Diabetes type II Father     Arthritis Maternal Grandmother     No Known Problems Paternal Grandmother     Heart disease Paternal Grandfather     Stroke Paternal Grandfather     Cirrhosis Mother     No Known Problems Sister     No Known Problems Brother     No Known Problems Maternal Grandfather     Breast cancer Paternal Aunt 62    Colon cancer Paternal Uncle 28    No Known Problems Sister     No Known Problems Maternal Aunt     No Known Problems Maternal Aunt        Objective:  Ht 5' 2" (1 575 m)   Wt 89 8 kg (198 lb)   BMI 36 21 kg/m²        Physical Exam  Constitutional:       Appearance: She is well-developed  HENT:      Head: Normocephalic and atraumatic  Neck:      Musculoskeletal: Normal range of motion  Musculoskeletal:      Comments: Arm movements are normal no visible swelling or redness   Neurological:      General: No focal deficit present  Mental Status: She is alert and oriented to person, place, and time  Mental status is at baseline  Cranial Nerves: No cranial nerve deficit     Psychiatric:         Behavior: Behavior normal

## 2021-02-02 ENCOUNTER — APPOINTMENT (EMERGENCY)
Dept: RADIOLOGY | Facility: HOSPITAL | Age: 66
DRG: 482 | End: 2021-02-02
Payer: COMMERCIAL

## 2021-02-02 ENCOUNTER — HOSPITAL ENCOUNTER (INPATIENT)
Facility: HOSPITAL | Age: 66
LOS: 2 days | Discharge: HOME HEALTH | DRG: 482 | End: 2021-02-04
Attending: EMERGENCY MEDICINE | Admitting: ORTHOPAEDIC SURGERY
Payer: COMMERCIAL

## 2021-02-02 DIAGNOSIS — S72.002A CLOSED FRACTURE OF NECK OF LEFT FEMUR, INITIAL ENCOUNTER (HCC): Primary | ICD-10-CM

## 2021-02-02 DIAGNOSIS — E11.649 UNCONTROLLED TYPE 2 DIABETES MELLITUS WITH HYPOGLYCEMIA WITHOUT COMA (HCC): ICD-10-CM

## 2021-02-02 PROBLEM — Z01.818 PREOPERATIVE CLEARANCE: Status: ACTIVE | Noted: 2021-02-02

## 2021-02-02 LAB
25(OH)D3 SERPL-MCNC: 39.2 NG/ML (ref 30–100)
ABO GROUP BLD: NORMAL
ABO GROUP BLD: NORMAL
ALBUMIN SERPL BCP-MCNC: 3.5 G/DL (ref 3.5–5)
ALP SERPL-CCNC: 74 U/L (ref 46–116)
ALT SERPL W P-5'-P-CCNC: 59 U/L (ref 12–78)
ANION GAP SERPL CALCULATED.3IONS-SCNC: 5 MMOL/L (ref 4–13)
APTT PPP: 27 SECONDS (ref 23–37)
AST SERPL W P-5'-P-CCNC: 43 U/L (ref 5–45)
BASOPHILS # BLD AUTO: 0.02 THOUSANDS/ΜL (ref 0–0.1)
BASOPHILS NFR BLD AUTO: 0 % (ref 0–1)
BILIRUB SERPL-MCNC: 0.43 MG/DL (ref 0.2–1)
BLD GP AB SCN SERPL QL: NEGATIVE
BUN SERPL-MCNC: 20 MG/DL (ref 5–25)
CALCIUM SERPL-MCNC: 9.4 MG/DL (ref 8.3–10.1)
CHLORIDE SERPL-SCNC: 103 MMOL/L (ref 100–108)
CO2 SERPL-SCNC: 28 MMOL/L (ref 21–32)
CREAT SERPL-MCNC: 0.98 MG/DL (ref 0.6–1.3)
EOSINOPHIL # BLD AUTO: 0.14 THOUSAND/ΜL (ref 0–0.61)
EOSINOPHIL NFR BLD AUTO: 2 % (ref 0–6)
ERYTHROCYTE [DISTWIDTH] IN BLOOD BY AUTOMATED COUNT: 13.4 % (ref 11.6–15.1)
ERYTHROCYTE [SEDIMENTATION RATE] IN BLOOD: 40 MM/HOUR (ref 0–29)
GFR SERPL CREATININE-BSD FRML MDRD: 60 ML/MIN/1.73SQ M
GLUCOSE SERPL-MCNC: 300 MG/DL (ref 65–140)
GLUCOSE SERPL-MCNC: 364 MG/DL (ref 65–140)
HCT VFR BLD AUTO: 39.7 % (ref 34.8–46.1)
HGB BLD-MCNC: 13 G/DL (ref 11.5–15.4)
IMM GRANULOCYTES # BLD AUTO: 0.04 THOUSAND/UL (ref 0–0.2)
IMM GRANULOCYTES NFR BLD AUTO: 1 % (ref 0–2)
INR PPP: 1.03 (ref 0.84–1.19)
LYMPHOCYTES # BLD AUTO: 1.67 THOUSANDS/ΜL (ref 0.6–4.47)
LYMPHOCYTES NFR BLD AUTO: 21 % (ref 14–44)
MCH RBC QN AUTO: 27.5 PG (ref 26.8–34.3)
MCHC RBC AUTO-ENTMCNC: 32.7 G/DL (ref 31.4–37.4)
MCV RBC AUTO: 84 FL (ref 82–98)
MONOCYTES # BLD AUTO: 0.62 THOUSAND/ΜL (ref 0.17–1.22)
MONOCYTES NFR BLD AUTO: 8 % (ref 4–12)
NEUTROPHILS # BLD AUTO: 5.6 THOUSANDS/ΜL (ref 1.85–7.62)
NEUTS SEG NFR BLD AUTO: 68 % (ref 43–75)
NRBC BLD AUTO-RTO: 0 /100 WBCS
PLATELET # BLD AUTO: 210 THOUSANDS/UL (ref 149–390)
PMV BLD AUTO: 10.6 FL (ref 8.9–12.7)
POTASSIUM SERPL-SCNC: 4.1 MMOL/L (ref 3.5–5.3)
PROT SERPL-MCNC: 7.8 G/DL (ref 6.4–8.2)
PROTHROMBIN TIME: 13.5 SECONDS (ref 11.6–14.5)
PTH-INTACT SERPL-MCNC: 48.5 PG/ML (ref 18.4–80.1)
RBC # BLD AUTO: 4.72 MILLION/UL (ref 3.81–5.12)
RH BLD: NEGATIVE
RH BLD: NEGATIVE
SODIUM SERPL-SCNC: 136 MMOL/L (ref 136–145)
SPECIMEN EXPIRATION DATE: NORMAL
TSH SERPL DL<=0.05 MIU/L-ACNC: 5.36 UIU/ML (ref 0.36–3.74)
WBC # BLD AUTO: 8.09 THOUSAND/UL (ref 4.31–10.16)

## 2021-02-02 PROCEDURE — 85025 COMPLETE CBC W/AUTO DIFF WBC: CPT | Performed by: EMERGENCY MEDICINE

## 2021-02-02 PROCEDURE — 85610 PROTHROMBIN TIME: CPT | Performed by: EMERGENCY MEDICINE

## 2021-02-02 PROCEDURE — 99285 EMERGENCY DEPT VISIT HI MDM: CPT | Performed by: EMERGENCY MEDICINE

## 2021-02-02 PROCEDURE — 99255 IP/OBS CONSLTJ NEW/EST HI 80: CPT | Performed by: INTERNAL MEDICINE

## 2021-02-02 PROCEDURE — 82306 VITAMIN D 25 HYDROXY: CPT | Performed by: ORTHOPAEDIC SURGERY

## 2021-02-02 PROCEDURE — 86850 RBC ANTIBODY SCREEN: CPT | Performed by: EMERGENCY MEDICINE

## 2021-02-02 PROCEDURE — 99285 EMERGENCY DEPT VISIT HI MDM: CPT

## 2021-02-02 PROCEDURE — 85652 RBC SED RATE AUTOMATED: CPT | Performed by: ORTHOPAEDIC SURGERY

## 2021-02-02 PROCEDURE — 80053 COMPREHEN METABOLIC PANEL: CPT | Performed by: EMERGENCY MEDICINE

## 2021-02-02 PROCEDURE — 71045 X-RAY EXAM CHEST 1 VIEW: CPT

## 2021-02-02 PROCEDURE — 94760 N-INVAS EAR/PLS OXIMETRY 1: CPT

## 2021-02-02 PROCEDURE — 96374 THER/PROPH/DIAG INJ IV PUSH: CPT

## 2021-02-02 PROCEDURE — 86901 BLOOD TYPING SEROLOGIC RH(D): CPT | Performed by: EMERGENCY MEDICINE

## 2021-02-02 PROCEDURE — 73552 X-RAY EXAM OF FEMUR 2/>: CPT

## 2021-02-02 PROCEDURE — 82948 REAGENT STRIP/BLOOD GLUCOSE: CPT

## 2021-02-02 PROCEDURE — 83970 ASSAY OF PARATHORMONE: CPT | Performed by: ORTHOPAEDIC SURGERY

## 2021-02-02 PROCEDURE — 84443 ASSAY THYROID STIM HORMONE: CPT | Performed by: ORTHOPAEDIC SURGERY

## 2021-02-02 PROCEDURE — 93005 ELECTROCARDIOGRAM TRACING: CPT

## 2021-02-02 PROCEDURE — 86900 BLOOD TYPING SEROLOGIC ABO: CPT | Performed by: EMERGENCY MEDICINE

## 2021-02-02 PROCEDURE — 0241U HB NFCT DS VIR RESP RNA 4 TRGT: CPT | Performed by: ORTHOPAEDIC SURGERY

## 2021-02-02 PROCEDURE — 96375 TX/PRO/DX INJ NEW DRUG ADDON: CPT

## 2021-02-02 PROCEDURE — 36415 COLL VENOUS BLD VENIPUNCTURE: CPT | Performed by: EMERGENCY MEDICINE

## 2021-02-02 PROCEDURE — 85730 THROMBOPLASTIN TIME PARTIAL: CPT | Performed by: EMERGENCY MEDICINE

## 2021-02-02 PROCEDURE — 72170 X-RAY EXAM OF PELVIS: CPT

## 2021-02-02 RX ORDER — DOCUSATE SODIUM 100 MG/1
100 CAPSULE, LIQUID FILLED ORAL 2 TIMES DAILY
Status: DISCONTINUED | OUTPATIENT
Start: 2021-02-02 | End: 2021-02-04 | Stop reason: HOSPADM

## 2021-02-02 RX ORDER — OXYCODONE HYDROCHLORIDE 5 MG/1
5 TABLET ORAL EVERY 4 HOURS PRN
Status: DISCONTINUED | OUTPATIENT
Start: 2021-02-02 | End: 2021-02-04 | Stop reason: HOSPADM

## 2021-02-02 RX ORDER — CEFAZOLIN SODIUM 2 G/50ML
2000 SOLUTION INTRAVENOUS
Status: COMPLETED | OUTPATIENT
Start: 2021-02-03 | End: 2021-02-03

## 2021-02-02 RX ORDER — METHOCARBAMOL 500 MG/1
500 TABLET, FILM COATED ORAL EVERY 6 HOURS SCHEDULED
Status: DISCONTINUED | OUTPATIENT
Start: 2021-02-02 | End: 2021-02-04 | Stop reason: HOSPADM

## 2021-02-02 RX ORDER — ACETAMINOPHEN 325 MG/1
975 TABLET ORAL EVERY 8 HOURS SCHEDULED
Status: DISCONTINUED | OUTPATIENT
Start: 2021-02-02 | End: 2021-02-04 | Stop reason: HOSPADM

## 2021-02-02 RX ORDER — GABAPENTIN 100 MG/1
100 CAPSULE ORAL 3 TIMES DAILY
Status: DISCONTINUED | OUTPATIENT
Start: 2021-02-02 | End: 2021-02-04 | Stop reason: HOSPADM

## 2021-02-02 RX ORDER — HYDROMORPHONE HCL/PF 1 MG/ML
0.5 SYRINGE (ML) INJECTION ONCE
Status: COMPLETED | OUTPATIENT
Start: 2021-02-02 | End: 2021-02-02

## 2021-02-02 RX ORDER — PANTOPRAZOLE SODIUM 40 MG/1
40 TABLET, DELAYED RELEASE ORAL
Status: DISCONTINUED | OUTPATIENT
Start: 2021-02-03 | End: 2021-02-04 | Stop reason: HOSPADM

## 2021-02-02 RX ORDER — CHLORHEXIDINE GLUCONATE 0.12 MG/ML
15 RINSE ORAL ONCE
Status: COMPLETED | OUTPATIENT
Start: 2021-02-02 | End: 2021-02-03

## 2021-02-02 RX ORDER — SERTRALINE HYDROCHLORIDE 100 MG/1
100 TABLET, FILM COATED ORAL DAILY
Status: DISCONTINUED | OUTPATIENT
Start: 2021-02-03 | End: 2021-02-04 | Stop reason: HOSPADM

## 2021-02-02 RX ORDER — FENTANYL CITRATE 50 UG/ML
75 INJECTION, SOLUTION INTRAMUSCULAR; INTRAVENOUS ONCE
Status: COMPLETED | OUTPATIENT
Start: 2021-02-02 | End: 2021-02-02

## 2021-02-02 RX ORDER — SENNOSIDES 8.6 MG
1 TABLET ORAL DAILY
Status: DISCONTINUED | OUTPATIENT
Start: 2021-02-03 | End: 2021-02-04 | Stop reason: HOSPADM

## 2021-02-02 RX ORDER — OXYCODONE HYDROCHLORIDE 10 MG/1
10 TABLET ORAL EVERY 4 HOURS PRN
Status: DISCONTINUED | OUTPATIENT
Start: 2021-02-02 | End: 2021-02-04 | Stop reason: HOSPADM

## 2021-02-02 RX ORDER — MELATONIN
2000 DAILY
Status: DISCONTINUED | OUTPATIENT
Start: 2021-02-03 | End: 2021-02-04 | Stop reason: HOSPADM

## 2021-02-02 RX ORDER — ONDANSETRON 2 MG/ML
4 INJECTION INTRAMUSCULAR; INTRAVENOUS EVERY 6 HOURS PRN
Status: DISCONTINUED | OUTPATIENT
Start: 2021-02-02 | End: 2021-02-04 | Stop reason: HOSPADM

## 2021-02-02 RX ORDER — INSULIN GLARGINE 100 [IU]/ML
25 INJECTION, SOLUTION SUBCUTANEOUS EVERY 12 HOURS SCHEDULED
Status: DISCONTINUED | OUTPATIENT
Start: 2021-02-02 | End: 2021-02-04 | Stop reason: HOSPADM

## 2021-02-02 RX ORDER — LEVOTHYROXINE SODIUM 0.1 MG/1
100 TABLET ORAL DAILY
Status: DISCONTINUED | OUTPATIENT
Start: 2021-02-03 | End: 2021-02-04 | Stop reason: HOSPADM

## 2021-02-02 RX ORDER — SODIUM CHLORIDE, SODIUM LACTATE, POTASSIUM CHLORIDE, CALCIUM CHLORIDE 600; 310; 30; 20 MG/100ML; MG/100ML; MG/100ML; MG/100ML
75 INJECTION, SOLUTION INTRAVENOUS CONTINUOUS
Status: DISCONTINUED | OUTPATIENT
Start: 2021-02-03 | End: 2021-02-04 | Stop reason: HOSPADM

## 2021-02-02 RX ADMIN — INSULIN GLARGINE 25 UNITS: 100 INJECTION, SOLUTION SUBCUTANEOUS at 21:38

## 2021-02-02 RX ADMIN — HYDROMORPHONE HYDROCHLORIDE 0.5 MG: 1 INJECTION, SOLUTION INTRAMUSCULAR; INTRAVENOUS; SUBCUTANEOUS at 17:57

## 2021-02-02 RX ADMIN — FENTANYL CITRATE 75 MCG: 50 INJECTION INTRAMUSCULAR; INTRAVENOUS at 16:04

## 2021-02-02 RX ADMIN — OXYCODONE HYDROCHLORIDE 10 MG: 10 TABLET ORAL at 21:41

## 2021-02-02 RX ADMIN — INSULIN LISPRO 4 UNITS: 100 INJECTION, SOLUTION INTRAVENOUS; SUBCUTANEOUS at 23:14

## 2021-02-02 RX ADMIN — ACETAMINOPHEN 975 MG: 325 TABLET, FILM COATED ORAL at 21:39

## 2021-02-02 RX ADMIN — DOCUSATE SODIUM 100 MG: 100 CAPSULE, LIQUID FILLED ORAL at 21:38

## 2021-02-02 RX ADMIN — GABAPENTIN 100 MG: 100 CAPSULE ORAL at 21:40

## 2021-02-02 NOTE — H&P
Orthopedics   Katlyn Khalil 76 y o  female MRN: [id number]  Unit/Bed#: X ray      Chief Complaint:   left hip pain    HPI:   76 y o  female community ambulator status post fall complaining of left hip pain and inability to bear weight  Pain is well localized to the hip and is made worse with motion or contact to the area  Denies numbness or tingling  Patient states that she slipped on on ice and landed on her left hip  She does not take any blood thinners  Review Of Systems:   · Skin: Normal  · Neuro: See HPI  · Musculoskeletal: See HPI  · 14 point review of systems negative except as stated above     Past Medical History:   Past Medical History:   Diagnosis Date    Acid reflux     Anxiety     Diabetes mellitus ([address] )     Hypertension     Hypothyroid        Past Surgical History:   Past Surgical History:   Procedure Laterality Date    CHOLECYSTECTOMY      COLONOSCOPY      DILATION AND CURETTAGE OF UTERUS      ESOPHAGOGASTRODUODENOSCOPY      HYSTERECTOMY      44    OOPHORECTOMY      38    NH COLONOSCOPY FLX DX W/COLLJ SPEC WHEN PFRMD N/A 1/28/2016    Procedure: EGD AND COLONOSCOPY;  Surgeon: Gt García MD;  Location: [facility];   Service: Gastroenterology    NH INCISE FINGER TENDON SHEATH Right 1/31/2017    Procedure: LONG FINGER TRIGGER RELEASE ;  Surgeon: Pina Calhoun MD;  [facility];  Service: Orthopedics    NH INCISE FINGER TENDON SHEATH Right 7/21/2016    Procedure: RELEASE TRIGGER FINGER - LONG FINGER;  Surgeon: Pina Calhoun MD;  [facility];  Service: Orthopedics    NH REVISE MEDIAN N/CARPAL TUNNEL SURG Right 1/31/2017    Procedure: WRIST CARPAL TUNNEL RELEASE ; TENOLYSIS OF FPL, FDS 2-5, FDP 2-5;  APPLICATION OF Jeffrey Sands;  Surgeon: Pina Calhoun MD;  [facility];  Service: Orthopedics    ROOT CANAL      UPPER GASTROINTESTINAL ENDOSCOPY         Family History:  Family history reviewed and non-contributory  Family History   Problem Relation Age of Onset    Cancer Father     Heart attack Father     Diabetes type II Father     Arthritis Maternal Grandmother     No Known Problems Paternal Grandmother     Heart disease Paternal Grandfather     Stroke Paternal Grandfather     Cirrhosis Mother     No Known Problems Sister     No Known Problems Brother     No Known Problems Maternal Grandfather     Breast cancer Paternal Aunt 62    Colon cancer Paternal Uncle 28    No Known Problems Sister     No Known Problems Maternal Aunt     No Known Problems Maternal Aunt        Social History:  Social History     Socioeconomic History    Marital status: Legally      Spouse name: None    Number of children: None    Years of education: None    Highest education level: None   Occupational History    None   Social Needs    Financial resource strain: None    Food insecurity     Worry: None     Inability: None    Transportation needs     Medical: None     Non-medical: None   Tobacco Use    Smoking status: Former Smoker     Packs/day: 2 00     Years: 10 00     Pack years: 20 00     Types: Cigarettes     Quit date:      Years since quittin 1    Smokeless tobacco: Never Used   Substance and Sexual Activity    Alcohol use: Yes     Comment: Rarely    Drug use: No    Sexual activity: Not Currently   Lifestyle    Physical activity     Days per week: None     Minutes per session: None    Stress: None   Relationships    Social connections     Talks on phone: None     Gets together: None     Attends [employer] service: None     Active member of club or organization: None     Attends meetings of clubs or organizations: None     Relationship status: None    Intimate partner violence     Fear of current or ex partner: None     Emotionally abused: None     Physically abused: None     Forced sexual activity: None   Other Topics Concern    None   Social History Narrative    None       Allergies:    Allergies   Allergen Reactions    Metformin Diarrhea           Labs:  0   Lab Value Date/Time    HCT 39 7 02/02/2021 1601    HCT 36 6 09/16/2020 0728    HCT 36 9 02/19/2019 1202    HCT 38 4 08/23/2017 0754    HCT 37 8 12/19/2016 0805    HCT 37 8 12/19/2016 0805    HCT 37 8 07/28/2016 1001    HGB 13 0 02/02/2021 1601    HGB 11 9 09/16/2020 0728    HGB 12 0 02/19/2019 1202    HGB 12 8 08/23/2017 0754    HGB 12 3 12/19/2016 0805    HGB 12 3 12/19/2016 0805    HGB 12 3 07/28/2016 1001    INR 1 03 02/02/2021 1601    WBC 8 09 02/02/2021 1601    WBC 5 7 09/16/2020 0728    WBC 6 1 02/19/2019 1202    WBC 5 50 08/23/2017 0754    WBC 6 3 12/19/2016 0805    WBC 6 3 12/19/2016 0805    WBC 6 5 07/28/2016 1001    ESR 34 (H) 11/18/2019 0838    CRP 10 2 (H) 11/18/2019 0838       Meds:    Current Facility-Administered Medications:     HYDROmorphone (DILAUDID) injection 0 5 mg, 0 5 mg, Intravenous, Once, [place],     Current Outpatient Medications:     Blood Glucose Monitoring Suppl (ACCU-CHEK MAT PLUS) w/Device KIT, Test 4 times daily, Disp: 1 kit, Rfl: 0    Cholecalciferol (VITAMIN D) 2000 units tablet, TAKE ONE TABLET BY MOUTH EVERY DAY, Disp: 30 tablet, Rfl: 0    Continuous Blood Gluc  (Dexcom G6 ) MERCY, Use 1 each 5 (five) times a day, Disp: 1 Device, Rfl: 1    Continuous Blood Gluc Sensor (Dexcom G6 Sensor) MISC, Use 1 each 5 (five) times a day, Disp: 3 each, Rfl: 5    Continuous Blood Gluc Transmit (Dexcom G6 Transmitter) MISC, Use 1 each 5 (five) times a day, Disp: 1 each, Rfl: 5    cyanocobalamin (VITAMIN B-12) 1,000 mcg tablet, Take 1,000 mcg by mouth daily, Disp: , Rfl:     glimepiride (AMARYL) 4 mg tablet, Take 1 tablet (4 mg total) by mouth 2 (two) times a day, Disp: 180 tablet, Rfl: 1    glucose blood (FREESTYLE LITE) test strip, 1 each by Other route 4 (four) times a day Use 4 times daily DM2-- free style lite test strips, Disp: 400 each, Rfl: 1    insulin aspart, w/niacinamide, (FIASP) 100 Units/mL injection pen, Inject 12 Units under the skin 4 (four) times a day, Disp: 3 pen, Rfl: 5    insulin glargine (Lantus SoloStar) 100 units/mL injection pen, 25 units in AM and 35 units in PM, Disp: 15 mL, Rfl: 5    Insulin Pen Needle (BD Pen Needle Kelly U/F) 32G X 4 MM MISC, by Other route 4 (four) times a day, Disp: 400 each, Rfl: 1    Insulin Syringe-Needle U-100 (B-D INS SYR ULTRAFINE  3CC/30G) 30G X 1/2" 0 3 ML MISC, Inject under the skin 4 (four) times a day, Disp: 100 each, Rfl: 4    Lancets (FREESTYLE) lancets, by Other route 3 (three) times a day Test blood glucose, Disp: 90 each, Rfl: 5    levothyroxine 100 mcg tablet, Take 1 tablet (100 mcg total) by mouth daily, Disp: 90 tablet, Rfl: 1    lisinopril-hydrochlorothiazide (PRINZIDE,ZESTORETIC) 20-12 5 MG per tablet, Take 0 5 tablets by mouth daily, Disp: 15 tablet, Rfl: 5    meloxicam (MOBIC) 15 mg tablet, Take 1 tablet (15 mg total) by mouth daily, Disp: 30 tablet, Rfl: 2    omeprazole (PriLOSEC) 40 MG capsule, Take 1 capsule (40 mg total) by mouth daily, Disp: 30 capsule, Rfl: 5    sertraline (ZOLOFT) 100 mg tablet, Take 1 tablet (100 mg total) by mouth daily, Disp: 30 tablet, Rfl: 5    Blood Culture:   No results found for: BLOODCX    Wound Culture:   No results found for: WOUNDCULT    Ins and Outs:  No intake/output data recorded  Physical Exam:   /72   Pulse 94   Temp 97 9 °F (36 6 °C) (Oral)   Resp 17   Ht 5' 2" (1 575 m)   Wt 90 7 kg (200 lb)   SpO2 96%   BMI 36 58 kg/m²   Gen: Alert and oriented to person, place, time  HEENT: EOMI, eyes clear, moist mucus membranes, hearing intact  Respiratory: Bilateral chest rise   No audible wheezing found  Cardiovascular: Regular Rate and Rhythm  Abdomen: soft nontender/nondistended  Musculoskeletal: left lower extremity  · Skin intact, limb shortened and externally rotated  · Tender to palpation over hip  · Pain with internal/external rotation of the hip  · Sensation intact L3-S1  · Intact ankle dorsi/plantar flexion, EHL/FHL  · 2+ DP pulse      Radiology:   I personally reviewed the films  X-rays left hip shows basicervical femoral neck fracture    _*_*_*_*_*_*_*_*_*_*_*_*_*_*_*_*_*_*_*_*_*_*_*_*_*_*_*_*_*_*_*_*_*_*_*_*_*_*_*_*_*    Assessment:  76 y  o female status post fall with left basicervical femoral neck fracture  Patient would benefit from operative fixation    Plan:   · Non weight bearing left lower extremity  · Analgesics for pain  · NPO at midnight  · Carmona Catheter insertion  · Pre-op labs  · Medicine consult for all medical management and preoperative risk stratification  · To OR for IM nail femur fracture of basicervical femoral neck fracture  · Body mass index is 36 58 kg/m²  moderately obese  Recommend behavior modifications, nutrition and physical activity    · Dispo: Ortho will follow      Cathrine Blizzard, MD

## 2021-02-02 NOTE — ED PROVIDER NOTES
History  Chief Complaint   Patient presents with    Fall     Patient was shoveling snow and as she was walking back into her house she slipped and fell onto her left side; reports left sided hip pain and lower back pain; slight rotation noted to left leg but not shortened     77 yo female who does not take aspirin or blood thinners states that she was outside today trying to walk to talk to someone who was going to be plowing her driveway when she slipped and fell onto her L hip  Since then she has been unable to ambulate and EMS had to pick her up off the ground where she fell  Patient denies head strike, recent illness, fever, sweats, chills, sore throat, cough, chest pain, shortness of breath or abdominal pain, nausea vomiting diarrhea constipation dysuria, hematuria  Patient denies any symptoms prior to the fall and states that she felt just because she slipped on the ice  Prior to Admission Medications   Prescriptions Last Dose Informant Patient Reported? Taking?    Blood Glucose Monitoring Suppl (ACCU-CHEK MAT PLUS) w/Device KIT 2/2/2021 at Unknown time Self No Yes   Sig: Test 4 times daily   Cholecalciferol (VITAMIN D) 2000 units tablet 2/1/2021 at Unknown time Self No Yes   Sig: TAKE ONE TABLET BY MOUTH EVERY DAY   Continuous Blood Gluc  (Dexcom G6 ) [facility]   No Yes   Sig: Use 1 each 5 (five) times a day   Continuous Blood Gluc Sensor (Dexcom G6 Sensor) MISC   No Yes   Sig: Use 1 each 5 (five) times a day   Continuous Blood Gluc Transmit (Dexcom G6 Transmitter) MISC   No Yes   Sig: Use 1 each 5 (five) times a day   Insulin Pen Needle (BD Pen Needle Kelly U/F) 32G X 4 MM MISC  Self No Yes   Sig: by Other route 4 (four) times a day   Insulin Syringe-Needle U-100 (B-D INS SYR ULTRAFINE  3CC/30G) 30G X 1/2" 0 3 ML MISC  Self No Yes   Sig: Inject under the skin 4 (four) times a day   Lancets (FREESTYLE) lancets  Self No Yes   Sig: by Other route 3 (three) times a day Test blood glucose cyanocobalamin (VITAMIN B-12) 1,000 mcg tablet 2021 at Unknown time Self Yes Yes   Sig: Take 1,000 mcg by mouth daily   glimepiride (AMARYL) 4 mg tablet 2021 at Unknown time Self No Yes   Sig: Take 1 tablet (4 mg total) by mouth 2 (two) times a day   glucose blood (FREESTYLE LITE) test strip  Self No Yes   Si each by Other route 4 (four) times a day Use 4 times daily DM2-- free style lite test strips   insulin aspart, w/niacinamide, (FIASP) 100 Units/mL injection pen 2021 at Unknown time Self No Yes   Sig: Inject 12 Units under the skin 4 (four) times a day   insulin glargine (Lantus SoloStar) 100 units/mL injection pen 2021 at Unknown time Self No Yes   Si units in AM and 35 units in PM   levothyroxine 100 mcg tablet 2021 at Unknown time  No Yes   Sig: Take 1 tablet (100 mcg total) by mouth daily   lisinopril-hydrochlorothiazide (PRINZIDE,ZESTORETIC) 20-12 5 MG per tablet 2021 at Unknown time  No Yes   Sig: Take 0 5 tablets by mouth daily   meloxicam (MOBIC) 15 mg tablet  Self No Yes   Sig: Take 1 tablet (15 mg total) by mouth daily   omeprazole (PriLOSEC) 40 MG capsule 2021 at Unknown time Self No Yes   Sig: Take 1 capsule (40 mg total) by mouth daily   sertraline (ZOLOFT) 100 mg tablet 2021 at Unknown time Self No Yes   Sig: Take 1 tablet (100 mg total) by mouth daily      Facility-Administered Medications: None       Past Medical History:   Diagnosis Date    Acid reflux     Anxiety     Diabetes mellitus ([address] )     Hypertension     Hypothyroid        Past Surgical History:   Procedure Laterality Date    CHOLECYSTECTOMY      COLONOSCOPY      DILATION AND CURETTAGE OF UTERUS      ESOPHAGOGASTRODUODENOSCOPY      HYSTERECTOMY      44    OOPHORECTOMY      44    [place] COLONOSCOPY FLX DX W/COLLJ SPEC WHEN PFRMD N/A 2016    Procedure: EGD AND COLONOSCOPY;  Surgeon: Shannan Smith MD;  [facility]: [facility];   Service: Gastroenterology    W[place] INCISE FINGER TENDON SHEATH Right 2017    Procedure: LONG FINGER TRIGGER RELEASE ;  Surgeon: Humberto Oliveira MD;  [facility];  Service: Orthopedics    AL INCISE FINGER TENDON SHEATH Right 2016    Procedure: RELEASE TRIGGER FINGER - LONG FINGER;  Surgeon: Humberto Oliveira MD;  [facility];  Service: Orthopedics    AL REVISE MEDIAN N/CARPAL TUNNEL SURG Right 2017    Procedure: WRIST CARPAL TUNNEL RELEASE ; TENOLYSIS OF FPL, FDS 2-5, FDP 2-5;  APPLICATION OF Rivera Corado;  Surgeon: Humberto Oliveira MD;  [facility];  Service: Orthopedics    ROOT CANAL      UPPER GASTROINTESTINAL ENDOSCOPY         Family History   Problem Relation Age of Onset    Cancer Father     Heart attack Father     Diabetes type II Father     Arthritis Maternal Grandmother     No Known Problems Paternal Grandmother     Heart disease Paternal Grandfather     Stroke Paternal Grandfather     Cirrhosis Mother     No Known Problems Sister     No Known Problems Brother     No Known Problems Maternal Grandfather     Breast cancer Paternal Aunt 62    Colon cancer Paternal Uncle 28    No Known Problems Sister     No Known Problems Maternal Aunt     No Known Problems Maternal Aunt      I have reviewed and agree with the history as documented      E-Cigarette/Vaping    E-Cigarette Use Never User      E-Cigarette/Vaping Substances    Nicotine No     THC No     CBD No     Flavoring No     Other No     Unknown No      Social History     Tobacco Use    Smoking status: Former Smoker     Packs/day: 2 00     Years: 10 00     Pack years: 20 00     Types: Cigarettes     Quit date:      Years since quittin 1    Smokeless tobacco: Never Used   Substance Use Topics    Alcohol use: Yes     Comment: Rarely    Drug use: No        Review of Systems    Physical Exam  ED Triage Vitals [21 1552]   Temperature Pulse Respirations Blood Pressure SpO2   97 9 °F (36 6 °C) 96 18 (!) 190/80 98 %      Temp Source Heart Rate Source Patient Position - Orthostatic VS BP Location FiO2 (%)   Oral Monitor Lying Right arm --      Pain Score       4             Orthostatic Vital Signs  Vitals:    02/02/21 1552 02/02/21 1645 02/02/21 2045   BP: (!) 190/80 156/72 138/69   Pulse: 96 94 94   Patient Position - Orthostatic VS: Lying         Physical Exam    ED Medications  Medications   ceFAZolin (ANCEF) IVPB (premix in dextrose) 2,000 mg 50 mL (has no administration in time range)   lactated ringers infusion (has no administration in time range)   oxyCODONE (ROXICODONE) IR tablet 5 mg (has no administration in time range)   oxyCODONE (ROXICODONE) immediate release tablet 10 mg (10 mg Oral Given 2/2/21 2141)   acetaminophen (TYLENOL) tablet 975 mg (975 mg Oral Given 2/2/21 2139)   methocarbamol (ROBAXIN) tablet 500 mg (500 mg Oral Not Given 2/2/21 1800)   gabapentin (NEURONTIN) capsule 100 mg (100 mg Oral Given 2/2/21 2140)   chlorhexidine (PERIDEX) 0 12 % oral rinse 15 mL (has no administration in time range)   ondansetron (ZOFRAN) injection 4 mg (has no administration in time range)   docusate sodium (COLACE) capsule 100 mg (100 mg Oral Given 2/2/21 2138)   senna (SENOKOT) tablet 8 6 mg (has no administration in time range)   cholecalciferol (VITAMIN D3) tablet 2,000 Units (has no administration in time range)   insulin glargine (LANTUS) subcutaneous injection 25 Units 0 25 mL (25 Units Subcutaneous Given 2/2/21 2138)   levothyroxine tablet 100 mcg (has no administration in time range)   pantoprazole (PROTONIX) EC tablet 40 mg (has no administration in time range)   sertraline (ZOLOFT) tablet 100 mg (has no administration in time range)   insulin lispro (HumaLOG) 100 units/mL subcutaneous injection 1-6 Units (has no administration in time range)   insulin lispro (HumaLOG) 100 units/mL subcutaneous injection 12 Units (has no administration in time range)   fentanyl citrate (PF) 100 MCG/2ML 75 mcg (75 mcg Intravenous Given 2/2/21 1604) HYDROmorphone (DILAUDID) injection 0 5 mg (0 5 mg Intravenous Given 2/2/21 1757)       Diagnostic Studies  Results Reviewed     Procedure Component Value Units Date/Time    PTH, intact [[id number]]  (Normal) Collected: 02/02/21 1811    Lab Status: Final result Specimen: Blood from Arm, Right Updated: 02/02/21 1848     PTH 48 5 pg/mL     Vitamin D 25 hydroxy [[id number]]  (Normal) Collected: 02/02/21 1811    Lab Status: Final result Specimen: Blood from Arm, Right Updated: 02/02/21 1848     Vit D, 25-Hydroxy 39 2 ng/mL     Narrative: This assay is a certified procedure of the CDC Vitamin D Standardization Certification Program (VDSCP)     Deficiency <20ng/ml   Insufficiency 20-30ng/ml   Sufficient  ng/ml     *Patients undergoing fluorescein dye angiography may retain small amounts of fluorescein in the body for 48-72 hours post procedure  Samples containing fluorescein can produce falsely elevated Vitamin D values  If the patient had this procedure, a specimen should be resubmitted post fluorescein clearance  TSH, 3rd generation [[id number]]  (Abnormal) Collected: 02/02/21 1811    Lab Status: Final result Specimen: Blood from Arm, Right Updated: 02/02/21 1843     TSH 3RD GENERATON 5 360 uIU/mL     Narrative:      Patients undergoing fluorescein dye angiography may retain small amounts of fluorescein in the body for 48-72 hours post procedure  Samples containing fluorescein can produce falsely depressed TSH values  If the patient had this procedure,a specimen should be resubmitted post fluorescein clearance  Sedimentation rate, automated [[id number]]  (Abnormal) Collected: 02/02/21 1811    Lab Status: Final result Specimen: Blood from Arm, Right Updated: 02/02/21 1828     Sed Rate 40 mm/hour     Narrative:      New method- Test performed using  automated [employer]  If following a patient's inflammatory disease during treatment, a new baseline should be established      Comprehensive metabolic panel [[id number]]  (Abnormal) Collected: 02/02/21 1601    Lab Status: Final result Specimen: Blood from Arm, Right Updated: 02/02/21 1631     Sodium 136 mmol/L      Potassium 4 1 mmol/L      Chloride 103 mmol/L      CO2 28 mmol/L      ANION GAP 5 mmol/L      BUN 20 mg/dL      Creatinine 0 98 mg/dL      Glucose 364 mg/dL      Calcium 9 4 mg/dL      AST 43 U/L      ALT 59 U/L      Alkaline Phosphatase 74 U/L      Total Protein 7 8 g/dL      Albumin 3 5 g/dL      Total Bilirubin 0 43 mg/dL      eGFR 60 ml/min/1 73sq m     Narrative:      National Kidney Disease Foundation guidelines for Chronic Kidney Disease (CKD):     Stage 1 with normal or high GFR (GFR > 90 mL/min/1 73 square meters)    Stage 2 Mild CKD (GFR = 60-89 mL/min/1 73 square meters)    Stage 3A Moderate CKD (GFR = 45-59 mL/min/1 73 square meters)    Stage 3B Moderate CKD (GFR = 30-44 mL/min/1 73 square meters)    Stage 4 Severe CKD (GFR = 15-29 mL/min/1 73 square meters)    Stage 5 End Stage CKD (GFR <15 mL/min/1 73 square meters)  Note: GFR calculation is accurate only with a steady state creatinine    Protime-INR [[id number]]  (Normal) Collected: 02/02/21 1601    Lab Status: Final result Specimen: Blood from Arm, Right Updated: 02/02/21 1630     Protime 13 5 seconds      INR 1 03    APTT [[id number]]  (Normal) Collected: 02/02/21 1601    Lab Status: Final result Specimen: Blood from Arm, Right Updated: 02/02/21 1630     PTT 27 seconds     CBC and differential [[id number]] Collected: 02/02/21 1601    Lab Status: Final result Specimen: Blood from Arm, Right Updated: 02/02/21 1624     WBC 8 09 Thousand/uL      RBC 4 72 Million/uL      Hemoglobin 13 0 g/dL      Hematocrit 39 7 %      MCV 84 fL      MCH 27 5 pg      MCHC 32 7 g/dL      RDW 13 4 %      MPV 10 6 fL      Platelets 104 Thousands/uL      nRBC 0 /100 WBCs      Neutrophils Relative 68 %      Immat GRANS % 1 %      Lymphocytes Relative 21 %      Monocytes Relative 8 %      Eosinophils Relative 2 %      Basophils Relative 0 %      Neutrophils Absolute 5 60 Thousands/µL      Immature Grans Absolute 0 04 Thousand/uL      Lymphocytes Absolute 1 67 Thousands/µL      Monocytes Absolute 0 62 Thousand/µL      Eosinophils Absolute 0 14 Thousand/µL      Basophils Absolute 0 02 Thousands/µL                  XR femur 2 views LEFT   Final Result by Megan Rudd MD (02/02 1650)      Intratrochanteric fracture of the left femur  Workstation performed: [id number]         XR chest 1 view portable   Final Result by Megan Rudd MD (02/02 1648)      No acute abnormality in the chest                   Workstation performed: [id number]         XR pelvis ap only 1 or 2 vw   Final Result by Megan Rudd MD (02/02 1651)      Intratrochanteric fracture of the left femur  Workstation performed: [id number]               Procedures  ECG 12 Lead Documentation Only    Date/Time: 2/2/2021 5:59 PM  Performed by: Rayray Wise DO  Authorized by: Rayray Wise DO     Indications / Diagnosis:  Pre-op  ECG reviewed by me, the ED Provider: yes    Patient location:  ED  Previous ECG:     Previous ECG:  Compared to current    Similarity:  No change  Interpretation:     Interpretation: normal    Rate:     ECG rate:  93    ECG rate assessment: normal    Rhythm:     Rhythm: sinus rhythm    Ectopy:     Ectopy: none    QRS:     QRS axis:  Normal    QRS intervals:  Normal  Conduction:     Conduction: normal    ST segments:     ST segments:  Normal  T waves:     T waves: normal            ED Course  ED Course as of Feb 02 2252   Tue Feb 02, 2021   1728 Discussed xray findings with patient  Ortho consulted                  Identification of Seniors at Risk      Most Recent Value   (ISAR) Identification of Seniors at Risk   Before the illness or injury that brought you to the Emergency, did you need someone to help you on a regular basis?   0 Filed at: 02/02/2021 1552   In the last 24 hours, have you needed more help than usual?  0 Filed at: 02/02/2021 1552   Have you been hospitalized for one or more nights during the past 6 months? 0 Filed at: 02/02/2021 1552   In general, do you see well?  0 Filed at: 02/02/2021 1552   In general, do you have serious problems with your memory? 0 Filed at: 02/02/2021 1552   Do you take more than three different medications every day? 1 Filed at: 02/02/2021 1552   ISAR Score  1 Filed at: 02/02/2021 1552                    SBIRT 20yo+      Most Recent Value   SBIRT (23 yo +)   In order to provide better care to our patients, we are screening all of our patients for alcohol and drug use  Would it be okay to ask you these screening questions? No Filed at: 02/02/2021 1556                MDM  Number of Diagnoses or Management Options  Diagnosis management comments: Likely femoral neck fracture  Xrays  Pre-op labs, pain control   Consult ortho      Disposition  Final diagnoses:   Closed fracture of neck of left femur, initial encounter ([facility] )     Time reflects when diagnosis was documented in both MDM as applicable and the Disposition within this note     Time User Action Codes Description Comment    2/2/2021  5:52 PM Silvana Whitehead Add [[id number]] Closed fracture of neck of left femur, initial encounter [facility])       ED Disposition     None      Follow-up Information    None         Current Discharge Medication List      CONTINUE these medications which have NOT CHANGED    Details   Blood Glucose Monitoring Suppl (ACCU-CHEK MAT PLUS) w/Device KIT Test 4 times daily  Qty: 1 kit, Refills: 0    Associated Diagnoses: Uncontrolled type 2 diabetes mellitus with hypoglycemia without coma (HCC)      Cholecalciferol (VITAMIN D) 2000 units tablet TAKE ONE TABLET BY MOUTH EVERY DAY  Qty: 30 tablet, Refills: 0    Associated Diagnoses: Vitamin D deficiency      Continuous Blood Gluc  (Dexcom G6 ) [facility] Use 1 each 5 (five) times a day  Qty: 1 Device, Refills: 1    Associated Diagnoses: Uncontrolled type 2 diabetes mellitus with hypoglycemia without coma (HCC)      Continuous Blood Gluc Sensor (Dexcom G6 Sensor) MISC Use 1 each 5 (five) times a day  Qty: 3 each, Refills: 5    Associated Diagnoses: Uncontrolled type 2 diabetes mellitus with hypoglycemia without coma (HCC)      Continuous Blood Gluc Transmit (Dexcom G6 Transmitter) MISC Use 1 each 5 (five) times a day  Qty: 1 each, Refills: 5    Associated Diagnoses: Uncontrolled type 2 diabetes mellitus with hypoglycemia without coma ([facility])      cyanocobalamin (VITAMIN B-12) 1,000 mcg tablet Take 1,000 mcg by mouth daily      glimepiride (AMARYL) 4 mg tablet Take 1 tablet (4 mg total) by mouth 2 (two) times a day  Qty: 180 tablet, Refills: 1    Associated Diagnoses: Type 2 diabetes mellitus with complication, with long-term current use of insulin ([facility])      glucose blood (FREESTYLE LITE) test strip 1 each by Other route 4 (four) times a day Use 4 times daily DM2-- free style lite test strips  Qty: 400 each, Refills: 1    Associated Diagnoses: Uncontrolled type 2 diabetes mellitus with hypoglycemia without coma ([facility])      insulin aspart, w/niacinamide, (FIASP) 100 Units/mL injection pen Inject 12 Units under the skin 4 (four) times a day  Qty: 3 pen, Refills: 5    Associated Diagnoses: Uncontrolled type 2 diabetes mellitus with hypoglycemia without coma ([facility])      insulin glargine (Lantus SoloStar) 100 units/mL injection pen 25 units in AM and 35 units in PM  Qty: 15 mL, Refills: 5    Associated Diagnoses: Uncontrolled type 2 diabetes mellitus with hypoglycemia without coma ([facility])      Insulin Pen Needle (BD Pen Needle Kelly U/F) 32G X 4 MM MISC by Other route 4 (four) times a day  Qty: 400 each, Refills: 1    Comments: Dx E11 9 Use 4 times daily  Associated Diagnoses: Uncontrolled type 2 diabetes mellitus with hyperosmolarity without coma, without long-term current use of insulin ([facility])      Insulin Syringe-Needle U-100 (B-D INS SYR ULTRAFINE  3CC/30G) 30G X 1/2" 0 3 ML MISC Inject under the skin 4 (four) times a day  Qty: 100 each, Refills: 4    Associated Diagnoses: Type 2 diabetes mellitus without complication, without long-term current use of insulin (HCC)      Lancets (FREESTYLE) lancets by Other route 3 (three) times a day Test blood glucose  Qty: 90 each, Refills: 5    Associated Diagnoses: Type 2 diabetes mellitus without complication, without long-term current use of insulin (HCC)      levothyroxine 100 mcg tablet Take 1 tablet (100 mcg total) by mouth daily  Qty: 90 tablet, Refills: 1    Associated Diagnoses: Hypothyroidism, unspecified type      lisinopril-hydrochlorothiazide (PRINZIDE,ZESTORETIC) 20-12 5 MG per tablet Take 0 5 tablets by mouth daily  Qty: 15 tablet, Refills: 5    Associated Diagnoses: Essential hypertension, benign      meloxicam (MOBIC) 15 mg tablet Take 1 tablet (15 mg total) by mouth daily  Qty: 30 tablet, Refills: 2    Associated Diagnoses: Acute pain of right shoulder      omeprazole (PriLOSEC) 40 MG capsule Take 1 capsule (40 mg total) by mouth daily  Qty: 30 capsule, Refills: 5    Associated Diagnoses: Gastroesophageal reflux disease, esophagitis presence not specified      sertraline (ZOLOFT) 100 mg tablet Take 1 tablet (100 mg total) by mouth daily  Qty: 30 tablet, Refills: 5    Associated Diagnoses: Anxiety           No discharge procedures on file  PDMP Review     None           ED Provider  Attending physically available and evaluated Luz Marina Persaud  RICHARD managed the patient along with the ED Attending      Electronically Signed by

## 2021-02-02 NOTE — CONSULTS
Consult- Bandar Lomas 1955, 76 y o  female MRN: [id number]    Unit/Bed#: ED 25 Encounter: [id number]    Primary Care Provider: Raúl Pepper MD   Date and time admitted to hospital: 2/2/2021  3:46 PM      Inpatient consult to Internal Medicine  Consult performed by: Katlin Enriquez MD  Consult ordered by: Rosanne Beltran MD        Preoperative clearance  Assessment & Plan  Patient able to complete > 4Mets w/o difficulty  Patient is of mild- moderate risk for surgical intervention, given her history of uncontrolled diabetes  Patient is medically cleared for surgery as benefits outweigh risks  Will hold lisinopril-HCTZ to avoid alec preop then may resume postop    Essential hypertension  Assessment & Plan   Controlled   will hold lisinopril -hydrochlorothiazide combo pill until after surgical intervention    Hypothyroidism  Assessment & Plan   Continue levothyroxine    Diabetes type 2, uncontrolled (HCC)  Assessment & Plan  Lab Results   Component Value Date    HGBA1C 8 0 (H) 09/16/2020       No results for input(s): POCGLU in the last 72 hours  Blood Sugar Average: Last 72 hrs: Will continue patients basal bolus regimen, SSI, diabetic diet    Anxiety  Assessment & Plan   Continue Zoloft    * Closed left hip fracture, initial encounter [facility])  Assessment & Plan   Management as per Orthopedic surgery   plans for fixation 2/3 a m  VTE Prophylaxis: Reason for no pharmacologic prophylaxis planned for OR tomorrow  / sequential compression device     Recommendations for Discharge:  · When cleared by PT    Counseling / Coordination of Care Time: 1 hour  Greater than 50% of total time spent on patient counseling and coordination of care  Collaboration of Care: Were Recommendations Directly Discussed with Primary Treatment Team? - Yes     History of Present Illness:    Bandar Lomas is a 76 y o  female who is originally admitted to the orthopedic surgical service due to L hip fracture   We are consulted for preop clearance  Patient  States she was shoveling the snow today when she fell onto her left hip  In the ED she was found to have an intratrochanteric fracture of the left femur  Orthopedic surgery plans for fixation tomorrow morning  Patient has never had a stress test   She denies any chest pain, shortness of breath, lightheadedness  She is able to complete more than 4 Mets without difficulty  Review of Systems:    Review of Systems   Constitutional: Negative for activity change, chills, diaphoresis, fatigue and fever  HENT: Negative for congestion, rhinorrhea, sinus pressure and trouble swallowing  Eyes: Negative  Respiratory: Negative for cough, chest tightness and shortness of breath  Cardiovascular: Negative for chest pain, palpitations and leg swelling  Gastrointestinal: Negative for abdominal distention, abdominal pain, constipation, diarrhea, nausea and vomiting  Endocrine: Negative  Genitourinary: Negative for difficulty urinating, frequency, hematuria and urgency  Musculoskeletal: Negative for back pain, joint swelling and neck pain  L hip pain    Skin: Negative  Allergic/Immunologic: Negative  Neurological: Negative for dizziness, syncope, weakness, light-headedness and headaches  Hematological: Negative  Psychiatric/Behavioral: Negative  All other systems reviewed and are negative  Past Medical and Surgical History:     Past Medical History:   Diagnosis Date    Acid reflux     Anxiety     Diabetes mellitus ([address] )     Hypertension     Hypothyroid        Past Surgical History:   Procedure Laterality Date    CHOLECYSTECTOMY      COLONOSCOPY      DILATION AND CURETTAGE OF UTERUS      ESOPHAGOGASTRODUODENOSCOPY      HYSTERECTOMY      45    OOPHORECTOMY      39    WI COLONOSCOPY FLX DX W/COLLJ SPEC WHEN PFRMD N/A 1/28/2016    Procedure: EGD AND COLONOSCOPY;  Surgeon: Joseline Diego MD;  [facility];   Service: Gastroenterology    MA INCISE FINGER TENDON SHEATH Right 1/31/2017    Procedure: LONG FINGER TRIGGER RELEASE ;  Surgeon: Humberto Oliveira MD;  [facility];  Service: Orthopedics    MA INCISE FINGER TENDON SHEATH Right 7/21/2016    Procedure: RELEASE TRIGGER FINGER - LONG FINGER;  Surgeon: Humberto Oliveira MD;  [facility];  Service: Orthopedics    MA REVISE MEDIAN N/CARPAL TUNNEL SURG Right 1/31/2017    Procedure: WRIST CARPAL TUNNEL RELEASE ; TENOLYSIS OF FPL, FDS 2-5, FDP 2-5;  APPLICATION OF Rivera Corado;  Surgeon: Humberto Oliveira MD;  [facility];  Service: Orthopedics    ROOT CANAL      UPPER GASTROINTESTINAL ENDOSCOPY         Meds/Allergies:    PTA meds:   Prior to Admission Medications   Prescriptions Last Dose Informant Patient Reported? Taking?    Blood Glucose Monitoring Suppl (ACCU-CHEK MAT PLUS) w/Device KIT  Self No Yes   Sig: Test 4 times daily   Cholecalciferol (VITAMIN D) 2000 units tablet  Self No Yes   Sig: TAKE ONE TABLET BY MOUTH EVERY DAY   Continuous Blood Gluc  (Dexcom G6 ) MERCY   No Yes   Sig: Use 1 each 5 (five) times a day   Continuous Blood Gluc Sensor (Dexcom G6 Sensor) MISC   No Yes   Sig: Use 1 each 5 (five) times a day   Continuous Blood Gluc Transmit (Dexcom G6 Transmitter) MISC   No Yes   Sig: Use 1 each 5 (five) times a day   Insulin Pen Needle (BD Pen Needle Kelly U/F) 32G X 4 MM MISC  Self No Yes   Sig: by Other route 4 (four) times a day   Insulin Syringe-Needle U-100 (B-D INS SYR ULTRAFINE  3CC/30G) 30G X 1/2" 0 3 ML MISC  Self No Yes   Sig: Inject under the skin 4 (four) times a day   Lancets (FREESTYLE) lancets  Self No Yes   Sig: by Other route 3 (three) times a day Test blood glucose   cyanocobalamin (VITAMIN B-12) 1,000 mcg tablet  Self Yes Yes   Sig: Take 1,000 mcg by mouth daily   glimepiride (AMARYL) 4 mg tablet  Self No Yes   Sig: Take 1 tablet (4 mg total) by mouth 2 (two) times a day   glucose blood (FREESTYLE LITE) test strip  Self No Yes   Si each by Other route 4 (four) times a day Use 4 times daily DM2-- free style lite test strips   insulin aspart, w/niacinamide, (FIASP) 100 Units/mL injection pen  Self No Yes   Sig: Inject 12 Units under the skin 4 (four) times a day   insulin glargine (Lantus SoloStar) 100 units/mL injection pen  Self No Yes   Si units in AM and 35 units in PM   levothyroxine 100 mcg tablet   No Yes   Sig: Take 1 tablet (100 mcg total) by mouth daily   lisinopril-hydrochlorothiazide (PRINZIDE,ZESTORETIC) 20-12 5 MG per tablet   No Yes   Sig: Take 0 5 tablets by mouth daily   meloxicam (MOBIC) 15 mg tablet  Self No Yes   Sig: Take 1 tablet (15 mg total) by mouth daily   omeprazole (PriLOSEC) 40 MG capsule  Self No Yes   Sig: Take 1 capsule (40 mg total) by mouth daily   sertraline (ZOLOFT) 100 mg tablet  Self No Yes   Sig: Take 1 tablet (100 mg total) by mouth daily      Facility-Administered Medications: None       Allergies:    Allergies   Allergen Reactions    Metformin Diarrhea       Social History:     Marital Status: Legally     Substance Use History:   Social History     Substance and Sexual Activity   Alcohol Use Yes    Comment: Rarely     Social History     Tobacco Use   Smoking Status Former Smoker    Packs/day: 2 00    Years: 10 00    Pack years: 20 00    Types: Cigarettes    Quit date: 12    Years since quittin 1   Smokeless Tobacco Never Used     Social History     Substance and Sexual Activity   Drug Use No       Family History:    Family History   Problem Relation Age of Onset    Cancer Father     Heart attack Father     Diabetes type II Father     Arthritis Maternal Grandmother     No Known Problems Paternal Grandmother     Heart disease Paternal Grandfather     Stroke Paternal Grandfather     Cirrhosis Mother     No Known Problems Sister     No Known Problems Brother     No Known Problems Maternal Grandfather     Breast cancer Paternal Aunt 62  Colon cancer Paternal Uncle 28    No Known Problems Sister     No Known Problems Maternal Aunt     No Known Problems Maternal Aunt        Physical Exam:     Vitals:   Blood Pressure: 156/72 (02/02/21 1645)  Pulse: 94 (02/02/21 1645)  Temperature: 97 9 °F (36 6 °C) (02/02/21 1552)  Temp Source: Oral (02/02/21 1552)  Respirations: 17 (02/02/21 1645)  Height: 5' 2" (157 5 cm) (02/02/21 1549)  Weight - Scale: 90 7 kg (200 lb) (02/02/21 1549)  SpO2: 96 % (02/02/21 1645)    Physical Exam  Vitals signs and nursing note reviewed  Constitutional:       Appearance: Normal appearance  She is obese  HENT:      Head: Normocephalic and atraumatic  Right Ear: External ear normal       Left Ear: External ear normal       Nose: Nose normal       Mouth/Throat:      Mouth: Mucous membranes are moist       Pharynx: Oropharynx is clear  Eyes:      Conjunctiva/sclera: Conjunctivae normal       Pupils: Pupils are equal, round, and reactive to light  Neck:      Musculoskeletal: Neck supple  No muscular tenderness  Cardiovascular:      Rate and Rhythm: Normal rate and regular rhythm  Pulses: Normal pulses  Heart sounds: Normal heart sounds  Pulmonary:      Effort: Pulmonary effort is normal       Breath sounds: Normal breath sounds  Abdominal:      General: Abdomen is flat  Bowel sounds are normal       Palpations: Abdomen is soft  Musculoskeletal:         General: No swelling  Comments: L hip pain on palpation   Skin:     General: Skin is warm and dry  Capillary Refill: Capillary refill takes less than 2 seconds  Neurological:      General: No focal deficit present  Mental Status: She is alert and oriented to person, place, and time  Mental status is at baseline  Psychiatric:         Mood and Affect: Mood normal          Behavior: Behavior normal          Thought Content:  Thought content normal          Judgment: Judgment normal        Additional Data:     Lab Results: I have personally reviewed pertinent reports  Results from last 7 days   Lab Units 02/02/21  1601   WBC Thousand/uL 8 09   HEMOGLOBIN g/dL 13 0   HEMATOCRIT % 39 7   PLATELETS Thousands/uL 210   NEUTROS PCT % 68   LYMPHS PCT % 21   MONOS PCT % 8   EOS PCT % 2     Results from last 7 days   Lab Units 02/02/21  1601   SODIUM mmol/L 136   POTASSIUM mmol/L 4 1   CHLORIDE mmol/L 103   CO2 mmol/L 28   BUN mg/dL 20   CREATININE mg/dL 0 98   ANION GAP mmol/L 5   CALCIUM mg/dL 9 4   ALBUMIN g/dL 3 5   TOTAL BILIRUBIN mg/dL 0 43   ALK PHOS U/L 74   ALT U/L 59   AST U/L 43   GLUCOSE RANDOM mg/dL 364*     Results from last 7 days   Lab Units 02/02/21  1601   INR  1 03         Lab Results   Component Value Date/Time    HGBA1C 8 0 (H) 09/16/2020 07:28 AM    HGBA1C 8 5 (H) 03/23/2020 08:27 AM    HGBA1C 8 9 (H) 11/18/2019 08:38 AM    HGBA1C 8 1 (H) 07/15/2019 07:18 AM    HGBA1C 9 2 (H) 02/19/2019 12:02 PM    HGBA1C 7 3 (H) 08/03/2018 07:01 AM               Imaging: I have personally reviewed pertinent reports  and I have personally reviewed pertinent films in PACS    XR femur 2 views LEFT   Final Result by Levy Torres MD (02/02 1650)      Intratrochanteric fracture of the left femur  Workstation performed: [id number]         XR chest 1 view portable   Final Result by Levy Torres MD (02/02 1648)      No acute abnormality in the chest                   Workstation performed: [id number]         XR pelvis ap only 1 or 2 vw   Final Result by Levy Torres MD (02/02 1651)      Intratrochanteric fracture of the left femur  Workstation performed: [id number]             EKG, Pathology, and Other Studies Reviewed on Admission:   · EKG: NSR w/o ST changes    ** Please Note: This note has been constructed using a voice recognition system   **

## 2021-02-03 ENCOUNTER — APPOINTMENT (INPATIENT)
Dept: RADIOLOGY | Facility: HOSPITAL | Age: 66
DRG: 482 | End: 2021-02-03
Payer: COMMERCIAL

## 2021-02-03 ENCOUNTER — ANESTHESIA EVENT (INPATIENT)
Dept: PERIOP | Facility: HOSPITAL | Age: 66
DRG: 482 | End: 2021-02-03
Payer: COMMERCIAL

## 2021-02-03 ENCOUNTER — ANESTHESIA (INPATIENT)
Dept: PERIOP | Facility: HOSPITAL | Age: 66
DRG: 482 | End: 2021-02-03
Payer: COMMERCIAL

## 2021-02-03 VITALS — HEART RATE: 110 BPM

## 2021-02-03 LAB
ANION GAP SERPL CALCULATED.3IONS-SCNC: 5 MMOL/L (ref 4–13)
ATRIAL RATE: 93 BPM
BASOPHILS # BLD AUTO: 0.02 THOUSANDS/ΜL (ref 0–0.1)
BASOPHILS NFR BLD AUTO: 0 % (ref 0–1)
BUN SERPL-MCNC: 17 MG/DL (ref 5–25)
CALCIUM SERPL-MCNC: 9.3 MG/DL (ref 8.3–10.1)
CHLORIDE SERPL-SCNC: 104 MMOL/L (ref 100–108)
CO2 SERPL-SCNC: 28 MMOL/L (ref 21–32)
CREAT SERPL-MCNC: 0.68 MG/DL (ref 0.6–1.3)
EOSINOPHIL # BLD AUTO: 0.01 THOUSAND/ΜL (ref 0–0.61)
EOSINOPHIL NFR BLD AUTO: 0 % (ref 0–6)
ERYTHROCYTE [DISTWIDTH] IN BLOOD BY AUTOMATED COUNT: 13.6 % (ref 11.6–15.1)
FLUAV RNA RESP QL NAA+PROBE: NEGATIVE
FLUBV RNA RESP QL NAA+PROBE: NEGATIVE
GFR SERPL CREATININE-BSD FRML MDRD: 91 ML/MIN/1.73SQ M
GLUCOSE SERPL-MCNC: 177 MG/DL (ref 65–140)
GLUCOSE SERPL-MCNC: 205 MG/DL (ref 65–140)
GLUCOSE SERPL-MCNC: 217 MG/DL (ref 65–140)
GLUCOSE SERPL-MCNC: 269 MG/DL (ref 65–140)
GLUCOSE SERPL-MCNC: 315 MG/DL (ref 65–140)
HCT VFR BLD AUTO: 37.1 % (ref 34.8–46.1)
HGB BLD-MCNC: 12.3 G/DL (ref 11.5–15.4)
IMM GRANULOCYTES # BLD AUTO: 0.04 THOUSAND/UL (ref 0–0.2)
IMM GRANULOCYTES NFR BLD AUTO: 0 % (ref 0–2)
LYMPHOCYTES # BLD AUTO: 1.48 THOUSANDS/ΜL (ref 0.6–4.47)
LYMPHOCYTES NFR BLD AUTO: 14 % (ref 14–44)
MCH RBC QN AUTO: 27.8 PG (ref 26.8–34.3)
MCHC RBC AUTO-ENTMCNC: 33.2 G/DL (ref 31.4–37.4)
MCV RBC AUTO: 84 FL (ref 82–98)
MONOCYTES # BLD AUTO: 0.69 THOUSAND/ΜL (ref 0.17–1.22)
MONOCYTES NFR BLD AUTO: 6 % (ref 4–12)
NEUTROPHILS # BLD AUTO: 8.6 THOUSANDS/ΜL (ref 1.85–7.62)
NEUTS SEG NFR BLD AUTO: 80 % (ref 43–75)
NRBC BLD AUTO-RTO: 0 /100 WBCS
P AXIS: 57 DEGREES
PLATELET # BLD AUTO: 223 THOUSANDS/UL (ref 149–390)
PMV BLD AUTO: 10.5 FL (ref 8.9–12.7)
POTASSIUM SERPL-SCNC: 3.7 MMOL/L (ref 3.5–5.3)
PR INTERVAL: 162 MS
QRS AXIS: 53 DEGREES
QRSD INTERVAL: 92 MS
QT INTERVAL: 362 MS
QTC INTERVAL: 450 MS
RBC # BLD AUTO: 4.43 MILLION/UL (ref 3.81–5.12)
RSV RNA RESP QL NAA+PROBE: NEGATIVE
SARS-COV-2 RNA RESP QL NAA+PROBE: NEGATIVE
SODIUM SERPL-SCNC: 137 MMOL/L (ref 136–145)
T WAVE AXIS: 50 DEGREES
T4 FREE SERPL-MCNC: 0.85 NG/DL (ref 0.76–1.46)
VENTRICULAR RATE: 93 BPM
WBC # BLD AUTO: 10.84 THOUSAND/UL (ref 4.31–10.16)

## 2021-02-03 PROCEDURE — 84439 ASSAY OF FREE THYROXINE: CPT | Performed by: PHYSICIAN ASSISTANT

## 2021-02-03 PROCEDURE — 80048 BASIC METABOLIC PNL TOTAL CA: CPT | Performed by: INTERNAL MEDICINE

## 2021-02-03 PROCEDURE — 93010 ELECTROCARDIOGRAM REPORT: CPT | Performed by: INTERNAL MEDICINE

## 2021-02-03 PROCEDURE — NC001 PR NO CHARGE: Performed by: ORTHOPAEDIC SURGERY

## 2021-02-03 PROCEDURE — 85025 COMPLETE CBC W/AUTO DIFF WBC: CPT | Performed by: INTERNAL MEDICINE

## 2021-02-03 PROCEDURE — C1713 ANCHOR/SCREW BN/BN,TIS/BN: HCPCS | Performed by: ORTHOPAEDIC SURGERY

## 2021-02-03 PROCEDURE — 99223 1ST HOSP IP/OBS HIGH 75: CPT | Performed by: ORTHOPAEDIC SURGERY

## 2021-02-03 PROCEDURE — 99232 SBSQ HOSP IP/OBS MODERATE 35: CPT | Performed by: PHYSICIAN ASSISTANT

## 2021-02-03 PROCEDURE — 99232 SBSQ HOSP IP/OBS MODERATE 35: CPT | Performed by: INTERNAL MEDICINE

## 2021-02-03 PROCEDURE — 0QSC36Z REPOSITION LEFT LOWER FEMUR WITH INTRAMEDULLARY INTERNAL FIXATION DEVICE, PERCUTANEOUS APPROACH: ICD-10-PCS | Performed by: ORTHOPAEDIC SURGERY

## 2021-02-03 PROCEDURE — 73552 X-RAY EXAM OF FEMUR 2/>: CPT | Performed by: ORTHOPAEDIC SURGERY

## 2021-02-03 PROCEDURE — 94760 N-INVAS EAR/PLS OXIMETRY 1: CPT

## 2021-02-03 PROCEDURE — 82948 REAGENT STRIP/BLOOD GLUCOSE: CPT

## 2021-02-03 PROCEDURE — 27245 TREAT THIGH FRACTURE: CPT | Performed by: PHYSICIAN ASSISTANT

## 2021-02-03 PROCEDURE — 73502 X-RAY EXAM HIP UNI 2-3 VIEWS: CPT

## 2021-02-03 PROCEDURE — C1769 GUIDE WIRE: HCPCS | Performed by: ORTHOPAEDIC SURGERY

## 2021-02-03 PROCEDURE — 27245 TREAT THIGH FRACTURE: CPT | Performed by: ORTHOPAEDIC SURGERY

## 2021-02-03 PROCEDURE — 99223 1ST HOSP IP/OBS HIGH 75: CPT | Performed by: INTERNAL MEDICINE

## 2021-02-03 DEVICE — 5.0MM TI LOCKING SCREW W/T25 STARDRIVE 34MM F/IM NAIL-STER: Type: IMPLANTABLE DEVICE | Site: FEMUR | Status: FUNCTIONAL

## 2021-02-03 DEVICE — 10MM/130 DEG TI CANN TFNA 170MM - STERILE
Type: IMPLANTABLE DEVICE | Site: FEMUR | Status: FUNCTIONAL
Brand: TFN-ADVANCE

## 2021-02-03 DEVICE — TFNA FENESTRATED SCREW 100MM - STERILE
Type: IMPLANTABLE DEVICE | Site: FEMUR | Status: FUNCTIONAL
Brand: TFN-ADVANCE

## 2021-02-03 RX ORDER — ONDANSETRON 2 MG/ML
INJECTION INTRAMUSCULAR; INTRAVENOUS AS NEEDED
Status: DISCONTINUED | OUTPATIENT
Start: 2021-02-03 | End: 2021-02-03

## 2021-02-03 RX ORDER — METOCLOPRAMIDE HYDROCHLORIDE 5 MG/ML
10 INJECTION INTRAMUSCULAR; INTRAVENOUS ONCE AS NEEDED
Status: DISCONTINUED | OUTPATIENT
Start: 2021-02-03 | End: 2021-02-03 | Stop reason: HOSPADM

## 2021-02-03 RX ORDER — MIDAZOLAM HYDROCHLORIDE 2 MG/2ML
INJECTION, SOLUTION INTRAMUSCULAR; INTRAVENOUS AS NEEDED
Status: DISCONTINUED | OUTPATIENT
Start: 2021-02-03 | End: 2021-02-03

## 2021-02-03 RX ORDER — LABETALOL 20 MG/4 ML (5 MG/ML) INTRAVENOUS SYRINGE
10 ONCE
Status: DISCONTINUED | OUTPATIENT
Start: 2021-02-03 | End: 2021-02-03 | Stop reason: HOSPADM

## 2021-02-03 RX ORDER — NEOSTIGMINE METHYLSULFATE 1 MG/ML
INJECTION INTRAVENOUS AS NEEDED
Status: DISCONTINUED | OUTPATIENT
Start: 2021-02-03 | End: 2021-02-03

## 2021-02-03 RX ORDER — CALCIUM CARBONATE 200(500)MG
500 TABLET,CHEWABLE ORAL
Status: DISCONTINUED | OUTPATIENT
Start: 2021-02-03 | End: 2021-02-04 | Stop reason: HOSPADM

## 2021-02-03 RX ORDER — FENTANYL CITRATE 50 UG/ML
INJECTION, SOLUTION INTRAMUSCULAR; INTRAVENOUS AS NEEDED
Status: DISCONTINUED | OUTPATIENT
Start: 2021-02-03 | End: 2021-02-03

## 2021-02-03 RX ORDER — HYDROMORPHONE HCL/PF 1 MG/ML
0.5 SYRINGE (ML) INJECTION EVERY 4 HOURS PRN
Status: DISCONTINUED | OUTPATIENT
Start: 2021-02-03 | End: 2021-02-04 | Stop reason: HOSPADM

## 2021-02-03 RX ORDER — ONDANSETRON 2 MG/ML
4 INJECTION INTRAMUSCULAR; INTRAVENOUS ONCE AS NEEDED
Status: DISCONTINUED | OUTPATIENT
Start: 2021-02-03 | End: 2021-02-03 | Stop reason: HOSPADM

## 2021-02-03 RX ORDER — OXYCODONE HYDROCHLORIDE 5 MG/1
TABLET ORAL
Qty: 30 TABLET | Refills: 0 | Status: SHIPPED | OUTPATIENT
Start: 2021-02-03 | End: 2021-02-09 | Stop reason: SDUPTHER

## 2021-02-03 RX ORDER — MAGNESIUM HYDROXIDE 1200 MG/15ML
LIQUID ORAL AS NEEDED
Status: DISCONTINUED | OUTPATIENT
Start: 2021-02-03 | End: 2021-02-03 | Stop reason: HOSPADM

## 2021-02-03 RX ORDER — GLYCOPYRROLATE 0.2 MG/ML
INJECTION INTRAMUSCULAR; INTRAVENOUS AS NEEDED
Status: DISCONTINUED | OUTPATIENT
Start: 2021-02-03 | End: 2021-02-03

## 2021-02-03 RX ORDER — TRANEXAMIC ACID 100 MG/ML
INJECTION, SOLUTION INTRAVENOUS AS NEEDED
Status: DISCONTINUED | OUTPATIENT
Start: 2021-02-03 | End: 2021-02-03

## 2021-02-03 RX ORDER — PROPOFOL 10 MG/ML
INJECTION, EMULSION INTRAVENOUS AS NEEDED
Status: DISCONTINUED | OUTPATIENT
Start: 2021-02-03 | End: 2021-02-03

## 2021-02-03 RX ORDER — ROCURONIUM BROMIDE 10 MG/ML
INJECTION, SOLUTION INTRAVENOUS AS NEEDED
Status: DISCONTINUED | OUTPATIENT
Start: 2021-02-03 | End: 2021-02-03

## 2021-02-03 RX ORDER — CEFAZOLIN SODIUM 1 G/50ML
1000 SOLUTION INTRAVENOUS EVERY 8 HOURS
Status: COMPLETED | OUTPATIENT
Start: 2021-02-03 | End: 2021-02-04

## 2021-02-03 RX ORDER — METOCLOPRAMIDE HYDROCHLORIDE 5 MG/ML
INJECTION INTRAMUSCULAR; INTRAVENOUS AS NEEDED
Status: DISCONTINUED | OUTPATIENT
Start: 2021-02-03 | End: 2021-02-03

## 2021-02-03 RX ORDER — HYDROMORPHONE HCL/PF 1 MG/ML
0.5 SYRINGE (ML) INJECTION
Status: DISCONTINUED | OUTPATIENT
Start: 2021-02-03 | End: 2021-02-03 | Stop reason: HOSPADM

## 2021-02-03 RX ORDER — SUCCINYLCHOLINE/SOD CL,ISO/PF 100 MG/5ML
SYRINGE (ML) INTRAVENOUS AS NEEDED
Status: DISCONTINUED | OUTPATIENT
Start: 2021-02-03 | End: 2021-02-03

## 2021-02-03 RX ADMIN — HYDROMORPHONE HYDROCHLORIDE 0.5 MG: 1 INJECTION, SOLUTION INTRAMUSCULAR; INTRAVENOUS; SUBCUTANEOUS at 21:33

## 2021-02-03 RX ADMIN — INSULIN LISPRO 12 UNITS: 100 INJECTION, SOLUTION INTRAVENOUS; SUBCUTANEOUS at 16:51

## 2021-02-03 RX ADMIN — CEFAZOLIN SODIUM 2000 MG: 2 SOLUTION INTRAVENOUS at 11:31

## 2021-02-03 RX ADMIN — FENTANYL CITRATE 50 MCG: 50 INJECTION INTRAMUSCULAR; INTRAVENOUS at 12:23

## 2021-02-03 RX ADMIN — Medication 2000 UNITS: at 08:15

## 2021-02-03 RX ADMIN — GABAPENTIN 100 MG: 100 CAPSULE ORAL at 16:55

## 2021-02-03 RX ADMIN — INSULIN LISPRO 1 UNITS: 100 INJECTION, SOLUTION INTRAVENOUS; SUBCUTANEOUS at 21:30

## 2021-02-03 RX ADMIN — GABAPENTIN 100 MG: 100 CAPSULE ORAL at 08:15

## 2021-02-03 RX ADMIN — METHOCARBAMOL 500 MG: 500 TABLET ORAL at 06:31

## 2021-02-03 RX ADMIN — INSULIN LISPRO 5 UNITS: 100 INJECTION, SOLUTION INTRAVENOUS; SUBCUTANEOUS at 16:51

## 2021-02-03 RX ADMIN — MIDAZOLAM 2 MG: 1 INJECTION INTRAMUSCULAR; INTRAVENOUS at 11:28

## 2021-02-03 RX ADMIN — INSULIN GLARGINE 25 UNITS: 100 INJECTION, SOLUTION SUBCUTANEOUS at 08:16

## 2021-02-03 RX ADMIN — ACETAMINOPHEN 975 MG: 325 TABLET, FILM COATED ORAL at 21:26

## 2021-02-03 RX ADMIN — INSULIN LISPRO 12 UNITS: 100 INJECTION, SOLUTION INTRAVENOUS; SUBCUTANEOUS at 13:40

## 2021-02-03 RX ADMIN — OXYCODONE HYDROCHLORIDE 5 MG: 5 TABLET ORAL at 19:33

## 2021-02-03 RX ADMIN — METOCLOPRAMIDE 10 MG: 5 INJECTION, SOLUTION INTRAMUSCULAR; INTRAVENOUS at 12:09

## 2021-02-03 RX ADMIN — ACETAMINOPHEN 975 MG: 325 TABLET, FILM COATED ORAL at 06:31

## 2021-02-03 RX ADMIN — TRANEXAMIC ACID 1000 MG: 1 INJECTION, SOLUTION INTRAVENOUS at 11:41

## 2021-02-03 RX ADMIN — ONDANSETRON 4 MG: 2 INJECTION INTRAMUSCULAR; INTRAVENOUS at 12:09

## 2021-02-03 RX ADMIN — Medication 100 MG: at 11:33

## 2021-02-03 RX ADMIN — LEVOTHYROXINE SODIUM 100 MCG: 100 TABLET ORAL at 08:15

## 2021-02-03 RX ADMIN — FENTANYL CITRATE 50 MCG: 50 INJECTION INTRAMUSCULAR; INTRAVENOUS at 12:01

## 2021-02-03 RX ADMIN — SODIUM CHLORIDE, SODIUM LACTATE, POTASSIUM CHLORIDE, AND CALCIUM CHLORIDE 75 ML/HR: .6; .31; .03; .02 INJECTION, SOLUTION INTRAVENOUS at 00:22

## 2021-02-03 RX ADMIN — ACETAMINOPHEN 975 MG: 325 TABLET, FILM COATED ORAL at 13:49

## 2021-02-03 RX ADMIN — FENTANYL CITRATE 50 MCG: 50 INJECTION INTRAMUSCULAR; INTRAVENOUS at 11:54

## 2021-02-03 RX ADMIN — METHOCARBAMOL 500 MG: 500 TABLET ORAL at 13:49

## 2021-02-03 RX ADMIN — SODIUM CHLORIDE, SODIUM LACTATE, POTASSIUM CHLORIDE, AND CALCIUM CHLORIDE 75 ML/HR: .6; .31; .03; .02 INJECTION, SOLUTION INTRAVENOUS at 13:31

## 2021-02-03 RX ADMIN — METHOCARBAMOL 500 MG: 500 TABLET ORAL at 00:22

## 2021-02-03 RX ADMIN — DOCUSATE SODIUM 100 MG: 100 CAPSULE, LIQUID FILLED ORAL at 16:56

## 2021-02-03 RX ADMIN — FENTANYL CITRATE 50 MCG: 50 INJECTION INTRAMUSCULAR; INTRAVENOUS at 11:33

## 2021-02-03 RX ADMIN — OXYCODONE HYDROCHLORIDE 10 MG: 10 TABLET ORAL at 03:05

## 2021-02-03 RX ADMIN — CHLORHEXIDINE GLUCONATE 15 ML: 1.2 RINSE ORAL at 10:25

## 2021-02-03 RX ADMIN — GLYCOPYRROLATE 0.3 MG: 0.2 INJECTION, SOLUTION INTRAMUSCULAR; INTRAVENOUS at 12:09

## 2021-02-03 RX ADMIN — PANTOPRAZOLE SODIUM 40 MG: 40 TABLET, DELAYED RELEASE ORAL at 06:31

## 2021-02-03 RX ADMIN — NEOSTIGMINE METHYLSULFATE 3 MG: 1 INJECTION INTRAVENOUS at 12:09

## 2021-02-03 RX ADMIN — INSULIN GLARGINE 25 UNITS: 100 INJECTION, SOLUTION SUBCUTANEOUS at 21:26

## 2021-02-03 RX ADMIN — OXYCODONE HYDROCHLORIDE 10 MG: 10 TABLET ORAL at 08:16

## 2021-02-03 RX ADMIN — GABAPENTIN 100 MG: 100 CAPSULE ORAL at 21:26

## 2021-02-03 RX ADMIN — ROCURONIUM BROMIDE 20 MG: 50 INJECTION, SOLUTION INTRAVENOUS at 11:42

## 2021-02-03 RX ADMIN — INSULIN LISPRO 2 UNITS: 100 INJECTION, SOLUTION INTRAVENOUS; SUBCUTANEOUS at 12:49

## 2021-02-03 RX ADMIN — CEFAZOLIN SODIUM 1000 MG: 1 SOLUTION INTRAVENOUS at 19:33

## 2021-02-03 RX ADMIN — METHOCARBAMOL 500 MG: 500 TABLET ORAL at 23:28

## 2021-02-03 RX ADMIN — SERTRALINE HYDROCHLORIDE 100 MG: 100 TABLET ORAL at 08:15

## 2021-02-03 RX ADMIN — PROPOFOL 140 MG: 10 INJECTION, EMULSION INTRAVENOUS at 11:33

## 2021-02-03 NOTE — PROGRESS NOTES
Patient is a 58-year-old female is in the hospital yesterday evening  She fell injuring her left hip  She noted immediate onset of pain in left hip and inability ambulate  X-rays were obtained in the emergency room, fracture was diagnosed, she is admitted  This morning she admits to persistence of pain in the left hip  She describes no numbness or tingling  She describes no prodrome will groin pain in the left side  Examination finds alert oriented female, was in modest distress secondary to pain in the left hip area  The soft tissue envelope overlying the left hip is intact  There is pain with attempted motion left hip  Left knee is not effused  Calf compartments on the left soft supple  Toes on left foot are warm, sensate, and mobile  I personally reviewed x-rays left hip my interpretation is as follows:  A comminuted and displaced fracture left proximal femur in the intertrochanteric area is identified  Assessment:  Ambulatory female 76years old presents following a fall with acute traumatic fracture left proximal femur  It is a closed fracture left hip intertrochanteric in nature  All diagnoses including osteoporosis discussed the patient  Treatment options including risks, benefits, alternatives discussed in detail  Operative repair is indicated for this left proximal femur fracture    Consent was established above-mentioned surgery  Plan:  As this patient has achieved medical clearance, operative repair of the left proximal femur can be undertaken while an inpatient today [address] [place] [address]

## 2021-02-03 NOTE — CASE MANAGEMENT
Pt went to OR with Ortho today for L femoral IM nail, will require Lovenox at discharge  CM called [facility] where Lovenox script was sent and was informed that Lovenox has a co-pay of $1 30  CM informed Ortho resident Parth Teresa of same

## 2021-02-03 NOTE — PROGRESS NOTES
Subjective: No acute events overnight  No acute distress  Resting comfortably in bed    Objective:  A 10 point ROS was performed; negative except as noted above  Lab Results   Component Value Date/Time    WBC 10 84 (H) 02/03/2021 05:07 AM    WBC 6 3 12/19/2016 08:05 AM    WBC 6 3 12/19/2016 08:05 AM    HGB 12 3 02/03/2021 05:07 AM    HGB 12 3 12/19/2016 08:05 AM    HGB 12 3 12/19/2016 08:05 AM       Vitals:    02/03/21 0256   BP:    Pulse:    Resp:    Temp:    SpO2: 90%     Left lower extremity  TTP L Hip  Motor intact to EHL/FHL/TA/GS  Sensation intact to light touch to dp/sp/tib/paulo/saph distributions  Toes warm and well perfused with brisk capillary refill    Assessment: 76 y o  female with left femoral neck fracture    Plan:  NWB LLE  To OR for Operative fixation of left hip  NPO  Pain control  DVT ppx:  On hold for OR  PT/OT  Dispo: Ortho will follow

## 2021-02-03 NOTE — PHYSICAL THERAPY NOTE
Physical Therapy Cancellation Note      PT orders received and chart reviewed  Pt scheduled for operative fixation of L hip with ortho today   PT will continue to follow post op      Sravan Hatch, PT, DPT

## 2021-02-03 NOTE — UTILIZATION REVIEW
Initial Clinical Review    Admission: Date/Time/Statement:   Admission Orders (From admission, onward)     Ordered        02/02/21 1759  INPATIENT ADMISSION  Once                   Orders Placed This Encounter   Procedures    INPATIENT ADMISSION     Standing Status:   Standing     Number of Occurrences:   1     Order Specific Question:   Level of Care     Answer:   Med Surg [16]     Order Specific Question:   Estimated length of stay     Answer:   More than 2 Midnights     Order Specific Question:   Certification     Answer:   I certify that inpatient services are medically necessary for this patient for a duration of greater than two midnights  See H&P and MD Progress Notes for additional information about the patient's course of treatment  ED Arrival Information     Expected Arrival Acuity Means of Arrival Escorted By Service Admission Type    - 2/2/2021 15:46 Urgent Ambulance [address] Urgent    Arrival Complaint    left hip pain         Chief Complaint   Patient presents with    Fall     Patient was shoveling snow and as she was walking back into her house she slipped and fell onto her left side; reports left sided hip pain and lower back pain; slight rotation noted to left leg but not shortened     Assessment/Plan: 76year old female, presented to the ED @ [facility] from home via EMS  Admitted as Inpatient due to  Left basicervical femoral neck fracture  PTA  Was outside shoveling show, walking back to house slipped and fell onto left side  limb shortened and externally rotated  Non weight bearing LLE  Analgesia PRN  NPO after MN  [place] catheter insertion  Pre-op ;abs ordered  Consult Medicine for all medical management and preoperative risk stratification    To OR for IM nail femur fracture of basicervical femoral neck fracture    SURGERY DATE: 2/3/2021  Procedure(s) (LRB):  INSERTION NAIL IM FEMUR ANTEGRADE (TROCHANTERIC) (Left)  ORIF left hip with intramedullary nail  Anesthesia Type: General  Operative Findings:  ORIF utilizing short nail, locked distal      ED Triage Vitals [02/02/21 1552]   Temperature Pulse Respirations Blood Pressure SpO2   97 9 °F (36 6 °C) 96 18 (!) 190/80 98 %      Temp Source Heart Rate Source Patient Position - Orthostatic VS BP Location FiO2 (%)   Oral Monitor Lying Right arm --      Pain Score       4          Wt Readings from Last 1 Encounters:   02/02/21 90 7 kg (200 lb)     Additional Vital Signs:   Date/Time  Temp  Pulse  Resp  BP  MAP (mmHg)  SpO2  O2 Flow Rate (L/min)  O2 Device  Cardiac (WDL)  Patient Position - Orthostatic VS   02/03/21 13:53:21  97 3 °F (36 3 °C)Abnormal   86  18  157/74  102  93 %  --  --  --  --   02/03/21 1300  97 5 °F (36 4 °C)  92  14  155/50  --  95 %  2 L/min  Nasal cannula  WDL  --   02/03/21 1245  --  94  14  155/55  --  97 %  2 L/min  Nasal cannula  --  --   02/03/21 1230  97 °F (36 1 °C)Abnormal   106Abnormal   19  195/54Abnormal   --  97 %  6 L/min  Simple mask  WDL  --   02/03/21 0827  --  --  --  --  --  --  --  None (Room air)  --  --   02/03/21 0800  98 3 °F (36 8 °C)  88  18  148/70  101  92 %  --  --  --  Lying   02/03/21 0256  --  --  --  --  --  90 %  --  None (Room air)  --  --   02/02/21 2356  98 4 °F (36 9 °C)  94  17  108/70  85  90 %  --  None (Room air)  --  Lying   02/02/21 2141  --  --  --  --  --  95 %  --  None (Room air)  --  --   02/02/21 2117  --  --  --  --  --  93 %  0 L/min  None (Room air)  --  --   02/02/21 2045  98 5 °F (36 9 °C)  94  18  138/69  --  94 %  --  --  --  --   02/02/21 1645  --  94  17  156/72  104  96 %  --  None (Room air)  --  --     Date and Time Eye Opening Best Verbal Response Best Motor Response Alfonso Coma Scale Score   02/02/21 2101 4 5 6 15   02/02/21 1555 4 5 6 15     02/03/2021 @ 1222 Left hip/pel X:  Pending official reading    02/02/2021 @ 1648  Left femur X:  Intratrochanteric fracture of the left femur       02/02/2021 @ 1648  Chest X: No acute abnormality in the chest       2021 @ 1650  Pel X:  Intratrochanteric fracture of the left femur       2021 @ 2253  EC, NSR    Pertinent Labs/Diagnostic Test Results:   Results from last 7 days   Lab Units 21  2304   SARS-COV-2  Negative     Results from last 7 days   Lab Units 21  0507 21  1601   WBC Thousand/uL 10 84* 8 09   HEMOGLOBIN g/dL 12 3 13 0   HEMATOCRIT % 37 1 39 7   PLATELETS Thousands/uL 223 210   NEUTROS ABS Thousands/µL 8 60* 5 60     Results from last 7 days   Lab Units 21  0507 21  1601   SODIUM mmol/L 137 136   POTASSIUM mmol/L 3 7 4 1   CHLORIDE mmol/L 104 103   CO2 mmol/L 28 28   ANION GAP mmol/L 5 5   BUN mg/dL 17 20   CREATININE mg/dL 0 68 0 98   EGFR ml/min/1 73sq m 91 60   CALCIUM mg/dL 9 3 9 4     Results from last 7 days   Lab Units 21  1601   AST U/L 43   ALT U/L 59   ALK PHOS U/L 74   TOTAL PROTEIN g/dL 7 8   ALBUMIN g/dL 3 5   TOTAL BILIRUBIN mg/dL 0 43     Results from last 7 days   Lab Units 21  1233 21  0715 21  2124   POC GLUCOSE mg/dl 217* 205* 300*     Results from last 7 days   Lab Units 21  0507 21  1601   GLUCOSE RANDOM mg/dL 269* 364*     Results from last 7 days   Lab Units 21  1601   PROTIME seconds 13 5   INR  1 03   PTT seconds 27     Results from last 7 days   Lab Units 21  1811   TSH 3RD GENERATON uIU/mL 5 360*     Results from last 7 days   Lab Units 21  1811   SED RATE mm/hour 40*     Results from last 7 days   Lab Units 21  2304   INFLUENZA A PCR  Negative   INFLUENZA B PCR  Negative   RSV PCR  Negative     ED Treatment:   Medication Administration from 2021 1546 to 2021       Date/Time Order Dose Route Action     2021 1604 fentanyl citrate (PF) 100 MCG/2ML 75 mcg 75 mcg Intravenous Given     2021 1757 HYDROmorphone (DILAUDID) injection 0 5 mg 0 5 mg Intravenous Given        Past Medical History:   Diagnosis Date    Acid reflux     Anxiety     Diabetes mellitus ([place] [address] )     Hypertension     Hypothyroid      Present on Admission:   Diabetic gastroparesis ([place] [address] )   Diabetes type 2, uncontrolled ([place] [address] )   Anxiety   Hypothyroidism   Essential hypertension   Closed left hip fracture, initial encounter [facility])      Admitting Diagnosis: Left hip pain [M25 552]  Closed fracture of neck of left femur, initial encounter ([place] [address] ) [[id number] 002A]  Age/Sex: 76 y o  female  Admission Orders:  Scheduled Medications:  acetaminophen, 975 mg, Oral, Q8H [address]  cefazolin, 1,000 mg, Intravenous, Q8H  cholecalciferol, 2,000 Units, Oral, Daily  docusate sodium, 100 mg, Oral, BID  [START ON 2/4/2021] enoxaparin, 40 mg, Subcutaneous, Q24H PRIMO  gabapentin, 100 mg, Oral, TID  insulin glargine, 25 Units, Subcutaneous, Q12H PRIMO  insulin lispro, 1-6 Units, Subcutaneous, 4x Daily (AC & HS)  insulin lispro, 12 Units, Subcutaneous, TID With Meals  levothyroxine, 100 mcg, Oral, Daily  methocarbamol, 500 mg, Oral, Q6H PRIMO  pantoprazole, 40 mg, Oral, Early Morning  senna, 1 tablet, Oral, Daily  sertraline, 100 mg, Oral, Daily      Continuous IV Infusions:  lactated ringers, 75 mL/hr, Intravenous, Continuous      PRN Meds:  ondansetron, 4 mg, Intravenous, Q6H PRN  oxyCODONE, 10 mg, Oral, Q4H PRN  X 1 dose 2/2;  X 2 doses 2/3  oxyCODONE, 5 mg, Oral, Q4H PRN      Consult PT/OT  Elia SCDs  O2 @ 2L via NC  IP CONSULT TO INTERNAL MEDICINE  IP CONSULT TO CASE MANAGEMENT  IP CONSULT TO ENDOCRINOLOGY  IP CONSULT TO GERONTOLOGY    Network Utilization Review Department  ATTENTION: Please call with any questions or concerns to [phone] and carefully listen to the prompts so that you are directed to the right person  All voicemails are confidential   Mandy Arriaga all requests for admission clinical reviews, approved or denied determinations and any other requests to dedicated fax number below belonging to the campus where the patient is receiving treatment   List of dedicated fax numbers for the Facilities:  FACILITY NAME UR FAX NUMBER   ADMISSION DENIALS (Administrative/Medical Necessity) [phone]   [address] (Maternity/NICU/Pediatrics) [facility],7Th Floor [facility] [address] [place] [address]  [phone]   [place] [address] (  Aquilino Smith "Hannah" 103) [address] [address] Savita [place] 1481 [phone]   [address]51 [phone]

## 2021-02-03 NOTE — ANESTHESIA POSTPROCEDURE EVALUATION
Post-Op Assessment Note    CV Status:  Stable  Pain Score: 0    Pain management: adequate     Mental Status:  Alert and awake   Hydration Status:  Euvolemic   PONV Controlled:  Controlled   Airway Patency:  Patent      Post Op Vitals Reviewed: Yes      Staff: CRNA         No complications documented      BP   195/54   Temp   97   Pulse  109   Resp   18   SpO2   97

## 2021-02-03 NOTE — PLAN OF CARE
Problem: Potential for Falls  Goal: Patient will remain free of falls  Description: INTERVENTIONS:  - Assess patient frequently for physical needs  -  Identify cognitive and physical deficits and behaviors that affect risk of falls    -  [place] fall precautions as indicated by assessment   - Educate patient/family on patient safety including physical limitations  - Instruct patient to call for assistance with activity based on assessment  - Modify environment to reduce risk of injury  - Consider OT/PT consult to assist with strengthening/mobility  Outcome: Progressing     Problem: Prexisting or High Potential for Compromised Skin Integrity  Goal: Skin integrity is maintained or improved  Description: INTERVENTIONS:  - Identify patients at risk for skin breakdown  - Assess and monitor skin integrity  - Assess and monitor nutrition and hydration status  - Monitor labs   - Assess for incontinence   - Turn and reposition patient  - Assist with mobility/ambulation  - Relieve pressure over bony prominences  - Avoid friction and shearing  - Provide appropriate hygiene as needed including keeping skin clean and dry  - Evaluate need for skin moisturizer/barrier cream  - Collaborate with interdisciplinary team   - Patient/family teaching  - Consider wound care consult   Outcome: Progressing     Problem: PAIN - ADULT  Goal: Verbalizes/displays adequate comfort level or baseline comfort level  Description: Interventions:  - Encourage patient to monitor pain and request assistance  - Assess pain using appropriate pain scale  - Administer analgesics based on type and severity of pain and evaluate response  - Implement non-pharmacological measures as appropriate and evaluate response  - Consider cultural and social influences on pain and pain management  - Notify physician/advanced practitioner if interventions unsuccessful or patient reports new pain  Outcome: Progressing     Problem: INFECTION - ADULT  Goal: Absence or prevention of progression during hospitalization  Description: INTERVENTIONS:  - Assess and monitor for signs and symptoms of infection  - Monitor lab/diagnostic results  - Monitor all insertion sites, i e  indwelling lines, tubes, and drains  - Monitor endotracheal if appropriate and nasal secretions for changes in amount and color  - [place] appropriate cooling/warming therapies per order  - Administer medications as ordered  - Instruct and encourage patient and family to use good hand hygiene technique  - Identify and instruct in appropriate isolation precautions for identified infection/condition  Outcome: Progressing  Goal: Absence of fever/infection during neutropenic period  Description: INTERVENTIONS:  - Monitor WBC    Outcome: Progressing     Problem: SAFETY ADULT  Goal: Patient will remain free of falls  Description: INTERVENTIONS:  - Assess patient frequently for physical needs  -  Identify cognitive and physical deficits and behaviors that affect risk of falls    -  [place] fall precautions as indicated by assessment   - Educate patient/family on patient safety including physical limitations  - Instruct patient to call for assistance with activity based on assessment  - Modify environment to reduce risk of injury  - Consider OT/PT consult to assist with strengthening/mobility  Outcome: Progressing  Goal: Maintain or return to baseline ADL function  Description: INTERVENTIONS:  -  Assess patient's ability to carry out ADLs; assess patient's baseline for ADL function and identify physical deficits which impact ability to perform ADLs (bathing, care of mouth/teeth, toileting, grooming, dressing, etc )  - Assess/evaluate cause of self-care deficits   - Assess range of motion  - Assess patient's mobility; develop plan if impaired  - Assess patient's need for assistive devices and provide as appropriate  - Encourage maximum independence but intervene and supervise when necessary  - Involve family in performance of ADLs  - Assess for home care needs following discharge   - Consider OT consult to assist with ADL evaluation and planning for discharge  - Provide patient education as appropriate  Outcome: Progressing  Goal: Maintain or return mobility status to optimal level  Description: INTERVENTIONS:  - Assess patient's baseline mobility status (ambulation, transfers, stairs, etc )    - Identify cognitive and physical deficits and behaviors that affect mobility  - Identify mobility aids required to assist with transfers and/or ambulation (gait belt, sit-to-stand, lift, walker, cane, etc )  - [place] fall precautions as indicated by assessment  - Record patient progress and toleration of activity level on Mobility SBAR; progress patient to next Phase/Stage  - Instruct patient to call for assistance with activity based on assessment  - Consider rehabilitation consult to assist with strengthening/weightbearing, etc   Outcome: Progressing     Problem: DISCHARGE PLANNING  Goal: Discharge to home or other facility with appropriate resources  Description: INTERVENTIONS:  - Identify barriers to discharge w/patient and caregiver  - Arrange for needed discharge resources and transportation as appropriate  - Identify discharge learning needs (meds, wound care, etc )  - Arrange for interpretive services to assist at discharge as needed  - Refer to Case Management Department for coordinating discharge planning if the patient needs post-hospital services based on physician/advanced practitioner order or complex needs related to functional status, cognitive ability, or social support system  Outcome: Progressing

## 2021-02-03 NOTE — CASE MANAGEMENT
LOS: Day 1  Bundle: pt is not a bundle  Readmission risk: pt is not a 30 day readmit    CM reviewed role with pt at bedside  Pt reported her son Mackenzie Stephens as her emergency contact [phone] as well as her DIL Arelis Carmona [phone]  Pt reported that she lives with her son and DIL in a 1 level home with 5 BRENDA to enter  Pt reported that she was IPTA with ADLs, pt drives and reported that she works part-time  Pt reported that she does not have any DME in the home  Pt denied hx of HHC and STR  Pt confirmed that she received OPPT from  3-4 years ago in [place], [place]  PCP is Dr Yoon Joyner; pharmacy of choice is [facility] in [place]  Pt confirmed that she does have anxiety, no outpatient counseling/ med management reported  Pt denied hx of substance abuse tx  Son or DIL to p/u upon dc  CM reviewed d/c planning process including the following: identifying help at home, patient preference for d/c planning needs, Discharge Lounge, [facility] Meds to Bed program, availability of treatment team to discuss questions or concerns patient and/or family may have regarding understanding medications and recognizing signs and symptoms once discharged  CM also encouraged patient to follow up with all recommended appointments after discharge  Patient advised of importance for patient and family to participate in managing patients medical well being  Patient/caregiver received discharge checklist  Content reviewed  Patient/caregiver encouraged to participate in discharge plan of care prior to discharge home

## 2021-02-03 NOTE — ANESTHESIA PREPROCEDURE EVALUATION
Procedure:  INSERTION NAIL IM FEMUR ANTEGRADE (TROCHANTERIC) (Left Leg Upper)    Relevant Problems   CARDIO   (+) Essential hypertension      ENDO   (+) Hypothyroidism      GI/HEPATIC   (+) Fatty liver   (+) GERD (gastroesophageal reflux disease)      MUSCULOSKELETAL   (+) Osteoarthritis, hip, bilateral      NEURO/PSYCH   (+) Anxiety   (+) Diabetic gastroparesis (HCC)   (+) Hand paresthesia      Other   (+) Irritable bowel syndrome   (+) Obesity   (+) Splenomegaly        Physical Exam    Airway    Mallampati score: II  TM Distance: >3 FB  Neck ROM: full     Dental       Cardiovascular      Pulmonary      Other Findings        Anesthesia Plan  ASA Score- 2     Anesthesia Type- general with ASA Monitors  Additional Monitors:   Airway Plan: ETT  Plan Factors-    Chart reviewed  Induction- intravenous  Postoperative Plan-     Informed Consent- Anesthetic plan and risks discussed with patient  I personally reviewed this patient with the CRNA  Discussed and agreed on the Anesthesia Plan with the CRNA  Jessy Allen

## 2021-02-03 NOTE — PROGRESS NOTES
Progress Note - Ady Foote 1955, 76 y o  female MRN: [id number]  Unit/Bed#: -01 Encounter: [id number]  Primary Care Provider: Kevin Lomas MD   Date and time admitted to hospital: 2/2/2021  3:46 PM    * Closed left hip fracture, initial encounter [facility])  Assessment & Plan  · Management as per primary, Orthopedic surgery  · S/p operative fixation with left IMN today   · Monitor for postop ABLA  · Analgesics as needed  · PT/OT postoperatively    Essential hypertension  Assessment & Plan  · BP acceptable, monitor routinely   · Ensure adequate pain control   · Hold lisinopril-hydrochlorothiazide perioperatively, resume when appropriate     Type 2 diabetes mellitus, with long-term current use of insulin [facility])  Assessment & Plan  Lab Results   Component Value Date    HGBA1C 8 0 (H) 09/16/2020       Recent Labs     02/02/21  2124 02/03/21  0715   POCGLU 300* 205*       Blood Sugar Average: Last 72 hrs:  (P) 252 5     · Hold oral antihyperglycemics while inpatient  · Endocrine following, appreciate recommendations   · Continue home insulin regimen: Lantus 25 units AM and 25 units PM + SSI  · QID accuchecks with SSI coverage   · Diabetic diet    Diabetic gastroparesis (HCC)  Assessment & Plan  · Noted history  · Continue bowel regimen as to avoid opioid induce constipation     Hypothyroidism  Assessment & Plan  · TSH elevated, free T4 within normal limits   · Continue home dose levothyroxine     Anxiety  Assessment & Plan  · Continue Zoloft    VTE Pharmacologic Prophylaxis:   Pharmacologic: Enoxaparin (Lovenox)  Mechanical VTE Prophylaxis in Place: No    Patient Centered Rounds: I have performed bedside rounds with nursing staff today  Education and Discussions with Family / Patient: patient, family update per primary service     Time Spent for Care: 15 minutes  More than 50% of total time spent on counseling and coordination of care as described above      Current Length of Stay: 1 day(s)    Current Patient Status: Inpatient   Certification Statement: The patient will continue to require additional inpatient hospital stay due to postoperative monitoring     Discharge Plan: per primary, postop PT/OT evaluations pending     Code Status: Level 1 - Full Code    Subjective:   Patient seen and examined postoperatively  She denies pain in hip at this time  Reports feeling a little "out of it" from anesthesia  Denies chest pain or SOB  Objective:     Vitals:   Temp (24hrs), Av 8 °F (36 6 °C), Min:97 °F (36 1 °C), Max:98 5 °F (36 9 °C)    Temp:  [97 °F (36 1 °C)-98 5 °F (36 9 °C)] 97 9 °F (36 6 °C)  HR:  [] 93  Resp:  [14-19] 18  BP: (108-195)/(50-74) 152/73  SpO2:  [90 %-97 %] 94 %  Body mass index is 36 58 kg/m²  Input and Output Summary (last 24 hours): Intake/Output Summary (Last 24 hours) at 2/3/2021 1623  Last data filed at 2/3/2021 1216  Gross per 24 hour   Intake 1173 75 ml   Output 1100 ml   Net 73 75 ml       Physical Exam:     Physical Exam  Vitals signs and nursing note reviewed  Constitutional:       General: She is not in acute distress  Appearance: She is obese  Cardiovascular:      Rate and Rhythm: Normal rate and regular rhythm  Pulses: Normal pulses  Heart sounds: No murmur  Pulmonary:      Effort: Pulmonary effort is normal  No respiratory distress  Breath sounds: No wheezing or rales  Comments: SpO2 91% on room air  Abdominal:      General: Abdomen is flat  There is no distension  Palpations: Abdomen is soft  Tenderness: There is no abdominal tenderness  Neurological:      Mental Status: She is alert and oriented to person, place, and time  Mental status is at baseline         Additional Data:     Labs:    Results from last 7 days   Lab Units 21  0507   WBC Thousand/uL 10 84*   HEMOGLOBIN g/dL 12 3   HEMATOCRIT % 37 1   PLATELETS Thousands/uL 223   NEUTROS PCT % 80*   LYMPHS PCT % 14   MONOS PCT % 6   EOS PCT % 0     Results from last 7 days   Lab Units 02/03/21  0507 02/02/21  1601   SODIUM mmol/L 137 136   POTASSIUM mmol/L 3 7 4 1   CHLORIDE mmol/L 104 103   CO2 mmol/L 28 28   BUN mg/dL 17 20   CREATININE mg/dL 0 68 0 98   ANION GAP mmol/L 5 5   CALCIUM mg/dL 9 3 9 4   ALBUMIN g/dL  --  3 5   TOTAL BILIRUBIN mg/dL  --  0 43   ALK PHOS U/L  --  74   ALT U/L  --  59   AST U/L  --  43   GLUCOSE RANDOM mg/dL 269* 364*     Results from last 7 days   Lab Units 02/02/21  1601   INR  1 03     Results from last 7 days   Lab Units 02/03/21  1610 02/03/21  1233 02/03/21  0715 02/02/21  2124   POC GLUCOSE mg/dl 315* 217* 205* 300*                   * I Have Reviewed All Lab Data Listed Above  * Additional Pertinent Lab Tests Reviewed:  Andrew 65 Admission Reviewed    Imaging:    Imaging Reports Reviewed Today Include: none  Imaging Personally Reviewed by Myself Includes:  none    Recent Cultures (last 7 days):           Last 24 Hours Medication List:   Current Facility-Administered Medications   Medication Dose Route Frequency Provider Last Rate    acetaminophen  975 mg Oral [facility] Thurnell Shown, PA-C      calcium carbonate  500 mg Oral BID after meals Fermin Boateng MD      cefazolin  1,000 mg Intravenous Q8H Thurnell Shown, PA-C      cholecalciferol  2,000 Units Oral Daily Thurnell Shown, PA-C      docusate sodium  100 mg Oral BID Thurnell Shown, PA-C      [START ON 2/4/2021] enoxaparin  40 mg Subcutaneous Q24H [address] Thurnell Shown, PA-C      gabapentin  100 mg Oral TID Thurnell Shown, PA-C      HYDROmorphone  0 5 mg Intravenous Q4H PRN Néstor Justice MD      insulin glargine  25 Units Subcutaneous Q12H [address] Thurnell Shown, PA-C      insulin lispro  1-6 Units Subcutaneous 4x Daily (AC & HS) Thurnell Shown, PA-C      insulin lispro  12 Units Subcutaneous TID With Meals Thurnell Shown, PA-C      lactated ringers  75 mL/hr Intravenous Continuous Thurnell Shown, PA-C 75 mL/hr (02/03/21 1331)    levothyroxine  100 mcg Oral Daily Carisa Dunham Juliet Ogden PA-C      methocarbamol  500 mg Oral [facility] Iain Lozano PA-C      ondansetron  4 mg Intravenous Q6H PRN Iain Lozano PA-C      oxyCODONE  10 mg Oral Q4H PRN Iain Lozano PA-C      oxyCODONE  5 mg Oral Q4H PRN Iain Lozano PA-C      pantoprazole  40 mg Oral Early Morning Iain Lozano PA-C      senna  1 tablet Oral Daily Iain Lozano PA-C      sertraline  100 mg Oral Daily Iain Lozano PA-C          Today, Patient Was Seen By: Carleen Leal PA-C    ** Please Note: Dictation voice to text software may have been used in the creation of this document   **

## 2021-02-03 NOTE — CONSULTS
Consultation - [facility]    Patient Information: Suzie Pinedo 76 y o  female MRN: [id number]  Unit/Bed#: -01 Encounter: [id number]  Admitting Physician: Lisa Ramirez MD  PCP: Jessica Alberts MD  Date of Consultation:  02/03/21    ASSESSMENT:  76 yr old female with  known hx of T2DM for 10 years, HTN, HLD, morbid obesity, Osteopenia, Hypothyroidism, GERD/esophagitis/gastroparesis and NAFLD is admitted with a fall and fragility fracture Lt neck of femur  Nicholas Meredith PLAN:    1  Osteoporosis with pathological fracture  Note recent bone scan shows reduced BMD  She is on vitamin D supplementation and has normocalcemia with nml PTH  Start calcium 1g/day  Continue Vitamin D3  Will need outpt follow up in 6-8 weeks for definitive management  Good candidate for prolia/ reclast       2  Type 2 diabetes with hyperglycemia  A1c was 8 2%  Goal 7%  Home regimen Lantus 25u am, 35u pm and Fiasp 12u tid  She is presently hyperglycemic in 200-300mg/dL range  Just post op and appetite not recovered yet  Continue Lantus 25u BID and fiasp 12u tidac for today  Will increase fiasp to 15u tidac tomorrow  3  Hypothyroidism  She is on Levothyroxine 100mcg that she takes in morning but waits only 15min before breakfast  TSh 5 3uIU/mL and Ft4 0 85ng/dL  May be followed with repeat thyroid functions as outpt after appropriate use of levothyroxine for 6 weeks  She was advised to wait 1 hr before breakfast and 4 hrs before calcium/iron supplements  4  Gastroparesis  This seems to have improved  Last episode was 5 months ago when she had labile hyperglycemia  Is she remains stable, may consider GLP1 agonists as outpt  She will benefit from Endocrinology follow up as outpt  Total time spent was 50min of which >50% was in coordination of care        Reason for consultation:   Own the bone     History of Present Illness:    Suzie Pinedo is a 76 y o  female who presents with a mechanical fall on ice with sustained Lt femoral neck fracture  She has known hx of T2DM for 10 years, HTN, HLD, morbid obesity, Osteopenia, Hypothyroidism, GERD/esophagitis/gastroparesis and NAFLD  She reports a previous ankle fracture 16 years ago after fall off stairs  Recent DEXA bone scan in 11/20 showed osteopenia with Tscore -1 6 lt femur  She has 2 servings of dairy daily and is on vitamin D replacement  No family Hx  Non smoker and no chronic inflammatory state other than listed above  Her diabetes is uncontrolled with recent A1c of 8 2%  no reported microvascular or macrovascular complications  Current regimen includes Lantus 25u am and 35u pm, Fiasp 12u br, ryan and bedtime  She had severe GI side effects with metformin  She reports significant improvement of gasropareiss sx since insulin dose titration  No polyuria, polydispia, blurred vision or weight loss  [address] ophthalmology  All other ROS negative  Review of Systems:    Review of Systems   Constitutional: Positive for fatigue  HENT: Negative  Eyes: Negative  Respiratory: Negative  Cardiovascular: Negative  Gastrointestinal: Negative  Endocrine: Negative  Musculoskeletal: Negative  Skin: Negative  Allergic/Immunologic: Negative  Neurological: Negative  Hematological: Negative  Psychiatric/Behavioral: Negative  Past Medical and Surgical History:     Past Medical History:   Diagnosis Date    Acid reflux     Anxiety     Diabetes mellitus ([address] )     Hypertension     Hypothyroid        Past Surgical History:   Procedure Laterality Date    CHOLECYSTECTOMY      COLONOSCOPY      DILATION AND CURETTAGE OF UTERUS      ESOPHAGOGASTRODUODENOSCOPY      HYSTERECTOMY      45    OOPHORECTOMY      39    [place] COLONOSCOPY FLX DX W/COLLJ SPEC WHEN PFRMD N/A 1/28/2016    Procedure: EGD AND COLONOSCOPY;  Surgeon: Parrish Dominguez MD;  [facility];   Service: Gastroenterology    TN INCISE FINGER TENDON SHEATH Right 1/31/2017    Procedure: LONG FINGER TRIGGER RELEASE ;  Surgeon: Jacoby Zendejas MD;  [facility];  Service: Orthopedics    ME INCISE FINGER TENDON SHEATH Right 7/21/2016    Procedure: RELEASE TRIGGER FINGER - LONG FINGER;  Surgeon: Jacoby Zendejas MD;  [facility];  Service: Orthopedics    ME REVISE MEDIAN N/CARPAL TUNNEL SURG Right 1/31/2017    Procedure: WRIST CARPAL TUNNEL RELEASE ; TENOLYSIS OF FPL, FDS 2-5, FDP 2-5;  APPLICATION OF Derenda Velez;  Surgeon: Jacoby Zendejas MD;  [facility];  Service: Orthopedics    ROOT CANAL      UPPER GASTROINTESTINAL ENDOSCOPY         Meds/Allergies:    PTA meds:   Prior to Admission Medications   Prescriptions Last Dose Informant Patient Reported? Taking?    Blood Glucose Monitoring Suppl (ACCU-CHEK MAT PLUS) w/Device KIT 2/2/2021 at Unknown time Self No Yes   Sig: Test 4 times daily   Cholecalciferol (VITAMIN D) 2000 units tablet 2/1/2021 at Unknown time Self No Yes   Sig: TAKE ONE TABLET BY MOUTH EVERY DAY   Continuous Blood Gluc  (Dexcom G6 ) MERCY   No Yes   Sig: Use 1 each 5 (five) times a day   Continuous Blood Gluc Sensor (Dexcom G6 Sensor) MISC   No Yes   Sig: Use 1 each 5 (five) times a day   Continuous Blood Gluc Transmit (Dexcom G6 Transmitter) MISC   No Yes   Sig: Use 1 each 5 (five) times a day   Insulin Pen Needle (BD Pen Needle Kelly U/F) 32G X 4 MM MISC  Self No Yes   Sig: by Other route 4 (four) times a day   Insulin Syringe-Needle U-100 (B-D INS SYR ULTRAFINE  3CC/30G) 30G X 1/2" 0 3 ML MISC  Self No Yes   Sig: Inject under the skin 4 (four) times a day   Lancets (FREESTYLE) lancets  Self No Yes   Sig: by Other route 3 (three) times a day Test blood glucose   cyanocobalamin (VITAMIN B-12) 1,000 mcg tablet 2/1/2021 at Unknown time Self Yes Yes   Sig: Take 1,000 mcg by mouth daily   glimepiride (AMARYL) 4 mg tablet 2/1/2021 at Unknown time Self No Yes   Sig: Take 1 tablet (4 mg total) by mouth 2 (two) times a day   glucose blood (FREESTYLE LITE) test strip  Self No Yes   Si each by Other route 4 (four) times a day Use 4 times daily DM2-- free style lite test strips   insulin aspart, w/niacinamide, (FIASP) 100 Units/mL injection pen 2021 at Unknown time Self No Yes   Sig: Inject 12 Units under the skin 4 (four) times a day   insulin glargine (Lantus SoloStar) 100 units/mL injection pen 2021 at Unknown time Self No Yes   Si units in AM and 35 units in PM   levothyroxine 100 mcg tablet 2021 at Unknown time  No Yes   Sig: Take 1 tablet (100 mcg total) by mouth daily   lisinopril-hydrochlorothiazide (PRINZIDE,ZESTORETIC) 20-12 5 MG per tablet 2021 at Unknown time  No Yes   Sig: Take 0 5 tablets by mouth daily   meloxicam (MOBIC) 15 mg tablet  Self No Yes   Sig: Take 1 tablet (15 mg total) by mouth daily   omeprazole (PriLOSEC) 40 MG capsule 2021 at Unknown time Self No Yes   Sig: Take 1 capsule (40 mg total) by mouth daily   sertraline (ZOLOFT) 100 mg tablet 2021 at Unknown time Self No Yes   Sig: Take 1 tablet (100 mg total) by mouth daily      Facility-Administered Medications: None       Allergies:    Allergies   Allergen Reactions    Metformin Diarrhea     History:     Marital Status: Legally    Occupation:   Substance Use History:   Social History     Substance and Sexual Activity   Alcohol Use Yes    Comment: Rarely     Social History     Tobacco Use   Smoking Status Former Smoker    Packs/day: 2 00    Years: 10 00    Pack years: 20 00    Types: Cigarettes    Quit date: 12    Years since quittin 1   Smokeless Tobacco Never Used     Social History     Substance and Sexual Activity   Drug Use No       Family History:    Family History   Problem Relation Age of Onset    Cancer Father     Heart attack Father     Diabetes type II Father     Arthritis Maternal Grandmother     No Known Problems Paternal Grandmother     Heart disease Paternal Norrine Lime     Stroke Paternal Grandfather  Cirrhosis Mother     No Known Problems Sister     No Known Problems Brother     No Known Problems Maternal Grandfather     Breast cancer Paternal Aunt 62    Colon cancer Paternal Uncle 28    No Known Problems Sister     No Known Problems Maternal Aunt     No Known Problems Maternal Aunt        Physical Exam:     Vitals:   Blood Pressure: 148/70 (02/03/21 0800)  Pulse: 88 (02/03/21 0800)  Temperature: 98 3 °F (36 8 °C) (02/03/21 0800)  Temp Source: Oral (02/03/21 0800)  Respirations: 18 (02/03/21 0800)  Height: 5' 2" (157 5 cm) (02/02/21 1549)  Weight - Scale: 90 7 kg (200 lb) (02/02/21 1549)  SpO2: 92 % (02/03/21 0800)    Physical Exam  Constitutional:       Appearance: She is well-developed  HENT:      Head: Normocephalic  Eyes:      Pupils: Pupils are equal, round, and reactive to light  Neck:      Musculoskeletal: Normal range of motion  Thyroid: No thyromegaly  Cardiovascular:      Rate and Rhythm: Normal rate  Heart sounds: Normal heart sounds  Pulmonary:      Effort: Pulmonary effort is normal       Breath sounds: Normal breath sounds  Abdominal:      General: Bowel sounds are normal       Palpations: Abdomen is soft  Musculoskeletal:         General: No deformity  Skin:     Capillary Refill: Capillary refill takes less than 2 seconds  Coloration: Skin is not pale  Findings: No rash  Neurological:      Mental Status: She is alert and oriented to person, place, and time             Lab and Imaging Results: I have personally reviewed pertinent films in PACS    Results from last 7 days   Lab Units 02/03/21  0507   WBC Thousand/uL 10 84*   HEMOGLOBIN g/dL 12 3   HEMATOCRIT % 37 1   PLATELETS Thousands/uL 223   NEUTROS PCT % 80*   LYMPHS PCT % 14   MONOS PCT % 6   EOS PCT % 0     Results from last 7 days   Lab Units 02/03/21  0507 02/02/21  1601   POTASSIUM mmol/L 3 7 4 1   CHLORIDE mmol/L 104 103   CO2 mmol/L 28 28   BUN mg/dL 17 20   CREATININE mg/dL 0 68 0 98 CALCIUM mg/dL 9 3 9 4   ALK PHOS U/L  --  74   ALT U/L  --  59   AST U/L  --  43     Results from last 7 days   Lab Units 02/02/21  1601   INR  1 03     POC Glucose (mg/dl)   Date Value   02/03/2021 205 (H)   02/02/2021 300 (H)   01/31/2017 218 (H)   01/31/2017 292 (H)   01/31/2017 344 (H)         ** Please Note: Dragon 360 Dictation voice to text software may have been used in the creation of this document   **

## 2021-02-03 NOTE — CONSULTS
Consultation - Geriatric Medicine   Danni Paz 76 y o  female MRN: [id number]  Unit/Bed#: -01 Encounter: [id number]      Assessment/Plan     Ambulatory dysfunction with fall  -mechanical fall when shoveling snow yesterday  -does not use assist device for ambulation at baseline  -no history of falls   -continue to encourage good body mechanics and assist with all transfers  -keep personal items and call bell close to prevent reaching  -continue fall precautions  -PT/OT post op    Left femoral neck fracture   -s/p fall at home while shoveling snow  -NWB LLE  -continue acute pain control  -continue to monitor for acute blood loss anemia  -Ortho following, anticipate OR today  -PT/OT post op    Acute pain due to trauma   -recommend pain control per Geriatric pain protocol:  Tylenol 975mg Q8H scheduled  Roxicodone 2 5mg Q4H PRN moderate pain  Roxicodone 5mg Q4H PRN severe pain  Dilaudid 0 2mg Q4H PRN  -consider adjuncts such as Gabapentin 100mg HS and lidocaine patch topically  -encourage addition of non-pharmacologic pain treatment including ice and frequent repositioning  -recommend  bowel regimen to prevent and treat constipation due to increased risk with acute pain and opiate pain medications    Osteoporosis  -evidenced by fragility fracture of left femur sustained in fall from standing height  -DXA 11/20/20 T-score -1 6 consistent with osteopenia however now clinically osteoporosis due to fragility fracture  -Vitamin D 39 2  -TSH elevated at 5 36  -PTH 48 5  -encourage well balanced nutrition with adequate calcium and vitamin D, continue daily supplementation     Impaired Vision  -uses glasses for reading, recommend use of corrective lenses at all appropriate times  -encourage adequate lighting and encourage use of assistance with ambulation  -keep personal belongings close to person to avoid reaching  -encourage appropriate footwear at all times    Cognitive screening  -Pt A/Ox4 and denies memory concerns  -independent with ADLs/iADLs  -no CTH or cognitive screening on record  -encourage pt to remain physically, socially, and cognitively active and engaged to maintain cognitive acuity    Anxiety  -symptoms controlled on Sertraline, continue home regimen  -consider referral to counselor/support group as o/p  -continue multimodal treatment regimen    Hypothyroidism  -TSH elevated at 5 36   -continue home levothyroxine regimen, consider increasing dosing to 112mcg and recheck TSH/FT4 in 4-6 weeks   -continue close o/p f/u for chronic disease and medication titration/management    DM-II  -HbA1c above goal at 8  -goal glu 140-180 during hospitalization to reduce risk of hypoglycemia  -continue     Deconditioning/debility/frailty  -clinical frailty scale stage stage 3, managing well   -continue good control chronic medical conditions  -continue to encourage well balanced nutrition  -excellent support system including son and daughter in law  -continue to monitor for signs/symptoms of anxiety/depression and treat if arise as may have large impact on response to therapies and overall quality of life    Delirium precautions  -Patient is high risk of delirium due to age, traumatic injury, acute pain, surgical procedure, hospitalization   -Initiate delirium precautions  -maintain normal sleep/wake cycle  -minimize overnight interruptions, group overnight vitals/labs/nursing checks as possible  -dim lights, close blinds and turn off tv to minimize stimulation and encourage sleep environment in evenings  -ensure that pain is well controlled   -monitor for fecal and urinary retention which may precipitate delirium  -encourage early mobilization and ambulation following fixation of femur fx and once cleared by Ortho  -provide frequent reorientation and redirection as indicated and appropriate   -limit use of medications which may precipitate or worsen delirium such as tramadol, benzodiazepine, anticholinergics, and benadryl  -encourage hydration and nutrition   -redirect unwanted behaviors as first line    Home medication review  Will confirm with pharmacy when open during business hours     Update: personally confirmed medication list with [facility] [phone]:    Glimepiride 4mg BID  Fiasp insulin (aspart with niacinamide) 12U QID  Insulin Glargine 25 units in morning and 35 units in evening (double checked with pharmacy that doses are 25U/35U)  Levothyroxine 100mcg daily  Lisinopril-HCTZ 20-12 5mg, take 1/2 tab daily  Omeprazole 40mg daily  Sertraline 100mg daily     No longer on Meloxicam per pharmacy    History of Present Illness   Physician Requesting Consult: Joe Lyman MD  Reason for Consult / Principal Problem: Fall  Hx and PE limited by: N/A  Additional history obtained from: Chart review and patient interview    HPI: Errol Kenny is a 76y o  year old female with HTN  DM-II with chronic insulin use, hypothyroidism, osteoporosis, anxiety and impaired vision who is admitted to the Orthopedic service with femur fracture following mechanical fall on ice when shoveling snow yesterday  She is being seen in consultation by geriatrics for high risk of developing delirium during hospitalization  She is seen and examined at the bedside where she is lying resting, she reports pain currently at 5/10 and some anxiety related to requiring surgical procedure and recovery process  She explains that the fall occurred yesterday at her home when she was shoveling snow and slipped causing her to fall and resulting in immediate severe pain and inability to bear weight  She does not use assist device for ambulation and reports no recent falls, she resides at home with her son and daughter in law  She is independent in all ADLs and iADLs, describes herself as a very active person who is always on the go and denies memory concerns  She uses glasses for reading but does not use hearing aid, dentures, walker or cane  Inpatient consult to Gerontology  Consult performed by: Aroldo Bob DO  Consult ordered by: Lacey Dalal MD        Review of Systems   Constitutional: Negative for appetite change, chills and fever  HENT: Negative for dental problem, hearing loss and trouble swallowing  Eyes: Negative for visual disturbance  Respiratory: Negative for cough, chest tightness, shortness of breath and wheezing  Cardiovascular: Negative for chest pain and palpitations  Gastrointestinal: Negative for abdominal pain, constipation, diarrhea, nausea and vomiting  Endocrine: Negative  Genitourinary: Negative  Musculoskeletal: Positive for arthralgias (left leg)  Skin: Negative  Allergic/Immunologic: Negative  Neurological: Negative for dizziness, light-headedness and headaches  Hematological: Negative  Psychiatric/Behavioral: Negative for behavioral problems and sleep disturbance  The patient is nervous/anxious (about surgery)  All other systems reviewed and are negative  Historical Information   Past Medical History:   Diagnosis Date    Acid reflux     Anxiety     Diabetes mellitus [address] [address]     Hypertension     Hypothyroid      Past Surgical History:   Procedure Laterality Date    CHOLECYSTECTOMY      COLONOSCOPY      DILATION AND CURETTAGE OF UTERUS      ESOPHAGOGASTRODUODENOSCOPY      HYSTERECTOMY      45    OOPHORECTOMY      39    LA COLONOSCOPY FLX DX W/COLLJ SPEC WHEN PFRMD N/A 1/28/2016    Procedure: EGD AND COLONOSCOPY;  Surgeon: Marina Grant MD;  [facility];   Service: Gastroenterology    LA INCISE FINGER TENDON SHEATH Right 1/31/2017    Procedure: LONG FINGER TRIGGER RELEASE ;  Surgeon: Skyla Pinedo MD;  [facility];  Service: Orthopedics    LA INCISE FINGER TENDON SHEATH Right 7/21/2016    Procedure: RELEASE TRIGGER FINGER - LONG FINGER;  Surgeon: Skyla Pinedo MD;  [facility];  Service: Orthopedics    LA REVISE MEDIAN N/CARPAL TUNNEL SURG Right 2017    Procedure: WRIST CARPAL TUNNEL RELEASE ; TENOLYSIS OF FPL, FDS 2-5, FDP 2-5;  APPLICATION OF TENOGLYIDE;  Surgeon: Pina Calhoun MD;  [facility];  Service: Orthopedics    ROOT CANAL      UPPER GASTROINTESTINAL ENDOSCOPY       Social History   Social History     Substance and Sexual Activity   Alcohol Use Yes    Comment: Rarely     Social History     Substance and Sexual Activity   Drug Use No     Social History     Tobacco Use   Smoking Status Former Smoker    Packs/day: 2 00    Years: 10 00    Pack years: 20 00    Types: Cigarettes    Quit date: 12    Years since quittin 1   Smokeless Tobacco Never Used     Family History:   Family History   Problem Relation Age of Onset    Cancer Father     Heart attack Father     Diabetes type II Father     Arthritis Maternal Grandmother     No Known Problems Paternal Grandmother     Heart disease Paternal Grandfather     Stroke Paternal Grandfather     Cirrhosis Mother     No Known Problems Sister     No Known Problems Brother     No Known Problems Maternal Grandfather     Breast cancer Paternal Aunt 62    Colon cancer Paternal Uncle 28    No Known Problems Sister     No Known Problems Maternal Aunt     No Known Problems Maternal Aunt      Meds/Allergies   all current active meds have been reviewed    Allergies   Allergen Reactions    Metformin Diarrhea     Objective     Intake/Output Summary (Last 24 hours) at 2/3/2021 3457  Last data filed at 2021 2154  Gross per 24 hour   Intake --   Output 850 ml   Net -850 ml     Invasive Devices     Peripheral Intravenous Line            Peripheral IV 21 Right Antecubital less than 1 day          Drain            Urethral Catheter Double-lumen 16 Fr  less than 1 day              Physical Exam  Vitals signs and nursing note reviewed  Constitutional:       Appearance: Normal appearance  She is obese  HENT:      Head: Normocephalic and atraumatic        Nose: Nose normal       Mouth/Throat:      Mouth: Mucous membranes are moist       Comments: Dentition in tact  Eyes:      General: No scleral icterus  Right eye: No discharge  Left eye: No discharge  Conjunctiva/sclera: Conjunctivae normal    Neck:      Musculoskeletal: Neck supple  Comments: Trachea appears midline  Cardiovascular:      Rate and Rhythm: Normal rate  Pulses: Normal pulses  Pulmonary:      Effort: No respiratory distress  Breath sounds: Normal breath sounds  No wheezing  Abdominal:      General: Bowel sounds are normal  There is no distension  Palpations: Abdomen is soft  Tenderness: There is no abdominal tenderness  Musculoskeletal:      Right lower leg: No edema  Left lower leg: No edema  Comments: Normal overall muscle mass for age and activity level   Skin:     General: Skin is warm and dry  Neurological:      Mental Status: She is alert and oriented to person, place, and time  Mental status is at baseline     Psychiatric:         Mood and Affect: Mood normal          Behavior: Behavior normal        Lab Results:   I have personally reviewed pertinent lab results including the following:  -sodium 137, potassium 3 7, chloride 104, CO2 28, BUN 17, creatinine 0 68, glucose 269, calcium 9 3, GFR 91  -vitamin-D 39 2  -hemoglobin 12 3, hematocrit 37 1, WBC 10 4,   -Vitamin D 39 2  -TSH elevated at 5 36  -PTH 48 5    I have personally reviewed the following imaging study reports in PACS:    DEXA 11/20/2020:  T-score -1 6  Left femur XR 2/2/21:  Intertrochanteric fx left femur  Portable chest x-ray 02/02/2021:  No acute abnormality in chest    Therapies:   PT: Pending   OT: Pending     VTE Prophylaxis: Reason for no pharmacologic prophylaxis On hold for OR    Code Status: Level 1 - Full Code  Advance Directive and Living Will:      Power of :    POLST:      Family and Social Support: son and daughter in law  Goals of Care: good pain control post op

## 2021-02-03 NOTE — OCCUPATIONAL THERAPY NOTE
Occupational Therapy         Patient Name: Danni Paz  SCLTC'N Date: 2/3/2021     02/03/21 0729   OT Last Visit   OT Visit Date 02/03/21   Note Type   Cancel Reasons Patient to operating room       ORDERS RECEIVED  CHART REVIEW COMPLETED  PT PLANNED FOR OR FOR FIXATION OF FEMUR FX  WILL FOLLOW POST-OP      Chisé Diacik, MOT, OTR/L

## 2021-02-03 NOTE — OP NOTE
OPERATIVE REPORT  PATIENT NAME: Caryle Parma    :  1955  MRN: [id number]  Pt Location:  OR ROOM 15    SURGERY DATE: 2/3/2021    Surgeon(s) and Role:     * Jodi Swan MD - Primary     * Sondra Montano PA-C - Assisting    Preop Diagnosis:  Closed fracture of neck of left femur, initial encounter ([place] [address] ) Dwana Fail    Post-Op Diagnosis Codes:     * Closed fracture of neck of left femur, initial encounter ([place] [address] ) [S72 002A]    Procedure(s) (LRB):  INSERTION NAIL IM FEMUR ANTEGRADE (TROCHANTERIC) (Left)  ORIF left hip with intramedullary nail    Specimen(s):  * No specimens in log *    Estimated Blood Loss:   Minimal    Drains:  Urethral Catheter Double-lumen 16 Fr  (Active)   Reasons to continue Urinary Catheter  Post-operative urological requirements 21   Goal for Removal No longer needed- Will place order to discontinue 21   Site Assessment Clean;Skin intact 21   Collection Container Standard drainage bag 21   Securement Method Tape 21   Output (mL) 50 mL 21 0956   Number of days: 1       Anesthesia Type:   Choice    Operative Indications:  Closed fracture of neck of left femur, initial encounter ([place] [address] ) [S72 002A]      Operative Findings:  ORIF utilizing short nail, locked distal    Complications:   None    Procedure and Technique: Following induction of adequate level of general anesthesia, the patient was then transferred to the fracture table  Closed reduction was performed of the left hip and this was confirmed by padded fluoroscopy  The left hip and lateral thigh were then prepped draped sterilely  The shower curtain was utilized  Under fluoroscopic control the proximal femur was opened up  This is done via small incision proximal the hip  A beaded tip guide carolyn was placed down the medic canal the femur  Reaming proximally was 60 mm  The short nail was then introduced in cannulated fashion    When it was sunk to appropriate depth, the beaded tip guide carolyn was were drawn  A lag screw was then placed within the center of the femoral head  This was placed over a terminally threaded K-wire which gentleman reamed and tapped  The setscrew was deployed  It was backed off a quarter turn  The leg was taken out of traction and the compression wheel was spun generating compression of the fracture site  Distal lock was performed utilizing out  device was single screw in nature going from lateral to medial   Final fluoroscopic films document a well-aligned fracture, good hardware position  The wounds were then flushed with saline closed  Subcu tissue closed 2 Vicryl suture  The skin was closed to a nylons  Sterile dressings were applied  She was then awakened from general anesthesia, taken recovery room stable condition plans to include physical therapy weight-bearing to tolerance, she will require DVT prophylaxis with Lovenox   I was present for the entire procedure   please note, that as no qualified orthopedic resident was available to assist, the assistance of Belgica Schroeder was instrumental in the safe execution of this patient's left hip surgery    Started the patient position, patient prep drape, intraoperative assistance, wound closure, dressing application, patient transfer, all these were performed under my direct supervision    Patient Disposition:  PACU     SIGNATURE: Ewa Lizama MD  DATE: February 3, 2021  TIME: 12:09 PM

## 2021-02-04 VITALS
BODY MASS INDEX: 36.8 KG/M2 | HEART RATE: 86 BPM | TEMPERATURE: 98.8 F | DIASTOLIC BLOOD PRESSURE: 60 MMHG | SYSTOLIC BLOOD PRESSURE: 114 MMHG | HEIGHT: 62 IN | RESPIRATION RATE: 18 BRPM | OXYGEN SATURATION: 91 % | WEIGHT: 200 LBS

## 2021-02-04 LAB
ANION GAP SERPL CALCULATED.3IONS-SCNC: 5 MMOL/L (ref 4–13)
BASOPHILS # BLD AUTO: 0.02 THOUSANDS/ΜL (ref 0–0.1)
BASOPHILS NFR BLD AUTO: 0 % (ref 0–1)
BUN SERPL-MCNC: 12 MG/DL (ref 5–25)
CALCIUM SERPL-MCNC: 9.2 MG/DL (ref 8.3–10.1)
CHLORIDE SERPL-SCNC: 105 MMOL/L (ref 100–108)
CO2 SERPL-SCNC: 28 MMOL/L (ref 21–32)
CREAT SERPL-MCNC: 0.62 MG/DL (ref 0.6–1.3)
EOSINOPHIL # BLD AUTO: 0.09 THOUSAND/ΜL (ref 0–0.61)
EOSINOPHIL NFR BLD AUTO: 1 % (ref 0–6)
ERYTHROCYTE [DISTWIDTH] IN BLOOD BY AUTOMATED COUNT: 13.7 % (ref 11.6–15.1)
GFR SERPL CREATININE-BSD FRML MDRD: 94 ML/MIN/1.73SQ M
GLUCOSE SERPL-MCNC: 147 MG/DL (ref 65–140)
GLUCOSE SERPL-MCNC: 152 MG/DL (ref 65–140)
GLUCOSE SERPL-MCNC: 189 MG/DL (ref 65–140)
HCT VFR BLD AUTO: 31.1 % (ref 34.8–46.1)
HGB BLD-MCNC: 10.2 G/DL (ref 11.5–15.4)
IMM GRANULOCYTES # BLD AUTO: 0.03 THOUSAND/UL (ref 0–0.2)
IMM GRANULOCYTES NFR BLD AUTO: 0 % (ref 0–2)
LYMPHOCYTES # BLD AUTO: 1.39 THOUSANDS/ΜL (ref 0.6–4.47)
LYMPHOCYTES NFR BLD AUTO: 17 % (ref 14–44)
MCH RBC QN AUTO: 27.9 PG (ref 26.8–34.3)
MCHC RBC AUTO-ENTMCNC: 32.8 G/DL (ref 31.4–37.4)
MCV RBC AUTO: 85 FL (ref 82–98)
MONOCYTES # BLD AUTO: 0.6 THOUSAND/ΜL (ref 0.17–1.22)
MONOCYTES NFR BLD AUTO: 7 % (ref 4–12)
NEUTROPHILS # BLD AUTO: 6.08 THOUSANDS/ΜL (ref 1.85–7.62)
NEUTS SEG NFR BLD AUTO: 75 % (ref 43–75)
NRBC BLD AUTO-RTO: 0 /100 WBCS
PLATELET # BLD AUTO: 162 THOUSANDS/UL (ref 149–390)
PMV BLD AUTO: 10.7 FL (ref 8.9–12.7)
POTASSIUM SERPL-SCNC: 3.6 MMOL/L (ref 3.5–5.3)
RBC # BLD AUTO: 3.65 MILLION/UL (ref 3.81–5.12)
SODIUM SERPL-SCNC: 138 MMOL/L (ref 136–145)
WBC # BLD AUTO: 8.21 THOUSAND/UL (ref 4.31–10.16)

## 2021-02-04 PROCEDURE — 97163 PT EVAL HIGH COMPLEX 45 MIN: CPT

## 2021-02-04 PROCEDURE — 85025 COMPLETE CBC W/AUTO DIFF WBC: CPT | Performed by: PHYSICIAN ASSISTANT

## 2021-02-04 PROCEDURE — NC001 PR NO CHARGE: Performed by: ORTHOPAEDIC SURGERY

## 2021-02-04 PROCEDURE — 97166 OT EVAL MOD COMPLEX 45 MIN: CPT

## 2021-02-04 PROCEDURE — 82948 REAGENT STRIP/BLOOD GLUCOSE: CPT

## 2021-02-04 PROCEDURE — 97535 SELF CARE MNGMENT TRAINING: CPT

## 2021-02-04 PROCEDURE — 99232 SBSQ HOSP IP/OBS MODERATE 35: CPT | Performed by: INTERNAL MEDICINE

## 2021-02-04 PROCEDURE — 80048 BASIC METABOLIC PNL TOTAL CA: CPT | Performed by: PHYSICIAN ASSISTANT

## 2021-02-04 RX ORDER — DOCUSATE SODIUM 100 MG/1
100 CAPSULE, LIQUID FILLED ORAL 2 TIMES DAILY
Qty: 10 CAPSULE | Refills: 0 | Status: SHIPPED | OUTPATIENT
Start: 2021-02-04 | End: 2022-01-24 | Stop reason: SDUPTHER

## 2021-02-04 RX ORDER — ACETAMINOPHEN 325 MG/1
650 TABLET ORAL EVERY 6 HOURS
Qty: 56 TABLET | Refills: 0 | Status: SHIPPED | OUTPATIENT
Start: 2021-02-04 | End: 2021-02-11

## 2021-02-04 RX ORDER — METHOCARBAMOL 500 MG/1
500 TABLET, FILM COATED ORAL 4 TIMES DAILY
Qty: 20 TABLET | Refills: 0 | Status: SHIPPED | OUTPATIENT
Start: 2021-02-04 | End: 2021-02-09 | Stop reason: SDUPTHER

## 2021-02-04 RX ADMIN — PANTOPRAZOLE SODIUM 40 MG: 40 TABLET, DELAYED RELEASE ORAL at 05:14

## 2021-02-04 RX ADMIN — OXYCODONE HYDROCHLORIDE 10 MG: 10 TABLET ORAL at 12:14

## 2021-02-04 RX ADMIN — LEVOTHYROXINE SODIUM 100 MCG: 100 TABLET ORAL at 08:15

## 2021-02-04 RX ADMIN — INSULIN GLARGINE 25 UNITS: 100 INJECTION, SOLUTION SUBCUTANEOUS at 08:13

## 2021-02-04 RX ADMIN — METHOCARBAMOL 500 MG: 500 TABLET ORAL at 11:37

## 2021-02-04 RX ADMIN — SERTRALINE HYDROCHLORIDE 100 MG: 100 TABLET ORAL at 08:16

## 2021-02-04 RX ADMIN — CEFAZOLIN SODIUM 1000 MG: 1 SOLUTION INTRAVENOUS at 03:19

## 2021-02-04 RX ADMIN — ENOXAPARIN SODIUM 40 MG: 40 INJECTION SUBCUTANEOUS at 08:13

## 2021-02-04 RX ADMIN — GABAPENTIN 100 MG: 100 CAPSULE ORAL at 08:16

## 2021-02-04 RX ADMIN — HYDROMORPHONE HYDROCHLORIDE 0.5 MG: 1 INJECTION, SOLUTION INTRAMUSCULAR; INTRAVENOUS; SUBCUTANEOUS at 10:28

## 2021-02-04 RX ADMIN — METHOCARBAMOL 500 MG: 500 TABLET ORAL at 05:14

## 2021-02-04 RX ADMIN — INSULIN LISPRO 2 UNITS: 100 INJECTION, SOLUTION INTRAVENOUS; SUBCUTANEOUS at 12:09

## 2021-02-04 RX ADMIN — OXYCODONE HYDROCHLORIDE 10 MG: 10 TABLET ORAL at 08:16

## 2021-02-04 RX ADMIN — DOCUSATE SODIUM 100 MG: 100 CAPSULE, LIQUID FILLED ORAL at 08:16

## 2021-02-04 RX ADMIN — ACETAMINOPHEN 975 MG: 325 TABLET, FILM COATED ORAL at 13:40

## 2021-02-04 RX ADMIN — CALCIUM CARBONATE (ANTACID) CHEW TAB 500 MG 500 MG: 500 CHEW TAB at 13:42

## 2021-02-04 RX ADMIN — Medication 2000 UNITS: at 08:15

## 2021-02-04 RX ADMIN — SENNOSIDES 8.6 MG: 8.6 TABLET, FILM COATED ORAL at 08:16

## 2021-02-04 RX ADMIN — ACETAMINOPHEN 975 MG: 325 TABLET, FILM COATED ORAL at 05:13

## 2021-02-04 NOTE — PLAN OF CARE
Problem: Potential for Falls  Goal: Patient will remain free of falls  Description: INTERVENTIONS:  - Assess patient frequently for physical needs  -  Identify cognitive and physical deficits and behaviors that affect risk of falls    -  [place] fall precautions as indicated by assessment   - Educate patient/family on patient safety including physical limitations  - Instruct patient to call for assistance with activity based on assessment  - Modify environment to reduce risk of injury  - Consider OT/PT consult to assist with strengthening/mobility  2/4/2021 1838 by Markel Cheek RN  Outcome: Adequate for Discharge  2/4/2021 1016 by Markel Cheek RN  Outcome: Progressing     Problem: Prexisting or High Potential for Compromised Skin Integrity  Goal: Skin integrity is maintained or improved  Description: INTERVENTIONS:  - Identify patients at risk for skin breakdown  - Assess and monitor skin integrity  - Assess and monitor nutrition and hydration status  - Monitor labs   - Assess for incontinence   - Turn and reposition patient  - Assist with mobility/ambulation  - Relieve pressure over bony prominences  - Avoid friction and shearing  - Provide appropriate hygiene as needed including keeping skin clean and dry  - Evaluate need for skin moisturizer/barrier cream  - Collaborate with interdisciplinary team   - Patient/family teaching  - Consider wound care consult   2/4/2021 1838 by Markel Cheek RN  Outcome: Adequate for Discharge  2/4/2021 1016 by Markel Cheek RN  Outcome: Progressing     Problem: PAIN - ADULT  Goal: Verbalizes/displays adequate comfort level or baseline comfort level  Description: Interventions:  - Encourage patient to monitor pain and request assistance  - Assess pain using appropriate pain scale  - Administer analgesics based on type and severity of pain and evaluate response  - Implement non-pharmacological measures as appropriate and evaluate response  - Consider cultural and social influences on pain and pain management  - Notify physician/advanced practitioner if interventions unsuccessful or patient reports new pain  2/4/2021 1838 by David Melendez RN  Outcome: Adequate for Discharge  2/4/2021 1016 by David Melendez RN  Outcome: Progressing     Problem: INFECTION - ADULT  Goal: Absence or prevention of progression during hospitalization  Description: INTERVENTIONS:  - Assess and monitor for signs and symptoms of infection  - Monitor lab/diagnostic results  - Monitor all insertion sites, i e  indwelling lines, tubes, and drains  - Monitor endotracheal if appropriate and nasal secretions for changes in amount and color  - [place] appropriate cooling/warming therapies per order  - Administer medications as ordered  - Instruct and encourage patient and family to use good hand hygiene technique  - Identify and instruct in appropriate isolation precautions for identified infection/condition  2/4/2021 1838 by David Melendez RN  Outcome: Adequate for Discharge  2/4/2021 1016 by David Melendez RN  Outcome: Progressing  Goal: Absence of fever/infection during neutropenic period  Description: INTERVENTIONS:  - Monitor WBC    2/4/2021 1838 by David Melendez RN  Outcome: Adequate for Discharge  2/4/2021 1016 by David Melendez RN  Outcome: Progressing     Problem: SAFETY ADULT  Goal: Patient will remain free of falls  Description: INTERVENTIONS:  - Assess patient frequently for physical needs  -  Identify cognitive and physical deficits and behaviors that affect risk of falls    -  [place] fall precautions as indicated by assessment   - Educate patient/family on patient safety including physical limitations  - Instruct patient to call for assistance with activity based on assessment  - Modify environment to reduce risk of injury  - Consider OT/PT consult to assist with strengthening/mobility  2/4/2021 1838 by David Melendez RN  Outcome: Adequate for Discharge  2/4/2021 1016 by Florencio Anders RN  Outcome: Progressing  Goal: Maintain or return to baseline ADL function  Description: INTERVENTIONS:  -  Assess patient's ability to carry out ADLs; assess patient's baseline for ADL function and identify physical deficits which impact ability to perform ADLs (bathing, care of mouth/teeth, toileting, grooming, dressing, etc )  - Assess/evaluate cause of self-care deficits   - Assess range of motion  - Assess patient's mobility; develop plan if impaired  - Assess patient's need for assistive devices and provide as appropriate  - Encourage maximum independence but intervene and supervise when necessary  - Involve family in performance of ADLs  - Assess for home care needs following discharge   - Consider OT consult to assist with ADL evaluation and planning for discharge  - Provide patient education as appropriate  2/4/2021 1838 by Florencio Anders RN  Outcome: Adequate for Discharge  2/4/2021 1016 by Florencio Anders RN  Outcome: Progressing  Goal: Maintain or return mobility status to optimal level  Description: INTERVENTIONS:  - Assess patient's baseline mobility status (ambulation, transfers, stairs, etc )    - Identify cognitive and physical deficits and behaviors that affect mobility  - Identify mobility aids required to assist with transfers and/or ambulation (gait belt, sit-to-stand, lift, walker, cane, etc )  - [place] fall precautions as indicated by assessment  - Record patient progress and toleration of activity level on Mobility SBAR; progress patient to next Phase/Stage  - Instruct patient to call for assistance with activity based on assessment  - Consider rehabilitation consult to assist with strengthening/weightbearing, etc   2/4/2021 1838 by Florencio Anders RN  Outcome: Adequate for Discharge  2/4/2021 1016 by Florencio Anders RN  Outcome: Progressing     Problem: DISCHARGE PLANNING  Goal: Discharge to home or other facility with appropriate resources  Description: INTERVENTIONS:  - Identify barriers to discharge w/patient and caregiver  - Arrange for needed discharge resources and transportation as appropriate  - Identify discharge learning needs (meds, wound care, etc )  - Arrange for interpretive services to assist at discharge as needed  - Refer to Case Management Department for coordinating discharge planning if the patient needs post-hospital services based on physician/advanced practitioner order or complex needs related to functional status, cognitive ability, or social support system  2/4/2021 1838 by Kwasi Norton RN  Outcome: Adequate for Discharge  2/4/2021 1016 by Kwasi Norton RN  Outcome: Progressing

## 2021-02-04 NOTE — DISCHARGE SUMMARY
Discharge Summary - Orthopedics   Matilda Hernandez 76 y o  female MRN: [id number]  Unit/Bed#: -01    Attending Physician: Rachell Valladares    Admitting diagnosis: Left hip pain [M25 552]  Closed fracture of neck of left femur, initial encounter ([facility] ) [S72 002A]    Discharge diagnosis: Left hip pain [M25 552]  Closed fracture of neck of left femur, initial encounter ([facility] ) [S72 002A]    Date of admission: 2/2/2021    Date of discharge: 02/04/21    Procedure: Left antegrade femoral intramedullary nail     HPI & Hospital course:  76 y o  female community ambulator who presented to the ED after a mechanical ground level fall sustaining a left basicervical femur fracture  Pt was admitted to the orthopaedic service and taken to the OR on 02/03/2021 for left antegrade femoral intramedullary nail  Prior to surgery the risks and benefits of surgery were explained and informed consent was obtained  Surgery went without complications and pt was discharged to the PACU in a stable condition and was transferred to the floor  Patient was started on lovenox for DVT ppx and this is to be continued for 28 days postoperatively  On discharge date pt was cleared by PT and the medicine team and determined to be safe for discharge  Daily discussion was had with the patient, nursing staff, orthopaedic team, and family members if present  All questions were answered to the patients satisifaction  0   Lab Value Date/Time    HGB 10 2 (L) 02/04/2021 0555    HGB 12 3 02/03/2021 0507    HGB 13 0 02/02/2021 1601    HGB 11 9 09/16/2020 0728    HGB 12 0 02/19/2019 1202    HGB 12 8 08/23/2017 0754    HGB 12 3 12/19/2016 0805    HGB 12 3 12/19/2016 0805    HGB 12 3 07/28/2016 1001    HGB 12 3 03/21/2015 1134       Greater than 2 gram decrease in Hb qualifies for diagnosis of acute blood loss anemia  Vital signs remained stable and pt was resuscitated with IVF as needed  Body mass index is 36 58 kg/m²  moderately obese   Recommend behavior modifications, nutrition and physical activity  Discharge Instructions:   · Weight bearing to tolerance left lower extremity  · Keep dressings clean and dry at all times   · Complete DVT prophylaxis as prescribed: Lovenox for 28 days  · Physical therapy  · Follow-up as scheduled, otherwise call for appt  Discharge Medications: For the complete list of discharge medications, please refer to the patient's medication reconciliation

## 2021-02-04 NOTE — PLAN OF CARE
Problem: Potential for Falls  Goal: Patient will remain free of falls  Description: INTERVENTIONS:  - Assess patient frequently for physical needs  -  Identify cognitive and physical deficits and behaviors that affect risk of falls    -  [place] fall precautions as indicated by assessment   - Educate patient/family on patient safety including physical limitations  - Instruct patient to call for assistance with activity based on assessment  - Modify environment to reduce risk of injury  - Consider OT/PT consult to assist with strengthening/mobility  Outcome: Progressing     Problem: Prexisting or High Potential for Compromised Skin Integrity  Goal: Skin integrity is maintained or improved  Description: INTERVENTIONS:  - Identify patients at risk for skin breakdown  - Assess and monitor skin integrity  - Assess and monitor nutrition and hydration status  - Monitor labs   - Assess for incontinence   - Turn and reposition patient  - Assist with mobility/ambulation  - Relieve pressure over bony prominences  - Avoid friction and shearing  - Provide appropriate hygiene as needed including keeping skin clean and dry  - Evaluate need for skin moisturizer/barrier cream  - Collaborate with interdisciplinary team   - Patient/family teaching  - Consider wound care consult   Outcome: Progressing     Problem: PAIN - ADULT  Goal: Verbalizes/displays adequate comfort level or baseline comfort level  Description: Interventions:  - Encourage patient to monitor pain and request assistance  - Assess pain using appropriate pain scale  - Administer analgesics based on type and severity of pain and evaluate response  - Implement non-pharmacological measures as appropriate and evaluate response  - Consider cultural and social influences on pain and pain management  - Notify physician/advanced practitioner if interventions unsuccessful or patient reports new pain  Outcome: Progressing     Problem: INFECTION - ADULT  Goal: Absence or prevention of progression during hospitalization  Description: INTERVENTIONS:  - Assess and monitor for signs and symptoms of infection  - Monitor lab/diagnostic results  - Monitor all insertion sites, i e  indwelling lines, tubes, and drains  - Monitor endotracheal if appropriate and nasal secretions for changes in amount and color  - [place] appropriate cooling/warming therapies per order  - Administer medications as ordered  - Instruct and encourage patient and family to use good hand hygiene technique  - Identify and instruct in appropriate isolation precautions for identified infection/condition  Outcome: Progressing  Goal: Absence of fever/infection during neutropenic period  Description: INTERVENTIONS:  - Monitor WBC    Outcome: Progressing     Problem: SAFETY ADULT  Goal: Patient will remain free of falls  Description: INTERVENTIONS:  - Assess patient frequently for physical needs  -  Identify cognitive and physical deficits and behaviors that affect risk of falls    -  [place] fall precautions as indicated by assessment   - Educate patient/family on patient safety including physical limitations  - Instruct patient to call for assistance with activity based on assessment  - Modify environment to reduce risk of injury  - Consider OT/PT consult to assist with strengthening/mobility  Outcome: Progressing  Goal: Maintain or return to baseline ADL function  Description: INTERVENTIONS:  -  Assess patient's ability to carry out ADLs; assess patient's baseline for ADL function and identify physical deficits which impact ability to perform ADLs (bathing, care of mouth/teeth, toileting, grooming, dressing, etc )  - Assess/evaluate cause of self-care deficits   - Assess range of motion  - Assess patient's mobility; develop plan if impaired  - Assess patient's need for assistive devices and provide as appropriate  - Encourage maximum independence but intervene and supervise when necessary  - Involve family in performance of ADLs  - Assess for home care needs following discharge   - Consider OT consult to assist with ADL evaluation and planning for discharge  - Provide patient education as appropriate  Outcome: Progressing  Goal: Maintain or return mobility status to optimal level  Description: INTERVENTIONS:  - Assess patient's baseline mobility status (ambulation, transfers, stairs, etc )    - Identify cognitive and physical deficits and behaviors that affect mobility  - Identify mobility aids required to assist with transfers and/or ambulation (gait belt, sit-to-stand, lift, walker, cane, etc )  - [place] fall precautions as indicated by assessment  - Record patient progress and toleration of activity level on Mobility SBAR; progress patient to next Phase/Stage  - Instruct patient to call for assistance with activity based on assessment  - Consider rehabilitation consult to assist with strengthening/weightbearing, etc   Outcome: Progressing     Problem: DISCHARGE PLANNING  Goal: Discharge to home or other facility with appropriate resources  Description: INTERVENTIONS:  - Identify barriers to discharge w/patient and caregiver  - Arrange for needed discharge resources and transportation as appropriate  - Identify discharge learning needs (meds, wound care, etc )  - Arrange for interpretive services to assist at discharge as needed  - Refer to Case Management Department for coordinating discharge planning if the patient needs post-hospital services based on physician/advanced practitioner order or complex needs related to functional status, cognitive ability, or social support system  Outcome: Progressing

## 2021-02-04 NOTE — CASE MANAGEMENT
CM was informed that pt is in need of a RW and BSC for discharge  Script sent to [facility] via [facility] per pt request     Pt also recommended for in home PT/OT  Pt requested referral to [facility], referral placed via [facility]  CM department to follow for response  [phone]    Per "[employer] unable to accept  Pt requested referral to [facility]  If [facility] unable to accept, pt requested a blanket referral to agencies that service her area  Referral to [facility] placed via [facility], awaiting response  [phone]    Per "[employer]", [facility] able to accept

## 2021-02-04 NOTE — PLAN OF CARE
Problem: PHYSICAL THERAPY ADULT  Goal: Performs mobility at highest level of function for planned discharge setting  See evaluation for individualized goals  Description: Treatment/Interventions: LE strengthening/ROM, Functional transfer training, Therapeutic exercise, Endurance training, Bed mobility, Gait training, Elevations          See flowsheet documentation for full assessment, interventions and recommendations  Note: Prognosis: Excellent  Problem List: Decreased strength, Decreased endurance, Decreased mobility, Pain, Orthopedic restrictions  Assessment: Pt is a 77 yo female admitted to [place] [address] on 2/2/2021 s/p fall while shoveling snow  Pt had surgery on 2/3 for insertion of IM nail femur antegrade on L  Dx: closed fracture neck fracture of L femur, hypertension  DM, hypothyroidism, anxiety  Two patient identifiers were used to confirm  Pt lives with her son and DIL in a mobile home with 5 BRENDA  PT was I for ADL's and mobility prior  Pt did not use any DME  Pt's son and DIL work but can assist if needed  PT drives and works part time  Pt's impairments include reduced mobility, high risk of falling, pain, gait abnormalities  These impairments limit the ability of the patient to perform mobility without increased assistance, return to PLOF and participate in everyday life activities  Pt would benefit from continued skilled therapy while in the hospital to improve overall mobility and work towards a safe dc  Recommend discharge to home with home PT and the use of a RW  At the end of the session the patient was left in seated position with call bell and phone within reach  The patient's AM-PAC Basic Mobility Inpatient Short Form Raw Score is 21, Standardized Score is 45 55  A standardized score greater than 42 9 suggests the patient may benefit from discharge to home  Please also refer to the recommendation of the Physical Therapist for safe discharge planning    Barriers to Discharge: Decreased caregiver support     PT Discharge Recommendation: Home with skilled therapy(home PT)     PT - OK to Discharge: Yes    See flowsheet documentation for full assessment

## 2021-02-04 NOTE — PHYSICAL THERAPY NOTE
Physical Therapy Evaluation Note     Patient Name: Bandar BORDEN Date: 2/4/2021     Problem List  Principal Problem:    Closed left hip fracture, initial encounter [facility])  Active Problems:    Anxiety    Type 2 diabetes mellitus, with long-term current use of insulin ([facility] [address] )    Diabetic gastroparesis ([facility] [address] )    Hypothyroidism    Essential hypertension       Past Medical History  Past Medical History:   Diagnosis Date    Acid reflux     Anxiety     Diabetes mellitus ([facility] [address] )     Hypertension     Hypothyroid         Past Surgical History  Past Surgical History:   Procedure Laterality Date    CHOLECYSTECTOMY      COLONOSCOPY      DILATION AND CURETTAGE OF UTERUS      ESOPHAGOGASTRODUODENOSCOPY      HYSTERECTOMY      45    OOPHORECTOMY      45    NV COLONOSCOPY FLX DX W/COLLJ SPEC WHEN PFRMD N/A 1/28/2016    Procedure: EGD AND COLONOSCOPY;  Surgeon: Renzo Jeronimo MD;  [facility]; Service: Gastroenterology    NV INCISE FINGER TENDON SHEATH Right 1/31/2017    Procedure: LONG FINGER TRIGGER RELEASE ;  Surgeon: César Cason MD;  [facility];  Service: Orthopedics    NV INCISE FINGER TENDON SHEATH Right 7/21/2016    Procedure: RELEASE TRIGGER FINGER - LONG FINGER;  Surgeon: César Cason MD;  [facility];  Service: Orthopedics    NV OPEN RX FEMUR FX+INTRAMED SHERRY Left 2/3/2021    Procedure: INSERTION NAIL IM FEMUR ANTEGRADE (TROCHANTERIC);   Surgeon: Augustine Greenfield MD;  [facility];  Service: Orthopedics    NV REVISE MEDIAN N/CARPAL TUNNEL SURG Right 1/31/2017    Procedure: WRIST CARPAL TUNNEL RELEASE ; TENOLYSIS OF FPL, FDS 2-5, FDP 2-5;  APPLICATION OF Rozanna Fling;  Surgeon: César Cason MD;  [facility];  Service: Orthopedics    ROOT CANAL      UPPER GASTROINTESTINAL ENDOSCOPY        02/04/21 0953   PT Last Visit   PT Visit Date 02/04/21   Note Type   Note type Evaluation   Pain Assessment   Pain Assessment Tool 0-10   Pain Score 3   Pain Location/Orientation Orientation: Left; Location: Hip   Home Living   Type of [address]   Additional Comments Pt lives with her son and CLAUDE in a mobile home with 5 BRENDA  PT was I for ADL's and mobility prior  Pt works part time  Pt did not use any DME  Pt drives  Pt's family can assist if needed but does work during the day  Pt noted that her son is considering working nights if needed to help care for the patient  Prior Function   Level of [place] Independent with ADLs and functional mobility   Lives With Family  (son and CLAUDE)   ADL Assistance Independent   IADLs Independent   Falls in the last 6 months 1 to 4   Vocational Part time employment   Restrictions/Precautions   Weight Bearing Precautions Per Order Yes   LLE Weight Bearing Per Order WBAT   Other Precautions Fall Risk;Pain   General   Family/Caregiver Present No   Cognition   Overall Cognitive Status WFL   Arousal/Participation Alert   Attention Within functional limits   Orientation Level Oriented X4   Memory Within functional limits   Following Commands Follows all commands and directions without difficulty   RLE Assessment   RLE Assessment WFL   LLE Assessment   LLE Assessment X   Strength LLE   L Hip Flexion 2+/5   L Knee Extension 3+/5   L Ankle Dorsiflexion 3+/5   Bed Mobility   Additional Comments pt OOB At the begining of the session   Transfers   Sit to Stand 5  Supervision   Stand to Sit 5  Supervision   Ambulation/Elevation   Gait pattern Shuffling; Step to; Improper Weight shift; Antalgic;Decreased L stance  (step to progress to normal gait )   Gait Assistance 4  Minimal assist   Additional items Assist x 1   Assistive Device Rolling walker   Distance 10ftx1, 50ftx2   Stair Management Assistance 4  Minimal assist   Additional items Assist x 1   Stair Management Technique One rail L;One rail R   Number of Stairs 5  (2+3)   Balance   Static Sitting Fair + Dynamic Sitting Fair +   Static Standing Fair   Dynamic Standing Fair   Ambulatory Fair -   Endurance Deficit   Endurance Deficit Yes   Activity Tolerance   Activity Tolerance Patient limited by fatigue;Patient limited by pain   Medical Staff Made Aware OT   Nurse Made Aware nurse approved therapy sesion   Assessment   Prognosis Excellent   Problem List Decreased strength;Decreased endurance;Decreased mobility;Pain;Orthopedic restrictions   Assessment Pt is a 77 yo female admitted to [place] [address] on 2/2/2021 s/p fall while shoveling snow  Pt had surgery on 2/3 for insertion of IM nail femur antegrade on L  Dx: closed fracture neck fracture of L femur, hypertension  DM, hypothyroidism, anxiety  Two patient identifiers were used to confirm  Pt lives with her son and CLAUDE in a mobile home with 5 BRENDA  PT was I for ADL's and mobility prior  Pt did not use any DME  Pt's son and CLAUDE work but can assist if needed  PT drives and works part time  Pt's impairments include reduced mobility, high risk of falling, pain, gait abnormalities  These impairments limit the ability of the patient to perform mobility without increased assistance, return to PLOF and participate in everyday life activities  Pt would benefit from continued skilled therapy while in the hospital to improve overall mobility and work towards a safe dc  Recommend discharge to home with home PT and the use of a RW  At the end of the session the patient was left in seated position with call bell and phone within reach  The patient's AM-PAC Basic Mobility Inpatient Short Form Raw Score is 21, Standardized Score is 45 55  A standardized score greater than 42 9 suggests the patient may benefit from discharge to home  Please also refer to the recommendation of the Physical Therapist for safe discharge planning  Pt is very motivated and determined to get better      Barriers to Discharge Decreased caregiver support   Goals   STG Expiration Date 02/18/21   Short Term Goal #1 STG 1: Pt will perform transfers at a MI/I level to return to baseline of function  STG 2: Pt will ambulate 300ft with RW or least restrictive device at a MI/I level to reduce the level of assistance needed upon d/c home  STG 3: Pt will negotiate 5 steps with HR at a I level to ensure safety with activity  STG 4: Pt will perform bed mobility at a I to safety return to PLOF  PT Treatment Day 0   Plan   Treatment/Interventions LE strengthening/ROM; Functional transfer training; Therapeutic exercise; Endurance training;Bed mobility;Gait training;Elevations   PT Frequency Other (Comment)  (3-5xwk)   Recommendation   PT Discharge Recommendation Home with skilled therapy  (home PT)   PT - OK to Discharge Yes   Additional Comments when medically celared   AM-PAC Basic Mobility Inpatient   Turning in Bed Without Bedrails 4   Lying on Back to Sitting on Edge of Flat Bed 4   Moving Bed to Chair 3   Standing Up From Chair 4   Walk in Room 3   Climb 3-5 Stairs 3   Basic Mobility Inpatient Raw Score 21   Basic Mobility Standardized Score 45 55   Quentin Knowles, Pt, DPT

## 2021-02-04 NOTE — OCCUPATIONAL THERAPY NOTE
Occupational Therapy Evaluation and treatment session      Cheryle Fanobed    2/4/2021    Principal Problem:    Closed left hip fracture, initial encounter ([facility] [address] )  Active Problems:    Anxiety    Type 2 diabetes mellitus, with long-term current use of insulin (HCC)    Diabetic gastroparesis (HCC)    Hypothyroidism    Essential hypertension      Past Medical History:   Diagnosis Date    Acid reflux     Anxiety     Diabetes mellitus ([facility] [address] )     Hypertension     Hypothyroid        Past Surgical History:   Procedure Laterality Date    CHOLECYSTECTOMY      COLONOSCOPY      DILATION AND CURETTAGE OF UTERUS      ESOPHAGOGASTRODUODENOSCOPY      HYSTERECTOMY      45    OOPHORECTOMY      45    VT COLONOSCOPY FLX DX W/COLLJ SPEC WHEN PFRMD N/A 1/28/2016    Procedure: EGD AND COLONOSCOPY;  Surgeon: Anju Devlin MD;  [facility]; Service: Gastroenterology    VT INCISE FINGER TENDON SHEATH Right 1/31/2017    Procedure: LONG FINGER TRIGGER RELEASE ;  Surgeon: Elo Quiroz MD;  [facility];  Service: Orthopedics    VT INCISE FINGER TENDON SHEATH Right 7/21/2016    Procedure: RELEASE TRIGGER FINGER - LONG FINGER;  Surgeon: Elo Quiroz MD;  [facility];  Service: Orthopedics    VT OPEN RX FEMUR FX+INTRAMED SHERRY Left 2/3/2021    Procedure: INSERTION NAIL IM FEMUR ANTEGRADE (TROCHANTERIC);   Surgeon: Duane David MD;  [facility];  Service: Orthopedics    VT REVISE MEDIAN N/CARPAL TUNNEL SURG Right 1/31/2017    Procedure: WRIST CARPAL TUNNEL RELEASE ; TENOLYSIS OF FPL, FDS 2-5, FDP 2-5;  APPLICATION OF [place];  Surgeon: Elo Quiroz MD;  [facility];  Service: Orthopedics    ROOT CANAL      UPPER GASTROINTESTINAL ENDOSCOPY          02/04/21 0952   OT Last Visit   OT Visit Date 02/04/21   Note Type   Note type Evaluation   Restrictions/Precautions   Weight Bearing Precautions Per Order Yes   LLE Weight Bearing Per Order WBAT   Other Precautions Fall Risk;Pain   Pain Assessment   Pain Assessment Tool 0-10   Pain Score 3   Pain Location/Orientation Orientation: Left; Location: Hip;Location: [facility]   Home Living   Type of [address] [facility] One level  (5 BRENDA)   Bathroom Shower/Tub Tub/shower unit   Bathroom Toilet Standard   Additional Comments pt denies having any DME at baseline  reports son will install grab bars in the shower   Prior Function   Level of [place] Independent with ADLs and functional mobility   Lives With Family  (son and daughter in law  )   ADL Assistance Independent   IADLs Independent   Falls in the last 6 months 1 to 4  (slipped on ice when shoveling snow)   Vocational Part time employment   Comments 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  also reports son is an EMT and DIL a nurse here at [address] [place] [address],[address] pt reports being fully independent w all self care, mobility, home management, driving etc   Reciprocal Relationships supportive family   Intrinsic Gratification always being on the go  Likes to bake and do flower arrangements   Psychosocial   Psychosocial (WDL) WDL   Subjective   Subjective " all of a sudden I was laying in the snow"   ADL   Eating Assistance 7  Independent   Grooming Assistance 7  Independent   Grooming [address]; Chest;Right arm;Left arm; Abdomen   LB Bathing Assistance 4  Minimal Assistance   LB Bathing Deficit Right lower leg including foot; Left lower leg including foot   UB Dressing Assistance 7  Independent   LB Dressing Assistance 4  Minimal Assistance   LB Dressing Deficit Thread RLE into pants; Thread LLE into pants; Thread RLE into underwear; Thread LLE into underwear;Don/doff R sock; Don/doff L sock   Toileting Assistance  5  Supervision/Setup   Bed Mobility   Additional Comments Pt OOB in chair   Transfers   Sit to Stand 5  Supervision   Stand to Sit 5  Supervision   Stand pivot 5  Supervision   Toilet transfer 5 Supervision   Additional Comments w use of RW, cues to push the walker instead of lifting it   Functional Mobility   Functional Mobility 5  Supervision   Additional items Rolling walker   Balance   Static Sitting Good   Dynamic Sitting Fair +   Static Standing Fair +   Dynamic Standing Fair   Activity Tolerance   Activity Tolerance Patient tolerated treatment well   Medical Staff Made Aware PT [address]  [facility] to see per RN   RUE Assessment   RUE Assessment WFL   LUE Assessment   LUE Assessment WFL   Hand Function   Gross Motor Coordination Functional   Fine Motor Coordination Functional   Sensation   Light Touch No apparent deficits   Cognition   Overall Cognitive Status WFL   Arousal/Participation Alert; Cooperative   Attention Within functional limits   Orientation Level Oriented X4   Memory Within functional limits   Following Commands Follows all commands and directions without difficulty   Assessment   Limitation Decreased ADL status; Decreased high-level ADLs; Decreased self-care trans   Assessment Pt is a 76 y o  female seen for OT evaluation s/p admit to [facility] on 2/2/2021 w/ Closed left hip fracture, initial encounter ([facility] [address] )  She underwent orthopedic surgery w IM nailing on 2/3/21  She has been cleared for OOB, WBAT and OT evaluation  Comorbidities affecting pt's functional performance at time of assessment include: DM, HTN, obesity and anxiety, IBS, OA hip, hand pareshesia, GERD  Personal factors affecting pt at time of IE include:steps to enter environment, difficulty performing ADLS and difficulty performing IADLS   Prior to admission, pt was living at home in a one story home w son and DIL  Pt reports 5 BRENDA  She is normally an active person, working part time cleaning homes, fully independent w all self care, mobility and driving  Pt  Reports falling while shoveling snow and fx her hip    Upon evaluation: Pt requires min assist for LB dressing, Supervision and cues w mobility 2* the following deficits impacting occupational performance: weakness, decreased balance, decreased tolerance, increased pain and orthopedic restrictions  Pt to benefit from continued skilled OT tx while in the hospital to address deficits as defined above and maximize level of functional independence w ADL's and functional mobility  Occupational Performance areas to address include: bathing/shower, toilet hygiene, dressing, functional mobility and clothing management  Pt scored   75/100 on the barthel index  Based on findings from OT evaluation and functional performance deficits, pt has been identified as a  moderate complexity evaluation  She participated in OT treatment following IE w focus on standing at sinkside for hygiene as well as sitting in chair for LB dressing  Pt educated on different type of LH adaptive equipement available as well as DME such as BSC and shower seat  Pt demonstrates good safety awareness and good insight  From OT standpoint, recommendation at time of d/c would be home w family support, BSC, RW and possible tub seat  Goals   Patient Goals to be independent   STG Time Frame 3-5   Short Term Goal #1 pt will complete all self care mod I w good safety    Short Term Goal #2 tolerate standing x 10 min w support of RW for increased safety w hygiene   Short Term Goal  complete functional mobility mod I  w use of RW   Long Term Goal #1 assess for DME needs at home   Plan   Treatment Interventions ADL retraining;Functional transfer training; Endurance training;Cognitive reorientation;Patient/family training; Compensatory technique education; Energy conservation; Activityengagement   Goal Expiration Date 02/09/21   OT Treatment Day 1   OT Frequency 3-5x/wk   Additional Treatment Session   Start Time 3991  (IE from [id number])   End Time 0952   Treatment Assessment pt seen for OT treatment session w focus on self care   Pt able to walk to bathroom, toelrated standing at sinkside for washing hands, no loss of balance noted  Pt able to perform functional mobiltiy w use of RW requiring only Supervision, occasional cues for improved use of RW  Pt able to don hospital pants w min assist to thread LE's, able to doff w S  Able to pull over hips w S in stance  PT tolerated standing x 8 min before needing to sit down for a little rest  Pt educated on available  adaptive equipement, pt reports family can help for now  pt also educated on DME such as bedside commode and shower chair  Pt reports being able to borrow shower seat from a friend  OT will continue to follow while in acute care  Anticiapte dc to home w family support and home PT per protocol      Additional Treatment Day 1   Recommendation   OT Discharge Recommendation Home with skilled therapy   Equipment Recommended Bedside commode;Tub seat with back   OT - OK to Discharge Yes   AM-PAC Daily Activity Inpatient   Lower Body Dressing 3   Bathing 3   Toileting 4   Upper Body Dressing 4   Grooming 4   Eating 4   Daily Activity Raw Score 22   Daily Activity Standardized Score (Calc for Raw Score >=11) 47 1   Barthel Index   Feeding 10   Bathing 0   Grooming Score 5   Dressing Score 5   Bladder Score 10   Bowels Score 10   Toilet Use Score 10   Transfers (Bed/Chair) Score 10   Mobility (Level Surface) Score 10   Stairs Score 5   Barthel Index Score 75

## 2021-02-04 NOTE — QUICK NOTE
Patient was not personally seen or examined today  Chart thoroughly reviewed including vital signs, progress notes and laboratory studies  Patient was cleared by PT for discharge home with therapy  [facility] accepted patient  Discharge planning as per orthopedic team   Patient to resume prior to admission medications as previously prescribed  aside from changes in insulin regimen as per endocrinology

## 2021-02-04 NOTE — PROGRESS NOTES
Subjective: No acute events overnight  No acute distress  Resting comfortably in bed    Objective:  A 10 point ROS was performed; negative except as noted above       Lab Results   Component Value Date/Time    WBC 10 84 (H) 02/03/2021 05:07 AM    WBC 6 3 12/19/2016 08:05 AM    WBC 6 3 12/19/2016 08:05 AM    HGB 12 3 02/03/2021 05:07 AM    HGB 12 3 12/19/2016 08:05 AM    HGB 12 3 12/19/2016 08:05 AM       Vitals:    02/04/21 0322   BP: 126/67   Pulse: 89   Resp: 17   Temp: 98 7 °F (37 1 °C)   SpO2: 97%     Right lower extremity  Dressing C/D/I  Thigh soft and compressible  Motor intact to EHL/FHL/TA/GS  Sensation intact to light touch to dp/sp/tib/paulo/saph distributions  Toes warm and well perfused with brisk capillary refill    Assessment: 76 y o  female POD 1 L TFN    Plan:  WBAT LLE  Pain control  DVT ppx: Enoxaparin (Lovenox)  PT/OT  Will continue to assess for acute blood loss anemia  Dispo: Ortho will follow

## 2021-02-04 NOTE — PLAN OF CARE
Problem: OCCUPATIONAL THERAPY ADULT  Goal: Performs self-care activities at highest level of function for planned discharge setting  See evaluation for individualized goals  Note: Limitation: Decreased ADL status, Decreased high-level ADLs, Decreased self-care trans     Assessment: Pt is a 76 y o  female seen for OT evaluation s/p admit to [facility] on 2/2/2021 w/ Closed left hip fracture, initial encounter ([facility] [address] )  She underwent orthopedic surgery w IM nailing on 2/3/21  She has been cleared for OOB, WBAT and OT evaluation  Comorbidities affecting pt's functional performance at time of assessment include: DM, HTN, obesity and anxiety, IBS, OA hip, hand pareshesia, GERD  Personal factors affecting pt at time of IE include:steps to enter environment, difficulty performing ADLS and difficulty performing IADLS   Prior to admission, pt was living at home in a one story home w son and DIL  Pt reports 5 BRENDA  She is normally an active person, working part time cleaning homes, fully independent w all self care, mobility and driving  Pt  Reports falling while shoveling snow and fx her hip  Upon evaluation: Pt requires min assist for LB dressing, Supervision and cues w mobility 2* the following deficits impacting occupational performance: weakness, decreased balance, decreased tolerance, increased pain and orthopedic restrictions  Pt to benefit from continued skilled OT tx while in the hospital to address deficits as defined above and maximize level of functional independence w ADL's and functional mobility  Occupational Performance areas to address include: bathing/shower, toilet hygiene, dressing, functional mobility and clothing management  Pt scored   75/100 on the barthel index  Based on findings from OT evaluation and functional performance deficits, pt has been identified as a  moderate complexity evaluation   She participated in OT treatment following IE w focus on standing at sinkside for hygiene as well as sitting in chair for LB dressing  Pt educated on different type of LH adaptive equipement available as well as DME such as BSC and shower seat  Pt demonstrates good safety awareness and good insight  From OT standpoint, recommendation at time of d/c would be home w family support, BSC, RW and possible tub seat  OT Discharge Recommendation: Home with skilled therapy  OT - OK to Discharge:  Yes

## 2021-02-04 NOTE — PROGRESS NOTES
Progress Note - Geriatric Medicine   Frankey Beckmann 76 y o  female MRN: [id number]  Unit/Bed#: -01 Encounter: [id number]      Assessment/Plan:    Ambulatory dysfunction with fall  -mechanical fall while shoveling snow  -denies hx of falls  -no assist device for ambulation at baseline  -remains high fall risk due to now having sustained a fall, femur fracture requiring surgical intervention and age  -continue to encourage good body mechanics  -assist with all transfers  -keep personal items and call bell close to prevent reaching  -PT/OT following     Left femoral neck fracture  -s/p L TFN with Dr Joanie Connelly on 2/3/21  -continue acute pain control recommend geriatric pain protocol   -continue tx acute blood loss anemia  -Endo following for tx osteoporosis   -PT/OT following     Acute pain due to trauma  -recommend pain control per Geriatric pain protocol:  Tylenol 975mg Q8H scheduled  Roxicodone 2 5mg Q4H PRN moderate pain  Roxicodone 5mg Q4H PRN severe pain  Dilaudid 0 2mg Q4H PRN  -consider adjuncts such as Gabapentin 100mg HS and lidocaine patch topically  -encourage addition of non-pharmacologic pain treatment including ice and frequent repositioning  -recommend  bowel regimen to prevent and treat constipation due to increased risk with acute pain and opiate pain medications    Osteoporosis   -evidenced by fragility fracture sustained in fall from standing height  -currently on vitamin d daily supplementation  -will need o/p f/u with Endo for pharmacologic tx    Hypothyroidism  -currently on levothyroxine 100mcg daily with mildly elevated TSH, consider dose increase to 112mcg daily with recheck TSH/FT4 in 4-6 weeks vs repeat labs as o/p and follow-up with PCP for further titration    Anxiety  -symptoms well controlled on current Sertraline dosing   -continue to encourage multimodal treatment including consideration of support group and counseling     DM-II  -uncontrolled with hyperglycemia on admission now significantly improved with regimen adjustments by Endocrinology  -continue to encourage healthy lifestyle modifications     Deconditioning/debility/frailty   -multifactorial including age, uncontrolled diabetes and multiple chronic medical co-morbidities now with fall and femur fracture  -continue tx acute metabolic derangements  -continue to encourage good control chronic medical conditions     High risk of developing delirium  -ensure pain well controlled  -avoid extremes of blood glu  -maintain normal circadian rhythm  -monitor for fecal and urinary retention  -reorient and redirect frequently as appropriate     Care coordination: Rounded with Heavenly (RN)    Subjective:     Patient seen and examined at bedside where she is sitting resting comfortably, she reports that she has moderate pain at time of evaluation and is awaiting pain medication received shortly before encounter to take effect  Tolerated breakfast, no nausea or vomiting, nursing reports no acute events overnight  Review of Systems   Constitutional: Negative for appetite change, chills and fever  HENT: Negative for facial swelling and trouble swallowing  Eyes: Negative for visual disturbance  Respiratory: Negative for shortness of breath and wheezing  Cardiovascular: Negative for chest pain and palpitations  Gastrointestinal: Negative for abdominal pain, constipation, nausea and vomiting  Endocrine: Negative  Genitourinary: Negative  Musculoskeletal: Positive for arthralgias (left hip/leg pain) and gait problem  Skin: Negative  Allergic/Immunologic: Negative  Neurological: Positive for headaches  Negative for dizziness and light-headedness  Hematological: Negative  Psychiatric/Behavioral: Negative for sleep disturbance  All other systems reviewed and are negative  Objective:     Vitals: Blood pressure 114/60, pulse 86, temperature 98 8 °F (37 1 °C), resp   rate 18, height 5' 2" (1 575 m), weight 90 7 kg (200 lb), SpO2 91 %, not currently breastfeeding  ,Body mass index is 36 58 kg/m²  Intake/Output Summary (Last 24 hours) at 2/4/2021 1050  Last data filed at 2/4/2021 0513  Gross per 24 hour   Intake 900 ml   Output 1500 ml   Net -600 ml     Current Medications: Reviewed    Physical Exam:   Physical Exam  Vitals signs and nursing note reviewed  Constitutional:       Appearance: Normal appearance  Comments: Appears stated age, no acute distress sitting in chair aside bed resting comfortably   HENT:      Head: Normocephalic and atraumatic  Nose: Nose normal       Mouth/Throat:      Mouth: Mucous membranes are moist       Comments: Dentition in tact  Eyes:      General: No scleral icterus  Right eye: No discharge  Left eye: No discharge  Conjunctiva/sclera: Conjunctivae normal    Neck:      Musculoskeletal: Neck supple  Comments: Trachea midline  Phonation normal  Cardiovascular:      Rate and Rhythm: Normal rate and regular rhythm  Pulses: Normal pulses  Pulmonary:      Effort: Pulmonary effort is normal  No respiratory distress  Breath sounds: Normal breath sounds  No wheezing  Abdominal:      General: Bowel sounds are normal  There is no distension  Palpations: Abdomen is soft  Tenderness: There is no abdominal tenderness  Musculoskeletal:      Right lower leg: No edema  Left lower leg: No edema  Comments: Normal overall muscle mass  Moves all 4 ext spontaneously with some guarding LLE due to anticipation of pain   Skin:     General: Skin is warm and dry  Neurological:      General: No focal deficit present  Mental Status: She is alert and oriented to person, place, and time     Psychiatric:         Mood and Affect: Mood normal          Behavior: Behavior normal         Invasive Devices     Peripheral Intravenous Line            Peripheral IV 02/02/21 Right Antecubital 1 day              Lab, Imaging and other studies: I have personally reviewed pertinent reports

## 2021-02-04 NOTE — DISCHARGE INSTRUCTIONS
Discharge Instructions - Orthopedics  Aidee Perez 76 y o  female MRN: [id number]  Unit/Bed#: PACU 14    Weight Bearing Status:                                           Weight Bearing as tolerated to the left lower extremity  DVT prophylaxis:  Complete course of Lovenox as directed    Pain:  Continue analgesics as directed    Showering Instructions:   Do not shower until follow-up appointment    Dressing Instructions:   Keep dressing clean, dry and intact until follow up appointment  Driving Instructions:  No driving until cleared by Orthopaedic Surgery  PT/OT:  Continue PT/OT on outpatient basis as directed    Appt Instructions:    If you do not have your appointment, please call the clinic at [phone]  Otherwise followup as scheduled below:      New insulin regimen per endocrine Dr Álvaro Velez:    Insulin Glargine 25 units in AM, 35 units at PM     FIASP insulin 14 units with each meal

## 2021-02-05 ENCOUNTER — TRANSITIONAL CARE MANAGEMENT (OUTPATIENT)
Dept: INTERNAL MEDICINE CLINIC | Age: 66
End: 2021-02-05

## 2021-02-07 ENCOUNTER — TELEPHONE (OUTPATIENT)
Dept: OTHER | Facility: OTHER | Age: 66
End: 2021-02-07

## 2021-02-07 NOTE — TELEPHONE ENCOUNTER
Natalie from homecare called to advise that home PT will be continued 2x weekly for the next 3 weeks  She would like to know if a home health aid order could be place as well for 3 weeks  She also wanted to report drug interactions of methocarbamol and oxycodone as well as Lovenox, levothyroxine and sertraline   Natalie would like a call back tomorrow to discuss further

## 2021-02-08 NOTE — ED ATTENDING ATTESTATION
2/2/2021  IJean-Claude MD, saw and evaluated the patient  I have discussed the patient with the resident/non-physician practitioner and agree with the resident's/non-physician practitioner's findings, Plan of Care, and MDM as documented in the resident's/non-physician practitioner's note, except where noted  All available labs and Radiology studies were reviewed  I was present for key portions of any procedure(s) performed by the resident/non-physician practitioner and I was immediately available to provide assistance  At this point I agree with the current assessment done in the Emergency Department  I have conducted an independent evaluation of this patient a history and physical is as follows:    ED Course    77-year-old female presents with left lateral hip pain patient and inability to weightbear status post fall on ice today  Patient denies any additional injuries  Vital signs reviewed  Examination of left lower extremity shows a shortened externally rotated left lower extremity with tenderness over the lateral aspect of the hip  Pelvis stable to rock no CT or L-spine tenderness to palpation  Abdomen soft nontender nondistended normal bowel sounds    x-rays remarkable for left intratrochanteric hip fracture  Impression:  Left hip fracture  Pain management  Consult Orthopedics for admission             Critical Care Time  Procedures

## 2021-02-09 ENCOUNTER — TELEPHONE (OUTPATIENT)
Dept: OBGYN CLINIC | Facility: HOSPITAL | Age: 66
End: 2021-02-09

## 2021-02-09 ENCOUNTER — TELEPHONE (OUTPATIENT)
Dept: OBGYN CLINIC | Facility: CLINIC | Age: 66
End: 2021-02-09

## 2021-02-09 DIAGNOSIS — Z09 FRACTURE FOLLOW-UP: Primary | ICD-10-CM

## 2021-02-09 DIAGNOSIS — S72.002A CLOSED FRACTURE OF NECK OF LEFT FEMUR, INITIAL ENCOUNTER (HCC): ICD-10-CM

## 2021-02-09 RX ORDER — OXYCODONE HYDROCHLORIDE 5 MG/1
TABLET ORAL
Qty: 30 TABLET | Refills: 0 | Status: SHIPPED | OUTPATIENT
Start: 2021-02-09 | End: 2021-02-17 | Stop reason: SDUPTHER

## 2021-02-09 RX ORDER — METHOCARBAMOL 500 MG/1
500 TABLET, FILM COATED ORAL 4 TIMES DAILY
Qty: 20 TABLET | Refills: 0 | Status: SHIPPED | OUTPATIENT
Start: 2021-02-09 | End: 2021-02-17 | Stop reason: SDUPTHER

## 2021-02-09 NOTE — TELEPHONE ENCOUNTER
Dr Jennifer Child from [facility] is requesting a letter stating that you acknowledge that the methocarbamol prescribed for Gissel Palomino is a high risk medication and that the benefits will outweigh the potential risk  Please fax to [phone]  Any questions please call [phone]    Thank you

## 2021-02-09 NOTE — TELEPHONE ENCOUNTER
Message received     I called Celeste Zimmer,  no one picked up,   Left message on machine     home health ordered on behalf of the patient,   Order placed in the computer       for the comment regarding sensitivities,  you may forward that to the patient's PCP         thank you    NATALYA

## 2021-02-09 NOTE — TELEPHONE ENCOUNTER
Prescription refills created electronically  Please call patient inform of above   this patient's surgery was last week, she only needs a 2 week follow-up appointment with me in the office    That would mean around 217 or 218 would be appropriate

## 2021-02-09 NOTE — TELEPHONE ENCOUNTER
Pt contacted Call Center requested refill of their medication  She had sx with  2/3 L [facility]  Practice Admin was also contacted for a forced appt  Medication Name:  Oxycodone  methocarbamol      Dosage of Med:  5mg  500mg      Frequency of Med:   -1 pill po Q4 Hrs prn  -Take 1 tablet (500 mg total) by mouth 4 (four) times a day for 5 days As needed for muscle spasm  Remaining Medication:  - 2left   - 6 left       Pharmacy and Location: [address] [place] [address]         Pt  Preferred Callback Phone Number: [phone]      Thank you  * PLEASE ADVISE PATIENTS:    REFILL REQUESTS WILL BE PROCESSED WITHIN 24-48 HOURS  *

## 2021-02-10 NOTE — ED CARE HANDOFF
Review of Systems - Negative except Significant L hip pain and inability to walk    Physical Exam  Vitals signs and nursing note reviewed  Constitutional:       General: She is not in acute distress  Appearance: She is well-developed  She is not diaphoretic  HENT:      Head: Normocephalic and atraumatic  Right Ear: External ear normal       Left Ear: External ear normal       Nose: Nose normal    Eyes:      General: No scleral icterus  Right eye: No discharge  Left eye: No discharge  Conjunctiva/sclera: Conjunctivae normal       Pupils: Pupils are equal, round, and reactive to light  Neck:      Musculoskeletal: Normal range of motion and neck supple  Vascular: No JVD  Trachea: No tracheal deviation  Cardiovascular:      Rate and Rhythm: Normal rate and regular rhythm  Heart sounds: Normal heart sounds  No murmur  No friction rub  No gallop  Pulmonary:      Effort: Pulmonary effort is normal  No respiratory distress  Breath sounds: Normal breath sounds  No stridor  No wheezing or rales  Abdominal:      General: Bowel sounds are normal  There is no distension  Palpations: Abdomen is soft  There is no mass  Tenderness: There is no abdominal tenderness  There is no guarding  Musculoskeletal:         General: Tenderness, deformity and signs of injury present  Comments: Pain with ROM of L hip  Inability to complete full ROM  Sensation intact  DP/PT pulses 2+ B/L   Skin:     General: Skin is warm and dry  Coloration: Skin is not pale  Findings: No erythema or rash  Neurological:      Mental Status: She is alert and oriented to person, place, and time  Cranial Nerves: No cranial nerve deficit  Sensory: No sensory deficit  Motor: No abnormal muscle tone  Psychiatric:         Behavior: Behavior normal          Thought Content:  Thought content normal          Judgment: Judgment normal                 Daivan Layton DO  02/10/21 5203

## 2021-02-12 ENCOUNTER — TELEPHONE (OUTPATIENT)
Dept: OBGYN CLINIC | Facility: HOSPITAL | Age: 66
End: 2021-02-12

## 2021-02-12 NOTE — TELEPHONE ENCOUNTER
Message reviewed  please notify Loree Phillips,   Who made the phone call to us on February 7th requesting home care services  it would only be polite to let Natalie know,   That the patient declined       thank you,    NATALYA

## 2021-02-12 NOTE — TELEPHONE ENCOUNTER
MALIKA called and left a voicemail stating that the patient is declining home care services ans they will be closing out the order

## 2021-02-15 ENCOUNTER — OFFICE VISIT (OUTPATIENT)
Dept: INTERNAL MEDICINE CLINIC | Facility: CLINIC | Age: 66
End: 2021-02-15
Payer: COMMERCIAL

## 2021-02-15 VITALS
RESPIRATION RATE: 18 BRPM | DIASTOLIC BLOOD PRESSURE: 60 MMHG | HEIGHT: 62 IN | SYSTOLIC BLOOD PRESSURE: 138 MMHG | OXYGEN SATURATION: 98 % | WEIGHT: 195 LBS | TEMPERATURE: 97.2 F | HEART RATE: 84 BPM | BODY MASS INDEX: 35.88 KG/M2

## 2021-02-15 DIAGNOSIS — S72.002A CLOSED LEFT HIP FRACTURE, INITIAL ENCOUNTER (HCC): Primary | ICD-10-CM

## 2021-02-15 DIAGNOSIS — E11.00 UNCONTROLLED TYPE 2 DIABETES MELLITUS WITH HYPEROSMOLARITY WITHOUT COMA, WITHOUT LONG-TERM CURRENT USE OF INSULIN (HCC): ICD-10-CM

## 2021-02-15 DIAGNOSIS — E11.649 UNCONTROLLED TYPE 2 DIABETES MELLITUS WITH HYPOGLYCEMIA WITHOUT COMA (HCC): ICD-10-CM

## 2021-02-15 DIAGNOSIS — E66.01 OBESITY, MORBID (HCC): ICD-10-CM

## 2021-02-15 DIAGNOSIS — I10 ESSENTIAL HYPERTENSION: ICD-10-CM

## 2021-02-15 PROCEDURE — 99495 TRANSJ CARE MGMT MOD F2F 14D: CPT | Performed by: NURSE PRACTITIONER

## 2021-02-15 PROCEDURE — 1111F DSCHRG MED/CURRENT MED MERGE: CPT | Performed by: NURSE PRACTITIONER

## 2021-02-15 RX ORDER — PEN NEEDLE, DIABETIC 32GX 5/32"
NEEDLE, DISPOSABLE MISCELLANEOUS 4 TIMES DAILY
Qty: 400 EACH | Refills: 1 | Status: SHIPPED | OUTPATIENT
Start: 2021-02-15 | End: 2021-05-19 | Stop reason: SDUPTHER

## 2021-02-15 RX ORDER — INSULIN GLARGINE 100 [IU]/ML
INJECTION, SOLUTION SUBCUTANEOUS
Qty: 15 ML | Refills: 5 | Status: SHIPPED | OUTPATIENT
Start: 2021-02-15 | End: 2021-08-03 | Stop reason: SDUPTHER

## 2021-02-15 NOTE — PROGRESS NOTES
Assessment/Plan:        Problem List Items Addressed This Visit        Cardiovascular and Mediastinum    Essential hypertension     BP stable, no med changes            Musculoskeletal and Integument    Closed left hip fracture, initial encounter [facility]) - Primary     Doing well, has ortho appointment 2/18  Continue lovenox for full 28 days postop  Continue home PT  F/u with any new concerns            Other    Obesity, morbid ([facility] )      Other Visit Diagnoses     Uncontrolled type 2 diabetes mellitus with hypoglycemia without coma ([facility] [address] )        Relevant Medications    insulin glargine (Lantus SoloStar) 100 units/mL injection pen    Uncontrolled type 2 diabetes mellitus with hyperosmolarity without coma, without long-term current use of insulin ([facility])        Relevant Medications    insulin glargine (Lantus SoloStar) 100 units/mL injection pen    Insulin Pen Needle (BD Pen Needle Kelly U/F) 32G X 4 MM MISC             Reason for visit is TCM s/p admission to [facility]"" 2/2-2/4/2021 for left hip fracture, s/p IM nail left femur    Encounter provider MEDARDO Szymanski       Provider located at [address] [place], [place] [id number]      Recent Visits  No visits were found meeting these conditions  Showing recent visits within past 7 days and meeting all other requirements     Today's Visits  Date Type Provider Dept   02/15/21 Office Visit MEDARDO Szymanski [facility]   Showing today's visits and meeting all other requirements     Future Appointments  No visits were found meeting these conditions  Showing future appointments within next 150 days and meeting all other requirements        After connecting through ANPI, the patient was identified by name and date of birth   Abdullahi Guzman was informed that this is a telemedicine visit and that the visit is being conducted through [employer] and patient was informed that this is not a secure, HIPAA-compliant platform  She agrees to proceed     My office door was closed  No one else was in the room  She acknowledged consent and understanding of privacy and security of the video platform  The patient has agreed to participate and understands they can discontinue the visit at any time  Patient is aware this is a billable service  Subjective:     Patient ID: Kashif Gooden is a 76 y o  female  Patient presents for TCM s/p admission to B on 2/2 for left hip fracture s/p fall  She had left IM nail to femur procedure on 2/3 and was discharged home on 2/4  Notes and labs  from hospitalization reviewed  She is to be on Lovenox x 28 days from procedure per ortho  She is having bruising on leg but denies any abnormal bleeding  Has appointment with ortho on 2/18  She has home PT twice weekly and home nursing  Her son and his girlfriend are caring for her, both are EMT/CNA      Hip Pain   The incident occurred more than 1 week ago  The incident occurred at home  The injury mechanism was a fall  The pain is present in the left hip  The pain is moderate  The pain has been fluctuating since onset  Pertinent negatives include no numbness  Associated symptoms comments: S/p surgery  The symptoms are aggravated by movement  The treatment provided mild relief  Review of Systems   Constitutional: Negative for chills, diaphoresis and fever  HENT: Negative for sore throat and trouble swallowing  Respiratory: Negative for cough, shortness of breath and wheezing  Cardiovascular: Positive for leg swelling (left leg s/p surgery)  Negative for chest pain and palpitations  Gastrointestinal: Negative for abdominal pain, constipation (taking stool softener daily and having regular BMs), diarrhea, nausea and vomiting  Genitourinary: Negative for difficulty urinating  Musculoskeletal: Positive for gait problem     Neurological: Negative for dizziness, light-headedness, numbness and headaches  Hematological: Bruises/bleeds easily  Psychiatric/Behavioral: The patient is not nervous/anxious  Objective:    Vitals:    02/15/21 1327 02/15/21 1406   BP:  138/60   Pulse:  84   Resp:  18   Temp:  (!) 97 2 °F (36 2 °C)   SpO2:  98%   Weight: 88 5 kg (195 lb)    Height: 5' 2" (1 575 m)        Physical Exam  Constitutional:       General: She is not in acute distress  Appearance: She is well-developed  HENT:      Head: Normocephalic and atraumatic  Eyes:      General: No scleral icterus  Neck:      Musculoskeletal: Normal range of motion  Pulmonary:      Effort: Pulmonary effort is normal    Abdominal:      Comments: Per patient's self-palpation    Musculoskeletal:         General: Swelling present  Comments: Bruising noted to left upper leg, swelling noted   Skin:     Findings: Bruising present  No erythema  Neurological:      Mental Status: She is alert and oriented to person, place, and time  Psychiatric:         Behavior: Behavior normal          Thought Content: Thought content normal              Transitional Care Management Review:  Dominik Wood is a 76 y o  female here for TCM follow up       During the TCM phone call patient stated:    TCM Call (since 1/15/2021)     Date and time call was made  2/5/2021 11:43 AM    Hospital care reviewed  Records reviewed    Patient was hospitialized at  [facility]        Date of Admission  02/02/21    Date of discharge  02/04/21    Diagnosis  left hip fracture, s/p IM nail left femur    Disposition  Home; Home health services    Were the patients medications reviewed and updated  Yes    Current Symptoms  Leg pain - left side    Left side leg pain severity  Mild    Leg pain, left side, onset  Ongoing      TCM Call (since 1/15/2021)     Post hospital issues  Reduced activity; Poor ADL (Activities of Daily Living) performance    Should patient be enrolled in anticoag monitoring? Yes    Scheduled for follow up? Yes    Did you obtain your prescribed medications  Yes    Do you need help managing your prescriptions or medications  No    Is transportation to your appointment needed  No    I have advised the patient to call PCP with any new or worsening symptoms  [facility]    The type of support provided  Emotional; Physical    Do you have social support  Yes, quite a bit    Are you recieving any outpatient services  No    Are you recieving home care services  Yes    Types of home care services  Home PT    Are you using any community resources  No    Current waiver services  No    Have you fallen in the last 12 months  Yes    How many times  1    Interperter language line needed  No    Counseling  Patient    Counseling topics  Activities of daily living; Importance of RX compliance; instructions for management          I spent 26 minutes with the patient during this visit      Daljit Cho

## 2021-02-15 NOTE — ASSESSMENT & PLAN NOTE
Doing well, has ortho appointment 2/18  Continue lovenox for full 28 days postop  Continue home PT  F/u with any new concerns

## 2021-02-16 ENCOUNTER — TELEPHONE (OUTPATIENT)
Dept: OBGYN CLINIC | Facility: HOSPITAL | Age: 66
End: 2021-02-16

## 2021-02-16 NOTE — TELEPHONE ENCOUNTER
Daughter in law Jordi Ibrahim called to state that she spoke to someone earlier this morning regarding patient's appt on Thursday, and due to snow on Thursday, she would like her MIL to come in tomorrow  She spoke with someone who advised they'd reach out to practice admin for a sooner appt  I don't see a note in the chart regarding that call, however I confirmed with someone that an email was sent  Jordi Ibrahim will wait for a call back from practice administrator      Callback ZN#[phone]

## 2021-02-17 ENCOUNTER — HOSPITAL ENCOUNTER (OUTPATIENT)
Dept: RADIOLOGY | Facility: HOSPITAL | Age: 66
Discharge: HOME | End: 2021-02-17
Attending: ORTHOPAEDIC SURGERY
Payer: COMMERCIAL

## 2021-02-17 ENCOUNTER — OFFICE VISIT (OUTPATIENT)
Dept: OBGYN CLINIC | Facility: HOSPITAL | Age: 66
End: 2021-02-17

## 2021-02-17 VITALS
SYSTOLIC BLOOD PRESSURE: 133 MMHG | HEIGHT: 62 IN | HEART RATE: 76 BPM | DIASTOLIC BLOOD PRESSURE: 74 MMHG | BODY MASS INDEX: 35.67 KG/M2

## 2021-02-17 DIAGNOSIS — Z48.89 AFTERCARE FOLLOWING SURGERY: Primary | ICD-10-CM

## 2021-02-17 DIAGNOSIS — Z48.89 AFTERCARE FOLLOWING SURGERY: ICD-10-CM

## 2021-02-17 DIAGNOSIS — S72.002A CLOSED FRACTURE OF NECK OF LEFT FEMUR, INITIAL ENCOUNTER (HCC): ICD-10-CM

## 2021-02-17 PROCEDURE — 99024 POSTOP FOLLOW-UP VISIT: CPT | Performed by: ORTHOPAEDIC SURGERY

## 2021-02-17 PROCEDURE — 73502 X-RAY EXAM HIP UNI 2-3 VIEWS: CPT

## 2021-02-17 RX ORDER — OXYCODONE HYDROCHLORIDE 5 MG/1
TABLET ORAL
Qty: 30 TABLET | Refills: 0 | Status: SHIPPED | OUTPATIENT
Start: 2021-02-17 | End: 2021-07-28

## 2021-02-17 RX ORDER — METHOCARBAMOL 500 MG/1
500 TABLET, FILM COATED ORAL 4 TIMES DAILY
Qty: 20 TABLET | Refills: 0 | Status: SHIPPED | OUTPATIENT
Start: 2021-02-17 | End: 2021-07-28

## 2021-02-17 NOTE — PROGRESS NOTES
Assessment:  1  Aftercare following surgery  XR hip/pelv 2-3 vws left if performed   2  Closed fracture of neck of left femur, initial encounter (HCC)  oxyCODONE (ROXICODONE) 5 mg immediate release tablet    methocarbamol (ROBAXIN) 500 mg tablet       Plan:  The patient can full weight bear  She should continue physical therapy  Oxycodone and methocarbamol were refilled  She should follow up in 4 weeks  To do next visit:  Return in about 4 weeks (around 3/17/2021)  The above stated was discussed in layman's terms and the patient expressed understanding  All questions were answered to the patient's satisfaction  Scribe Attestation    I,:  Santosh Vázquez am acting as a scribe while in the presence of the attending physician :       I,:  Mattie Martins MD personally performed the services described in this documentation    as scribed in my presence :             Subjective:   Kerri Muller is a 76 y o  female who presents 2 weeks s/p left trochanteric IM nail insertion, 2/3/2021  She is doing well  Today she complains of generalized left hip pain  She does participate in home physical therapy  She has home visiting nurse once a week  She does use oxycodone and methocarbamol for pain with benefit  Review of systems negative unless otherwise specified in HPI    Past Medical History:   Diagnosis Date    Acid reflux     Anxiety     Diabetes mellitus ([address] )     Hypertension     Hypothyroid        Past Surgical History:   Procedure Laterality Date    CHOLECYSTECTOMY      COLONOSCOPY      DILATION AND CURETTAGE OF UTERUS      ESOPHAGOGASTRODUODENOSCOPY      HYSTERECTOMY      44    OOPHORECTOMY      38    [place] COLONOSCOPY FLX DX W/COLLJ SPEC WHEN PFRMD N/A 1/28/2016    Procedure: EGD AND COLONOSCOPY;  Surgeon: Kalyn Sandhu MD;  [facility];   Service: Gastroenterology    [place] INCISE FINGER TENDON SHEATH Right 1/31/2017    Procedure: LONG FINGER TRIGGER RELEASE ;  Surgeon: Emily Low MD;  [facility];  Service: Orthopedics    SD INCISE FINGER TENDON SHEATH Right 2016    Procedure: RELEASE TRIGGER FINGER - LONG FINGER;  Surgeon: Emily Low MD;  [facility];  Service: Orthopedics    SD OPEN RX FEMUR FX+INTRAMED SHERRY Left 2/3/2021    Procedure: INSERTION NAIL IM FEMUR ANTEGRADE (TROCHANTERIC);   Surgeon: Loren Erickson MD;  [facility];  Service: Orthopedics    SD REVISE MEDIAN N/CARPAL TUNNEL SURG Right 2017    Procedure: WRIST CARPAL TUNNEL RELEASE ; TENOLYSIS OF FPL, FDS 2-5, FDP 2-5;  APPLICATION OF Etelvina Hanna;  Surgeon: Emily Low MD;  [facility];  Service: Orthopedics    ROOT CANAL      UPPER GASTROINTESTINAL ENDOSCOPY         Family History   Problem Relation Age of Onset    Cancer Father     Heart attack Father     Diabetes type II Father     Arthritis Maternal Grandmother     No Known Problems Paternal Grandmother     Heart disease Paternal Grandfather     Stroke Paternal Grandfather     Cirrhosis Mother     No Known Problems Sister     No Known Problems Brother     No Known Problems Maternal Grandfather     Breast cancer Paternal Aunt 62    Colon cancer Paternal Uncle 28    No Known Problems Sister     No Known Problems Maternal Aunt     No Known Problems Maternal Aunt        Social History     Occupational History    Not on file   Tobacco Use    Smoking status: Former Smoker     Packs/day: 2 00     Years: 10 00     Pack years: 20 00     Types: Cigarettes     Quit date:      Years since quittin 1    Smokeless tobacco: Never Used   Substance and Sexual Activity    Alcohol use: Not Currently     Frequency: Never     Comment: Rarely    Drug use: No    Sexual activity: Not Currently         Current Outpatient Medications:     Blood Glucose Monitoring Suppl (ACCU-CHEK MAT PLUS) w/Device KIT, Test 4 times daily, Disp: 1 kit, Rfl: 0    Cholecalciferol (VITAMIN D) 2000 units tablet, TAKE ONE TABLET BY MOUTH EVERY DAY, Disp: 30 tablet, Rfl: 0    Continuous Blood Gluc  (Dexcom G6 ) MERCY, Use 1 each 5 (five) times a day (Patient not taking: Reported on 2/15/2021), Disp: 1 Device, Rfl: 1    Continuous Blood Gluc Sensor (Dexcom G6 Sensor) MISC, Use 1 each 5 (five) times a day (Patient not taking: Reported on 2/15/2021), Disp: 3 each, Rfl: 5    Continuous Blood Gluc Transmit (Dexcom G6 Transmitter) MISC, Use 1 each 5 (five) times a day (Patient not taking: Reported on 2/15/2021), Disp: 1 each, Rfl: 5    cyanocobalamin (VITAMIN B-12) 1,000 mcg tablet, Take 1,000 mcg by mouth daily, Disp: , Rfl:     docusate sodium (COLACE) 100 mg capsule, Take 1 capsule (100 mg total) by mouth 2 (two) times a day, Disp: 10 capsule, Rfl: 0    enoxaparin (LOVENOX) 40 mg/0 4 mL, Inject 0 4 mL (40 mg total) under the skin daily in the early morning for 28 days, Disp: 28 Syringe, Rfl: 0    glimepiride (AMARYL) 4 mg tablet, Take 1 tablet (4 mg total) by mouth 2 (two) times a day, Disp: 180 tablet, Rfl: 1    glucose blood (FREESTYLE LITE) test strip, 1 each by Other route 4 (four) times a day Use 4 times daily DM2-- free style lite test strips, Disp: 400 each, Rfl: 1    insulin aspart, w/niacinamide, (FIASP) 100 Units/mL injection pen, Inject 12 Units under the skin 4 (four) times a day, Disp: 3 pen, Rfl: 5    insulin glargine (Lantus SoloStar) 100 units/mL injection pen, 25 units in AM and 35 units in PM, Disp: 15 mL, Rfl: 5    Insulin Pen Needle (BD Pen Needle Kelly U/F) 32G X 4 MM MISC, Use 4 (four) times a day, Disp: 400 each, Rfl: 1    Insulin Syringe-Needle U-100 (B-D INS SYR ULTRAFINE  3CC/30G) 30G X 1/2" 0 3 ML MISC, Inject under the skin 4 (four) times a day, Disp: 100 each, Rfl: 4    Lancets (FREESTYLE) lancets, by Other route 3 (three) times a day Test blood glucose, Disp: 90 each, Rfl: 5    levothyroxine 100 mcg tablet, Take 1 tablet (100 mcg total) by mouth daily, Disp: 90 tablet, Rfl: 1    lisinopril-hydrochlorothiazide (PRINZIDE,ZESTORETIC) 20-12 5 MG per tablet, Take 0 5 tablets by mouth daily, Disp: 15 tablet, Rfl: 5    methocarbamol (ROBAXIN) 500 mg tablet, Take 1 tablet (500 mg total) by mouth 4 (four) times a day for 5 days As needed for muscle spasm , Disp: 20 tablet, Rfl: 0    omeprazole (PriLOSEC) 40 MG capsule, Take 1 capsule (40 mg total) by mouth daily, Disp: 30 capsule, Rfl: 5    oxyCODONE (ROXICODONE) 5 mg immediate release tablet, 1 pill po Q4 Hrs prn, Disp: 30 tablet, Rfl: 0    sertraline (ZOLOFT) 100 mg tablet, Take 1 tablet (100 mg total) by mouth daily, Disp: 30 tablet, Rfl: 5    Allergies   Allergen Reactions    Metformin Diarrhea            Vitals:    02/17/21 1451   BP: 133/74   Pulse: 76       Objective:  Physical exam  · General: Awake, Alert, Oriented  · Eyes: Pupils equal, round and reactive to light  · Heart: regular rate and rhythm  · Lungs: No audible wheezing  · Abdomen: soft                    Ortho Exam   Left hip:  Incision clean dry and intact  Sutures well approximated and removed today  Hip flexor and knee extensor intact  DF intact  Calf compartments soft and supple  Sensation intact  Toes are warm sensate and mobile      Diagnostics, reviewed and taken today if performed as documented: The attending physician has personally reviewed the pertinent films in PACS and interpretation is as follows:  Left femur x-ray:  Well aligned fracture with stable orthopedic hardware  Procedures, if performed today:    Procedures    None performed      Portions of the record may have been created with voice recognition software  Occasional wrong word or "sound a like" substitutions may have occurred due to the inherent limitations of voice recognition software  Read the chart carefully and recognize, using context, where substitutions have occurred

## 2021-02-19 ENCOUNTER — TELEPHONE (OUTPATIENT)
Dept: OBGYN CLINIC | Facility: HOSPITAL | Age: 66
End: 2021-02-19

## 2021-02-19 NOTE — TELEPHONE ENCOUNTER
Zahra Fisher is calling from [facility] to let Dr Fern Georges know that they missed PT visits this week  Patient cancelled the first scheduled visit and was not feeling well for the second

## 2021-02-23 ENCOUNTER — TELEPHONE (OUTPATIENT)
Dept: OBGYN CLINIC | Facility: HOSPITAL | Age: 66
End: 2021-02-23

## 2021-02-23 NOTE — TELEPHONE ENCOUNTER
Patient sees Dr Rissa Sandoval    Patient saw PT Yesterday and they said she can now do out patient PT, patient would like to know if that is ok, she states she is only taking Tylenol Extra Strength (arthritis)      - [phone]

## 2021-02-25 ENCOUNTER — TELEPHONE (OUTPATIENT)
Dept: OBGYN CLINIC | Facility: HOSPITAL | Age: 66
End: 2021-02-25

## 2021-02-25 NOTE — TELEPHONE ENCOUNTER
Patient Sees Dr Jalil DALY called and they are releasing patient from [address], she Is going well

## 2021-03-02 ENCOUNTER — APPOINTMENT (OUTPATIENT)
Dept: PHYSICAL THERAPY | Age: 66
End: 2021-03-02
Payer: COMMERCIAL

## 2021-03-03 ENCOUNTER — TELEPHONE (OUTPATIENT)
Dept: INTERNAL MEDICINE CLINIC | Facility: CLINIC | Age: 66
End: 2021-03-03

## 2021-03-08 ENCOUNTER — TELEMEDICINE (OUTPATIENT)
Dept: INTERNAL MEDICINE CLINIC | Facility: CLINIC | Age: 66
End: 2021-03-08
Payer: COMMERCIAL

## 2021-03-08 ENCOUNTER — TELEPHONE (OUTPATIENT)
Dept: OBGYN CLINIC | Facility: HOSPITAL | Age: 66
End: 2021-03-08

## 2021-03-08 VITALS
HEART RATE: 75 BPM | TEMPERATURE: 98.2 F | WEIGHT: 209.2 LBS | SYSTOLIC BLOOD PRESSURE: 120 MMHG | DIASTOLIC BLOOD PRESSURE: 72 MMHG | BODY MASS INDEX: 38.5 KG/M2 | HEIGHT: 62 IN | OXYGEN SATURATION: 98 %

## 2021-03-08 DIAGNOSIS — Z79.4 TYPE 2 DIABETES MELLITUS WITH COMPLICATION, WITH LONG-TERM CURRENT USE OF INSULIN (HCC): ICD-10-CM

## 2021-03-08 DIAGNOSIS — R35.0 URINARY FREQUENCY: Primary | ICD-10-CM

## 2021-03-08 DIAGNOSIS — Z79.4 TYPE 2 DIABETES MELLITUS WITH HYPERGLYCEMIA, WITH LONG-TERM CURRENT USE OF INSULIN (HCC): ICD-10-CM

## 2021-03-08 DIAGNOSIS — I10 ESSENTIAL HYPERTENSION: ICD-10-CM

## 2021-03-08 DIAGNOSIS — E11.8 TYPE 2 DIABETES MELLITUS WITH COMPLICATION, WITH LONG-TERM CURRENT USE OF INSULIN (HCC): ICD-10-CM

## 2021-03-08 DIAGNOSIS — E03.9 HYPOTHYROIDISM, UNSPECIFIED TYPE: ICD-10-CM

## 2021-03-08 DIAGNOSIS — K21.9 GASTROESOPHAGEAL REFLUX DISEASE, UNSPECIFIED WHETHER ESOPHAGITIS PRESENT: ICD-10-CM

## 2021-03-08 DIAGNOSIS — E11.65 TYPE 2 DIABETES MELLITUS WITH HYPERGLYCEMIA, WITH LONG-TERM CURRENT USE OF INSULIN (HCC): ICD-10-CM

## 2021-03-08 PROBLEM — N39.0 UTI (URINARY TRACT INFECTION): Status: ACTIVE | Noted: 2021-03-08

## 2021-03-08 LAB
SL AMB  POCT GLUCOSE, UA: NEGATIVE
SL AMB LEUKOCYTE ESTERASE,UA: NORMAL
SL AMB POCT BILIRUBIN,UA: NEGATIVE
SL AMB POCT BLOOD,UA: NEGATIVE
SL AMB POCT CLARITY,UA: NORMAL
SL AMB POCT COLOR,UA: YELLOW
SL AMB POCT KETONES,UA: NEGATIVE
SL AMB POCT NITRITE,UA: NEGATIVE
SL AMB POCT PH,UA: 6
SL AMB POCT SPECIFIC GRAVITY,UA: 1.02
SL AMB POCT URINE PROTEIN: NEGATIVE
SL AMB POCT UROBILINOGEN: NORMAL

## 2021-03-08 PROCEDURE — 87086 URINE CULTURE/COLONY COUNT: CPT | Performed by: INTERNAL MEDICINE

## 2021-03-08 PROCEDURE — 81003 URINALYSIS AUTO W/O SCOPE: CPT | Performed by: INTERNAL MEDICINE

## 2021-03-08 PROCEDURE — 1036F TOBACCO NON-USER: CPT | Performed by: INTERNAL MEDICINE

## 2021-03-08 PROCEDURE — 3008F BODY MASS INDEX DOCD: CPT | Performed by: INTERNAL MEDICINE

## 2021-03-08 PROCEDURE — 3061F NEG MICROALBUMINURIA REV: CPT | Performed by: INTERNAL MEDICINE

## 2021-03-08 PROCEDURE — 99213 OFFICE O/P EST LOW 20 MIN: CPT | Performed by: INTERNAL MEDICINE

## 2021-03-08 PROCEDURE — 1160F RVW MEDS BY RX/DR IN RCRD: CPT | Performed by: INTERNAL MEDICINE

## 2021-03-08 RX ORDER — SULFAMETHOXAZOLE AND TRIMETHOPRIM 800; 160 MG/1; MG/1
1 TABLET ORAL 2 TIMES DAILY
Qty: 6 TABLET | Refills: 0 | Status: SHIPPED | OUTPATIENT
Start: 2021-03-08 | End: 2021-03-11

## 2021-03-08 RX ORDER — GLIMEPIRIDE 4 MG/1
4 TABLET ORAL 2 TIMES DAILY
Qty: 180 TABLET | Refills: 1 | Status: SHIPPED | OUTPATIENT
Start: 2021-03-08 | End: 2021-12-06 | Stop reason: SDUPTHER

## 2021-03-08 NOTE — ASSESSMENT & PLAN NOTE
Lab Results   Component Value Date    HGBA1C 8 0 (H) 09/16/2020   Currently on Insulin treatment and oral medications  Will repeat an A1c

## 2021-03-08 NOTE — TELEPHONE ENCOUNTER
Patient is calling to ask Dr Naa Black to prescribe antibiotics for what she knows is a bladder infection  She called her PCP who said she would need to come in for an office visit in order to be prescribed anything or she could contact her surgeon  Patient states it is difficult for her to get around and she would like Dr Naa Black to order the antibiotics for what she knows is a bladder infection  Please advise  Claudetta Ganong [phone]    Patient uses [address] in [place]    Patient notes no known allergies other than Metformin    Patient also advises that she did have some stitches left in that were beginning to become embedded and her son, who is an EMT removed them  [facility] suggested that they make Dr Naa Black aware

## 2021-03-08 NOTE — ASSESSMENT & PLAN NOTE
· Discomfort, pressure and pain since 3 days ago    · Urine POCT positive for leukocyte  · Nitrite negative

## 2021-03-08 NOTE — PROGRESS NOTES
Assessment/Plan:    Hypothyroidism  Continue Levothyroxine    Type 2 diabetes mellitus, with long-term current use of insulin ([facility])    Lab Results   Component Value Date    HGBA1C 8 0 (H) 09/16/2020   Currently on Insulin treatment and oral medications  Will repeat an A1c    GERD (gastroesophageal reflux disease)  Symptoms controlled with PPI    Essential hypertension  Normotensive  Continue current management    UTI (urinary tract infection)  · Discomfort, pressure and pain since 3 days ago  · Urine POCT positive for leukocyte  · Nitrite negative  ·        Diagnoses and all orders for this visit:    Urinary frequency  -     POCT urine dip    Type 2 diabetes mellitus with complication, with long-term current use of insulin ([facility])  -     glimepiride (AMARYL) 4 mg tablet; Take 1 tablet (4 mg total) by mouth 2 (two) times a day    Gastroesophageal reflux disease, unspecified whether esophagitis present    Type 2 diabetes mellitus with hyperglycemia, with long-term current use of insulin ([facility])    Essential hypertension    Hypothyroidism, unspecified type          Subjective: Patient complains of suprapubic discomfort since 3 days ago  Patient ID: Ady Foote is a 76 y o  female  HPI Patient comes after experiencing suprapubic pressure, cramping and pain  Denies any fever or chills  No back pain  No dysuria, no odor change  Onset was 3 days ago  The following portions of the patient's history were reviewed and updated as appropriate: allergies, current medications, past family history, past medical history, past social history, past surgical history and problem list     Review of Systems   Constitutional: Negative  HENT: Negative  Eyes: Negative  Respiratory: Negative  Cardiovascular: Negative  Gastrointestinal: Negative  Genitourinary: Positive for frequency  Negative for dysuria and hematuria  Musculoskeletal: Positive for arthralgias and back pain  Skin: Negative  Allergic/Immunologic: Negative  Neurological: Negative  Hematological: Negative  Psychiatric/Behavioral: Negative  Objective:      /72 (BP Location: Left arm, Patient Position: Sitting, Cuff Size: Standard)   Pulse 75   Temp 98 2 °F (36 8 °C) (Tympanic)   Ht 5' 2" (1 575 m)   Wt 94 9 kg (209 lb 3 2 oz)   SpO2 98%   BMI 38 26 kg/m²          Physical Exam  Constitutional:       General: She is awake  She is not in acute distress  HENT:      Head: Normocephalic and atraumatic  Nose: Nose normal       Mouth/Throat:      Mouth: Mucous membranes are moist    Eyes:      Extraocular Movements: Extraocular movements intact  Cardiovascular:      Rate and Rhythm: Normal rate and regular rhythm  Pulses: Normal pulses  Heart sounds: Normal heart sounds  No murmur  No friction rub  Pulmonary:      Effort: Pulmonary effort is normal  No respiratory distress  Breath sounds: Normal breath sounds  No wheezing, rhonchi or rales  Chest:      Chest wall: No tenderness  Abdominal:      General: Bowel sounds are normal  There is no distension  Palpations: Abdomen is soft  Tenderness: There is no abdominal tenderness  Musculoskeletal: Normal range of motion  General: No swelling, tenderness or deformity  Right lower leg: No edema  Left lower leg: No edema  Skin:     General: Skin is warm  Findings: No erythema, lesion or rash  Neurological:      Mental Status: She is alert and oriented to person, place, and time     Psychiatric:         Mood and Affect: Mood normal          Behavior: Behavior normal

## 2021-03-08 NOTE — TELEPHONE ENCOUNTER
Patient advised that she should call PCP for a virtual visit and order UA C&S and abx that way  Family can get the cup and take it back to lab and then start abx  This way she does not have to get to Dr if she is having difficulty  Patient verbalized understanding

## 2021-03-09 ENCOUNTER — EVALUATION (OUTPATIENT)
Dept: PHYSICAL THERAPY | Age: 66
End: 2021-03-09
Payer: COMMERCIAL

## 2021-03-09 DIAGNOSIS — S72.002A CLOSED FRACTURE OF NECK OF LEFT FEMUR, INITIAL ENCOUNTER (HCC): Primary | ICD-10-CM

## 2021-03-09 DIAGNOSIS — M25.552 LEFT HIP PAIN: ICD-10-CM

## 2021-03-09 DIAGNOSIS — Z48.89 AFTERCARE FOLLOWING SURGERY: Primary | ICD-10-CM

## 2021-03-09 PROCEDURE — 97110 THERAPEUTIC EXERCISES: CPT | Performed by: PHYSICAL THERAPIST

## 2021-03-09 PROCEDURE — 97162 PT EVAL MOD COMPLEX 30 MIN: CPT | Performed by: PHYSICAL THERAPIST

## 2021-03-09 NOTE — PROGRESS NOTES
PT Evaluation     Today's date: 3/9/2021  Patient name: Aidee Perez  : 1955  MRN: [id number]  Referring provider: Laura Hanna PA-C  Dx:   Encounter Diagnosis     ICD-10-CM    1  Closed fracture of neck of left femur, initial encounter [facility])  S72 002A Ambulatory referral to Physical Therapy   2  Left hip pain  M25 552        Start Time: 1700  Stop Time: 1800  Total time in clinic (min): 60 minutes    Assessment  Assessment details: Pt is a 77 y/o female who presents with left hip pain  No further referral is necessary at this time  Pt is 5 weeks post intramedullary carolyn replacement causing pain, decreased strength, and ROM  Pt has a positive prognosis   Pt would benefit from PT to address these impairments leading to increased functional capacity and improved quality of life  Impairments: abnormal or restricted ROM, impaired physical strength, lacks appropriate home exercise program, pain with function, poor posture  and poor body mechanics  Understanding of Dx/Px/POC: good   Prognosis: good    Goals  Short Term Goals: to be achieved by 4 weeks  1) Patient to be independent with basic HEP  2) Decrease pain to 2/10 at its worst   3) Increase hip flexion ROM by 5-10 degrees   4) Increase LE strength by 1/2 MMT grade in all deficient planes      Long Term Goals: to be achieved by discharge  1) FOTO equal to or greater than 64   2) Ambulation to improve to maximal level of function  3) Stair negotiation will improve to reciprocal   4) Sit to stand transfers will improve to maximal level of function     Plan  Patient would benefit from: skilled physical therapy  Planned modality interventions: cryotherapy and thermotherapy: hydrocollator packs  Planned therapy interventions: neuromuscular re-education, patient education, stretching, strengthening, therapeutic activities, therapeutic exercise, therapeutic training, home exercise program and graded activity  Frequency: Twice a week for 10 weeks   Treatment plan discussed with: patient        Subjective Evaluation    History of Present Illness  Mechanism of injury: Pt fell shoveling and broke hip 5 weeks ago  Doing very well  Needs strengthening and mobility interventions to return to walking dog and maintaining her house  Quality of life: good    Pain  Current pain ratin  At best pain ratin  At worst pain ratin  Quality: sharp, radiating and dull ache  Aggravating factors: stair climbing, walking and standing    Hand dominance: right    Patient Goals  Patient goals for therapy: decreased pain, independence with ADLs/IADLs, return to sport/leisure activities, increased strength and increased motion          Objective     Strength/Myotome Testing     Left Hip   Planes of Motion   Flexion: 4-  Extension: 4-  Abduction: 4-  External rotation: 4-  Internal rotation: 4-    General Comments:      Hip Comments   5 STS: 15 seconds  TUG: 15 secs  Gait: antalgic with decreased stance time on affected extremity  Performed gait training with SPC, drastic improvements      Stair navigation: Step to pattern with bilateral hand rails              Precautions: Left hip fx      Manuals                                                                 Neuro Re-Ed                                                                                                        Ther Ex 3/9            Adductor squueze HEP            Quad set HEP            SL clamshell HEP            Nu Step nv            Mini squats nv            Standing hip abd nv                                      Ther Activity                                       Gait Training 3/9            SPC  nv            Stair training nv            Modalities

## 2021-03-10 DIAGNOSIS — Z23 ENCOUNTER FOR IMMUNIZATION: ICD-10-CM

## 2021-03-11 LAB — BACTERIA UR CULT: NORMAL

## 2021-03-12 ENCOUNTER — OFFICE VISIT (OUTPATIENT)
Dept: PHYSICAL THERAPY | Age: 66
End: 2021-03-12
Payer: COMMERCIAL

## 2021-03-12 DIAGNOSIS — S72.002A CLOSED FRACTURE OF NECK OF LEFT FEMUR, INITIAL ENCOUNTER (HCC): Primary | ICD-10-CM

## 2021-03-12 DIAGNOSIS — M25.552 LEFT HIP PAIN: ICD-10-CM

## 2021-03-12 PROCEDURE — 97140 MANUAL THERAPY 1/> REGIONS: CPT

## 2021-03-12 PROCEDURE — 97110 THERAPEUTIC EXERCISES: CPT

## 2021-03-12 NOTE — PROGRESS NOTES
Daily Note     Today's date: 3/12/2021  Patient name: Sydnee Jeong  : 1955  MRN: [id number]  Referring provider: La Farias PA-C  Dx:   Encounter Diagnosis     ICD-10-CM    1  Closed fracture of neck of left femur, initial [facility] ([facility] )  S72 002A    2  Left hip pain  M25 552                   Subjective: pt reports she's more sore due to cooking /prolonged standing, pt reports pain with R S/L with no pillow between knees and decreased pain with pillow for positioning       Objective: See treatment diary below  Pt educated on use of pillow for positioning and S/L in bed    Assessment: pt demo R S/L with pillow between knees and was able to report decreased strain on L LE, pt felt better/more mobile after session       Plan: Continue per plan of care  Progress treatment as tolerated         Precautions: Left hip fx      Manuals  3/12/21           PROM L LE  VK                                                  Neuro Re-Ed                                                                                                        Ther Ex 3/9 3/12/21           Adductor squueze HEP 3x10x5"           Quad set HEP 3x10x5"           SL clamshell HEP 3x10x3"           Nu Step nv 5' seat 7           Mini squats nv 2x10x3"           Standing hip abd nv 3x10 L                                     Ther Activity                                       Gait Training 3/9 3/12/21           SPC  nv np due to subjective (NV)           Stair training nv nv           Modalities

## 2021-03-16 ENCOUNTER — OFFICE VISIT (OUTPATIENT)
Dept: PHYSICAL THERAPY | Age: 66
End: 2021-03-16
Payer: COMMERCIAL

## 2021-03-16 DIAGNOSIS — S72.002A CLOSED FRACTURE OF NECK OF LEFT FEMUR, INITIAL ENCOUNTER (HCC): Primary | ICD-10-CM

## 2021-03-16 DIAGNOSIS — M25.552 LEFT HIP PAIN: ICD-10-CM

## 2021-03-16 PROCEDURE — 97140 MANUAL THERAPY 1/> REGIONS: CPT | Performed by: PHYSICAL THERAPIST

## 2021-03-16 PROCEDURE — 97110 THERAPEUTIC EXERCISES: CPT | Performed by: PHYSICAL THERAPIST

## 2021-03-16 PROCEDURE — 97112 NEUROMUSCULAR REEDUCATION: CPT | Performed by: PHYSICAL THERAPIST

## 2021-03-16 PROCEDURE — 97116 GAIT TRAINING THERAPY: CPT | Performed by: PHYSICAL THERAPIST

## 2021-03-16 NOTE — PROGRESS NOTES
Daily Note     Today's date: 3/16/2021  Patient name: Frankey Beckmann  : 1955  MRN: [id number]  Referring provider: Estela Jacobs PA-C  Dx:   Encounter Diagnosis     ICD-10-CM    1  Closed fracture of neck of left femur, initial encounter ([facility] )  S72 002A    2  Left hip pain  M25 552        Start Time: 1500  Stop Time: 1545  Total time in clinic (min): 45 minutes    Subjective: Pt continues to demonstrate antalgic gait  Objective: See treatment diary below      Assessment: Tolerated treatment well  Patient demonstrated fatigue post treatment and exhibited good technique with therapeutic exercises      Plan: Continue per plan of care        Precautions: Left hip fx      Manuals  3/12/21 3/16          PROM L LE  VK JF                                                 Neuro Re-Ed   3/16          Mini squats   3*10          Standing abduction   L 3x10                                                                           Ther Ex 3/9 3/12/21 3/16          Adductor squueze HEP 3x10x5" 3*10*5"          Quad set HEP 3x10x5" 3*10*5"          SL clamshell HEP 3x10x3"           Nu Step nv 5' seat 7 10' seat 7          Mini squats nv 2x10x3" 2*10*3"          Standing hip abd nv 3x10 L 3*10                                    Ther Activity                                       Gait Training 3/9 3/12/21 3/16          SPC  nv np due to subjective (NV) JF          Stair training nv nv           Modalities

## 2021-03-18 ENCOUNTER — APPOINTMENT (OUTPATIENT)
Dept: PHYSICAL THERAPY | Age: 66
End: 2021-03-18
Payer: COMMERCIAL

## 2021-03-22 DIAGNOSIS — F41.9 ANXIETY: ICD-10-CM

## 2021-03-22 RX ORDER — SERTRALINE HYDROCHLORIDE 100 MG/1
100 TABLET, FILM COATED ORAL DAILY
Qty: 30 TABLET | Refills: 5 | Status: SHIPPED | OUTPATIENT
Start: 2021-03-22 | End: 2021-09-24 | Stop reason: SDUPTHER

## 2021-03-23 ENCOUNTER — OFFICE VISIT (OUTPATIENT)
Dept: PHYSICAL THERAPY | Age: 66
End: 2021-03-23
Payer: COMMERCIAL

## 2021-03-23 DIAGNOSIS — M25.552 LEFT HIP PAIN: ICD-10-CM

## 2021-03-23 DIAGNOSIS — S72.002A CLOSED FRACTURE OF NECK OF LEFT FEMUR, INITIAL ENCOUNTER (HCC): Primary | ICD-10-CM

## 2021-03-23 PROCEDURE — 97112 NEUROMUSCULAR REEDUCATION: CPT

## 2021-03-23 PROCEDURE — 97140 MANUAL THERAPY 1/> REGIONS: CPT

## 2021-03-23 PROCEDURE — 97110 THERAPEUTIC EXERCISES: CPT

## 2021-03-23 NOTE — PROGRESS NOTES
Daily Note     Today's date: 3/23/2021  Patient name: Abdullahi Guzman  : 1955  MRN: [id number]  Referring provider: Chemo Berrios PA-C  Dx:   Encounter Diagnosis     ICD-10-CM    1  Closed fracture of neck of left femur, [facility]  S72 002A    2  Left hip pain  M25 552                   Subjective: pt reports her L leg is feeling better, but wishes she could be doing more      Objective: See treatment diary below      Assessment: pt completed there ex with min fatigue, improving ROM L LE, pt cont to amb with SPC with good balance      Plan: Continue per plan of care  Progress treatment as tolerated         Precautions: Left hip fx      Manuals  3/12/21 3/16 3/23/21         PROM L LE  VK JF VK                                                Neuro Re-Ed   3/16 3/23/21         Mini squats   3*10 3x10x3"         Standing abduction   L 3x10 L 3x10         Bridging     3x10x5"                                                             Ther Ex 3/9 3/12/21 3/16 3/23/21         Adductor squueze HEP 3x10x5" 3*10*5" 2x15x5"         Quad set HEP 3x10x5" 3*10*5" 2x15x5"         SL clamshell HEP 3x10x3"           Nu Step nv 5' seat 7 10' seat 7 10'         Mini squats nv 2x10x3" 2*10*3" above         Standing hip abd nv 3x10 L 3*10 above                                   Ther Activity                                       Gait Training 3/9 3/12/21 3/16          SPC  nv np due to subjective (NV) JF          Stair training nv nv nv          Modalities

## 2021-03-24 ENCOUNTER — HOSPITAL ENCOUNTER (OUTPATIENT)
Dept: RADIOLOGY | Facility: HOSPITAL | Age: 66
Discharge: HOME | End: 2021-03-24
Attending: ORTHOPAEDIC SURGERY
Payer: COMMERCIAL

## 2021-03-24 ENCOUNTER — OFFICE VISIT (OUTPATIENT)
Dept: OBGYN CLINIC | Facility: HOSPITAL | Age: 66
End: 2021-03-24

## 2021-03-24 VITALS
DIASTOLIC BLOOD PRESSURE: 84 MMHG | HEART RATE: 87 BPM | HEIGHT: 62 IN | SYSTOLIC BLOOD PRESSURE: 169 MMHG | BODY MASS INDEX: 38.26 KG/M2

## 2021-03-24 DIAGNOSIS — Z48.89 AFTERCARE FOLLOWING SURGERY: ICD-10-CM

## 2021-03-24 DIAGNOSIS — S72.042D CLOSED DISPLACED BASICERVICAL FRACTURE OF LEFT FEMUR WITH ROUTINE HEALING, SUBSEQUENT ENCOUNTER: Primary | ICD-10-CM

## 2021-03-24 PROCEDURE — 99024 POSTOP FOLLOW-UP VISIT: CPT | Performed by: ORTHOPAEDIC SURGERY

## 2021-03-24 PROCEDURE — 73502 X-RAY EXAM HIP UNI 2-3 VIEWS: CPT

## 2021-03-24 NOTE — PROGRESS NOTES
Assessment  Diagnoses and all orders for this visit:    Closed displaced basicervical fracture of left femur with routine healing, subsequent encounter      Discussion and Plan:    Weightbearing as tolerated left lower extremity   Continue physical therapy  May continue to take over-the-counter pain medications as needed   Wean cane as able   Follow-up in about 6 weeks for re-evaluation, with x-rays upon arrival    Subjective:   Patient ID: Abdullahi Guzman is a 76 y o  female      58-year-old female presents for postop evaluation for left basicervical femoral neck fracture status post intramedullary nail performed 7 weeks ago  Patient presents ambulating with the help of a cane  She has been taking only Tylenol for pain  She is currently doing physical therapy  She feels she has been improving  She does states that sometimes her hip feels stiff  Denies any other complaints today  The following portions of the patient's history were reviewed and updated as appropriate: allergies, current medications, past family history, past medical history, past social history, past surgical history and problem list     Review of Systems   Constitutional: Negative for chills, fever and unexpected weight change  HENT: Negative for sore throat  Eyes: Negative for visual disturbance  Respiratory: Negative for cough and shortness of breath  Cardiovascular: Negative for chest pain  Gastrointestinal: Negative for abdominal pain, nausea and vomiting  Endocrine: Negative for cold intolerance  Musculoskeletal: Positive for arthralgias and myalgias  Skin: Negative for rash and wound  Allergic/Immunologic: Negative for immunocompromised state         Objective:  /84   Pulse 87   Ht 5' 2" (1 575 m)   BMI 38 26 kg/m²       Left Hip Exam     Comments:  Well-healed lateral hip scars, no erythema   No limitations with hip flexion, internal rotation, external rotation  Patient is able to straight leg raise   Non tender to palpation around the hip   Antalgic gait            Physical Exam  Vitals signs and nursing note reviewed  Constitutional:       Appearance: Normal appearance  HENT:      Head: Normocephalic and atraumatic  Nose: Nose normal       Mouth/Throat:      Mouth: Mucous membranes are moist    Eyes:      Extraocular Movements: Extraocular movements intact  Conjunctiva/sclera: Conjunctivae normal    Neck:      Musculoskeletal: Normal range of motion  Cardiovascular:      Rate and Rhythm: Normal rate  Pulses: Normal pulses  Pulmonary:      Effort: Pulmonary effort is normal       Breath sounds: Normal breath sounds  Abdominal:      Palpations: Abdomen is soft  Musculoskeletal:      Comments: See Ortho Exam   Skin:     General: Skin is warm  Capillary Refill: Capillary refill takes less than 2 seconds  Neurological:      General: No focal deficit present  Mental Status: She is alert  Mental status is at baseline  Psychiatric:         Mood and Affect: Mood normal          Behavior: Behavior normal            I have personally reviewed pertinent films in PACS and my interpretation is as follows      X-rays left hip from today 03/24/2021 shows well-aligned short intramedullary nail with femoral head screw fixation, no evidence of hardware failure or complications there is evidence of callus formation of fracture site

## 2021-03-25 ENCOUNTER — OFFICE VISIT (OUTPATIENT)
Dept: PHYSICAL THERAPY | Age: 66
End: 2021-03-25
Payer: COMMERCIAL

## 2021-03-25 DIAGNOSIS — S72.002A CLOSED FRACTURE OF NECK OF LEFT FEMUR, INITIAL ENCOUNTER (HCC): Primary | ICD-10-CM

## 2021-03-25 DIAGNOSIS — M25.552 LEFT HIP PAIN: ICD-10-CM

## 2021-03-25 PROCEDURE — 97112 NEUROMUSCULAR REEDUCATION: CPT | Performed by: PHYSICAL THERAPIST

## 2021-03-25 PROCEDURE — 97140 MANUAL THERAPY 1/> REGIONS: CPT | Performed by: PHYSICAL THERAPIST

## 2021-03-25 PROCEDURE — 97116 GAIT TRAINING THERAPY: CPT | Performed by: PHYSICAL THERAPIST

## 2021-03-25 PROCEDURE — 97110 THERAPEUTIC EXERCISES: CPT | Performed by: PHYSICAL THERAPIST

## 2021-03-25 NOTE — PROGRESS NOTES
Daily Note     Today's date: 3/25/2021  Patient name: Brenden Corado  : 1955  MRN: [id number]  Referring provider: Jose C Cortez PA-C  Dx:   Encounter Diagnosis     ICD-10-CM    1  [facility] left [facility]  S72 002A    2  Left hip pain  M25 552        Start Time: 1530  Stop Time: 1615  Total time in clinic (min): 45 minutes    Subjective: Pt reports improvements with symptoms  Objective: See treatment diary below      Assessment: Tolerated treatment well  Pt was progressed to more weight bearing on affected structure to increase tolerance to community ambulation  Patient demonstrated fatigue post treatment and would benefit from continued PT      Plan: Continue per plan of care        Precautions: Left hip fx      Manuals  3/12/21 3/16 3/25/21         PROM L LE  VK MARJORIE JF                                                Neuro Re-Ed   3/16 3/25/21         Mini squats   3*10 3x10x3"         Standing abduction   L 3x10 B/L 3x10         Bridging     3x10x5"                                                             Ther Ex 3/9 3/12/21 3/16 3/25/21         Adductor squueze HEP 3x10x5" 3*10*5" 2x15x5"         Quad set HEP 3x10x5" 3*10*5" 2x15x5"         SL clamshell HEP 3x10x3"  3*10         Nu Step nv 5' seat 7 10' seat 7 10'         Mini squats nv 2x10x3" 2*10*3" above         Standing hip abd nv 3x10 L 3*10 above                                   Ther Activity                                       Gait Training 3/9 3/12/21 3/16 3/25         SPC  nv np due to subjective (NV) JF JV         Stair training nv nv nv          Modalities

## 2021-03-30 ENCOUNTER — OFFICE VISIT (OUTPATIENT)
Dept: PHYSICAL THERAPY | Age: 66
End: 2021-03-30
Payer: COMMERCIAL

## 2021-03-30 DIAGNOSIS — M25.552 LEFT HIP PAIN: ICD-10-CM

## 2021-03-30 DIAGNOSIS — S72.002A CLOSED FRACTURE OF NECK OF LEFT FEMUR, INITIAL ENCOUNTER (HCC): Primary | ICD-10-CM

## 2021-03-30 PROCEDURE — 97116 GAIT TRAINING THERAPY: CPT

## 2021-03-30 PROCEDURE — 97112 NEUROMUSCULAR REEDUCATION: CPT

## 2021-03-30 PROCEDURE — 97110 THERAPEUTIC EXERCISES: CPT

## 2021-03-30 PROCEDURE — 97140 MANUAL THERAPY 1/> REGIONS: CPT

## 2021-04-01 ENCOUNTER — APPOINTMENT (OUTPATIENT)
Dept: PHYSICAL THERAPY | Age: 66
End: 2021-04-01
Payer: COMMERCIAL

## 2021-04-06 ENCOUNTER — OFFICE VISIT (OUTPATIENT)
Dept: PHYSICAL THERAPY | Age: 66
End: 2021-04-06
Payer: COMMERCIAL

## 2021-04-06 DIAGNOSIS — M25.552 LEFT HIP PAIN: ICD-10-CM

## 2021-04-06 DIAGNOSIS — S72.002A CLOSED FRACTURE OF NECK OF LEFT FEMUR, INITIAL ENCOUNTER (HCC): Primary | ICD-10-CM

## 2021-04-06 PROCEDURE — 97116 GAIT TRAINING THERAPY: CPT

## 2021-04-06 PROCEDURE — 97110 THERAPEUTIC EXERCISES: CPT

## 2021-04-06 PROCEDURE — 97112 NEUROMUSCULAR REEDUCATION: CPT

## 2021-04-06 NOTE — PROGRESS NOTES
Daily Note     Today's date: 2021  Patient name: Kei Cornejo  : 1955  MRN: [id number]  Referring provider: Samina Ko PA-C  Dx:   Encounter Diagnosis     ICD-10-CM    1  Closed [facility] of left femur[facility]  S72 002A    2  Left hip pain  M25 552                   Subjective: pt reports she improving but still doesn't feel strong enough to full WB on L without support      Objective: See treatment diary below      Assessment: ex progressions challenging but dario well with mod fatigue, increased Trendelenberg noted without A D  Plan: Continue per plan of care  Progress treatment as tolerated         Precautions: Left hip fx      Manuals  3/12/21 3/16 3/25/21 3/30/21 4/6/21       PROM L LE  VK JF JF VK VK                                              Neuro Re-Ed   3/16 3/25/21 3/30/21 4/6/21       Mini squats   3*10 3x10x3" 3x10x3" 3x10x5"       Standing abduction   L 3x10 B/L 3x10 3x10 BL 3x10 ea BL       Bridging     3x10x5" 3x10x5" 3x10x5"       STS     2x10 2x10       Isometric hip flexor     VK 10x5" VK 10x5"       TrA activation      2x10x5"        sidestepping     nv With SPC 2x20'       Ther Ex 3/9 3/12/21 3/16 3/25/21 3/30/21 4/6/21       Adductor squeeze HEP 3x10x5" 3*10*5" 2x15x5" 3x15x5" 2x15x5"       Quad set HEP 3x10x5" 3*10*5" 2x15x5" 30x5" HEP       SL clamshell HEP 3x10x3"  3*10 ytb 3x10x3" ytb 3x10x3"       Nu Step nv 5' seat 7 10' seat 7 10' 10' 10'       Mini squats nv 2x10x3" 2*10*3" above         Standing hip abd nv 3x10 L 3*10 above         Standing hip flexion      2x10 L LE moving       LAQ      1 5# 3x10x5"       Ther Activity                                       Gait Training 3/9 3/12/21 3/16 3/25 3/30/21 4/6/21       SPC  nv np due to subjective (NV) JF JV  VK       Step up      4" 2x10 4" 2x10       Stair training nv nv nv  4 steps x1 rail/cane 1x       Modalities

## 2021-04-08 ENCOUNTER — EVALUATION (OUTPATIENT)
Dept: PHYSICAL THERAPY | Age: 66
End: 2021-04-08
Payer: COMMERCIAL

## 2021-04-08 DIAGNOSIS — M25.552 LEFT HIP PAIN: ICD-10-CM

## 2021-04-08 DIAGNOSIS — S72.002A CLOSED FRACTURE OF NECK OF LEFT FEMUR, INITIAL ENCOUNTER (HCC): Primary | ICD-10-CM

## 2021-04-08 PROCEDURE — 97110 THERAPEUTIC EXERCISES: CPT | Performed by: PHYSICAL THERAPIST

## 2021-04-08 PROCEDURE — 97140 MANUAL THERAPY 1/> REGIONS: CPT | Performed by: PHYSICAL THERAPIST

## 2021-04-08 NOTE — PROGRESS NOTES
PT Re-Evaluation     Today's date: 2021  Patient name: Raheem Aleman  : 1955  MRN: [id number]  Referring provider: Nelwyn Severin, PA-C  Dx:   Encounter Diagnosis     ICD-10-CM    1  Closed fracture of neck of left femur, initial encounter [facility])  S72 002A Ambulatory referral to Physical Therapy   2  Left hip pain  M25 552        Start Time: 1530  Stop Time: 1615  Total time in clinic (min): 45 minutes    Assessment  Assessment details: Pt is a 77 y/o female who presents with left hip pain  No further referral is necessary at this time  Pt is 9 weeks post intramedullary carolyn replacement demonstrated improvements in pain,  strength, and ROM  Pt demonstrates improvements with functional mobility, but still has difficulty with stair climbing and community ambulation demonstrated in objective testing  Pt's balance is also diminished leading to increased risks of falls  Pt has a positive prognosis   Pt would benefit from PT to address these impairments leading to increased functional capacity and improved quality of life  Impairments: abnormal or restricted ROM, impaired physical strength, lacks appropriate home exercise program, pain with function, poor posture  and poor body mechanics  Understanding of Dx/Px/POC: good   Prognosis: good    Goals  Short Term Goals: to be achieved by 4 weeks MET   1) Patient to be independent with basic HEP  2) Decrease pain to 2/10 at its worst   3) Increase hip flexion ROM by 5-10 degrees   4) Increase LE strength by 1/2 MMT grade in all deficient planes      Long Term Goals: to be achieved by discharge Partially met   1) FOTO equal to or greater than 64   2) Ambulation to improve to maximal level of function  3) Stair negotiation will improve to reciprocal   4) Sit to stand transfers will improve to maximal level of function     Plan  Patient would benefit from: skilled physical therapy  Planned modality interventions: cryotherapy and thermotherapy: hydrocollator packs  Planned therapy interventions: neuromuscular re-education, patient education, stretching, strengthening, therapeutic activities, therapeutic exercise, therapeutic training, home exercise program and graded activity  Frequency: Twice a week for 8 weeks  Treatment plan discussed with: patient        Subjective Evaluation    History of Present Illness  Mechanism of injury: Pt fell shoveling and broke hip 5 weeks ago  Doing very well  Needs strengthening and mobility interventions to return to walking dog and maintaining her house     Quality of life: good    Pain  Current pain ratin  At best pain ratin  At worst pain ratin  Quality: sharp, radiating and dull ache  Aggravating factors: stair climbing, walking and standing    Hand dominance: right    Patient Goals  Patient goals for therapy: decreased pain, independence with ADLs/IADLs, return to sport/leisure activities, increased strength and increased motion          Objective     Strength/Myotome Testing     Left Hip   Planes of Motion   Flexion: 4  Extension: 4  Abduction: 4  External rotation: 4-  Internal rotation: 4-    General Comments:      Hip Comments   5 STS: 11 seconds without AD  TU secs  Gait:  decreased stance and Trendelenburg gait due to decreased strength     Stair navigation: Reciprocal with bilateral hand rails    SLS: 9 secs with mod-max sway  FGA               Precautions: Left hip fx        Manuals  3/12/21 3/16 3/25/21 3/30/21 4/8/21       PROM L LE  VK JF JF VK JF       Cadual hip glides                                        Neuro Re-Ed   3/16 3/25/21 3/30/21 4/8/21 4/8      Mini squats   3*10 3x10x3" 3x10x3" 3x10x5"       Standing abduction   L 3x10 B/L 3x10 3x10 BL 3x10 ea BL       Bridging     3x10x5" 3x10x5" 3x10x5"       STS     2x10 2x10       Isometric hip flexor     VK 10x5" VK 10x5"       TrA activation      2x10x5"        sidestepping     nv With SPC 2x20'       Ther Ex 3/9 3/12/21 3/16 3/25/21 3/30/21 4/8 Adductor squeeze HEP 3x10x5" 3*10*5" 2x15x5" 3x15x5"        Quad set HEP 3x10x5" 3*10*5" 2x15x5" 30x5"        SL clamshell HEP 3x10x3"  3*10 ytb 3x10x3"        Nu Step nv 5' seat 7 10' seat 7 10' 10'        Mini squats nv 2x10x3" 2*10*3" above         Standing hip abd nv 3x10 L 3*10 above         Standing hip flexion             LAQ             Ther Activity                                       Gait Training 3/9 3/12/21 3/16 3/25 3/30/21        SPC  nv np due to subjective (NV) JF JV         Step up      4" 2x10 4" 2x10 4"      Stair training nv nv nv  4 steps x1 rail/cane 1x       Modalities

## 2021-04-13 ENCOUNTER — APPOINTMENT (OUTPATIENT)
Dept: PHYSICAL THERAPY | Age: 66
End: 2021-04-13
Payer: COMMERCIAL

## 2021-04-14 ENCOUNTER — OFFICE VISIT (OUTPATIENT)
Dept: PHYSICAL THERAPY | Age: 66
End: 2021-04-14
Payer: COMMERCIAL

## 2021-04-14 DIAGNOSIS — S72.002A CLOSED FRACTURE OF NECK OF LEFT FEMUR, INITIAL ENCOUNTER (HCC): Primary | ICD-10-CM

## 2021-04-14 DIAGNOSIS — M25.552 LEFT HIP PAIN: ICD-10-CM

## 2021-04-14 PROCEDURE — 97110 THERAPEUTIC EXERCISES: CPT

## 2021-04-14 PROCEDURE — 97112 NEUROMUSCULAR REEDUCATION: CPT

## 2021-04-14 PROCEDURE — 97116 GAIT TRAINING THERAPY: CPT

## 2021-04-14 NOTE — PROGRESS NOTES
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 by end of day      Objective: See treatment diary below      Assessment: pt completed ex with min-mod fatigue, increased Trendelenburg noted when amb without A D  Plan: Continue per plan of care  Progress treatment as tolerated         Precautions: Left hip fx        Manuals  3/12/21 3/16 3/25/21 3/30/21 4/8/21 4/14/21      PROM L LE  VK JF JF VK JF VK      Cadual hip glides                                        Neuro Re-Ed   3/16 3/25/21 3/30/21 4/8/21 4/14/21      Mini squats   3*10 3x10x3" 3x10x3" 3x10x5" 3x12x5"      Standing abduction   L 3x10 B/L 3x10 3x10 BL 3x10 ea BL 3x10 ea BL      Bridging     3x10x5" 3x10x5" 3x10x5" 3x10x5"      STS     2x10 2x10 2x10      Isometric hip flexor     VK 10x5" VK 10x5" VK 2x10x5"      TrA activation      2x10x5"        sidestepping     nv With SPC 2x20' 2x20'      Wt shift to [facility]             Ther Ex 3/9 3/12/21 3/16 3/25/21 3/30/21 4/8 4/14/21      Adductor squeeze HEP 3x10x5" 3*10*5" 2x15x5" 3x15x5"        Quad set HEP 3x10x5" 3*10*5" 2x15x5" 30x5"        SL clamshell HEP 3x10x3"  3*10 ytb 3x10x3"  ytb 3x12x5"      Nu Step nv 5' seat 7 10' seat 7 10' 10'  10'      Mini squats nv 2x10x3" 2*10*3" above         Standing hip abd nv 3x10 L 3*10 above         Standing hip flexion             LAQ             Ther Activity                                       Gait Training 3/9 3/12/21 3/16 3/25 3/30/21  4/14/21      SPC  nv np due to subjective (NV) JF JV         Step up      4" 2x10 4" 2x10 4" 2x10      Stair training nv nv nv  4 steps x1 rail/cane 1x 4 steps x2 with railing Modalities

## 2021-04-16 ENCOUNTER — OFFICE VISIT (OUTPATIENT)
Dept: PHYSICAL THERAPY | Age: 66
End: 2021-04-16
Payer: COMMERCIAL

## 2021-04-16 DIAGNOSIS — M25.552 LEFT HIP PAIN: ICD-10-CM

## 2021-04-16 DIAGNOSIS — S72.002A CLOSED FRACTURE OF NECK OF LEFT FEMUR, INITIAL ENCOUNTER (HCC): Primary | ICD-10-CM

## 2021-04-16 PROCEDURE — 97112 NEUROMUSCULAR REEDUCATION: CPT | Performed by: PHYSICAL THERAPIST

## 2021-04-16 PROCEDURE — 97140 MANUAL THERAPY 1/> REGIONS: CPT | Performed by: PHYSICAL THERAPIST

## 2021-04-16 PROCEDURE — 97110 THERAPEUTIC EXERCISES: CPT | Performed by: PHYSICAL THERAPIST

## 2021-04-16 NOTE — PROGRESS NOTES
Daily Note     Today's date: 2021  Patient name: Bertha Donaldson  : 1955  MRN: [id number]  Referring provider: Coy West PA-C  Dx:   Encounter Diagnosis     ICD-10-CM    1  [facility] of left femur[facility]  S72 002A    2  Left hip pain  M25 552        Start Time: 1345  Stop Time: 1430  Total time in clinic (min): 45 minutes    Subjective: Pt reports no new symptoms  Objective: See treatment diary below      Assessment: Tolerated treatment well  Patient demonstrated fatigue post treatment and would benefit from continued PT      Plan: Continue per plan of care        Precautions: Left hip fx        Manuals  3/12/21 3/16 3/25/21 3/30/21 4/8/21 4/16/21      PROM L LE  VK JF JF VK JF VK      Cadual hip glides                                        Neuro Re-Ed   3/16 3/25/21 3/30/21 4/8/21 4/16/21      Mini squats   3*10 3x10x3" 3x10x3" 3x10x5" 3x12x5"      Standing abduction   L 3x10 B/L 3x10 3x10 BL 3x10 ea BL 3x10 ea BL      Bridging     3x10x5" 3x10x5" 3x10x5" 3x10x5"      STS     2x10 2x10 2x10      Isometric hip flexor     VK 10x5" VK 10x5" VK 2x10x5"      TrA activation      2x10x5"        sidestepping     nv With SPC 2x20' 2x20'      Wt shift to [facility]             Ther Ex 3/9 3/12/21 3/16 3/25/21 3/30/21 4/8 4/16/21      Adductor squeeze HEP 3x10x5" 3*10*5" 2x15x5" 3x15x5"        Quad set HEP 3x10x5" 3*10*5" 2x15x5" 30x5"        SL clamshell HEP 3x10x3"  3*10 ytb 3x10x3"  ytb 3x12x5"      Nu Step nv 5' seat 7 10' seat 7 10' 10'  10'      Mini squats nv 2x10x3" 2*10*3" above         Standing hip abd nv 3x10 L 3*10 above         Standing hip flexion             LAQ             Ther Activity                                       Gait Training 3/9 3/12/21 3/16 3/25 3/30/21  4/14/21      SPC  nv np due to subjective (NV) JF JV         Step up      4" 2x10 4" 2x10 4" 2x10      Stair training nv nv nv  4 steps x1 rail/cane 1x 4 steps x2 with railing      Modalities

## 2021-04-17 LAB
CHOLEST SERPL-MCNC: 199 MG/DL
CHOLEST/HDLC SERPL: 4 (CALC)
EST. AVERAGE GLUCOSE BLD GHB EST-MCNC: 146 (CALC)
EST. AVERAGE GLUCOSE BLD GHB EST-SCNC: 8.1 (CALC)
HBA1C MFR BLD: 6.7 % OF TOTAL HGB
HDLC SERPL-MCNC: 50 MG/DL
LDLC SERPL CALC-MCNC: 123 MG/DL (CALC)
NONHDLC SERPL-MCNC: 149 MG/DL (CALC)
TRIGL SERPL-MCNC: 145 MG/DL

## 2021-04-17 PROCEDURE — 3044F HG A1C LEVEL LT 7.0%: CPT | Performed by: INTERNAL MEDICINE

## 2021-04-18 DIAGNOSIS — E11.00 UNCONTROLLED TYPE 2 DIABETES MELLITUS WITH HYPEROSMOLARITY WITHOUT COMA, WITHOUT LONG-TERM CURRENT USE OF INSULIN (HCC): ICD-10-CM

## 2021-04-20 ENCOUNTER — APPOINTMENT (OUTPATIENT)
Dept: PHYSICAL THERAPY | Age: 66
End: 2021-04-20
Payer: COMMERCIAL

## 2021-04-21 ENCOUNTER — OFFICE VISIT (OUTPATIENT)
Dept: INTERNAL MEDICINE CLINIC | Age: 66
End: 2021-04-21
Payer: COMMERCIAL

## 2021-04-21 ENCOUNTER — OFFICE VISIT (OUTPATIENT)
Dept: PHYSICAL THERAPY | Age: 66
End: 2021-04-21
Payer: COMMERCIAL

## 2021-04-21 VITALS
TEMPERATURE: 97.3 F | DIASTOLIC BLOOD PRESSURE: 68 MMHG | HEART RATE: 70 BPM | WEIGHT: 207.8 LBS | HEIGHT: 62 IN | OXYGEN SATURATION: 97 % | SYSTOLIC BLOOD PRESSURE: 122 MMHG | BODY MASS INDEX: 38.24 KG/M2

## 2021-04-21 DIAGNOSIS — E03.9 HYPOTHYROIDISM, UNSPECIFIED TYPE: ICD-10-CM

## 2021-04-21 DIAGNOSIS — Z12.31 ENCOUNTER FOR SCREENING MAMMOGRAM FOR MALIGNANT NEOPLASM OF BREAST: Primary | ICD-10-CM

## 2021-04-21 DIAGNOSIS — Z79.4 TYPE 2 DIABETES MELLITUS WITHOUT COMPLICATION, WITH LONG-TERM CURRENT USE OF INSULIN (HCC): ICD-10-CM

## 2021-04-21 DIAGNOSIS — I10 ESSENTIAL HYPERTENSION: ICD-10-CM

## 2021-04-21 DIAGNOSIS — M25.552 LEFT HIP PAIN: ICD-10-CM

## 2021-04-21 DIAGNOSIS — E11.9 TYPE 2 DIABETES MELLITUS WITHOUT COMPLICATION, WITH LONG-TERM CURRENT USE OF INSULIN (HCC): ICD-10-CM

## 2021-04-21 DIAGNOSIS — E66.01 OBESITY, MORBID (HCC): ICD-10-CM

## 2021-04-21 DIAGNOSIS — E78.00 HYPERCHOLESTEROLEMIA: ICD-10-CM

## 2021-04-21 DIAGNOSIS — S72.002A CLOSED FRACTURE OF NECK OF LEFT FEMUR, INITIAL ENCOUNTER (HCC): Primary | ICD-10-CM

## 2021-04-21 DIAGNOSIS — K76.0 FATTY LIVER: ICD-10-CM

## 2021-04-21 PROCEDURE — 97110 THERAPEUTIC EXERCISES: CPT

## 2021-04-21 PROCEDURE — 1160F RVW MEDS BY RX/DR IN RCRD: CPT | Performed by: INTERNAL MEDICINE

## 2021-04-21 PROCEDURE — 97116 GAIT TRAINING THERAPY: CPT

## 2021-04-21 PROCEDURE — 3078F DIAST BP <80 MM HG: CPT | Performed by: INTERNAL MEDICINE

## 2021-04-21 PROCEDURE — 3074F SYST BP LT 130 MM HG: CPT | Performed by: INTERNAL MEDICINE

## 2021-04-21 PROCEDURE — 3008F BODY MASS INDEX DOCD: CPT | Performed by: INTERNAL MEDICINE

## 2021-04-21 PROCEDURE — 97112 NEUROMUSCULAR REEDUCATION: CPT

## 2021-04-21 PROCEDURE — 1036F TOBACCO NON-USER: CPT | Performed by: INTERNAL MEDICINE

## 2021-04-21 PROCEDURE — 99214 OFFICE O/P EST MOD 30 MIN: CPT | Performed by: INTERNAL MEDICINE

## 2021-04-21 RX ORDER — ROSUVASTATIN CALCIUM 5 MG/1
5 TABLET, COATED ORAL DAILY
Qty: 90 TABLET | Refills: 3 | Status: SHIPPED | OUTPATIENT
Start: 2021-04-21 | End: 2021-05-24

## 2021-04-21 RX ORDER — PEN NEEDLE, DIABETIC 32GX 5/32"
NEEDLE, DISPOSABLE MISCELLANEOUS
Qty: 400 EACH | Refills: 1 | OUTPATIENT
Start: 2021-04-21

## 2021-04-21 NOTE — PROGRESS NOTES
Assessment/Plan:    No problem-specific Assessment & Plan notes found for this encounter  Diagnoses and all orders for this visit:    Encounter for screening mammogram for malignant neoplasm of breast  -     Mammo screening bilateral w 3d & cad; Future    Fatty liver  -     Lipid panel; Future  -     Comprehensive metabolic panel; Future  -     rosuvastatin (CRESTOR) 5 mg tablet; Take 1 tablet (5 mg total) by mouth daily    Type 2 diabetes mellitus without complication, with long-term current use of insulin (HCC)  -     Lipid panel; Future  -     Comprehensive metabolic panel; Future  -     rosuvastatin (CRESTOR) 5 mg tablet; Take 1 tablet (5 mg total) by mouth daily  -     Hemoglobin A1C; Future    Hypothyroidism, unspecified type    Essential hypertension    Obesity, morbid (HCC)    Hypercholesterolemia  -     CK; Future              Time spent during encounter: 30   minutes (counseling, reviewing medications, and discussing treatment and plan)    Subjective:   Chief Complaint   Patient presents with    Follow-up     hypothyroidism  Review labs         Patient ID: Lelia Johnson is a 76 y o  female  Chief Complaint   Patient presents with    Follow-up     hypothyroidism  Review labs        Del Bernard is a 72yo female here for follow up of her diabetes, hypertension, hypothyroidism, and hepatic function  Her most recent hbA1c was 6 7%, well controlled on current medication regiment  She denies home blood pressure recording but today's pressures were WNL at 122/68  Her most recent thyroid hormone levels were within reference ranges and she denies any symptoms of cold intolerance, constipation, dry skin  Her main concern today is about her recent US results of her liver  She has a hx of fatty liver and a recent US study showed significant fibrosis of 67%  She is very worried about these results and is requesting counseling on what this means for her liver health     Of note she recently underwent surgery on her L femur for a fracture  She is currently seeing PT and is using a cane for help ambulating  The following portions of the patient's history were reviewed and updated as appropriate: allergies, current medications, past family history, past medical history, past social history, past surgical history and problem list     Review of Systems   Constitutional: Negative for chills, fatigue and fever  HENT: Negative for rhinorrhea, sinus pain, sore throat and trouble swallowing  Eyes: Negative for visual disturbance  Respiratory: Negative for cough, chest tightness and shortness of breath  Cardiovascular: Negative for chest pain, palpitations and leg swelling  Gastrointestinal: Negative for abdominal distention, blood in stool, constipation and diarrhea  Endocrine: Negative for cold intolerance, heat intolerance and polyuria  Genitourinary: Negative for dysuria, frequency, hematuria and urgency  Musculoskeletal: Negative for arthralgias and back pain  L femur recently operated on, using cane for ambulation   Neurological: Negative for weakness, numbness and headaches           Past Medical History:   Diagnosis Date    Acid reflux     Anxiety     Diabetes mellitus ([facility] )     Hypertension     Hypothyroid          Current Outpatient Medications:     Blood Glucose Monitoring Suppl (ACCU-CHEK MAT PLUS) w/Device KIT, Test 4 times daily, Disp: 1 kit, Rfl: 0    Cholecalciferol (VITAMIN D) 2000 units tablet, TAKE ONE TABLET BY MOUTH EVERY DAY, Disp: 30 tablet, Rfl: 0    cyanocobalamin (VITAMIN B-12) 1,000 mcg tablet, Take 1,000 mcg by mouth daily, Disp: , Rfl:     docusate sodium (COLACE) 100 mg capsule, Take 1 capsule (100 mg total) by mouth 2 (two) times a day, Disp: 10 capsule, Rfl: 0    glimepiride (AMARYL) 4 mg tablet, Take 1 tablet (4 mg total) by mouth 2 (two) times a day, Disp: 180 tablet, Rfl: 1    glucose blood (FREESTYLE LITE) test strip, 1 each by Other route 4 (four) times a day Use 4 times daily DM2-- free style lite test strips, Disp: 400 each, Rfl: 1    insulin aspart, w/niacinamide, (FIASP) 100 Units/mL injection pen, Inject 12 Units under the skin 4 (four) times a day, Disp: 3 pen, Rfl: 5    insulin glargine (Lantus SoloStar) 100 units/mL injection pen, 25 units in AM and 35 units in PM, Disp: 15 mL, Rfl: 5    Insulin Pen Needle (BD Pen Needle Kelly U/F) 32G X 4 MM MISC, Use 4 (four) times a day, Disp: 400 each, Rfl: 1    Insulin Syringe-Needle U-100 (B-D INS SYR ULTRAFINE  3CC/30G) 30G X 1/2" 0 3 ML MISC, Inject under the skin 4 (four) times a day, Disp: 100 each, Rfl: 4    Lancets (FREESTYLE) lancets, by Other route 3 (three) times a day Test blood glucose, Disp: 90 each, Rfl: 5    levothyroxine 100 mcg tablet, Take 1 tablet (100 mcg total) by mouth daily, Disp: 90 tablet, Rfl: 1    lisinopril-hydrochlorothiazide (PRINZIDE,ZESTORETIC) 20-12 5 MG per tablet, Take 0 5 tablets by mouth daily, Disp: 15 tablet, Rfl: 5    omeprazole (PriLOSEC) 40 MG capsule, Take 1 capsule (40 mg total) by mouth daily, Disp: 30 capsule, Rfl: 5    oxyCODONE (ROXICODONE) 5 mg immediate release tablet, 1 pill po Q4 Hrs prn, Disp: 30 tablet, Rfl: 0    sertraline (ZOLOFT) 100 mg tablet, Take 1 tablet (100 mg total) by mouth daily, Disp: 30 tablet, Rfl: 5    Continuous Blood Gluc  (Dexcom G6 ) MERCY, Use 1 each 5 (five) times a day (Patient not taking: Reported on 4/21/2021), Disp: 1 Device, Rfl: 1    Continuous Blood Gluc Sensor (Dexcom G6 Sensor) MISC, Use 1 each 5 (five) times a day (Patient not taking: Reported on 4/21/2021), Disp: 3 each, Rfl: 5    Continuous Blood Gluc Transmit (Dexcom G6 Transmitter) MISC, Use 1 each 5 (five) times a day (Patient not taking: Reported on 4/21/2021), Disp: 1 each, Rfl: 5    methocarbamol (ROBAXIN) 500 mg tablet, Take 1 tablet (500 mg total) by mouth 4 (four) times a day for 5 days As needed for muscle spasm   (Patient not taking: Reported on 4/21/2021), Disp: 20 tablet, Rfl: 0    Allergies   Allergen Reactions    Metformin Diarrhea       Social History   Past Surgical History:   Procedure Laterality Date    CHOLECYSTECTOMY      COLONOSCOPY      DILATION AND CURETTAGE OF UTERUS      ESOPHAGOGASTRODUODENOSCOPY      HYSTERECTOMY      45    OOPHORECTOMY      45    FL COLONOSCOPY FLX DX W/COLLJ SPEC WHEN PFRMD N/A 1/28/2016    Procedure: EGD AND COLONOSCOPY;  Surgeon: Ruth Ann Méndez MD;  [facility]; Service: Gastroenterology    FL INCISE FINGER TENDON SHEATH Right 1/31/2017    Procedure: LONG FINGER TRIGGER RELEASE ;  Surgeon: Ephraim Reyes MD;  [facility];  Service: Orthopedics    FL INCISE FINGER TENDON SHEATH Right 7/21/2016    Procedure: RELEASE TRIGGER FINGER - LONG FINGER;  Surgeon: Ephraim Reyes MD;  [facility];  Service: Orthopedics    FL OPEN RX FEMUR FX+INTRAMED SHERRY Left 2/3/2021    Procedure: INSERTION NAIL IM FEMUR ANTEGRADE (TROCHANTERIC);   Surgeon: Ehsan Martin MD;  [facility];  Service: Orthopedics    FL REVISE MEDIAN N/CARPAL TUNNEL SURG Right 1/31/2017    Procedure: WRIST CARPAL TUNNEL RELEASE ; TENOLYSIS OF FPL, FDS 2-5, FDP 2-5;  APPLICATION OF [place]adlupe Perla;  Surgeon: Ephraim Reyes MD;  [facility];  Service: Orthopedics    ROOT CANAL      UPPER GASTROINTESTINAL ENDOSCOPY       Family History   Problem Relation Age of Onset    Cancer Father     Heart attack Father     Diabetes type II Father     Arthritis Maternal Grandmother     No Known Problems Paternal Grandmother     Heart disease Paternal Grandfather     Stroke Paternal Grandfather     Cirrhosis Mother     No Known Problems Sister     No Known Problems Brother     No Known Problems Maternal Grandfather     Breast cancer Paternal Aunt 62    Colon cancer Paternal Uncle 28    No Known Problems Sister     No Known Problems Maternal Aunt     No Known Problems Maternal Aunt Objective:  /68 (BP Location: Left arm, Patient Position: Sitting, Cuff Size: Standard)   Pulse 70   Temp (!) 97 3 °F (36 3 °C) (Temporal)   Ht 5' 2" (1 575 m)   Wt 94 3 kg (207 lb 12 8 oz)   SpO2 97%   BMI 38 01 kg/m²     Recent Results (from the past 1344 hour(s))   POCT urine dip    Collection Time: 03/08/21  3:15 PM   Result Value Ref Range    LEUKOCYTE ESTERASE,UA small     NITRITE,UA negative     SL AMB POCT UROBILINOGEN 0 2 EU/dl     POCT URINE PROTEIN negative      PH,UA 6 0     BLOOD,UA negative     SPECIFIC GRAVITY,UA 1 025     KETONES,UA negative     BILIRUBIN,UA negative     GLUCOSE, UA negative      COLOR,UA yellow     CLARITY,UA hazy    Urine culture    Collection Time: 03/08/21  5:10 PM    Specimen: Urine, Clean Catch   Result Value Ref Range    Urine Culture 20,000-29,000 cfu/ml     Lipid panel    Collection Time: 04/16/21 12:07 PM   Result Value Ref Range    Total Cholesterol 199 <200 mg/dL    HDL 50 > OR = 50 mg/dL    Triglycerides 145 <150 mg/dL    LDL Calculated 123 (H) mg/dL (calc)    Chol HDLC Ratio 4 0 <5 0 (calc)    Non-HDL Cholesterol 149 (H) <130 mg/dL (calc)   Hemoglobin A1C With EAG    Collection Time: 04/16/21 12:07 PM   Result Value Ref Range    Hemoglobin A1C 6 7 (H) <5 7 % of total Hgb    Estimated Average Glucose 146 (calc)    Estimated Average Glucose (mmol/L) 8 1 (calc)            Physical Exam  Constitutional:       Appearance: Normal appearance  Cardiovascular:      Rate and Rhythm: Normal rate and regular rhythm  Pulses: Normal pulses  Heart sounds: Normal heart sounds  Pulmonary:      Effort: Pulmonary effort is normal       Breath sounds: Normal breath sounds  Abdominal:      General: Bowel sounds are normal  There is distension  Tenderness: There is no abdominal tenderness  Musculoskeletal:         General: No swelling or tenderness  Lymphadenopathy:      Cervical: No cervical adenopathy  Skin:     Findings: No rash  Neurological:      General: No focal deficit present  Mental Status: She is oriented to person, place, and time     Psychiatric:         Mood and Affect: Mood normal

## 2021-04-21 NOTE — PROGRESS NOTES
Daily Note     Today's date: 2021  Patient name: Lelia Johnson  : 1955  MRN: [id number]  Referring provider: Barb Echavarria PA-C  Dx:   Encounter Diagnosis     ICD-10-CM    1  Closed fracture of neck of left femur, [facility]  S72 002A    2  Left hip pain  M25 552                   Subjective: pt reports she's doing well and feels she's walking better       Objective: See treatment diary below      Assessment: pt with good dario for ex progressions to improve hip strength and decrease Trendelenburg when walking      Plan: Continue per plan of care  Progress treatment as tolerated         Precautions: Left hip fx        Manuals  3/12/21 3/16 3/25/21 3/30/21 4/8/21 4/16/21      PROM L LE  VK JF JF VK JF VK      Cadual hip glides                                        Neuro Re-Ed   3/16 3/25/21 3/30/21 4/8/21 4/16/21 4/21/21     Mini squats   3*10 3x10x3" 3x10x3" 3x10x5" 3x12x5" 3x12x5"     Standing abduction   L 3x10 B/L 3x10 3x10 BL 3x10 ea BL 3x10 ea BL 3x10 ea BL     Bridging     3x10x5" 3x10x5" 3x10x5" 3x10x5" 3x12x5"     STS     2x10 2x10 2x10 2x10     Isometric hip flexor     VK 10x5" VK 10x5" VK 2x10x5"      TrA activation      2x10x5"        sidestepping     nv With SPC 2x20' 2x20' 2x20'     Wt shift to [facility]             Ther Ex 3/9 3/12/21 3/16 3/25/21 3/30/21 421     Adductor squeeze HEP 3x10x5" 3*10*5" 2x15x5" 3x15x5"        Quad set HEP 3x10x5" 3*10*5" 2x15x5" 30x5"        SL clamshell HEP 3x10x3"  3*10 ytb 3x10x3"  ytb 3x12x5" ytb 3x12x3"     Nu Step nv 5' seat 7 10' seat 7 10' 10'  10' 10'     Mini squats nv 2x10x3" 2*10*3" above         Standing hip abd nv 3x10 L 3*10 above         Standing hip flexion             LAQ             SLR        2x10     Ther Activity             Step through on L LE        2x10                  Gait Training 3/9 3/12/21 3/16 3/25 3/30/21  4/14/21 4/21/21     SPC  nv np due to subjective (NV) JF JV    Gait tr without SPC 5 min Step up      4" 2x10 4" 2x10 4" 2x10      Stair training nv nv nv  4 steps x1 rail/cane 1x 4 steps x2 with railing      Lateral step up        4" 2x10     Modalities

## 2021-04-22 ENCOUNTER — APPOINTMENT (OUTPATIENT)
Dept: PHYSICAL THERAPY | Age: 66
End: 2021-04-22
Payer: COMMERCIAL

## 2021-04-27 ENCOUNTER — OFFICE VISIT (OUTPATIENT)
Dept: PHYSICAL THERAPY | Age: 66
End: 2021-04-27
Payer: COMMERCIAL

## 2021-04-27 DIAGNOSIS — S72.002A CLOSED FRACTURE OF NECK OF LEFT FEMUR, INITIAL ENCOUNTER (HCC): Primary | ICD-10-CM

## 2021-04-27 DIAGNOSIS — M25.552 LEFT HIP PAIN: ICD-10-CM

## 2021-04-27 PROCEDURE — 97110 THERAPEUTIC EXERCISES: CPT | Performed by: PHYSICAL THERAPIST

## 2021-04-27 PROCEDURE — 97116 GAIT TRAINING THERAPY: CPT | Performed by: PHYSICAL THERAPIST

## 2021-04-27 PROCEDURE — 97112 NEUROMUSCULAR REEDUCATION: CPT | Performed by: PHYSICAL THERAPIST

## 2021-04-27 NOTE — PROGRESS NOTES
Daily Note     Today's date: 2021  Patient name: Isabelle Nelson  : 1955  MRN: [id number]  Referring provider: Zhou Kahn PA-C  Dx:   Encounter Diagnosis     ICD-10-CM    1  Closed fracture of neck of left femur, [facility]  S72 002A    2  Left hip pain  M25 552        Start Time: 1530  Stop Time: 1615  Total time in clinic (min): 45 minutes    Subjective: Pt reports improvements with symptoms  Objective: See treatment diary below      Assessment: Tolerated treatment well  Pt's POC was progressed to include more strengthening  Patient demonstrated fatigue post treatment and would benefit from continued PT      Plan: Continue per plan of care        Precautions: Left hip fx        Manuals  3/12/21 3/16 3/25/21 3/30/21 4/8/21 4/16/21      PROM L LE  VK JF JF VK JF VK      Cadual hip glides                                        Neuro Re-Ed   3/16 3/25/21 3/30/21 4/8/21 4/16/21 4/21/21 4/27    Mini squats   3*10 3x10x3" 3x10x3" 3x10x5" 3x12x5" 3x12x5" 3*10*5"    Standing abduction   L 3x10 B/L 3x10 3x10 BL 3x10 ea BL 3x10 ea BL 3x10 ea BL 3x10 ea BL    Bridging     3x10x5" 3x10x5" 3x10x5" 3x10x5" 3x12x5" 3*12*5"    STS     2x10 2x10 2x10 2x10 2*10    Isometric hip flexor     VK 10x5" VK 10x5" VK 2x10x5"      TrA activation      2x10x5"        sidestepping     nv With SPC 2x20' 2x20' 2x20' 3*20'    Wt shift to [facility]             Ther Ex 3/9 3/12/21 3/16 3/25/21 3/30/21 4/8 4/16/21 4/21/21 4/27    Adductor squeeze HEP 3x10x5" 3*10*5" 2x15x5" 3x15x5"        Quad set HEP 3x10x5" 3*10*5" 2x15x5" 30x5"        SL clamshell HEP 3x10x3"  3*10 ytb 3x10x3"  ytb 3x12x5" ytb 3x12x3" gtb 3x10    Nu Step nv 5' seat 7 10' seat 7 10' 10'  10' 10' 10'    Mini squats nv 2x10x3" 2*10*3" above         Standing hip abd nv 3x10 L 3*10 above         Standing hip flexion             LAQ             SLR        2x10     Ther Activity             Step through on L LE        2x10                  Gait Training 3/9 3/12/21 3/16 3/25 3/30/21  4/14/21 4/21/21 4/27    SPC  nv np due to subjective (NV) JF JV    Gait tr without SPC 5 min     Step up      4" 2x10 4" 2x10 4" 2x10  8" 3x10    Stair training nv nv nv  4 steps x1 rail/cane 1x 4 steps x2 with railing      Lateral step up        4" 2x10 4" 3x10    Modalities

## 2021-04-29 ENCOUNTER — OFFICE VISIT (OUTPATIENT)
Dept: PHYSICAL THERAPY | Age: 66
End: 2021-04-29
Payer: COMMERCIAL

## 2021-04-29 DIAGNOSIS — S72.002A CLOSED FRACTURE OF NECK OF LEFT FEMUR, INITIAL ENCOUNTER (HCC): Primary | ICD-10-CM

## 2021-04-29 DIAGNOSIS — M25.552 LEFT HIP PAIN: ICD-10-CM

## 2021-04-29 DIAGNOSIS — Z48.89 AFTERCARE FOLLOWING SURGERY: Primary | ICD-10-CM

## 2021-04-29 PROCEDURE — 97110 THERAPEUTIC EXERCISES: CPT | Performed by: PHYSICAL THERAPIST

## 2021-04-29 PROCEDURE — 97112 NEUROMUSCULAR REEDUCATION: CPT | Performed by: PHYSICAL THERAPIST

## 2021-04-29 PROCEDURE — 97116 GAIT TRAINING THERAPY: CPT | Performed by: PHYSICAL THERAPIST

## 2021-04-29 NOTE — PROGRESS NOTES
Daily Note     Today's date: 2021  Patient name: Fox Cruz  : 1955  MRN: [id number]  Referring provider: Shayy Mann PA-C  Dx:   Encounter Diagnosis     ICD-10-CM    1  [facility] of left femur[facility]  S72 002A    2  Left hip pain  M25 552        Start Time: 1530  Stop Time: 1615  Total time in clinic (min): 45 minutes    Subjective: Pt reports improvements working in the garden  Objective: See treatment diary below      Assessment: Tolerated treatment well  Patient demonstrated fatigue post treatment and would benefit from continued PT      Plan: Continue per plan of care        Precautions: Left hip fx        Manuals  3/12/21 3/16 3/25/21 3/30/21 4/8/21 4/16/21      PROM L LE  VK JF JF VK JF VK      Cadual hip glides                                        Neuro Re-Ed   3/16 3/25/21 3/30/21 4/8/21 4/16/21 4/21/21 4/29    Mini squats   3*10 3x10x3" 3x10x3" 3x10x5" 3x12x5" 3x12x5" 3*10*5"    Standing abduction   L 3x10 B/L 3x10 3x10 BL 3x10 ea BL 3x10 ea BL 3x10 ea BL 3x10 ea BL    Bridging     3x10x5" 3x10x5" 3x10x5" 3x10x5" 3x12x5" 3*12*5"    STS     2x10 2x10 2x10 2x10 2*10    Isometric hip flexor     VK 10x5" VK 10x5" VK 2x10x5"      TrA activation      2x10x5"        sidestepping     nv With SPC 2x20' 2x20' 2x20' 3*20'    Wt shift to [facility]             Ther Ex 3/9 3/12/21 3/16 3/25/21 3/30/21 421    Adductor squeeze HEP 3x10x5" 3*10*5" 2x15x5" 3x15x5"        Quad set HEP 3x10x5" 3*10*5" 2x15x5" 30x5"        SL clamshell HEP 3x10x3"  3*10 ytb 3x10x3"  ytb 3x12x5" ytb 3x12x3" gtb 3x10    Nu Step nv 5' seat 7 10' seat 7 10' 10'  10' 10' 10'    Mini squats nv 2x10x3" 2*10*3" above         Standing hip abd nv 3x10 L 3*10 above         Standing hip flexion             LAQ             SLR        2x10     Ther Activity             Step through on L LE        2x10                  Gait Training 3/9 3/12/21 3/16 3/25 3/30/21  4/14/21 4/21/21 4/29    SPC  nv np due to subjective (NV) JF JV    Gait tr without SPC 5 min     Step up      4" 2x10 4" 2x10 4" 2x10  8" 3x10    Stair training nv nv nv  4 steps x1 rail/cane 1x 4 steps x2 with railing      Lateral step up        4" 2x10 4" 3x10    Modalities

## 2021-05-04 ENCOUNTER — OFFICE VISIT (OUTPATIENT)
Dept: PHYSICAL THERAPY | Age: 66
End: 2021-05-04
Payer: COMMERCIAL

## 2021-05-04 DIAGNOSIS — S72.002A CLOSED FRACTURE OF NECK OF LEFT FEMUR, INITIAL ENCOUNTER (HCC): Primary | ICD-10-CM

## 2021-05-04 DIAGNOSIS — M25.552 LEFT HIP PAIN: ICD-10-CM

## 2021-05-04 PROCEDURE — 97116 GAIT TRAINING THERAPY: CPT | Performed by: PHYSICAL THERAPIST

## 2021-05-04 PROCEDURE — 97112 NEUROMUSCULAR REEDUCATION: CPT | Performed by: PHYSICAL THERAPIST

## 2021-05-04 PROCEDURE — 97110 THERAPEUTIC EXERCISES: CPT | Performed by: PHYSICAL THERAPIST

## 2021-05-04 NOTE — PROGRESS NOTES
Daily Note     Today's date: 2021  Patient name: Isabelle Nelson  : 1955  MRN: [id number]  Referring provider: Zhou Kahn PA-C  Dx:   Encounter Diagnosis     ICD-10-CM    1  Closed fracture of neck of left femur, initial encounter ([facility]  S72 002A    2  Left hip pain  M25 552        Start Time: 1530  Stop Time: 1615  Total time in clinic (min): 45 minutes    Subjective: Pt reports no new symptoms      Objective: See treatment diary below      Assessment: Tolerated treatment well  Patient demonstrated fatigue post treatment and would benefit from continued PT      Plan: Continue per plan of care        Precautions: Left hip fx        Manuals  3/12/21 3/16 3/25/21 3/30/21 4/8/21 4/16/21      PROM L LE  VK JF JF VK JF VK      Cadual hip glides                                        Neuro Re-Ed   3/16 3/25/21 3/30/21 4/8/21 4/16/21 4/21/21 4/29 5/4   Mini squats   3*10 3x10x3" 3x10x3" 3x10x5" 3x12x5" 3x12x5" 3*10*5" 3*10*5"   Standing abduction   L 3x10 B/L 3x10 3x10 BL 3x10 ea BL 3x10 ea BL 3x10 ea BL 3x10 ea BL 3x10 ea BL   Bridging     3x10x5" 3x10x5" 3x10x5" 3x10x5" 3x12x5" 3*12*5" 3*12*5"   STS     2x10 2x10 2x10 2x10 2*10 3*10   Isometric hip flexor     VK 10x5" VK 10x5" VK 2x10x5"      TrA activation      2x10x5"        sidestepping     nv With SPC 2x20' 2x20' 2x20' 3*20' 3*20'   Wt shift to [facility]             Ther Ex 3/9 3/12/21 3/16 3/25/21 3/30/21 4/8 4/16/21 4/21/21 4/29 5/3   Adductor squeeze HEP 3x10x5" 3*10*5" 2x15x5" 3x15x5"        Quad set HEP 3x10x5" 3*10*5" 2x15x5" 30x5"        SL clamshell HEP 3x10x3"  3*10 ytb 3x10x3"  ytb 3x12x5" ytb 3x12x3" gtb 3x10 btb 3x10   Nu Step nv 5' seat 7 10' seat 7 10' 10'  10' 10' 10' 10'   Mini squats nv 2x10x3" 2*10*3" above         Standing hip abd nv 3x10 L 3*10 above         Standing hip flexion             LAQ             SLR        2x10     Ther Activity             Step through on L LE        2x10                  Gait Training 3/9 3/12/21 3/16 3/25 3/30/21  4/14/21 4/21/21 4/29 5/4   SPC  nv np due to subjective (NV) JF JV    Gait tr without SPC 5 min     Step up      4" 2x10 4" 2x10 4" 2x10  8" 3x10 8" 3x10   Stair training nv nv nv  4 steps x1 rail/cane 1x 4 steps x2 with railing      Lateral step up        4" 2x10 4" 3x10 4" 3x10   Modalities

## 2021-05-06 ENCOUNTER — APPOINTMENT (OUTPATIENT)
Dept: PHYSICAL THERAPY | Age: 66
End: 2021-05-06
Payer: COMMERCIAL

## 2021-05-11 ENCOUNTER — OFFICE VISIT (OUTPATIENT)
Dept: PHYSICAL THERAPY | Age: 66
End: 2021-05-11
Payer: COMMERCIAL

## 2021-05-11 DIAGNOSIS — M25.552 LEFT HIP PAIN: ICD-10-CM

## 2021-05-11 DIAGNOSIS — S72.002A CLOSED FRACTURE OF NECK OF LEFT FEMUR, INITIAL ENCOUNTER (HCC): Primary | ICD-10-CM

## 2021-05-11 PROCEDURE — 97110 THERAPEUTIC EXERCISES: CPT | Performed by: PHYSICAL THERAPIST

## 2021-05-11 PROCEDURE — 97112 NEUROMUSCULAR REEDUCATION: CPT | Performed by: PHYSICAL THERAPIST

## 2021-05-11 PROCEDURE — 97140 MANUAL THERAPY 1/> REGIONS: CPT | Performed by: PHYSICAL THERAPIST

## 2021-05-11 NOTE — PROGRESS NOTES
Daily Note     Today's date: 2021  Patient name: Josue Villasenor  : 1955  MRN: [id number]  Referring provider: Nghia Shi PA-C  Dx:   Encounter Diagnosis     ICD-10-CM    1  Closed fracture of [facility] of left femur, [facility]  S72 002A    2  Left hip pain  M25 552        Start Time: 1530  Stop Time: 1615  Total time in clinic (min): 45 minutes    Subjective: Pt reports improvements in symptoms  Objective: See treatment diary below      Assessment: Tolerated treatment well  Progressed to include more functional muscle activation interventions  Patient demonstrated fatigue post treatment and would benefit from continued PT      Plan: Continue per plan of care        Precautions: Left hip fx        Manuals             PROM L LE JF            Cadual hip glides  JF                                      Neuro Re-Ed    Mini squats 3*10*5"         3*10*5"   Standing abduction          3x10 ea BL   Bridging  3*12*5"         3*12*5"   STS 3*10         3*10   Isometric hip flexor             TrA activation              sidestepping 3 laps and monster walks         3*20'   Wt shift to SLS             Ther Ex 5/11         5/3   Adductor squeeze             Quad set             SL clamshell btb 3x10         btb 3x10   Nu Step 10'         10'   Mini squats             Standing hip abd             Standing hip flexion             LAQ             SLR             Ther Activity             Step through on L LE                          Gait Training    SPC              Step up  8" 3x10         8" 3x10   Stair training             Lateral step up          4" 3x10   Modalities

## 2021-05-13 ENCOUNTER — OFFICE VISIT (OUTPATIENT)
Dept: PHYSICAL THERAPY | Age: 66
End: 2021-05-13
Payer: COMMERCIAL

## 2021-05-13 DIAGNOSIS — M25.552 LEFT HIP PAIN: ICD-10-CM

## 2021-05-13 DIAGNOSIS — S72.002A CLOSED FRACTURE OF NECK OF LEFT FEMUR, INITIAL ENCOUNTER (HCC): Primary | ICD-10-CM

## 2021-05-13 PROCEDURE — 97110 THERAPEUTIC EXERCISES: CPT

## 2021-05-13 PROCEDURE — 97140 MANUAL THERAPY 1/> REGIONS: CPT

## 2021-05-13 PROCEDURE — 97112 NEUROMUSCULAR REEDUCATION: CPT

## 2021-05-13 NOTE — PROGRESS NOTES
Daily Note     Today's date: 2021  Patient name: Cori Dennis  : 1955  MRN: [id number]  Referring provider: Leyda Burnett PA-C  Dx:   Encounter Diagnosis     ICD-10-CM    1  [facility]  S72 002A    2  Left hip pain  M25 552                   Subjective: Patient denies pain, but does report she is limping a bit today with some soreness  She indicates she was doing yard work yesterday and weed wacking  Objective: See treatment diary below      Assessment: Tolerated treatment well  Patient exhibited good technique with therapeutic exercises and would benefit from continued therapy  Plan: Continue per plan of care        Precautions: Left hip fx        Manuals            PROM L LE JF [place]           Cadual hip glides  JF                                      Neuro Re-Ed    Mini squats 3*10*5" 2x10x5"        3*10*5"   Standing abduction          3x10 ea BL   Bridging  3*12*5" 3x12x5"        3*12*5"   STS 3*10         3*10   Isometric hip flexor             TrA activation              sidestepping 3 laps and monster walks 3 laps & monster walks  BTB         3*20'   Wt shift to SLS             Ther Ex         53   Adductor squeeze  2x10x5"           Quad set             SL clamshell btb 3x10 BTB  3x10        btb 3x10   Nu Step 10' 10'        10'   Mini squats  2x10x5"           Standing hip abd  2x15           Standing hip flexion  2x10           LAQ  3x10x2"  1#           SLR  1x10  1x7           Ther Activity             Step through on L LE                          Gait Training    SPC              Step up  8" 3x10         8" 3x10   Stair training             Lateral step up          4" 3x10   Modalities

## 2021-05-17 ENCOUNTER — OFFICE VISIT (OUTPATIENT)
Dept: PHYSICAL THERAPY | Age: 66
End: 2021-05-17
Payer: COMMERCIAL

## 2021-05-17 DIAGNOSIS — S72.002A CLOSED FRACTURE OF NECK OF LEFT FEMUR, INITIAL ENCOUNTER (HCC): Primary | ICD-10-CM

## 2021-05-17 DIAGNOSIS — M25.552 LEFT HIP PAIN: ICD-10-CM

## 2021-05-17 DIAGNOSIS — E11.649 UNCONTROLLED TYPE 2 DIABETES MELLITUS WITH HYPOGLYCEMIA WITHOUT COMA (HCC): ICD-10-CM

## 2021-05-17 PROCEDURE — 97112 NEUROMUSCULAR REEDUCATION: CPT

## 2021-05-17 PROCEDURE — 97140 MANUAL THERAPY 1/> REGIONS: CPT

## 2021-05-17 PROCEDURE — 97110 THERAPEUTIC EXERCISES: CPT

## 2021-05-17 RX ORDER — BLOOD-GLUCOSE METER
1 KIT MISCELLANEOUS 4 TIMES DAILY
Qty: 400 EACH | Refills: 1 | Status: SHIPPED | OUTPATIENT
Start: 2021-05-17

## 2021-05-17 NOTE — PROGRESS NOTES
Daily Note     Today's date: 2021  Patient name: Van Cho  : 1955  MRN: [id number]  Referring provider: Alanna Oneil PA-C  Dx:   Encounter Diagnosis     ICD-10-CM    1  Closed fracture of neck of left femur, initial encounter ([facility] [address] )  S72 002A    2  Left hip pain  M25 552                   Subjective: pt reports she's been having increased LB pain since she started walking  More without the cane and walking on different l terrains       Objective: See treatment diary below      Assessment: focused on ex technique and TrA activation which helped with decreasing LB pain during ex and gait      Plan: Continue per plan of care  Progress treatment as tolerated         Precautions: Left hip fx        Manuals 21          PROM L LE JF KY VK          Cadual hip glides  JF                                      Neuro Re-Ed 21       5/4   Mini squats 3*10*5" 2x10x5" 2x10x5"       3*10*5"   Standing abduction          3x10 ea BL   Bridging  3*12*5" 3x12x5" 3x12x5"       3*12*5"   STS 3*10         3*10   Isometric hip flexor   TrA with march in supine 2x10          TrA activation              sidestepping 3 laps and monster walks 3 laps & monster walks  BTB  nv       3*20'   Wt shift to SLS             Ther Ex 21       5/3   Adductor squeeze  2x10x5" With TrA 3x10x5"          Quad set             SL clamshell btb 3x10 BTB  3x10 btb 3x10x3"       btb 3x10   Nu Step 10' 10' 10'       10'   Mini squats  2x10x5"           Standing hip abd  2x15 2x15          Standing hip flexion  2x10           LAQ  3x10x2"  1#           SLR  1x10  1x7 2x8 with TrA          Ther Activity             Step through on L LE                          Gait Training 21       5/4   SPC              Step up  8" 3x10         8" 3x10   Stair training             Lateral step up          4" 3x10   Modalities

## 2021-05-19 ENCOUNTER — VBI (OUTPATIENT)
Dept: ADMINISTRATIVE | Facility: OTHER | Age: 66
End: 2021-05-19

## 2021-05-19 DIAGNOSIS — E11.00 UNCONTROLLED TYPE 2 DIABETES MELLITUS WITH HYPEROSMOLARITY WITHOUT COMA, WITHOUT LONG-TERM CURRENT USE OF INSULIN (HCC): ICD-10-CM

## 2021-05-19 DIAGNOSIS — K21.9 GERD (GASTROESOPHAGEAL REFLUX DISEASE): ICD-10-CM

## 2021-05-19 DIAGNOSIS — E11.9 TYPE 2 DIABETES MELLITUS WITHOUT COMPLICATION, WITHOUT LONG-TERM CURRENT USE OF INSULIN (HCC): ICD-10-CM

## 2021-05-19 RX ORDER — PEN NEEDLE, DIABETIC 32GX 5/32"
NEEDLE, DISPOSABLE MISCELLANEOUS 4 TIMES DAILY
Qty: 400 EACH | Refills: 1 | Status: SHIPPED | OUTPATIENT
Start: 2021-05-19 | End: 2021-07-28 | Stop reason: SDUPTHER

## 2021-05-19 RX ORDER — OMEPRAZOLE 40 MG/1
40 CAPSULE, DELAYED RELEASE ORAL DAILY
Qty: 30 CAPSULE | Refills: 0 | Status: SHIPPED | OUTPATIENT
Start: 2021-05-19 | End: 2021-06-25 | Stop reason: SDUPTHER

## 2021-05-19 RX ORDER — PEN NEEDLE, DIABETIC 32GX 5/32"
NEEDLE, DISPOSABLE MISCELLANEOUS 4 TIMES DAILY
Qty: 400 EACH | Refills: 0 | Status: CANCELLED | OUTPATIENT
Start: 2021-05-19

## 2021-05-19 RX ORDER — SYRING-NEEDL,DISP,INSUL,0.3 ML 30 G X1/2"
SYRINGE, EMPTY DISPOSABLE MISCELLANEOUS 4 TIMES DAILY
Qty: 100 EACH | Refills: 0 | Status: SHIPPED | OUTPATIENT
Start: 2021-05-19 | End: 2021-05-19

## 2021-05-21 ENCOUNTER — TELEPHONE (OUTPATIENT)
Dept: INTERNAL MEDICINE CLINIC | Age: 66
End: 2021-05-21

## 2021-05-21 ENCOUNTER — EVALUATION (OUTPATIENT)
Dept: PHYSICAL THERAPY | Age: 66
End: 2021-05-21
Payer: COMMERCIAL

## 2021-05-21 DIAGNOSIS — S72.002A CLOSED FRACTURE OF NECK OF LEFT FEMUR, INITIAL ENCOUNTER (HCC): Primary | ICD-10-CM

## 2021-05-21 DIAGNOSIS — M25.552 LEFT HIP PAIN: ICD-10-CM

## 2021-05-21 PROCEDURE — 97112 NEUROMUSCULAR REEDUCATION: CPT | Performed by: PHYSICAL THERAPIST

## 2021-05-21 PROCEDURE — 97140 MANUAL THERAPY 1/> REGIONS: CPT | Performed by: PHYSICAL THERAPIST

## 2021-05-21 PROCEDURE — 97110 THERAPEUTIC EXERCISES: CPT | Performed by: PHYSICAL THERAPIST

## 2021-05-21 NOTE — PROGRESS NOTES
PT D/C NOTE    Today's date: 2021  Patient name: Luigi Claude  : 1955  MRN: [id number]  Referring provider: Charna Boas, PA-C  Dx:   Encounter Diagnosis     ICD-10-CM    1  Closed fracture of neck of left femur, initial encounter [facility])  S72 002A Ambulatory referral to Physical Therapy   2  Left hip pain  M25 552        Start Time: 1000  Stop Time: 1030  Total time in clinic (min): 30 minutes    Assessment  Assessment details: Pt is a 75 y/o female who presents with left hip pain  No further referral is necessary at this time  Pt has met all functional goals and will be discharged with HEP  Understanding of Dx/Px/POC: good   Prognosis: good    Goals  Short Term Goals: to be achieved by 4 weeks MET   1) Patient to be independent with basic HEP  2) Decrease pain to 2/10 at its worst   3) Increase hip flexion ROM by 5-10 degrees   4) Increase LE strength by 1/2 MMT grade in all deficient planes  Long Term Goals: to be achieved by discharge MET  1) FOTO equal to or greater than 64   2) Ambulation to improve to maximal level of function  3) Stair negotiation will improve to reciprocal   4) Sit to stand transfers will improve to maximal level of function     Plan  Patient would benefit from: D/C PT with HEP        Subjective Evaluation    History of Present Illness  Mechanism of injury: Pt fell shoveling and broke hip 5 weeks ago  Doing very well  Needs strengthening and mobility interventions to return to walking dog and maintaining her house     Quality of life: good    Pain  Current pain ratin  At best pain ratin  At worst pain ratin  Quality: sharp, radiating and dull ache  Aggravating factors: stair climbing, walking and standing    Hand dominance: right    Patient Goals  Patient goals for therapy: decreased pain, independence with ADLs/IADLs, return to sport/leisure activities, increased strength and increased motion          Objective     Strength/Myotome Testing     Left Hip Planes of Motion   Flexion: 5  Extension: 5  Abduction: 5  External rotation: 5  Internal rotation: 5    General Comments:      Hip Comments   5 STS: 11 seconds without AD  TU secs  Gait:  decreased stance and Trendelenburg gait due to decreased strength     Stair navigation: Reciprocal with bilateral hand rails    SLS: 9 secs with mod-max sway  FGA               Precautions: Left hip fx        Manuals  3/12/21 3/16 3/25/21 3/30/21 5/20       PROM L LE  VK JF JF VK JF       Cadual hip glides                                        Neuro Re-Ed   3/16 3/25/21 3/30/21 5/20 4/8      Mini squats   3*10 3x10x3" 3x10x3" 3x10x5"       Standing abduction   L 3x10 B/L 3x10 3x10 BL 3x10 ea BL       Bridging     3x10x5" 3x10x5" 3x10x5"       STS     2x10 2x10       Isometric hip flexor     VK 10x5" VK 10x5"       TrA activation      2x10x5"        sidestepping     nv With SPC 2x20'       Ther Ex 3/9 3/12/21 3/16 3/25/21 3/30/21 5/20       Adductor squeeze HEP 3x10x5" 3*10*5" 2x15x5" 3x15x5"        Quad set HEP 3x10x5" 3*10*5" 2x15x5" 30x5"        SL clamshell HEP 3x10x3"  3*10 ytb 3x10x3"        Nu Step nv 5' seat 7 10' seat 7 10' 10' 10       Mini squats nv 2x10x3" 2*10*3" above         Standing hip abd nv 3x10 L 3*10 above         Standing hip flexion             LAQ             Ther Activity                                       Gait Training 3/9 3/12/21 3/16 3/25 3/30/21        SPC  nv np due to subjective (NV) JF JV         Step up      4" 2x10 4" 2x10 4"      Stair training nv nv nv  4 steps x1 rail/cane 1x       Modalities

## 2021-05-24 DIAGNOSIS — E11.9 TYPE 2 DIABETES MELLITUS WITHOUT COMPLICATION, WITH LONG-TERM CURRENT USE OF INSULIN (HCC): Primary | ICD-10-CM

## 2021-05-24 DIAGNOSIS — Z79.4 TYPE 2 DIABETES MELLITUS WITHOUT COMPLICATION, WITH LONG-TERM CURRENT USE OF INSULIN (HCC): Primary | ICD-10-CM

## 2021-05-24 RX ORDER — PRAVASTATIN SODIUM 40 MG
40 TABLET ORAL DAILY
Qty: 90 TABLET | Refills: 1 | Status: SHIPPED | OUTPATIENT
Start: 2021-05-24 | End: 2021-07-28

## 2021-05-25 ENCOUNTER — APPOINTMENT (OUTPATIENT)
Dept: PHYSICAL THERAPY | Age: 66
End: 2021-05-25
Payer: COMMERCIAL

## 2021-05-27 ENCOUNTER — APPOINTMENT (OUTPATIENT)
Dept: PHYSICAL THERAPY | Age: 66
End: 2021-05-27
Payer: COMMERCIAL

## 2021-06-01 DIAGNOSIS — E11.649 UNCONTROLLED TYPE 2 DIABETES MELLITUS WITH HYPOGLYCEMIA WITHOUT COMA (HCC): ICD-10-CM

## 2021-06-02 ENCOUNTER — HOSPITAL ENCOUNTER (OUTPATIENT)
Dept: RADIOLOGY | Facility: HOSPITAL | Age: 66
Discharge: HOME | End: 2021-06-02
Attending: ORTHOPAEDIC SURGERY
Payer: COMMERCIAL

## 2021-06-02 ENCOUNTER — OFFICE VISIT (OUTPATIENT)
Dept: OBGYN CLINIC | Facility: HOSPITAL | Age: 66
End: 2021-06-02
Payer: COMMERCIAL

## 2021-06-02 VITALS
HEART RATE: 77 BPM | DIASTOLIC BLOOD PRESSURE: 74 MMHG | BODY MASS INDEX: 38.01 KG/M2 | SYSTOLIC BLOOD PRESSURE: 167 MMHG | HEIGHT: 62 IN

## 2021-06-02 DIAGNOSIS — Z48.89 AFTERCARE FOLLOWING SURGERY: Primary | ICD-10-CM

## 2021-06-02 DIAGNOSIS — Z48.89 AFTERCARE FOLLOWING SURGERY: ICD-10-CM

## 2021-06-02 PROCEDURE — 99213 OFFICE O/P EST LOW 20 MIN: CPT | Performed by: ORTHOPAEDIC SURGERY

## 2021-06-02 PROCEDURE — 1036F TOBACCO NON-USER: CPT | Performed by: ORTHOPAEDIC SURGERY

## 2021-06-02 PROCEDURE — 3078F DIAST BP <80 MM HG: CPT | Performed by: ORTHOPAEDIC SURGERY

## 2021-06-02 PROCEDURE — 3077F SYST BP >= 140 MM HG: CPT | Performed by: ORTHOPAEDIC SURGERY

## 2021-06-02 PROCEDURE — 73502 X-RAY EXAM HIP UNI 2-3 VIEWS: CPT

## 2021-06-02 NOTE — PROGRESS NOTES
Assessment:  1  Aftercare following surgery  XR hip/pelv 2-3 vws left if performed       Plan:  4 months s/p left antegrade trochanteric femur IM nail, 2/3/2021  The patient can follow up as needed and is welcome to return at any point with any new or old issue  To do next visit:  Return if symptoms worsen or fail to improve  The above stated was discussed in layman's terms and the patient expressed understanding  All questions were answered to the patient's satisfaction  Scribe Attestation    I,:  Jaymie Gonzalez am acting as a scribe while in the presence of the attending physician :       I,:  Kenney Soares MD personally performed the services described in this documentation    as scribed in my presence :             Subjective:   Paul Kyle is a 76 y o  female who presents 4 months s/p left antegrade trochanteric femur IM nail, 2/3/2021  She is doing well  Today she complains of occasional left hip pain  She denies medications for this issue  She is active without repercussion  Review of systems negative unless otherwise specified in HPI    Past Medical History:   Diagnosis Date    Acid reflux     Anxiety     Diabetes mellitus ([address] )     Hypertension     Hypothyroid        Past Surgical History:   Procedure Laterality Date    CHOLECYSTECTOMY      COLONOSCOPY      DILATION AND CURETTAGE OF UTERUS      ESOPHAGOGASTRODUODENOSCOPY      HYSTERECTOMY      45    OOPHORECTOMY      39    IA COLONOSCOPY FLX DX W/COLLJ SPEC WHEN PFRMD N/A 1/28/2016    Procedure: EGD AND COLONOSCOPY;  Surgeon: Carl Lorenzo MD;  [facility];   Service: Gastroenterology    IA INCISE FINGER TENDON SHEATH Right 1/31/2017    Procedure: LONG FINGER TRIGGER RELEASE ;  Surgeon: Homer Ingram MD;  [facility];  Service: Orthopedics    IA INCISE FINGER TENDON SHEATH Right 7/21/2016    Procedure: RELEASE TRIGGER FINGER - LONG FINGER;  Surgeon: Homer Ingram MD;  [facility];  Service: Orthopedics    RI OPEN RX FEMUR FX+INTRAMED SHERRY Left 2/3/2021    Procedure: INSERTION NAIL IM FEMUR ANTEGRADE (TROCHANTERIC);   Surgeon: Louise Cisneros MD;  [facility];  Service: Orthopedics    RI REVISE MEDIAN N/CARPAL TUNNEL SURG Right 2017    Procedure: WRIST CARPAL TUNNEL RELEASE ; TENOLYSIS OF FPL, FDS 2-5, FDP 2-5;  APPLICATION OF Ulises Roper;  Surgeon: Karen Ríos MD;  [facility];  Service: Orthopedics    ROOT CANAL      UPPER GASTROINTESTINAL ENDOSCOPY         Family History   Problem Relation Age of Onset    Cancer Father     Heart attack Father     Diabetes type II Father     Arthritis Maternal Grandmother     No Known Problems Paternal Grandmother     Heart disease Paternal Grandfather     Stroke Paternal Grandfather     Cirrhosis Mother     No Known Problems Sister     No Known Problems Brother     No Known Problems Maternal Grandfather     Breast cancer Paternal Aunt 62    Colon cancer Paternal Uncle 28    No Known Problems Sister     No Known Problems Maternal Aunt     No Known Problems Maternal Aunt        Social History     Occupational History    Not on file   Tobacco Use    Smoking status: Former Smoker     Packs/day: 2 00     Years: 10 00     Pack years: 20 00     Types: Cigarettes     Quit date:      Years since quittin 4    Smokeless tobacco: Never Used   Substance and Sexual Activity    Alcohol use: Not Currently     Frequency: Never     Comment: Rarely    Drug use: No    Sexual activity: Not Currently         Current Outpatient Medications:     Blood Glucose Monitoring Suppl (ACCU-CHEK MAT PLUS) w/Device KIT, Test 4 times daily, Disp: 1 kit, Rfl: 0    Cholecalciferol (VITAMIN D) 2000 units tablet, TAKE ONE TABLET BY MOUTH EVERY DAY, Disp: 30 tablet, Rfl: 0    Continuous Blood Gluc  (Dexcom G6 ) MERCY, Use 1 each 5 (five) times a day (Patient not taking: Reported on 2021), Disp: 1 Device, Rfl: 1    Continuous Blood Gluc Sensor (Dexcom G6 Sensor) MISC, Use 1 each 5 (five) times a day (Patient not taking: Reported on 4/21/2021), Disp: 3 each, Rfl: 5    Continuous Blood Gluc Transmit (Dexcom G6 Transmitter) MISC, Use 1 each 5 (five) times a day (Patient not taking: Reported on 4/21/2021), Disp: 1 each, Rfl: 5    cyanocobalamin (VITAMIN B-12) 1,000 mcg tablet, Take 1,000 mcg by mouth daily, Disp: , Rfl:     docusate sodium (COLACE) 100 mg capsule, Take 1 capsule (100 mg total) by mouth 2 (two) times a day, Disp: 10 capsule, Rfl: 0    glimepiride (AMARYL) 4 mg tablet, Take 1 tablet (4 mg total) by mouth 2 (two) times a day, Disp: 180 tablet, Rfl: 1    glucose blood (FREESTYLE LITE) test strip, Use 1 each 4 (four) times a day Use 4 times daily DM2-- free style lite test strips, Disp: 400 each, Rfl: 1    insulin aspart, w/niacinamide, (FIASP) 100 Units/mL injection pen, Inject 12 Units under the skin 4 (four) times a day Please send 5  pens with 5 refills, Disp: 3 mL, Rfl: 5    insulin glargine (Lantus SoloStar) 100 units/mL injection pen, 25 units in AM and 35 units in PM, Disp: 15 mL, Rfl: 5    Insulin Pen Needle (BD Pen Needle Kelly U/F) 32G X 4 MM MISC, Use 4 (four) times a day, Disp: 400 each, Rfl: 1    Lancets (FREESTYLE) lancets, by Other route 3 (three) times a day Test blood glucose, Disp: 90 each, Rfl: 5    levothyroxine 100 mcg tablet, Take 1 tablet (100 mcg total) by mouth daily, Disp: 90 tablet, Rfl: 1    lisinopril-hydrochlorothiazide (PRINZIDE,ZESTORETIC) 20-12 5 MG per tablet, Take 0 5 tablets by mouth daily, Disp: 15 tablet, Rfl: 5    methocarbamol (ROBAXIN) 500 mg tablet, Take 1 tablet (500 mg total) by mouth 4 (four) times a day for 5 days As needed for muscle spasm   (Patient not taking: Reported on 4/21/2021), Disp: 20 tablet, Rfl: 0    omeprazole (PriLOSEC) 40 MG capsule, Take 1 capsule (40 mg total) by mouth daily, Disp: 30 capsule, Rfl: 0    oxyCODONE (ROXICODONE) 5 mg immediate release tablet, 1 pill po Q4 Hrs prn, Disp: 30 tablet, Rfl: 0    pravastatin (PRAVACHOL) 40 mg tablet, Take 1 tablet (40 mg total) by mouth daily, Disp: 90 tablet, Rfl: 1    sertraline (ZOLOFT) 100 mg tablet, Take 1 tablet (100 mg total) by mouth daily, Disp: 30 tablet, Rfl: 5    Allergies   Allergen Reactions    Metformin Diarrhea            Vitals:    06/02/21 1506   BP: 167/74   Pulse: 77       Objective:  Physical exam  · General: Awake, Alert, Oriented  · Eyes: Pupils equal, round and reactive to light  · Heart: regular rate and rhythm  · Lungs: No audible wheezing  · Abdomen: soft                    Ortho Exam  Left hip:  Well healed lateral incision  Good arc of motion  Patient sits comfortably in chair with hip flexed at 90 degrees  Patient stands from seated position without assistance  Calf compartments soft and supple  Sensation intact  Toes are warm sensate and mobile      Diagnostics, reviewed and taken today if performed as documented: The attending physician has personally reviewed the pertinent films in PACS and interpretation is as follows:  Left hip x-ray:  Well healed fracture site with no evidence of hardware failure  Procedures, if performed today:    Procedures    None performed      Portions of the record may have been created with voice recognition software  Occasional wrong word or "sound a like" substitutions may have occurred due to the inherent limitations of voice recognition software  Read the chart carefully and recognize, using context, where substitutions have occurred

## 2021-06-23 ENCOUNTER — HOSPITAL ENCOUNTER (OUTPATIENT)
Dept: RADIOLOGY | Age: 66
Discharge: HOME | End: 2021-06-23
Payer: COMMERCIAL

## 2021-06-23 VITALS — HEIGHT: 62 IN | WEIGHT: 209 LBS | BODY MASS INDEX: 38.46 KG/M2

## 2021-06-23 DIAGNOSIS — Z12.31 ENCOUNTER FOR SCREENING MAMMOGRAM FOR MALIGNANT NEOPLASM OF BREAST: ICD-10-CM

## 2021-06-23 PROCEDURE — 77063 BREAST TOMOSYNTHESIS BI: CPT

## 2021-06-23 PROCEDURE — 77067 SCR MAMMO BI INCL CAD: CPT

## 2021-06-25 DIAGNOSIS — K21.9 GERD (GASTROESOPHAGEAL REFLUX DISEASE): ICD-10-CM

## 2021-06-25 RX ORDER — OMEPRAZOLE 40 MG/1
40 CAPSULE, DELAYED RELEASE ORAL DAILY
Qty: 30 CAPSULE | Refills: 5 | Status: SHIPPED | OUTPATIENT
Start: 2021-06-25 | End: 2021-09-24 | Stop reason: SDUPTHER

## 2021-07-26 LAB
ALBUMIN SERPL-MCNC: 4.3 G/DL (ref 3.6–5.1)
ALBUMIN/GLOB SERPL: 1.3 (CALC) (ref 1–2.5)
ALP SERPL-CCNC: 75 U/L (ref 37–153)
ALT SERPL-CCNC: 30 U/L (ref 6–29)
AST SERPL-CCNC: 31 U/L (ref 10–35)
BILIRUB SERPL-MCNC: 0.3 MG/DL (ref 0.2–1.2)
BUN SERPL-MCNC: 20 MG/DL (ref 7–25)
BUN/CREAT SERPL: ABNORMAL (CALC) (ref 6–22)
CALCIUM SERPL-MCNC: 10.2 MG/DL (ref 8.6–10.4)
CHLORIDE SERPL-SCNC: 102 MMOL/L (ref 98–110)
CHOLEST SERPL-MCNC: 199 MG/DL
CHOLEST/HDLC SERPL: 4.1 (CALC)
CK SERPL-CCNC: 68 U/L (ref 29–143)
CO2 SERPL-SCNC: 30 MMOL/L (ref 20–32)
CREAT SERPL-MCNC: 0.65 MG/DL (ref 0.5–0.99)
GLOBULIN SER CALC-MCNC: 3.3 G/DL (CALC) (ref 1.9–3.7)
GLUCOSE SERPL-MCNC: 156 MG/DL (ref 65–99)
HBA1C MFR BLD: 6.8 % OF TOTAL HGB
HDLC SERPL-MCNC: 48 MG/DL
LDLC SERPL CALC-MCNC: 128 MG/DL (CALC)
NONHDLC SERPL-MCNC: 151 MG/DL (CALC)
POTASSIUM SERPL-SCNC: 4.1 MMOL/L (ref 3.5–5.3)
PROT SERPL-MCNC: 7.6 G/DL (ref 6.1–8.1)
SL AMB EGFR AFRICAN AMERICAN: 106 ML/MIN/1.73M2
SL AMB EGFR NON AFRICAN AMERICAN: 92 ML/MIN/1.73M2
SODIUM SERPL-SCNC: 139 MMOL/L (ref 135–146)
TRIGL SERPL-MCNC: 122 MG/DL

## 2021-07-26 PROCEDURE — 3044F HG A1C LEVEL LT 7.0%: CPT | Performed by: INTERNAL MEDICINE

## 2021-07-28 ENCOUNTER — OFFICE VISIT (OUTPATIENT)
Dept: INTERNAL MEDICINE CLINIC | Facility: CLINIC | Age: 66
End: 2021-07-28
Payer: COMMERCIAL

## 2021-07-28 VITALS
WEIGHT: 210.2 LBS | HEIGHT: 62 IN | OXYGEN SATURATION: 96 % | HEART RATE: 73 BPM | TEMPERATURE: 97.3 F | SYSTOLIC BLOOD PRESSURE: 124 MMHG | DIASTOLIC BLOOD PRESSURE: 72 MMHG | BODY MASS INDEX: 38.68 KG/M2

## 2021-07-28 DIAGNOSIS — K76.0 FATTY LIVER: ICD-10-CM

## 2021-07-28 DIAGNOSIS — E66.01 OBESITY, MORBID (HCC): ICD-10-CM

## 2021-07-28 DIAGNOSIS — M25.571 CHRONIC PAIN OF RIGHT ANKLE: ICD-10-CM

## 2021-07-28 DIAGNOSIS — Z79.4 TYPE 2 DIABETES MELLITUS WITHOUT COMPLICATION, WITH LONG-TERM CURRENT USE OF INSULIN (HCC): ICD-10-CM

## 2021-07-28 DIAGNOSIS — E11.43 DIABETIC GASTROPARESIS (HCC): Primary | ICD-10-CM

## 2021-07-28 DIAGNOSIS — Z78.9 STATIN INTOLERANCE: ICD-10-CM

## 2021-07-28 DIAGNOSIS — I10 ESSENTIAL HYPERTENSION: ICD-10-CM

## 2021-07-28 DIAGNOSIS — K31.84 DIABETIC GASTROPARESIS (HCC): Primary | ICD-10-CM

## 2021-07-28 DIAGNOSIS — E11.9 TYPE 2 DIABETES MELLITUS WITHOUT COMPLICATION, WITH LONG-TERM CURRENT USE OF INSULIN (HCC): ICD-10-CM

## 2021-07-28 DIAGNOSIS — G89.29 CHRONIC PAIN OF RIGHT ANKLE: ICD-10-CM

## 2021-07-28 PROBLEM — S72.002A CLOSED LEFT HIP FRACTURE, INITIAL ENCOUNTER (HCC): Status: RESOLVED | Noted: 2021-02-02 | Resolved: 2021-07-28

## 2021-07-28 PROCEDURE — 3008F BODY MASS INDEX DOCD: CPT | Performed by: INTERNAL MEDICINE

## 2021-07-28 PROCEDURE — 3725F SCREEN DEPRESSION PERFORMED: CPT | Performed by: INTERNAL MEDICINE

## 2021-07-28 PROCEDURE — 3074F SYST BP LT 130 MM HG: CPT | Performed by: INTERNAL MEDICINE

## 2021-07-28 PROCEDURE — 1036F TOBACCO NON-USER: CPT | Performed by: INTERNAL MEDICINE

## 2021-07-28 PROCEDURE — 99214 OFFICE O/P EST MOD 30 MIN: CPT | Performed by: INTERNAL MEDICINE

## 2021-07-28 PROCEDURE — 3078F DIAST BP <80 MM HG: CPT | Performed by: INTERNAL MEDICINE

## 2021-07-28 PROCEDURE — 1160F RVW MEDS BY RX/DR IN RCRD: CPT | Performed by: INTERNAL MEDICINE

## 2021-07-28 NOTE — PROGRESS NOTES
Diabetic Foot Exam    Patient's shoes and socks removed  Right Foot/Ankle   Right Foot Inspection  Skin Exam: dry skin                            Sensory   Vibration: intact  Proprioception: intact   Monofilament testing: intact  Vascular  Capillary refills: < 3 seconds  The right DP pulse is 1+  The right PT pulse is 1+  Left Foot/Ankle  Left Foot Inspection  Skin Exam: dry skin                                         Sensory   Vibration: intact  Proprioception: intact  Monofilament: intact  Vascular  Capillary refills: < 3 seconds  The left DP pulse is 1+  The left PT pulse is 1+  Assign Risk Category:  No deformity present;  No loss of protective sensation; Weak pulses       Risk: 0

## 2021-07-28 NOTE — PROGRESS NOTES
Assessment/Plan:     Diagnoses and all orders for this visit:    Diabetic gastroparesis ([address] )  Gastroparesis is stable  Type 2 diabetes mellitus without complication, with long-term current use of insulin (HCC)  -     Hemoglobin A1C; Future  -     Basic metabolic panel; Future  Type 2 diabetes mellitus is very well controlled  Fatty liver  Slightly increased AST the last over a fetus that is noted which shows the F2 fibrosis  Essential hypertension  Hypertension is very well controlled  Obesity, morbid ([address] )  Again discussed about diet exercise and weight loss  Statin intolerance             Subjective:   Chief Complaint   Patient presents with    Follow-up     review labs 7/26/21    Hypertension    Diabetes    Ankle Pain     right side    BMI: follow up        Patient ID: Telma Vinson is a 78 y o  female  HPI  This is a very pleasant 78 years young lady who is here today for the regular follow-up she had done a great job in controlling her diabetes her hemoglobin A1c is now less than 7 0 which was 8 9 she is taking medications and a she is doing well with VAC will follow her up in about 4 months for diabetic management  Complaining of bright ankle pain and swelling of the Achilles tendon looks like calcaneus spur I recommend her to see the orthopedic doctor for further evaluation and treatment  Hypertension is very well controlled  Hypercholesterolemia she has a increased cardiovascular risk but she is not able to tolerate the medication she tried  2 cholesterol medication and both of them give her the side effects and she was not able to continue  The following portions of the patient's history were reviewed and updated as appropriate: allergies, current medications, past family history, past medical history, past social history, past surgical history and problem list     Review of Systems   Constitutional: Negative for chills and fever  HENT: Negative for ear pain and sore throat      Eyes: Negative for pain and visual disturbance  Respiratory: Negative for cough and shortness of breath  Cardiovascular: Negative for chest pain and palpitations  Gastrointestinal: Negative for abdominal pain and vomiting  Genitourinary: Negative for dysuria and hematuria  Musculoskeletal: Negative for arthralgias and back pain  Right ankle pain on voiding depression on and and on walking   Skin: Negative for color change and rash  Neurological: Negative for seizures and syncope  All other systems reviewed and are negative          Past Medical History:   Diagnosis Date    Acid reflux     Anxiety     Diabetes mellitus ([facility] [address] )     Hypertension     Hypothyroid          Current Outpatient Medications:     Blood Glucose Monitoring Suppl (ACCU-CHEK MAT PLUS) w/Device KIT, Test 4 times daily, Disp: 1 kit, Rfl: 0    Cholecalciferol (VITAMIN D) 2000 units tablet, TAKE ONE TABLET BY MOUTH EVERY DAY, Disp: 30 tablet, Rfl: 0    cyanocobalamin (VITAMIN B-12) 1,000 mcg tablet, Take 1,000 mcg by mouth daily, Disp: , Rfl:     docusate sodium (COLACE) 100 mg capsule, Take 1 capsule (100 mg total) by mouth 2 (two) times a day, Disp: 10 capsule, Rfl: 0    glimepiride (AMARYL) 4 mg tablet, Take 1 tablet (4 mg total) by mouth 2 (two) times a day, Disp: 180 tablet, Rfl: 1    glucose blood (FREESTYLE LITE) test strip, Use 1 each 4 (four) times a day Use 4 times daily DM2-- free style lite test strips, Disp: 400 each, Rfl: 1    insulin aspart, w/niacinamide, (FIASP) 100 Units/mL injection pen, Inject 12 Units under the skin 4 (four) times a day Please send 5  pens with 5 refills, Disp: 3 mL, Rfl: 5    insulin glargine (Lantus SoloStar) 100 units/mL injection pen, 25 units in AM and 35 units in PM, Disp: 15 mL, Rfl: 5    Insulin Pen Needle (BD Pen Needle Kelly U/F) 32G X 4 MM MISC, Use 4 (four) times a day, Disp: 400 each, Rfl: 1    Lancets (FREESTYLE) lancets, by Other route 3 (three) times a day Test blood glucose, Disp: 90 each, Rfl: 5    levothyroxine 100 mcg tablet, Take 1 tablet (100 mcg total) by mouth daily, Disp: 90 tablet, Rfl: 1    lisinopril-hydrochlorothiazide (PRINZIDE,ZESTORETIC) 20-12 5 MG per tablet, Take 0 5 tablets by mouth daily, Disp: 15 tablet, Rfl: 5    omeprazole (PriLOSEC) 40 MG capsule, Take 1 capsule (40 mg total) by mouth daily, Disp: 30 capsule, Rfl: 5    sertraline (ZOLOFT) 100 mg tablet, Take 1 tablet (100 mg total) by mouth daily, Disp: 30 tablet, Rfl: 5    Continuous Blood Gluc Transmit (Dexcom G6 Transmitter) MISC, Use 1 each 5 (five) times a day (Patient not taking: Reported on 4/21/2021), Disp: 1 each, Rfl: 5    Allergies   Allergen Reactions    Metformin Diarrhea       Social History   Past Surgical History:   Procedure Laterality Date    CHOLECYSTECTOMY      COLONOSCOPY      DILATION AND CURETTAGE OF UTERUS      ESOPHAGOGASTRODUODENOSCOPY      HYSTERECTOMY      45    OOPHORECTOMY      45    CT COLONOSCOPY FLX DX W/COLLJ SPEC WHEN PFRMD N/A 1/28/2016    Procedure: EGD AND COLONOSCOPY;  Surgeon: Elizabeth Byrd MD;  [facility]; Service: Gastroenterology    CT INCISE FINGER TENDON SHEATH Right 1/31/2017    Procedure: LONG FINGER TRIGGER RELEASE ;  Surgeon: Aurelio Rebolledo MD;  [facility];  Service: Orthopedics    CT INCISE FINGER TENDON SHEATH Right 7/21/2016    Procedure: RELEASE TRIGGER FINGER - LONG FINGER;  Surgeon: Aurelio Rebolledo MD;  [facility];  Service: Orthopedics    CT OPEN RX FEMUR FX+INTRAMED SHERRY Left 2/3/2021    Procedure: INSERTION NAIL IM FEMUR ANTEGRADE (TROCHANTERIC);   Surgeon: Misti Carreon MD;  [facility];  Service: Orthopedics    CT REVISE MEDIAN N/CARPAL TUNNEL SURG Right 1/31/2017    Procedure: WRIST CARPAL TUNNEL RELEASE ; TENOLYSIS OF FPL, FDS 2-5, FDP 2-5;  APPLICATION OF Frank Lat;  Surgeon: Aurelio Rebolledo MD;  [facility];  Service: Orthopedics    ROOT CANAL      UPPER GASTROINTESTINAL ENDOSCOPY       Family History   Problem Relation Age of Onset    Cancer Father     Heart attack Father     Diabetes type II Father     Arthritis Maternal Grandmother     No Known Problems Paternal Grandmother     Heart disease Paternal Grandfather     Stroke Paternal Grandfather     Cirrhosis Mother     No Known Problems Sister     No Known Problems Brother     No Known Problems Maternal Grandfather     Breast cancer Paternal Aunt 62    Colon cancer Paternal Uncle 28    No Known Problems Sister     No Known Problems Maternal Aunt     No Known Problems Maternal Aunt        Objective:  /72 (BP Location: Right arm, Patient Position: Sitting, Cuff Size: Large)   Pulse 73   Temp (!) 97 3 °F (36 3 °C) (Temporal)   Ht 5' 2" (1 575 m)   Wt 95 3 kg (210 lb 3 2 oz)   SpO2 96%   BMI 38 45 kg/m²        Physical Exam  Constitutional:       Appearance: She is well-developed  She is obese  HENT:      Right Ear: External ear normal    Eyes:      Conjunctiva/sclera: Conjunctivae normal       Pupils: Pupils are equal, round, and reactive to light  Neck:      Thyroid: No thyromegaly  Vascular: No JVD  Cardiovascular:      Rate and Rhythm: Normal rate and regular rhythm  Heart sounds: Normal heart sounds  Pulmonary:      Breath sounds: Normal breath sounds  Abdominal:      General: Bowel sounds are normal       Palpations: Abdomen is soft  Musculoskeletal:         General: Normal range of motion  Cervical back: Normal range of motion  Comments: Right ankle slight sorting some swelling in the Achilles tendon range of motion is normal   Lymphadenopathy:      Cervical: No cervical adenopathy  Skin:     General: Skin is dry  Neurological:      Mental Status: She is alert and oriented to person, place, and time  Deep Tendon Reflexes: Reflexes are normal and symmetric     Psychiatric:         Behavior: Behavior normal

## 2021-08-03 DIAGNOSIS — E11.649 UNCONTROLLED TYPE 2 DIABETES MELLITUS WITH HYPOGLYCEMIA WITHOUT COMA (HCC): ICD-10-CM

## 2021-08-03 RX ORDER — INSULIN GLARGINE 100 [IU]/ML
INJECTION, SOLUTION SUBCUTANEOUS
Qty: 15 ML | Refills: 5 | Status: SHIPPED | OUTPATIENT
Start: 2021-08-03 | End: 2022-01-08 | Stop reason: SDUPTHER

## 2021-08-03 NOTE — TELEPHONE ENCOUNTER
Pt called the office and stated she is on her last pen of:     insulin glargine (Lantus SoloStar) 100 units/mL injection pen         And needs refills sent to:      [facility] [address] [place] [address], [address] [place] [id number]

## 2021-08-18 ENCOUNTER — HOSPITAL ENCOUNTER (OUTPATIENT)
Dept: RADIOLOGY | Facility: HOSPITAL | Age: 66
Discharge: HOME | End: 2021-08-18
Attending: ORTHOPAEDIC SURGERY
Payer: COMMERCIAL

## 2021-08-18 ENCOUNTER — CONSULT (OUTPATIENT)
Dept: OBGYN CLINIC | Facility: HOSPITAL | Age: 66
End: 2021-08-18
Attending: INTERNAL MEDICINE
Payer: COMMERCIAL

## 2021-08-18 VITALS
DIASTOLIC BLOOD PRESSURE: 76 MMHG | BODY MASS INDEX: 38.44 KG/M2 | SYSTOLIC BLOOD PRESSURE: 135 MMHG | HEART RATE: 76 BPM | HEIGHT: 62 IN | WEIGHT: 208.9 LBS

## 2021-08-18 DIAGNOSIS — M25.571 PAIN, JOINT, ANKLE AND FOOT, RIGHT: ICD-10-CM

## 2021-08-18 DIAGNOSIS — M25.571 CHRONIC PAIN OF RIGHT ANKLE: ICD-10-CM

## 2021-08-18 DIAGNOSIS — G89.29 CHRONIC PAIN OF RIGHT ANKLE: ICD-10-CM

## 2021-08-18 DIAGNOSIS — M76.61 ACHILLES TENDINITIS, RIGHT LEG: Primary | ICD-10-CM

## 2021-08-18 DIAGNOSIS — M19.079 ANKLE ARTHRITIS: ICD-10-CM

## 2021-08-18 PROCEDURE — 1036F TOBACCO NON-USER: CPT | Performed by: ORTHOPAEDIC SURGERY

## 2021-08-18 PROCEDURE — 99213 OFFICE O/P EST LOW 20 MIN: CPT | Performed by: ORTHOPAEDIC SURGERY

## 2021-08-18 PROCEDURE — 3008F BODY MASS INDEX DOCD: CPT | Performed by: ORTHOPAEDIC SURGERY

## 2021-08-18 PROCEDURE — 3075F SYST BP GE 130 - 139MM HG: CPT | Performed by: ORTHOPAEDIC SURGERY

## 2021-08-18 PROCEDURE — 3078F DIAST BP <80 MM HG: CPT | Performed by: ORTHOPAEDIC SURGERY

## 2021-08-18 PROCEDURE — 73610 X-RAY EXAM OF ANKLE: CPT

## 2021-08-18 NOTE — PROGRESS NOTES
Assessment:   Diagnosis ICD-10-CM Associated Orders   1  Achilles tendinitis, right leg  M76 [id number] Ambulatory referral to Physical Therapy   2  Chronic pain of right ankle  M25 571 Ambulatory referral to Orthopedic Surgery    G89 [id number] Ambulatory referral to Physical Therapy   3  Pain, joint, ankle and foot, right  M25 571 XR ankle 3+ vw right     Ambulatory referral to Physical Therapy   4  Ankle arthritis  M19 079 Ambulatory referral to Physical Therapy       Plan:  78 y o  female with right achilles tendonitis and mild ankle arthritis  Physical therapy would be warranted at this time and a script was given in office today  I suggested heel cups to be worn in her shoes  If patient fails PT, heel cups, oral medications, followup with Dr Mohan Tran for next treatment step    To do next visit:  Return if symptoms worsen or fail to improve  The above stated was discussed in layman's terms and the patient expressed understanding  All questions were answered to the patient's satisfaction  Scribe Attestation    I,:  Maite Patel am acting as a scribe while in the presence of the attending physician :       I,:  Myah Arboleda MD personally performed the services described in this documentation    as scribed in my presence :             Subjective:   Sally Ordaz is a 78 y o  female who presents to the office with right ankle pain that began 4 months ago without incidence of injury  She stated that she believes that she fractured the right ankle over 20 years ago  She stated that she had a previous left hip antegrade trochanteric femur IM nail on 2/3/2021 performed by Dr Quiroz Never  Review of systems negative unless otherwise specified in HPI  Review of Systems   Constitutional: Negative for chills and fever  HENT: Negative for ear pain and sore throat  Eyes: Negative for pain and visual disturbance  Respiratory: Negative for cough and shortness of breath      Cardiovascular: Negative for chest pain and palpitations  Gastrointestinal: Negative for abdominal pain and vomiting  Genitourinary: Negative for dysuria and hematuria  Musculoskeletal: Positive for arthralgias  Negative for back pain  Skin: Negative for color change and rash  Neurological: Negative for seizures and syncope  All other systems reviewed and are negative  Past Medical History:   Diagnosis Date    Acid reflux     Anxiety     Diabetes mellitus ([facility] [address] )     Hypertension     Hypothyroid        Past Surgical History:   Procedure Laterality Date    CHOLECYSTECTOMY      COLONOSCOPY      DILATION AND CURETTAGE OF UTERUS      ESOPHAGOGASTRODUODENOSCOPY      HYSTERECTOMY      45    OOPHORECTOMY      39    FL COLONOSCOPY FLX DX W/COLLJ SPEC WHEN PFRMD N/A 1/28/2016    Procedure: EGD AND COLONOSCOPY;  Surgeon: Danial Samano MD;  [facility]; Service: Gastroenterology    FL INCISE FINGER TENDON SHEATH Right 1/31/2017    Procedure: LONG FINGER TRIGGER RELEASE ;  Surgeon: Chris Ng MD;  [facility];  Service: Orthopedics    FL INCISE FINGER TENDON SHEATH Right 7/21/2016    Procedure: RELEASE TRIGGER FINGER - LONG FINGER;  Surgeon: Chris Ng MD;  [facility];  Service: Orthopedics    FL OPEN RX FEMUR FX+INTRAMED SHERRY Left 2/3/2021    Procedure: INSERTION NAIL IM FEMUR ANTEGRADE (TROCHANTERIC);   Surgeon: Kizzy Means MD;  [facility];  Service: Orthopedics    FL REVISE MEDIAN N/CARPAL TUNNEL SURG Right 1/31/2017    Procedure: WRIST CARPAL TUNNEL RELEASE ; TENOLYSIS OF FPL, FDS 2-5, FDP 2-5;  APPLICATION OF Izaiah Ochoa;  Surgeon: Chris Ng MD;  [facility];  Service: Orthopedics    ROOT CANAL      UPPER GASTROINTESTINAL ENDOSCOPY         Family History   Problem Relation Age of Onset    Cancer Father     Heart attack Father     Diabetes type II Father     Arthritis Maternal Grandmother     No Known Problems Paternal Grandmother     Heart disease Paternal Grandfather     Stroke Paternal Grandfather     Cirrhosis Mother     No Known Problems Sister     No Known Problems Brother     No Known Problems Maternal Grandfather     Breast cancer Paternal Aunt 62    Colon cancer Paternal Uncle 28    No Known Problems Sister     No Known Problems Maternal Aunt     No Known Problems Maternal Aunt        Social History     Occupational History    Not on file   Tobacco Use    Smoking status: Former Smoker     Packs/day: 2 00     Years: 10 00     Pack years: 20 00     Types: Cigarettes     Quit date:      Years since quittin 6    Smokeless tobacco: Never Used   Vaping Use    Vaping Use: Never used   Substance and Sexual Activity    Alcohol use: Not Currently     Comment: Rarely    Drug use: No    Sexual activity: Not Currently         Current Outpatient Medications:     Blood Glucose Monitoring Suppl (ACCU-CHEK MAT PLUS) w/Device KIT, Test 4 times daily, Disp: 1 kit, Rfl: 0    Cholecalciferol (VITAMIN D) 2000 units tablet, TAKE ONE TABLET BY MOUTH EVERY DAY, Disp: 30 tablet, Rfl: 0    cyanocobalamin (VITAMIN B-12) 1,000 mcg tablet, Take 1,000 mcg by mouth daily, Disp: , Rfl:     docusate sodium (COLACE) 100 mg capsule, Take 1 capsule (100 mg total) by mouth 2 (two) times a day, Disp: 10 capsule, Rfl: 0    glimepiride (AMARYL) 4 mg tablet, Take 1 tablet (4 mg total) by mouth 2 (two) times a day, Disp: 180 tablet, Rfl: 1    glucose blood (FREESTYLE LITE) test strip, Use 1 each 4 (four) times a day Use 4 times daily DM2-- free style lite test strips, Disp: 400 each, Rfl: 1    insulin aspart, w/niacinamide, (FIASP) 100 Units/mL injection pen, Inject 12 Units under the skin 4 (four) times a day Please send 5  pens with 5 refills, Disp: 3 mL, Rfl: 5    insulin glargine (Lantus SoloStar) 100 units/mL injection pen, 25 units in AM and 35 units in PM, Disp: 15 mL, Rfl: 5    Insulin Pen Needle (BD Pen Needle Kelly U/F) 32G X 4 MM MISC, Use 4 (four) times a day, Disp: 400 each, Rfl: 0    Lancets (freestyle) lancets, Use 3 (three) times a day Test blood glucose, Disp: 90 each, Rfl: 5    levothyroxine 100 mcg tablet, Take 1 tablet (100 mcg total) by mouth daily, Disp: 90 tablet, Rfl: 1    lisinopril-hydrochlorothiazide (PRINZIDE,ZESTORETIC) 20-12 5 MG per tablet, Take 0 5 tablets by mouth daily, Disp: 15 tablet, Rfl: 5    omeprazole (PriLOSEC) 40 MG capsule, Take 1 capsule (40 mg total) by mouth daily, Disp: 30 capsule, Rfl: 5    sertraline (ZOLOFT) 100 mg tablet, Take 1 tablet (100 mg total) by mouth daily, Disp: 30 tablet, Rfl: 5    Continuous Blood Gluc Transmit (Dexcom G6 Transmitter) MISC, Use 1 each 5 (five) times a day (Patient not taking: Reported on 4/21/2021), Disp: 1 each, Rfl: 5    Allergies   Allergen Reactions    Metformin Diarrhea            Vitals:    08/18/21 1123   BP: 135/76   Pulse: 76       Objective:                    Right Ankle Exam     Tenderness   Right ankle tenderness location: achilles  Swelling: none    Range of Motion   The patient has normal right ankle ROM  Muscle Strength   The patient has normal right ankle strength  Tests   Anterior drawer: negative    Other   Erythema: absent  Scars: absent  Sensation: normal  Pulse: present     Comments:  Compartments soft   Toes pink and mobile   Sensation intact       Left Ankle Exam   Left ankle exam is normal             Diagnostics, reviewed and taken today if performed as documented:    None performed      The attending physician has personally reviewed the pertinent films in PACS and interpretation is as follows:      Procedures, if performed today:    None performed      Portions of the record may have been created with voice recognition software  Occasional wrong word or "sound a like" substitutions may have occurred due to the inherent limitations of voice recognition software    Read the chart carefully and recognize, using context, where substitutions have occurred

## 2021-08-27 DIAGNOSIS — E03.9 HYPOTHYROIDISM, UNSPECIFIED TYPE: ICD-10-CM

## 2021-08-27 RX ORDER — LEVOTHYROXINE SODIUM 0.1 MG/1
100 TABLET ORAL DAILY
Qty: 90 TABLET | Refills: 1 | Status: SHIPPED | OUTPATIENT
Start: 2021-08-27 | End: 2021-08-27 | Stop reason: SDUPTHER

## 2021-09-24 DIAGNOSIS — K21.9 GERD (GASTROESOPHAGEAL REFLUX DISEASE): ICD-10-CM

## 2021-09-24 DIAGNOSIS — F41.9 ANXIETY: ICD-10-CM

## 2021-09-24 DIAGNOSIS — E11.00 UNCONTROLLED TYPE 2 DIABETES MELLITUS WITH HYPEROSMOLARITY WITHOUT COMA, WITHOUT LONG-TERM CURRENT USE OF INSULIN (HCC): ICD-10-CM

## 2021-09-27 DIAGNOSIS — F41.9 ANXIETY: ICD-10-CM

## 2021-09-27 RX ORDER — SERTRALINE HYDROCHLORIDE 100 MG/1
100 TABLET, FILM COATED ORAL DAILY
Qty: 90 TABLET | Refills: 0 | Status: CANCELLED | OUTPATIENT
Start: 2021-09-27

## 2021-09-27 RX ORDER — PEN NEEDLE, DIABETIC 32GX 5/32"
NEEDLE, DISPOSABLE MISCELLANEOUS 4 TIMES DAILY
Qty: 400 EACH | Refills: 3 | Status: SHIPPED | OUTPATIENT
Start: 2021-09-27 | End: 2022-03-28 | Stop reason: SDUPTHER

## 2021-09-27 RX ORDER — OMEPRAZOLE 40 MG/1
40 CAPSULE, DELAYED RELEASE ORAL DAILY
Qty: 90 CAPSULE | Refills: 0 | Status: SHIPPED | OUTPATIENT
Start: 2021-09-27 | End: 2021-12-27 | Stop reason: SDUPTHER

## 2021-09-27 RX ORDER — SERTRALINE HYDROCHLORIDE 100 MG/1
100 TABLET, FILM COATED ORAL DAILY
Qty: 90 TABLET | Refills: 1 | Status: SHIPPED | OUTPATIENT
Start: 2021-09-27 | End: 2022-03-17 | Stop reason: SDUPTHER

## 2021-10-01 DIAGNOSIS — E11.649 UNCONTROLLED TYPE 2 DIABETES MELLITUS WITH HYPOGLYCEMIA WITHOUT COMA (HCC): ICD-10-CM

## 2021-11-09 LAB
BUN SERPL-MCNC: 20 MG/DL (ref 7–25)
BUN/CREAT SERPL: ABNORMAL (CALC) (ref 6–22)
CALCIUM SERPL-MCNC: 9.8 MG/DL (ref 8.6–10.4)
CHLORIDE SERPL-SCNC: 104 MMOL/L (ref 98–110)
CO2 SERPL-SCNC: 28 MMOL/L (ref 20–32)
CREAT SERPL-MCNC: 0.64 MG/DL (ref 0.5–0.99)
GLUCOSE SERPL-MCNC: 179 MG/DL (ref 65–99)
HBA1C MFR BLD: 6.8 % OF TOTAL HGB
POTASSIUM SERPL-SCNC: 4.7 MMOL/L (ref 3.5–5.3)
SL AMB EGFR AFRICAN AMERICAN: 107 ML/MIN/1.73M2
SL AMB EGFR NON AFRICAN AMERICAN: 92 ML/MIN/1.73M2
SODIUM SERPL-SCNC: 139 MMOL/L (ref 135–146)

## 2021-11-09 PROCEDURE — 3044F HG A1C LEVEL LT 7.0%: CPT | Performed by: INTERNAL MEDICINE

## 2021-11-10 ENCOUNTER — OFFICE VISIT (OUTPATIENT)
Dept: INTERNAL MEDICINE CLINIC | Age: 66
End: 2021-11-10
Payer: COMMERCIAL

## 2021-11-10 VITALS
WEIGHT: 208.9 LBS | OXYGEN SATURATION: 97 % | HEART RATE: 73 BPM | DIASTOLIC BLOOD PRESSURE: 62 MMHG | HEIGHT: 63 IN | TEMPERATURE: 97 F | SYSTOLIC BLOOD PRESSURE: 120 MMHG | BODY MASS INDEX: 37.02 KG/M2

## 2021-11-10 DIAGNOSIS — M16.0 PRIMARY OSTEOARTHRITIS OF BOTH HIPS: ICD-10-CM

## 2021-11-10 DIAGNOSIS — E11.43 DIABETIC GASTROPARESIS (HCC): Primary | ICD-10-CM

## 2021-11-10 DIAGNOSIS — Z00.00 MEDICARE ANNUAL WELLNESS VISIT, SUBSEQUENT: ICD-10-CM

## 2021-11-10 DIAGNOSIS — E11.9 TYPE 2 DIABETES MELLITUS WITHOUT COMPLICATION, WITH LONG-TERM CURRENT USE OF INSULIN (HCC): ICD-10-CM

## 2021-11-10 DIAGNOSIS — I10 ESSENTIAL HYPERTENSION: ICD-10-CM

## 2021-11-10 DIAGNOSIS — Z79.4 TYPE 2 DIABETES MELLITUS WITHOUT COMPLICATION, WITH LONG-TERM CURRENT USE OF INSULIN (HCC): ICD-10-CM

## 2021-11-10 DIAGNOSIS — K31.84 DIABETIC GASTROPARESIS (HCC): Primary | ICD-10-CM

## 2021-11-10 DIAGNOSIS — R79.89 LFT ELEVATION: ICD-10-CM

## 2021-11-10 PROCEDURE — 3288F FALL RISK ASSESSMENT DOCD: CPT | Performed by: INTERNAL MEDICINE

## 2021-11-10 PROCEDURE — 1036F TOBACCO NON-USER: CPT | Performed by: INTERNAL MEDICINE

## 2021-11-10 PROCEDURE — 3008F BODY MASS INDEX DOCD: CPT | Performed by: INTERNAL MEDICINE

## 2021-11-10 PROCEDURE — 1125F AMNT PAIN NOTED PAIN PRSNT: CPT | Performed by: INTERNAL MEDICINE

## 2021-11-10 PROCEDURE — 3074F SYST BP LT 130 MM HG: CPT | Performed by: INTERNAL MEDICINE

## 2021-11-10 PROCEDURE — 1170F FXNL STATUS ASSESSED: CPT | Performed by: INTERNAL MEDICINE

## 2021-11-10 PROCEDURE — 3078F DIAST BP <80 MM HG: CPT | Performed by: INTERNAL MEDICINE

## 2021-11-10 PROCEDURE — G0439 PPPS, SUBSEQ VISIT: HCPCS | Performed by: INTERNAL MEDICINE

## 2021-11-10 PROCEDURE — 99214 OFFICE O/P EST MOD 30 MIN: CPT | Performed by: INTERNAL MEDICINE

## 2021-11-10 PROCEDURE — 1160F RVW MEDS BY RX/DR IN RCRD: CPT | Performed by: INTERNAL MEDICINE

## 2021-11-10 PROCEDURE — 3725F SCREEN DEPRESSION PERFORMED: CPT | Performed by: INTERNAL MEDICINE

## 2021-11-12 ENCOUNTER — VBI (OUTPATIENT)
Dept: ADMINISTRATIVE | Facility: OTHER | Age: 66
End: 2021-11-12

## 2021-11-18 ENCOUNTER — TELEPHONE (OUTPATIENT)
Dept: INTERNAL MEDICINE CLINIC | Age: 66
End: 2021-11-18

## 2021-11-19 ENCOUNTER — TELEPHONE (OUTPATIENT)
Dept: INTERNAL MEDICINE CLINIC | Age: 66
End: 2021-11-19

## 2021-11-29 ENCOUNTER — VBI (OUTPATIENT)
Dept: ADMINISTRATIVE | Facility: OTHER | Age: 66
End: 2021-11-29

## 2021-12-06 DIAGNOSIS — E11.8 TYPE 2 DIABETES MELLITUS WITH COMPLICATION, WITH LONG-TERM CURRENT USE OF INSULIN (HCC): ICD-10-CM

## 2021-12-06 DIAGNOSIS — Z79.4 TYPE 2 DIABETES MELLITUS WITH COMPLICATION, WITH LONG-TERM CURRENT USE OF INSULIN (HCC): ICD-10-CM

## 2021-12-06 RX ORDER — GLIMEPIRIDE 4 MG/1
4 TABLET ORAL 2 TIMES DAILY
Qty: 180 TABLET | Refills: 0 | Status: CANCELLED | OUTPATIENT
Start: 2021-12-06

## 2021-12-06 RX ORDER — GLIMEPIRIDE 4 MG/1
4 TABLET ORAL 2 TIMES DAILY
Qty: 180 TABLET | Refills: 1 | Status: SHIPPED | OUTPATIENT
Start: 2021-12-06 | End: 2022-06-05 | Stop reason: SDUPTHER

## 2021-12-09 DIAGNOSIS — K52.9 GASTROENTERITIS: ICD-10-CM

## 2021-12-09 RX ORDER — ONDANSETRON 4 MG/1
4 TABLET, FILM COATED ORAL EVERY 8 HOURS PRN
Qty: 20 TABLET | Refills: 0 | Status: SHIPPED | OUTPATIENT
Start: 2021-12-09 | End: 2022-01-07 | Stop reason: SDUPTHER

## 2021-12-27 DIAGNOSIS — K21.9 GERD (GASTROESOPHAGEAL REFLUX DISEASE): ICD-10-CM

## 2021-12-27 RX ORDER — OMEPRAZOLE 40 MG/1
40 CAPSULE, DELAYED RELEASE ORAL DAILY
Qty: 90 CAPSULE | Refills: 1 | Status: SHIPPED | OUTPATIENT
Start: 2021-12-27

## 2022-01-07 ENCOUNTER — TELEMEDICINE (OUTPATIENT)
Dept: INTERNAL MEDICINE CLINIC | Facility: CLINIC | Age: 67
End: 2022-01-07
Payer: MEDICARE

## 2022-01-07 VITALS — WEIGHT: 209 LBS | HEIGHT: 63 IN | BODY MASS INDEX: 37.03 KG/M2

## 2022-01-07 DIAGNOSIS — E11.43 DIABETIC GASTROPARESIS (HCC): ICD-10-CM

## 2022-01-07 DIAGNOSIS — Z79.4 TYPE 2 DIABETES MELLITUS WITH COMPLICATION, WITH LONG-TERM CURRENT USE OF INSULIN (HCC): Primary | ICD-10-CM

## 2022-01-07 DIAGNOSIS — K52.9 GASTROENTERITIS: ICD-10-CM

## 2022-01-07 DIAGNOSIS — K31.84 DIABETIC GASTROPARESIS (HCC): ICD-10-CM

## 2022-01-07 DIAGNOSIS — E11.8 TYPE 2 DIABETES MELLITUS WITH COMPLICATION, WITH LONG-TERM CURRENT USE OF INSULIN (HCC): Primary | ICD-10-CM

## 2022-01-07 PROCEDURE — 99213 OFFICE O/P EST LOW 20 MIN: CPT | Performed by: NURSE PRACTITIONER

## 2022-01-07 RX ORDER — ONDANSETRON 4 MG/1
4 TABLET, FILM COATED ORAL EVERY 8 HOURS PRN
Qty: 20 TABLET | Refills: 0 | Status: SHIPPED | OUTPATIENT
Start: 2022-01-07 | End: 2022-02-17 | Stop reason: SDUPTHER

## 2022-01-07 RX ORDER — METOCLOPRAMIDE 10 MG/1
10 TABLET ORAL 4 TIMES DAILY
Qty: 60 TABLET | Refills: 0 | Status: SHIPPED | OUTPATIENT
Start: 2022-01-07 | End: 2022-02-17 | Stop reason: SDUPTHER

## 2022-01-07 NOTE — PROGRESS NOTES
Virtual Regular Visit    Verification of patient location:    Patient is located in the following state in which I hold an active license [place]      Assessment/Plan:    Problem List Items Addressed This Visit        Digestive    Diabetic gastroparesis ([facility] [address] )       Lab Results   Component Value Date    HGBA1C 6 8 (H) 11/09/2021   Will start reglan, advised increased water intake and bland diet  Sent in PRN Zofran  Follow-up if no relief of symptoms or worsening of symptoms  May need to consider referral to GI  Relevant Medications    metoclopramide (Reglan) 10 mg tablet    Gastroenteritis    Relevant Medications    ondansetron (ZOFRAN) 4 mg tablet    metoclopramide (Reglan) 10 mg tablet      Other Visit Diagnoses     Type 2 diabetes mellitus with complication, with long-term current use of insulin ([facility] [address] )    -  Primary               Reason for visit is   Chief Complaint   Patient presents with    Abdominal Pain     Upset stomach, constipated, nausea & sometime vomits, intestinal cramping, bloating for a few weeks  Pt has hx of gastroparesis    Virtual Regular Visit        Encounter provider MEDARDO Wyatt    Provider located at [address] [address]      Recent Visits  No visits were found meeting these conditions  Showing recent visits within past 7 days and meeting all other requirements  Today's Visits  Date Type Provider Dept   01/07/22 Telephone Pascual Saras Kyle Dubin, CRNP [facility]   01/07/22 [place] [id number] Kyle Dubin, CRNP [facility]   Showing today's visits and meeting all other requirements  Future Appointments  No visits were found meeting these conditions  Showing future appointments within next 150 days and meeting all other requirements       The patient was identified by name and date of birth  Maria Elena Weber was informed that this is a telemedicine visit and that the visit is being conducted through [employer] and patient was informed that this is not a secure, HIPAA-compliant platform  She agrees to proceed     My office door was closed  No one else was in the room  She acknowledged consent and understanding of privacy and security of the video platform  The patient has agreed to participate and understands they can discontinue the visit at any time  Patient is aware this is a billable service  Subjective  Maria Elena Weber is a 78 y o  female    Patient calls in today with concerns for abdominal pain  She reports she has an upset stomach, constipation, nausea, vomitting,  Bloating and  Abdominal cramping  She reports history of gastroparesis which she is taking Reglan for in the past    She has been taking Zofran as needed with some relief  Feels like she has a "nervous stomach"    Abdominal Pain  This is a new problem  Associated symptoms include constipation, nausea and vomiting  Pertinent negatives include no arthralgias, diarrhea, dysuria, fever or headaches  Associated symptoms comments: abdominal cramping, bloating, sulfa taste when she brups   Treatments tried: suppository, zofran         Past Medical History:   Diagnosis Date    Acid reflux     Anxiety     Diabetes mellitus ([address] )     Disease of thyroid gland Years    GERD (gastroesophageal reflux disease) Years    Hypertension     Hypothyroid     Obesity Years    Shingles 2 years ago       Past Surgical History:   Procedure Laterality Date     SECTION      CHOLECYSTECTOMY      COLONOSCOPY      DILATION AND CURETTAGE OF UTERUS      ESOPHAGOGASTRODUODENOSCOPY      FRACTURE SURGERY      HYSTERECTOMY      45    [address]OPH[address]Y      [address]    NV COLONOSCOPY FLX DX W/COLLJ SPEC WHEN PFRMD N/A 2016    Procedure: EGD AND COLONOSCOPY;  Surgeon: Clay Joseph MD;  [facility];   Service: Gastroenterology    RI INCISE FINGER TENDON SHEATH Right 1/31/2017    Procedure: LONG FINGER TRIGGER RELEASE ;  Surgeon: Griffin West MD;  [facility];  Service: Orthopedics    RI INCISE FINGER TENDON SHEATH Right 7/21/2016    Procedure: RELEASE TRIGGER FINGER - LONG FINGER;  Surgeon: Griffin West MD;  [facility];  Service: Orthopedics    RI OPEN RX FEMUR FX+INTRAMED SHERRY Left 2/3/2021    Procedure: INSERTION NAIL IM FEMUR ANTEGRADE (TROCHANTERIC);   Surgeon: Leon Coyle MD;  [facility];  Service: Orthopedics    RI REVISE MEDIAN N/CARPAL TUNNEL SURG Right 1/31/2017    Procedure: WRIST CARPAL TUNNEL RELEASE ; TENOLYSIS OF FPL, FDS 2-5, FDP 2-5;  APPLICATION OF Bethene Card;  Surgeon: Griffin West MD;  [facility];  Service: Orthopedics    ROOT CANAL      UPPER GASTROINTESTINAL ENDOSCOPY         Current Outpatient Medications   Medication Sig Dispense Refill    Blood Glucose Monitoring Suppl (ACCU-CHEK MAT PLUS) w/Device KIT Test 4 times daily 1 kit 0    Cholecalciferol (VITAMIN D) 2000 units tablet TAKE ONE TABLET BY MOUTH EVERY DAY 30 tablet 0    cyanocobalamin (VITAMIN B-12) 1,000 mcg tablet Take 1,000 mcg by mouth daily      docusate sodium (COLACE) 100 mg capsule Take 1 capsule (100 mg total) by mouth 2 (two) times a day 10 capsule 0    glimepiride (AMARYL) 4 mg tablet Take 1 tablet (4 mg total) by mouth 2 (two) times a day 180 tablet 1    glucose blood (FREESTYLE LITE) test strip Use 1 each 4 (four) times a day Use 4 times daily DM2-- free style lite test strips 400 each 1    insulin aspart, w/niacinamide, (FIASP) 100 Units/mL injection pen Inject 12 Units under the skin 4 (four) times a day Please send 5  pens with 5 refills (Patient taking differently: Inject 12 Units under the skin 3 (three) times a day with meals Please send 5  pens with 5 refills ) 3 mL 5    insulin glargine (Lantus SoloStar) 100 units/mL injection pen 25 units in AM and 35 units in PM 15 mL 5    Insulin Pen Needle (BD Pen Needle Kelly U/F) 32G X 4 MM MISC Use 4 (four) times a day 400 each 3    Lancets (freestyle) lancets Use 3 (three) times a day Test blood glucose 90 each 5    levothyroxine 100 mcg tablet Take 1 tablet (100 mcg total) by mouth daily 90 tablet 0    lisinopril-hydrochlorothiazide (PRINZIDE,ZESTORETIC) 20-12 5 MG per tablet Take 0 5 tablets by mouth daily 15 tablet 5    methocarbamol (ROBAXIN) 500 mg tablet Take 1 tablet (500 mg total) by mouth 4 (four) times a day 30 tablet 0    omeprazole (PriLOSEC) 40 MG capsule Take 1 capsule (40 mg total) by mouth daily 90 capsule 1    ondansetron (ZOFRAN) 4 mg tablet Take 1 tablet (4 mg total) by mouth every 8 (eight) hours as needed for nausea or vomiting 20 tablet 0    sertraline (ZOLOFT) 100 mg tablet Take 1 tablet (100 mg total) by mouth daily 90 tablet 1    metoclopramide (Reglan) 10 mg tablet Take 1 tablet (10 mg total) by mouth 4 (four) times a day 60 tablet 0     No current facility-administered medications for this visit  Allergies   Allergen Reactions    Metformin Diarrhea       Review of Systems   Constitutional: Negative for activity change, appetite change, chills, diaphoresis and fever  HENT: Negative for congestion, ear discharge, ear pain, postnasal drip, rhinorrhea, sinus pressure, sinus pain and sore throat  Eyes: Negative for pain, discharge, itching and visual disturbance  Respiratory: Negative for cough, chest tightness, shortness of breath and wheezing  Cardiovascular: Negative for chest pain, palpitations and leg swelling  Gastrointestinal: Positive for abdominal pain, constipation, nausea and vomiting  Negative for diarrhea  Endocrine: Negative for polydipsia, polyphagia and polyuria  Genitourinary: Negative for difficulty urinating, dysuria and urgency  Musculoskeletal: Negative for arthralgias, back pain and neck pain  Skin: Negative for rash and wound  Neurological: Negative for dizziness, weakness, numbness and headaches  Video Exam    Vitals:    01/07/22 1022   Weight: 94 8 kg (209 lb)   Height: 5' 2 6" (1 59 m)       Physical Exam  Constitutional:       General: She is not in acute distress  HENT:      Mouth/Throat:      Pharynx: No oropharyngeal exudate  Eyes:      General: No scleral icterus  Pulmonary:      Effort: No respiratory distress  Neurological:      Mental Status: She is alert and oriented to person, place, and time  Psychiatric:         Behavior: Behavior normal          Thought Content: Thought content normal          Judgment: Judgment normal           I spent 15 minutes directly with the patient during this visit    VIRTUAL VISIT Ree Willis verbally agrees to participate in [employer]  Pt is aware that [employer] could be limited without vital signs or the ability to perform a full hands-on physical exam  Kelsea Fong understands she or the provider may request at any time to terminate the video visit and request the patient to seek care or treatment in person

## 2022-01-07 NOTE — ASSESSMENT & PLAN NOTE
Lab Results   Component Value Date    HGBA1C 6 8 (H) 11/09/2021   Will start reglan, advised increased water intake and bland diet  Sent in PRN Zofran  Follow-up if no relief of symptoms or worsening of symptoms  May need to consider referral to GI

## 2022-01-08 DIAGNOSIS — E11.649 UNCONTROLLED TYPE 2 DIABETES MELLITUS WITH HYPOGLYCEMIA WITHOUT COMA (HCC): ICD-10-CM

## 2022-01-10 RX ORDER — INSULIN GLARGINE 100 [IU]/ML
INJECTION, SOLUTION SUBCUTANEOUS
Qty: 15 ML | Refills: 0 | Status: SHIPPED | OUTPATIENT
Start: 2022-01-10 | End: 2022-02-07 | Stop reason: SDUPTHER

## 2022-01-24 ENCOUNTER — CONSULT (OUTPATIENT)
Dept: GASTROENTEROLOGY | Facility: AMBULARY SURGERY CENTER | Age: 67
End: 2022-01-24
Payer: MEDICARE

## 2022-01-24 VITALS
SYSTOLIC BLOOD PRESSURE: 182 MMHG | RESPIRATION RATE: 18 BRPM | HEIGHT: 62 IN | BODY MASS INDEX: 39.01 KG/M2 | HEART RATE: 82 BPM | DIASTOLIC BLOOD PRESSURE: 82 MMHG | WEIGHT: 212 LBS | OXYGEN SATURATION: 98 %

## 2022-01-24 DIAGNOSIS — S72.002A CLOSED FRACTURE OF NECK OF LEFT FEMUR, INITIAL ENCOUNTER (HCC): ICD-10-CM

## 2022-01-24 DIAGNOSIS — K59.00 CONSTIPATION, UNSPECIFIED CONSTIPATION TYPE: ICD-10-CM

## 2022-01-24 DIAGNOSIS — K21.9 GASTROESOPHAGEAL REFLUX DISEASE WITHOUT ESOPHAGITIS: ICD-10-CM

## 2022-01-24 DIAGNOSIS — K76.0 NAFLD (NONALCOHOLIC FATTY LIVER DISEASE): ICD-10-CM

## 2022-01-24 DIAGNOSIS — K31.84 GASTROPARESIS: Primary | ICD-10-CM

## 2022-01-24 PROCEDURE — 99204 OFFICE O/P NEW MOD 45 MIN: CPT | Performed by: INTERNAL MEDICINE

## 2022-01-24 RX ORDER — DOCUSATE SODIUM 100 MG/1
100 CAPSULE, LIQUID FILLED ORAL 2 TIMES DAILY
Qty: 10 CAPSULE | Refills: 0 | Status: SHIPPED | OUTPATIENT
Start: 2022-01-24 | End: 2022-02-17 | Stop reason: SDUPTHER

## 2022-01-24 RX ORDER — FAMOTIDINE 20 MG/1
20 TABLET, FILM COATED ORAL 2 TIMES DAILY
Qty: 60 TABLET | Refills: 11 | Status: SHIPPED | OUTPATIENT
Start: 2022-01-24

## 2022-01-24 RX ORDER — MAGNESIUM CARB/ALUMINUM HYDROX 105-160MG
296 TABLET,CHEWABLE ORAL ONCE
Qty: 296 ML | Refills: 0 | Status: SHIPPED | OUTPATIENT
Start: 2022-01-24 | End: 2022-01-25

## 2022-01-24 NOTE — ASSESSMENT & PLAN NOTE
Gastroesophageal reflux disease - Patient has the symptoms of chronic acid reflux for a long time  Possible hiatal hernia or LES weakness  Should rule out Garrett's esophagus because of chronic symptoms         -Patient was explained about the lifestyle and dietary modifications  Advised to avoid fatty foods, chocolates, caffeine, alcohol and any other triggering foods  Avoid eating for at least 3 hours before going to bed         -discussed about the long-term side effects from omeprazole and advised her to try Pepcid instead  Prescription was sent to the pharmacy      -schedule for EGD

## 2022-01-24 NOTE — ASSESSMENT & PLAN NOTE
Patient with history of gastroparesis reports having occasional symptoms     -advised about small frequent low-fat diet

## 2022-01-24 NOTE — PROGRESS NOTES
Consultation - 126 [facility]  David Klaudiamarc 1955 female         Chief Complaint:  Constipation and history of gastroparesis    HPI:  50-year-old obese female with history of diabetes mellitus, gastroparesis, hypertension, hypothyroidism, anxiety, GERD reports having problems with constipation recently  Denies any blood or mucus in the stool  She had colonoscopy in 2016  She reports having symptoms of fullness and vomiting and was told about gastroparesis in the past   She has problems with acid reflux for which she takes omeprazole regularly  Denies any abdominal pain  Denies any difficulty swallowing  REVIEW OF SYSTEMS: Review of Systems   Constitutional: Negative for activity change, appetite change, chills, diaphoresis, fatigue, fever and unexpected weight change  HENT: Negative for ear discharge, ear pain, facial swelling, hearing loss, nosebleeds, sore throat, tinnitus and voice change  Eyes: Negative for pain, discharge, redness, itching and visual disturbance  Respiratory: Negative for apnea, cough, chest tightness, shortness of breath and wheezing  Cardiovascular: Negative for chest pain and palpitations  Gastrointestinal:        As noted in HPI   Endocrine: Negative for cold intolerance, heat intolerance and polyuria  Genitourinary: Positive for frequency  Negative for difficulty urinating, dysuria, flank pain, hematuria and urgency  Musculoskeletal: Positive for myalgias  Negative for arthralgias, back pain, gait problem and joint swelling  Skin: Negative for rash and wound  Neurological: Negative for dizziness, tremors, seizures, speech difficulty, light-headedness, numbness and headaches  Hematological: Negative for adenopathy  Does not bruise/bleed easily  Psychiatric/Behavioral: Negative for agitation, behavioral problems and confusion  The patient is not nervous/anxious           Past Medical History:   Diagnosis Date    Acid reflux     Anxiety     Diabetes mellitus ([facility] [address] )     Disease of thyroid gland Years    Gastroparesis     GERD (gastroesophageal reflux disease) Years    Hypertension     Hypothyroid     Obesity Years    Shingles 2 years ago      Past Surgical History:   Procedure Laterality Date     SECTION      CHOLECYSTECTOMY      COLONOSCOPY      DILATION AND CURETTAGE OF UTERUS      ESOPHAGOGASTRODUODENOSCOPY      FRACTURE SURGERY      HYSTERECTOMY      45    OOPHORECTOMY      38    [place] COLONOSCOPY FLX DX W/COLLJ SPEC WHEN PFRMD N/A 2016    Procedure: EGD AND COLONOSCOPY;  Surgeon: Jonathan Davis MD;  [facility]; Service: Gastroenterology    [place] INCISE FINGER TENDON SHEATH Right 2017    Procedure: LONG FINGER TRIGGER RELEASE ;  Surgeon: Sarita Whitman MD;  [facility];  Service: Orthopedics    [place] INCISE FINGER TENDON SHEATH Right 2016    Procedure: RELEASE TRIGGER FINGER - LONG FINGER;  Surgeon: Sarita Whitman MD;  [facility];  Service: Orthopedics    [place] OPEN RX FEMUR FX+INTRAMED SHERRY Left 2/3/2021    Procedure: INSERTION NAIL IM FEMUR ANTEGRADE (TROCHANTERIC);   Surgeon: Alexander Dickens MD;  [facility];  Service: Orthopedics    [place] REVISE MEDIAN N/CARPAL TUNNEL SURG Right 2017    Procedure: WRIST CARPAL TUNNEL RELEASE ; TENOLYSIS OF FPL, FDS 2-5, FDP 2-5;  APPLICATION OF Sudestiny Lay;  Surgeon: Sarita Whitman MD;  [facility];  Service: Orthopedics    ROOT CANAL      UPPER GASTROINTESTINAL ENDOSCOPY       Social History     Socioeconomic History    Marital status: Legally      Spouse name: Not on file    Number of children: Not on file    Years of education: Not on file    Highest education level: Not on file   Occupational History    Not on file   Tobacco Use    Smoking status: Former Smoker     Packs/day: 2 00     Years: 10 00     Pack years: 20 00     Types: Cigarettes     Start date: 7/3/1964     Quit date:  Years since quittin 1    Smokeless tobacco: Never Used   Vaping Use    Vaping Use: Never used   Substance and Sexual Activity    Alcohol use:  Yes     Alcohol/week: 0 0 standard drinks     Comment: Rarely    Drug use: No    Sexual activity: Not Currently   Other Topics Concern    Not on file   Social History Narrative    Not on file     Social Determinants of Health     Financial Resource Strain: Not on file   Food Insecurity: Not on file   Transportation Needs: Not on file   Physical Activity: Not on file   Stress: Not on file   Social Connections: Not on file   Intimate Partner Violence: Not on file   Housing Stability: Not on file     Family History   Problem Relation Age of Onset    Heart attack Father     Diabetes type II Father     Hearing loss Father     Vision loss Father     Diabetes Father     Heart disease Father     Arthritis Maternal Grandmother     No Known Problems Paternal Grandmother     Heart disease Paternal Grandfather     Stroke Paternal Grandfather     Cirrhosis Mother     No Known Problems Sister     No Known Problems Maternal Grandfather     Breast cancer Paternal Aunt 62    Colon cancer Paternal Uncle 28    No Known Problems Sister     No Known Problems Maternal Aunt     No Known Problems Maternal Aunt     Alcohol abuse Son     Diabetes Brother      Metformin  Current Outpatient Medications   Medication Sig Dispense Refill    Cholecalciferol (VITAMIN D) 2000 units tablet TAKE ONE TABLET BY MOUTH EVERY DAY 30 tablet 0    cyanocobalamin (VITAMIN B-12) 1,000 mcg tablet Take 1,000 mcg by mouth daily      docusate sodium (COLACE) 100 mg capsule Take 1 capsule (100 mg total) by mouth 2 (two) times a day 10 capsule 0    glimepiride (AMARYL) 4 mg tablet Take 1 tablet (4 mg total) by mouth 2 (two) times a day 180 tablet 1    insulin aspart, w/niacinamide, (FIASP) 100 Units/mL injection pen Inject 12 Units under the skin 4 (four) times a day Please send 5  pens with 5 refills (Patient taking differently: Inject 12 Units under the skin 3 (three) times a day with meals Please send 5  pens with 5 refills ) 3 mL 5    insulin glargine (Lantus SoloStar) 100 units/mL injection pen 25 units in AM and 35 units in PM 15 mL 0    levothyroxine 100 mcg tablet Take 1 tablet (100 mcg total) by mouth daily 90 tablet 0    lisinopril-hydrochlorothiazide (PRINZIDE,ZESTORETIC) 20-12 5 MG per tablet Take 0 5 tablets by mouth daily 15 tablet 5    methocarbamol (ROBAXIN) 500 mg tablet Take 1 tablet (500 mg total) by mouth 4 (four) times a day 30 tablet 0    metoclopramide (Reglan) 10 mg tablet Take 1 tablet (10 mg total) by mouth 4 (four) times a day 60 tablet 0    omeprazole (PriLOSEC) 40 MG capsule Take 1 capsule (40 mg total) by mouth daily 90 capsule 1    ondansetron (ZOFRAN) 4 mg tablet Take 1 tablet (4 mg total) by mouth every 8 (eight) hours as needed for nausea or vomiting 20 tablet 0    sertraline (ZOLOFT) 100 mg tablet Take 1 tablet (100 mg total) by mouth daily 90 tablet 1    Blood Glucose Monitoring Suppl (ACCU-CHEK MAT PLUS) w/Device KIT Test 4 times daily (Patient not taking: Reported on 1/24/2022 ) 1 kit 0    famotidine (PEPCID) 20 mg tablet Take 1 tablet (20 mg total) by mouth 2 (two) times a day 60 tablet 11    glucose blood (FREESTYLE LITE) test strip Use 1 each 4 (four) times a day Use 4 times daily DM2-- free style lite test strips (Patient not taking: Reported on 1/24/2022 ) 400 each 1    Insulin Pen Needle (BD Pen Needle Kelly U/F) 32G X 4 MM MISC Use 4 (four) times a day (Patient not taking: Reported on 1/24/2022 ) 400 each 3    Lancets (freestyle) lancets Use 3 (three) times a day Test blood glucose (Patient not taking: Reported on 1/24/2022 ) 90 each 5    magnesium citrate (CITROMA) 1 745 g/30 mL oral solution Take 296 mL by mouth once for 1 dose 296 mL 0    polyethylene glycol (GOLYTELY) 4000 mL solution Take 4,000 mL by mouth once for 1 dose 4000 mL 0 No current facility-administered medications for this visit  Blood pressure (!) 182/82, pulse 82, resp  rate 18, height 5' 2" (1 575 m), weight 96 2 kg (212 lb), SpO2 98 %, not currently breastfeeding  PHYSICAL EXAM: Physical Exam  Constitutional:       Appearance: She is well-developed  HENT:      Head: Normocephalic and atraumatic  Nose: Nose normal    Eyes:      General: No scleral icterus  Right eye: No discharge  Left eye: No discharge  Conjunctiva/sclera: Conjunctivae normal    Neck:      Thyroid: No thyromegaly  Vascular: No JVD  Trachea: No tracheal deviation  Cardiovascular:      Rate and Rhythm: Normal rate and regular rhythm  Heart sounds: Normal heart sounds  No murmur heard  No friction rub  No gallop  Pulmonary:      Effort: Pulmonary effort is normal  No respiratory distress  Breath sounds: Normal breath sounds  No wheezing or rales  Chest:      Chest wall: No tenderness  Abdominal:      General: Bowel sounds are normal  There is no distension  Palpations: Abdomen is soft  There is no mass  Tenderness: There is no abdominal tenderness  There is no guarding or rebound  Hernia: No hernia is present  Musculoskeletal:         General: No tenderness or deformity  Cervical back: Neck supple  Lymphadenopathy:      Cervical: No cervical adenopathy  Skin:     General: Skin is warm and dry  Findings: No erythema or rash  Neurological:      Mental Status: She is alert and oriented to person, place, and time  Psychiatric:         Behavior: Behavior normal          Thought Content:  Thought content normal           Lab Results   Component Value Date    WBC 8 21 02/04/2021    HGB 10 2 (L) 02/04/2021    HCT 31 1 (L) 02/04/2021    MCV 85 02/04/2021     02/04/2021     Lab Results   Component Value Date    GLUCOSE 185 (H) 03/21/2015    CALCIUM 9 8 11/09/2021     12/19/2016    K 4 7 11/09/2021    CO2 28 11/09/2021     11/09/2021    BUN 20 11/09/2021    CREATININE 0 64 11/09/2021     Lab Results   Component Value Date    ALT 30 (H) 07/26/2021    AST 31 07/26/2021    ALKPHOS 75 07/26/2021    BILITOT 0 3 12/19/2016     Lab Results   Component Value Date    INR 1 03 02/02/2021    PROTIME 13 5 02/02/2021       XR ankle 3+ vw right    Result Date: 8/26/2021  Impression: No acute osseous abnormality  Workstation performed: [id number]       ASSESSMENT & PLAN:    Constipation  Appear to be physiologic  Rule out colorectal lesions     -advised to take MiraLax, stool softeners regularly    -Schedule for colonoscopy  -High-fiber diet     -Patient was given instructions about the colonoscopy prep     -Patient was explained about  the risks and benefits of the procedure  Risks including but not limited to bleeding, infection, perforation were explained in detail  Also explained about less than 100% sensitivity with the exam and other alternatives  Gastroesophageal reflux disease without esophagitis  Gastroesophageal reflux disease - Patient has the symptoms of chronic acid reflux for a long time  Possible hiatal hernia or LES weakness  Should rule out Garrett's esophagus because of chronic symptoms         -Patient was explained about the lifestyle and dietary modifications  Advised to avoid fatty foods, chocolates, caffeine, alcohol and any other triggering foods  Avoid eating for at least 3 hours before going to bed         -discussed about the long-term side effects from omeprazole and advised her to try Pepcid instead  Prescription was sent to the pharmacy      -schedule for EGD    Gastroparesis  Patient with history of gastroparesis reports having occasional symptoms     -advised about small frequent low-fat diet

## 2022-01-24 NOTE — ASSESSMENT & PLAN NOTE
Appear to be physiologic  Rule out colorectal lesions     -advised to take MiraLax, stool softeners regularly    -Schedule for colonoscopy  -High-fiber diet     -Patient was given instructions about the colonoscopy prep     -Patient was explained about  the risks and benefits of the procedure  Risks including but not limited to bleeding, infection, perforation were explained in detail  Also explained about less than 100% sensitivity with the exam and other alternatives

## 2022-01-24 NOTE — PATIENT INSTRUCTIONS
Scheduled date of EGD/colonoscopy (as of today):3/16/2022  Physician performing EGD/colonoscopy: DR Chapito Mcclure  [facility] of EGD[facility]colonoscopy[facility]  Desired bowel prep reviewed with patient:GOLYTELY/MAG CITRATE PER DR Chapito Mcclure  Instructions reviewed with patient by:WILLIAM NAVA  Clearances:  DIABETIC

## 2022-01-25 ENCOUNTER — TELEMEDICINE (OUTPATIENT)
Dept: INTERNAL MEDICINE CLINIC | Facility: OTHER | Age: 67
End: 2022-01-25
Payer: MEDICARE

## 2022-01-25 VITALS — BODY MASS INDEX: 39.01 KG/M2 | HEIGHT: 62 IN | WEIGHT: 212 LBS

## 2022-01-25 DIAGNOSIS — J06.9 VIRAL UPPER RESPIRATORY TRACT INFECTION: Primary | ICD-10-CM

## 2022-01-25 PROCEDURE — 99213 OFFICE O/P EST LOW 20 MIN: CPT | Performed by: FAMILY MEDICINE

## 2022-01-25 NOTE — PROGRESS NOTES
Virtual Regular Visit    Verification of patient location:    Patient is located in the following state in which I hold an active license [place]      Assessment/Plan:    Problem List Items Addressed This Visit        Respiratory    Viral upper respiratory tract infection - Primary    Relevant Orders    COVID Only - Collected at [facility] or [facility]        Doubt COVID however patient did check herself too quickly  Repeat COVID testing in 2 days PCR  Advised her for symptomatic care  Okay to take over-the-counter Tylenol cold and sinus or antihistamine  Call if any issues  Increase water intake       Reason for visit is   Chief Complaint   Patient presents with    COVID-19     sore throat, chest feels raw, dry cough, sneezing, no fever  started yesterday  godchild was over with same symptoms but was neg, son just went out and bought a rapid covid test she was going to do it before your call   health maintenance     due for eye exam        Encounter provider Cris Dawkins DO    Provider located at [address] [place] [address] [facility] [id number]      Recent Visits  No visits were found meeting these conditions  Showing recent visits within past 7 days and meeting all other requirements  Today's Visits  Date Type Provider Dept   01/25/22 [address], DO [facility]   Showing today's visits and meeting all other requirements  Future Appointments  No visits were found meeting these conditions  Showing future appointments within next 150 days and meeting all other requirements       The patient was identified by name and date of birth  Maria Elena Weber was informed that this is a telemedicine visit and that the visit is being conducted through  [employer] and patient was informed that this is not a secure, HIPAA-compliant platform  She agrees to proceed     My office door was closed  No one else was in the room  She acknowledged consent and understanding of privacy and security of the video platform  The patient has agreed to participate and understands they can discontinue the visit at any time  Patient is aware this is a billable service  Subjective  Jose Wall is a 78 y o  female    HPI     Patient with 1 day history for a issues as described above  Dry cough, no shortness breath no wheezing  No fevers chills no loss of taste or smell no nausea vomiting diarrhea  She had exposure to a child whose mother had COVID  Her COVID series is completed  She feels well  She also has a dry scratchy throat  She has not been taking any home medications for this  Her home rapid test that she took today was negative     Past Medical History:   Diagnosis Date    Acid reflux     Anxiety     Diabetes mellitus ([address] )     Disease of thyroid gland Years    Gastroparesis     GERD (gastroesophageal reflux disease) Years    Hypertension     Hypothyroid     Obesity Years    Shingles 2 years ago       Past Surgical History:   Procedure Laterality Date     SECTION      CHOLECYSTECTOMY      COLONOSCOPY      DILATION AND CURETTAGE OF UTERUS      ESOPHAGOGASTRODUODENOSCOPY      FRACTURE SURGERY      HYSTERECTOMY      45    OOPHORECTOMY      39    [place] COLONOSCOPY FLX DX W/COLLJ SPEC WHEN PFRMD N/A 2016    Procedure: EGD AND COLONOSCOPY;  Surgeon: Maribell Ramirez MD;  [facility];   Service: Gastroenterology    [place] INCISE FINGER TENDON SHEATH Right 2017    Procedure: LONG FINGER TRIGGER RELEASE ;  Surgeon: Rey Barillas MD;  [facility];  Service: Orthopedics    [place] INCISE FINGER TENDON SHEATH Right 2016    Procedure: RELEASE TRIGGER FINGER - LONG FINGER;  Surgeon: Rey Barillas MD;  Location:  MAIN OR;  Service: Orthopedics    [place] OPEN RX FEMUR FX+INTRAMED SHERRY Left 2/3/2021    Procedure: INSERTION NAIL IM FEMUR ANTEGRADE (TROCHANTERIC);   Surgeon: Marietta Wyatt MD;  [facility];  Service: Orthopedics    [place] REVISE MEDIAN N/CARPAL TUNNEL SURG Right 1/31/2017    Procedure: WRIST CARPAL TUNNEL RELEASE ; TENOLYSIS OF FPL, FDS 2-5, FDP 2-5;  APPLICATION OF Amadou Ovens;  Surgeon: Bernardino Saxena MD;  [facility];  Service: Orthopedics    ROOT CANAL      UPPER GASTROINTESTINAL ENDOSCOPY         Current Outpatient Medications   Medication Sig Dispense Refill    Cholecalciferol (VITAMIN D) 2000 units tablet TAKE ONE TABLET BY MOUTH EVERY DAY 30 tablet 0    cyanocobalamin (VITAMIN B-12) 1,000 mcg tablet Take 1,000 mcg by mouth daily      docusate sodium (COLACE) 100 mg capsule Take 1 capsule (100 mg total) by mouth 2 (two) times a day 10 capsule 0    famotidine (PEPCID) 20 mg tablet Take 1 tablet (20 mg total) by mouth 2 (two) times a day 60 tablet 11    glimepiride (AMARYL) 4 mg tablet Take 1 tablet (4 mg total) by mouth 2 (two) times a day 180 tablet 1    insulin aspart, w/niacinamide, (FIASP) 100 Units/mL injection pen Inject 12 Units under the skin 4 (four) times a day Please send 5  pens with 5 refills (Patient taking differently: Inject 12 Units under the skin 3 (three) times a day with meals Please send 5  pens with 5 refills ) 3 mL 5    insulin glargine (Lantus SoloStar) 100 units/mL injection pen 25 units in AM and 35 units in PM 15 mL 0    levothyroxine 100 mcg tablet Take 1 tablet (100 mcg total) by mouth daily 90 tablet 0    lisinopril-hydrochlorothiazide (PRINZIDE,ZESTORETIC) 20-12 5 MG per tablet Take 0 5 tablets by mouth daily 15 tablet 5    magnesium citrate (CITROMA) 1 745 g/30 mL oral solution Take 296 mL by mouth once for 1 dose 296 mL 0    methocarbamol (ROBAXIN) 500 mg tablet Take 1 tablet (500 mg total) by mouth 4 (four) times a day 30 tablet 0    metoclopramide (Reglan) 10 mg tablet Take 1 tablet (10 mg total) by mouth 4 (four) times a day 60 tablet 0    omeprazole (PriLOSEC) 40 MG capsule Take 1 capsule (40 mg total) by mouth daily 90 capsule 1    ondansetron (ZOFRAN) 4 mg tablet Take 1 tablet (4 mg total) by mouth every 8 (eight) hours as needed for nausea or vomiting 20 tablet 0    polyethylene glycol (GOLYTELY) 4000 mL solution Take 4,000 mL by mouth once for 1 dose 4000 mL 0    sertraline (ZOLOFT) 100 mg tablet Take 1 tablet (100 mg total) by mouth daily 90 tablet 1    Blood Glucose Monitoring Suppl (ACCU-CHEK MAT PLUS) w/Device KIT Test 4 times daily (Patient not taking: Reported on 1/24/2022 ) 1 kit 0    glucose blood (FREESTYLE LITE) test strip Use 1 each 4 (four) times a day Use 4 times daily DM2-- free style lite test strips (Patient not taking: Reported on 1/24/2022 ) 400 each 1    Insulin Pen Needle (BD Pen Needle Kelly U/F) 32G X 4 MM MISC Use 4 (four) times a day (Patient not taking: Reported on 1/24/2022 ) 400 each 3    Lancets (freestyle) lancets Use 3 (three) times a day Test blood glucose (Patient not taking: Reported on 1/24/2022 ) 90 each 5     No current facility-administered medications for this visit          Allergies   Allergen Reactions    Metformin Diarrhea       Review of Systems     Constitutional:  Denies fever or chills   Eyes:  Denies double , blurry vision or eye pain  HENT:  See HPI  Respiratory:  Denies cough or shortness of breath or wheezing  Cardiovascular:  Denies palpitations or chest pain  GI:  Denies abdominal pain, nausea, or vomiting, no loose stools, no reflux  Integument:  Denies rash , no open areas  Neurologic:  Denies headache or focal weakness, no dizziness  : no dysuria, or hematuria      Video Exam    Vitals:    01/25/22 1415   Weight: 96 2 kg (212 lb)   Height: 5' 2" (1 575 m)       Physical Exam     Constitutional:  Well developed, well nourished, no acute distress, non-toxic appearance   Eyes:  Is conjunctiva normal , non icteric sclera  HENT:  Atraumatic, non congested  Respiratory:  Nonlabored, appears comfortable, no conversational dyspnea  Cardiovascular:   no LE edema b/l  Neurologic:  no focal deficits noted   Psychiatric:  Speech and behavior appropriate , AAO x 3      I spent 3 minutes 39 seconds minutes directly with the patient during this visit    [address] Chbil verbally agrees to participate in [employer]  Pt is aware that [employer] could be limited without vital signs or the ability to perform a full hands-on physical exam  Kelsea Fong understands she or the provider may request at any time to terminate the video visit and request the patient to seek care or treatment in person

## 2022-01-26 ENCOUNTER — TELEPHONE (OUTPATIENT)
Dept: INTERNAL MEDICINE CLINIC | Age: 67
End: 2022-01-26

## 2022-01-26 NOTE — TELEPHONE ENCOUNTER
Patient is calling to let you know that she is having the dizziness, nausea but not worse that she gets to the vomiting    Just queasy like    She will go either later today or tomorrow

## 2022-01-27 PROCEDURE — U0003 INFECTIOUS AGENT DETECTION BY NUCLEIC ACID (DNA OR RNA); SEVERE ACUTE RESPIRATORY SYNDROME CORONAVIRUS 2 (SARS-COV-2) (CORONAVIRUS DISEASE [COVID-19]), AMPLIFIED PROBE TECHNIQUE, MAKING USE OF HIGH THROUGHPUT TECHNOLOGIES AS DESCRIBED BY CMS-2020-01-R: HCPCS | Performed by: FAMILY MEDICINE

## 2022-01-27 PROCEDURE — U0005 INFEC AGEN DETEC AMPLI PROBE: HCPCS | Performed by: FAMILY MEDICINE

## 2022-02-07 DIAGNOSIS — E11.649 UNCONTROLLED TYPE 2 DIABETES MELLITUS WITH HYPOGLYCEMIA WITHOUT COMA (HCC): ICD-10-CM

## 2022-02-08 ENCOUNTER — TELEPHONE (OUTPATIENT)
Dept: INTERNAL MEDICINE CLINIC | Facility: OTHER | Age: 67
End: 2022-02-08

## 2022-02-08 RX ORDER — INSULIN GLARGINE 100 [IU]/ML
INJECTION, SOLUTION SUBCUTANEOUS
Qty: 15 ML | Refills: 1 | Status: SHIPPED | OUTPATIENT
Start: 2022-02-08 | End: 2022-03-17 | Stop reason: SDUPTHER

## 2022-02-08 NOTE — TELEPHONE ENCOUNTER
Spoke to patient  She did not yet schedule her diabetic eye exam but she said she will do that because she is having problems seeing far away  Things are blurry  She will go ahead and schedule an appt on her own   She will call the office if she cannot find her referral

## 2022-02-17 DIAGNOSIS — S72.002A CLOSED FRACTURE OF NECK OF LEFT FEMUR, INITIAL ENCOUNTER (HCC): ICD-10-CM

## 2022-02-17 DIAGNOSIS — E11.43 DIABETIC GASTROPARESIS (HCC): ICD-10-CM

## 2022-02-17 DIAGNOSIS — K31.84 DIABETIC GASTROPARESIS (HCC): ICD-10-CM

## 2022-02-17 DIAGNOSIS — E11.649 UNCONTROLLED TYPE 2 DIABETES MELLITUS WITH HYPOGLYCEMIA WITHOUT COMA (HCC): ICD-10-CM

## 2022-02-17 DIAGNOSIS — K52.9 GASTROENTERITIS: ICD-10-CM

## 2022-02-18 DIAGNOSIS — K52.9 GASTROENTERITIS: ICD-10-CM

## 2022-02-18 RX ORDER — ONDANSETRON 4 MG/1
4 TABLET, FILM COATED ORAL EVERY 8 HOURS PRN
Qty: 20 TABLET | Refills: 1 | Status: SHIPPED | OUTPATIENT
Start: 2022-02-18 | End: 2022-03-17 | Stop reason: SDUPTHER

## 2022-02-18 RX ORDER — ONDANSETRON 4 MG/1
4 TABLET, FILM COATED ORAL EVERY 8 HOURS PRN
Qty: 20 TABLET | Refills: 0 | Status: CANCELLED | OUTPATIENT
Start: 2022-02-18

## 2022-02-18 RX ORDER — METOCLOPRAMIDE 10 MG/1
10 TABLET ORAL 4 TIMES DAILY
Qty: 60 TABLET | Refills: 3 | Status: SHIPPED | OUTPATIENT
Start: 2022-02-18 | End: 2022-04-12 | Stop reason: SDUPTHER

## 2022-02-18 RX ORDER — DOCUSATE SODIUM 100 MG/1
100 CAPSULE, LIQUID FILLED ORAL 2 TIMES DAILY
Qty: 30 CAPSULE | Refills: 3 | Status: SHIPPED | OUTPATIENT
Start: 2022-02-18

## 2022-03-03 ENCOUNTER — ANESTHESIA (OUTPATIENT)
Dept: ANESTHESIOLOGY | Facility: HOSPITAL | Age: 67
End: 2022-03-03

## 2022-03-03 ENCOUNTER — ANESTHESIA EVENT (OUTPATIENT)
Dept: ANESTHESIOLOGY | Facility: HOSPITAL | Age: 67
End: 2022-03-03

## 2022-03-09 ENCOUNTER — TELEPHONE (OUTPATIENT)
Dept: GASTROENTEROLOGY | Facility: CLINIC | Age: 67
End: 2022-03-09

## 2022-03-09 NOTE — TELEPHONE ENCOUNTER
Spoke w/ pt   Pt rescheduled to 4/20/2022 w/ dr Kishore Persaud at [facility]

## 2022-03-09 NOTE — TELEPHONE ENCOUNTER
Patients GI provider:  Dr Jessi Ko    Number to return call: [phone]    Reason for call: Pt calling stating that she received a phone call stating that her procedure on 3/16/2022 was cancelled, however, it is still showing up in her chart  Pt wanting to know if she still has procedure on 3/16/2022  Pt asking to speak with  to verify      Scheduled procedure/appointment date if applicable: Procedure: 3/36/9049

## 2022-03-16 LAB
ALBUMIN/CREAT UR: 8 MCG/MG CREAT
CHOLEST SERPL-MCNC: 215 MG/DL
CHOLEST/HDLC SERPL: 4.2 (CALC)
CREAT UR-MCNC: 105 MG/DL (ref 20–275)
GLUCOSE SERPL-MCNC: 147 MG/DL (ref 65–99)
HBA1C MFR BLD: 7.3 % OF TOTAL HGB
HDLC SERPL-MCNC: 51 MG/DL
LDLC SERPL CALC-MCNC: 138 MG/DL (CALC)
MICROALBUMIN UR-MCNC: 0.8 MG/DL
NONHDLC SERPL-MCNC: 164 MG/DL (CALC)
TRIGL SERPL-MCNC: 131 MG/DL

## 2022-03-17 DIAGNOSIS — I10 ESSENTIAL HYPERTENSION, BENIGN: ICD-10-CM

## 2022-03-17 DIAGNOSIS — K52.9 GASTROENTERITIS: ICD-10-CM

## 2022-03-17 DIAGNOSIS — Z79.4 TYPE 2 DIABETES MELLITUS WITH COMPLICATION, WITH LONG-TERM CURRENT USE OF INSULIN (HCC): ICD-10-CM

## 2022-03-17 DIAGNOSIS — F41.9 ANXIETY: ICD-10-CM

## 2022-03-17 DIAGNOSIS — E11.649 UNCONTROLLED TYPE 2 DIABETES MELLITUS WITH HYPOGLYCEMIA WITHOUT COMA (HCC): ICD-10-CM

## 2022-03-17 DIAGNOSIS — E11.8 TYPE 2 DIABETES MELLITUS WITH COMPLICATION, WITH LONG-TERM CURRENT USE OF INSULIN (HCC): ICD-10-CM

## 2022-03-17 RX ORDER — LISINOPRIL AND HYDROCHLOROTHIAZIDE 20; 12.5 MG/1; MG/1
0.5 TABLET ORAL DAILY
Qty: 15 TABLET | Refills: 5 | Status: SHIPPED | OUTPATIENT
Start: 2022-03-17

## 2022-03-17 RX ORDER — INSULIN GLARGINE 100 [IU]/ML
INJECTION, SOLUTION SUBCUTANEOUS
Qty: 15 ML | Refills: 5 | Status: SHIPPED | OUTPATIENT
Start: 2022-03-17

## 2022-03-17 RX ORDER — GLIMEPIRIDE 4 MG/1
4 TABLET ORAL 2 TIMES DAILY
Qty: 180 TABLET | Refills: 0 | Status: CANCELLED | OUTPATIENT
Start: 2022-03-17

## 2022-03-17 RX ORDER — SERTRALINE HYDROCHLORIDE 100 MG/1
100 TABLET, FILM COATED ORAL DAILY
Qty: 90 TABLET | Refills: 0 | Status: SHIPPED | OUTPATIENT
Start: 2022-03-17 | End: 2022-04-14 | Stop reason: SDUPTHER

## 2022-03-17 RX ORDER — ONDANSETRON 4 MG/1
4 TABLET, FILM COATED ORAL EVERY 8 HOURS PRN
Qty: 20 TABLET | Refills: 0 | Status: SHIPPED | OUTPATIENT
Start: 2022-03-17 | End: 2022-07-04 | Stop reason: SDUPTHER

## 2022-03-28 DIAGNOSIS — E11.00 UNCONTROLLED TYPE 2 DIABETES MELLITUS WITH HYPEROSMOLARITY WITHOUT COMA, WITHOUT LONG-TERM CURRENT USE OF INSULIN (HCC): ICD-10-CM

## 2022-03-28 RX ORDER — PEN NEEDLE, DIABETIC 32GX 5/32"
NEEDLE, DISPOSABLE MISCELLANEOUS 4 TIMES DAILY
Qty: 400 EACH | Refills: 0 | Status: SHIPPED | OUTPATIENT
Start: 2022-03-28 | End: 2022-07-08 | Stop reason: SDUPTHER

## 2022-03-29 NOTE — TELEPHONE ENCOUNTER
Pt has appt in April 2022 at [facility]  Instructed her to ensure they do diabetic eye exam as well

## 2022-03-30 DIAGNOSIS — E11.649 UNCONTROLLED TYPE 2 DIABETES MELLITUS WITH HYPOGLYCEMIA WITHOUT COMA (HCC): ICD-10-CM

## 2022-03-31 NOTE — TELEPHONE ENCOUNTER
Pt will call the pharmacy re refills since 5 refills were sent on 02/18/2022  She has fu appt on 04/04/2022 to address any further refills that may be needed

## 2022-04-04 ENCOUNTER — OFFICE VISIT (OUTPATIENT)
Dept: INTERNAL MEDICINE CLINIC | Age: 67
End: 2022-04-04
Payer: MEDICARE

## 2022-04-04 VITALS
HEART RATE: 70 BPM | BODY MASS INDEX: 38.4 KG/M2 | SYSTOLIC BLOOD PRESSURE: 132 MMHG | TEMPERATURE: 97.5 F | OXYGEN SATURATION: 95 % | DIASTOLIC BLOOD PRESSURE: 64 MMHG | HEIGHT: 62 IN | WEIGHT: 208.7 LBS

## 2022-04-04 DIAGNOSIS — E11.9 TYPE 2 DIABETES MELLITUS WITHOUT COMPLICATION, WITH LONG-TERM CURRENT USE OF INSULIN (HCC): Primary | ICD-10-CM

## 2022-04-04 DIAGNOSIS — N39.46 MIXED INCONTINENCE: ICD-10-CM

## 2022-04-04 DIAGNOSIS — E78.2 MIXED HYPERLIPIDEMIA: ICD-10-CM

## 2022-04-04 DIAGNOSIS — Z12.31 ENCOUNTER FOR SCREENING MAMMOGRAM FOR MALIGNANT NEOPLASM OF BREAST: ICD-10-CM

## 2022-04-04 DIAGNOSIS — Z79.4 TYPE 2 DIABETES MELLITUS WITHOUT COMPLICATION, WITH LONG-TERM CURRENT USE OF INSULIN (HCC): Primary | ICD-10-CM

## 2022-04-04 DIAGNOSIS — Z23 NEED FOR TDAP VACCINATION: ICD-10-CM

## 2022-04-04 DIAGNOSIS — Z23 NEED FOR SHINGLES VACCINE: ICD-10-CM

## 2022-04-04 PROCEDURE — 99214 OFFICE O/P EST MOD 30 MIN: CPT | Performed by: STUDENT IN AN ORGANIZED HEALTH CARE EDUCATION/TRAINING PROGRAM

## 2022-04-04 RX ORDER — ATORVASTATIN CALCIUM 20 MG/1
20 TABLET, FILM COATED ORAL DAILY
Qty: 30 TABLET | Refills: 5 | Status: SHIPPED | OUTPATIENT
Start: 2022-04-04

## 2022-04-04 NOTE — PROGRESS NOTES
INTERNAL MEDICINE OFFICE VISIT  [facility]  [address] [facility], [address]    NAME: Matilda Hernandez  AGE: 78 y o  SEX: female    DATE OF ENCOUNTER: 4/4/2022    Assessment and Plan     1  Type 2 diabetes mellitus without complication, with long-term current use of insulin (HCC)  A1C elevated ; 6 8 went up to 7 3 ; however patient noted not adherent with DM 2 Medications last couple months and express interest and willing to adhere ; education given on need for meds compliance and possible complications of DM2      - IRIS Diabetic eye exam  - HEMOGLOBIN A1C W/ EAG ESTIMATION; Future  - To continue Glargine 25+35 , Aspart 12 u QID , and Glimepiride , unchanged regimen but encouraged adherence    2  Encounter for screening mammogram for malignant neoplasm of breast  - Mammo screening bilateral w 3d & cad; Future    3  Mixed hyperlipidemia  Explained ASCVD 33 2% and need for statin; was on Crestor 5 mg QD in past but DC d/t patient was concerned of side effects/cramps however noted having intermittent cramps before/off Crestor as well  Will start with Lipitor 20, and consider up to 40 as tolerable in future visits; possible side effects discussed  - atorvastatin (LIPITOR) 20 mg tablet; Take 1 tablet (20 mg total) by mouth daily  Dispense: 30 tablet; Refill: 5    4  Need for Tdap vaccination  5  Need for shingles vaccine  Info given , will arrange at o/p pharmacy  6  Mixed Urinary incontinence:   - Noted "sometimes" leak urine when cough/strain or while on way to restroom; "sometimes" but more lately 1-2 / week  - Offered pharmacological management options or/& referral to urology; patient declined "may just use pads    not happens a lot"   - Counseled on urination timing/training as well as Kegel exercises  - Advised to consider options above if continues worsening    - Advised on adherence to DM2 Management as above         Orders Placed This Encounter Procedures    IRIS Diabetic eye exam    Mammo screening bilateral w 3d & cad    HEMOGLOBIN A1C W/ EAG ESTIMATION       - Counseling Documentation: patient was counseled regarding: instructions for management and risk factor reductions    Chief Complaint     Chief Complaint   Patient presents with    Follow-up     DM II  Review labs  pt  going to [facility] in [place] on [address]  History of Present Illness     Amrita Fong 79 yo F w PMH of DM2, HTN, HLD presents today to f/u on chronic conditions, discussed elevated A1C and Lipid panel and need for management as above, agreeable, was not adherent to Meds x 2 month or so, express interest to better adherence; noted incontinence, see above for details  Will follow up in 3 month or sooner if needed  BP acceptable on current regimen, however will recheck again next visit any my consider adjust if indicated  The following portions of the patient's history were reviewed and updated as appropriate: allergies, current medications, past family history, past medical history, past social history, past surgical history and problem list     Review of Systems   - GENERAL: Negative for any nausea, vomiting, fevers, chills, or weight loss  - HEENT: Negative for any head/Neck trauma, pain, double/blurry vision, sinusitis, rhinitis, nose bleeding   - CARDIAC: Negative for any chest pain, palpitation, Dyspnea on exertion, peripheral edema  - PULMONARY: Negative for any SOB, cough, wheezing    - GASTROINTESTINAL: Negative for any abdominal pain, N/V/D/C, blood in stool    - GENITOURINARY: Urinary incontinence , see HPI   Negative for any dysuria, hematuria,    - NEUROLOGIC: Negative for any muscle weakness, numbness/tingling, memory changes  - MUSCULOSKELETAL: Negative for any joint pains/swelling, limited ROM     - INTEGUMENTARY: Negative for any rashes, cuts/ lesions   - HEMATOLOGIC: Negative for any abnormal bruising, frequent infections or bleeding  Review of Systems    Active Problem List     Patient Active Problem List   Diagnosis    Anxiety    Type 2 diabetes mellitus, with long-term current use of insulin ([facility] [address] )    Diabetic gastroparesis (HCC)    GERD (gastroesophageal reflux disease)    Hypothyroidism    Fatty liver    Hand paresthesia    Irritable bowel syndrome    Obesity    Osteoarthritis, hip, bilateral    Splenomegaly    Essential hypertension    Seasonal allergies    Gastroenteritis    LFT elevation    Viral upper respiratory tract infection    Obesity, morbid (HCC)    UTI (urinary tract infection)    Statin intolerance    Chronic pain of right ankle    Achilles tendinitis, right leg    Gastroparesis    Constipation    Gastroesophageal reflux disease without esophagitis       Objective   Physical Exam:  - GEN: obese; Appears well, alert and oriented x 4, pleasant and cooperative, in no acute distress  - HEENT: Anicteric, mucous membranes moist, PERRL and EOMI   - NECK: No lymphadenopathy, JVD or carotid bruits   - HEART: RRR, normal S1 and S2, no murmurs, clicks, gallops or rubs   - LUNGS: Clear to auscultation bilaterally; no wheezes, rales, or rhonchi  - ABDOMEN: Normal bowel sounds, soft, no tenderness, no distention, no organomegaly or masses felt on exam    - EXTREMITIES: Peripheral pulses normal; no clubbing, cyanosis, or edema  - NEURO: No focal findings, CN II-XII are grossly intact  - Musculoskeletal: 5/5 strength, normal ROM, no swollen or erythematous joints     - SKIN: Normal without suspicious lesions on exposed skin    /64 (BP Location: Left arm, Patient Position: Sitting, Cuff Size: Standard)   Pulse 70   Temp 97 5 °F (36 4 °C) (Temporal)   Ht 5' 2" (1 575 m)   Wt 94 7 kg (208 lb 11 2 oz)   SpO2 95%   BMI 38 17 kg/m²     Pertinent Laboratory/Diagnostic Studies:  CBC:   Lab Results   Component Value Date/Time    WBC 8 21 02/04/2021 05:55 AM    WBC 6 3 12/19/2016 08:05 AM    WBC 6 3 12/19/2016 08:05 AM    RBC 3 65 (L) 02/04/2021 05:55 AM    RBC 4 64 12/19/2016 08:05 AM    RBC 4 64 12/19/2016 08:05 AM    HGB 10 2 (L) 02/04/2021 05:55 AM    HGB 12 3 12/19/2016 08:05 AM    HGB 12 3 12/19/2016 08:05 AM    HCT 31 1 (L) 02/04/2021 05:55 AM    HCT 37 8 12/19/2016 08:05 AM    HCT 37 8 12/19/2016 08:05 AM    MCV 85 02/04/2021 05:55 AM    MCV 81 5 12/19/2016 08:05 AM    MCV 81 5 12/19/2016 08:05 AM    MCH 27 9 02/04/2021 05:55 AM    MCH 26 4 (L) 12/19/2016 08:05 AM    MCH 26 4 (L) 12/19/2016 08:05 AM    MCHC 32 8 02/04/2021 05:55 AM    MCHC 32 4 12/19/2016 08:05 AM    MCHC 32 4 12/19/2016 08:05 AM    RDW 13 7 02/04/2021 05:55 AM    RDW 14 9 12/19/2016 08:05 AM    RDW 14 9 12/19/2016 08:05 AM    MPV 10 7 02/04/2021 05:55 AM    MPV 8 1 12/19/2016 08:05 AM    MPV 8 1 12/19/2016 08:05 AM     02/04/2021 05:55 AM     12/19/2016 08:05 AM     12/19/2016 08:05 AM    NRBC 0 02/04/2021 05:55 AM    NEUTOPHILPCT 75 02/04/2021 05:55 AM    NEUTOPHILPCT 66 03/21/2015 11:34 AM    LYMPHOPCT 17 02/04/2021 05:55 AM    LYMPHOPCT 25 03/21/2015 11:34 AM    MONOPCT 7 02/04/2021 05:55 AM    MONOPCT 6 03/21/2015 11:34 AM    EOSPCT 1 02/04/2021 05:55 AM    EOSPCT 3 03/21/2015 11:34 AM    BASOPCT 0 02/04/2021 05:55 AM    BASOPCT 0 4 07/28/2016 10:01 AM    NEUTROABS 6 08 02/04/2021 05:55 AM    NEUTROABS 4466 07/28/2016 10:01 AM    NEUTROABS 68 7 07/28/2016 10:01 AM    LYMPHSABS 1 39 02/04/2021 05:55 AM    LYMPHSABS 1469 07/28/2016 10:01 AM    LYMPHSABS 22 6 07/28/2016 10:01 AM    MONOSABS 0 60 02/04/2021 05:55 AM    MONOSABS 358 07/28/2016 10:01 AM    EOSABS 0 09 02/04/2021 05:55 AM    EOSABS 182 07/28/2016 10:01 AM    EOSABS 2 8 07/28/2016 10:01 AM     Chemistry Profile:   Lab Results   Component Value Date/Time     12/19/2016 08:05 AM    K 4 7 11/09/2021 09:12 AM     11/09/2021 09:12 AM    CO2 28 11/09/2021 09:12 AM    ANIONGAP 4 03/21/2015 11:34 AM    BUN 20 11/09/2021 09:12 AM    CREATININE 0 64 11/09/2021 09:12 AM    CREATININE 0 62 02/04/2021 05:55 AM    CREATININE 0 61 12/19/2016 08:05 AM    GLUC 147 (H) 03/16/2022 08:42 AM    GLUF 232 (H) 03/23/2020 08:27 AM    GLUCOSE 185 (H) 03/21/2015 11:34 AM    CALCIUM 9 8 11/09/2021 09:12 AM    AST 31 07/26/2021 09:27 AM    ALT 30 (H) 07/26/2021 09:27 AM    ALKPHOS 75 07/26/2021 09:27 AM    PROT 7 1 12/19/2016 08:05 AM    BILITOT 0 3 12/19/2016 08:05 AM    EGFR 94 02/04/2021 05:55 AM     Endocrine Studies:   Lab Results   Component Value Date/Time    HGBA1C 7 3 (H) 03/16/2022 08:42 AM    ALK3CLAREOGE 5 360 (H) 02/02/2021 06:11 PM    INB0TBHPFYXP 3 84 12/19/2016 08:05 AM    FREET4 0 85 02/03/2021 05:07 AM    TRIG 131 03/16/2022 08:42 AM    CHOL 180 12/19/2016 08:05 AM    CHOL 180 12/19/2016 08:05 AM    CHOLESTEROL 215 (H) 03/16/2022 08:42 AM    HDL 51 03/16/2022 08:42 AM    LDLCALC 138 (H) 03/16/2022 08:42 AM    NONHDLC 144 03/23/2020 08:27 AM    NONHDLC 136 12/19/2016 08:05 AM    NONHDLC 136 12/19/2016 08:05 AM    [id number] 39 2 02/02/2021 06:11 PM    [id number] 26 (L) 12/19/2016 08:05 AM    PTH 48 5 02/02/2021 06:11 PM       Current Medications     Current Outpatient Medications:     Cholecalciferol (VITAMIN D) 2000 units tablet, TAKE ONE TABLET BY MOUTH EVERY DAY, Disp: 30 tablet, Rfl: 0    cyanocobalamin (VITAMIN B-12) 1,000 mcg tablet, Take 1,000 mcg by mouth daily, Disp: , Rfl:     docusate sodium (COLACE) 100 mg capsule, Take 1 capsule (100 mg total) by mouth 2 (two) times a day, Disp: 30 capsule, Rfl: 3    famotidine (PEPCID) 20 mg tablet, Take 1 tablet (20 mg total) by mouth 2 (two) times a day, Disp: 60 tablet, Rfl: 11    glimepiride (AMARYL) 4 mg tablet, Take 1 tablet (4 mg total) by mouth 2 (two) times a day, Disp: 180 tablet, Rfl: 1    glucose blood (FREESTYLE LITE) test strip, Use 1 each 4 (four) times a day Use 4 times daily DM2-- free style lite test strips, Disp: 400 each, Rfl: 1    insulin aspart, w/niacinamide, (FIASP) 100 Units/mL injection pen, Inject 12 Units under the skin 4 (four) times a day Please send 5  pens with 5 refills, Disp: 3 mL, Rfl: 5    insulin glargine (Lantus SoloStar) 100 units/mL injection pen, 25 units in AM and 35 units in PM, Disp: 15 mL, Rfl: 5    Insulin Pen Needle (BD Pen Needle Kelly U/F) 32G X 4 MM MISC, Use 4 (four) times a day, Disp: 400 each, Rfl: 0    Lancets (freestyle) lancets, Use 3 (three) times a day Test blood glucose, Disp: 90 each, Rfl: 5    levothyroxine 100 mcg tablet, Take 1 tablet (100 mcg total) by mouth daily, Disp: 90 tablet, Rfl: 0    lisinopril-hydrochlorothiazide (PRINZIDE,ZESTORETIC) 20-12 5 MG per tablet, Take 0 5 tablets by mouth daily, Disp: 15 tablet, Rfl: 5    methocarbamol (ROBAXIN) 500 mg tablet, Take 1 tablet (500 mg total) by mouth 4 (four) times a day, Disp: 30 tablet, Rfl: 0    metoclopramide (Reglan) 10 mg tablet, Take 1 tablet (10 mg total) by mouth 4 (four) times a day, Disp: 60 tablet, Rfl: 3    ondansetron (ZOFRAN) 4 mg tablet, Take 1 tablet (4 mg total) by mouth every 8 (eight) hours as needed for nausea or vomiting, Disp: 20 tablet, Rfl: 0    sertraline (ZOLOFT) 100 mg tablet, Take 1 tablet (100 mg total) by mouth daily, Disp: 90 tablet, Rfl: 0    atorvastatin (LIPITOR) 20 mg tablet, Take 1 tablet (20 mg total) by mouth daily, Disp: 30 tablet, Rfl: 5    Blood Glucose Monitoring Suppl (ACCU-CHEK MAT PLUS) w/Device KIT, Test 4 times daily (Patient not taking: Reported on 1/24/2022 ), Disp: 1 kit, Rfl: 0    magnesium citrate (CITROMA) 1 745 g/30 mL oral solution, Take 296 mL by mouth once for 1 dose (Patient not taking: Reported on 4/4/2022 ), Disp: 296 mL, Rfl: 0    omeprazole (PriLOSEC) 40 MG capsule, Take 1 capsule (40 mg total) by mouth daily (Patient not taking: Reported on 4/4/2022 ), Disp: 90 capsule, Rfl: 1    polyethylene glycol (GOLYTELY) 4000 mL solution, Take 4,000 mL by mouth once for 1 dose (Patient not taking: Reported on 4/4/2022 ), Disp: 4000 mL, Rfl: 0    Health Maintenance     Health Maintenance   Topic Date Due    DTaP,Tdap,and Td Vaccines (1 - Tdap) Never done    DM Eye Exam  11/14/2021    HEMOGLOBIN A1C  06/16/2022    Breast Cancer Screening: Mammogram  06/23/2022    Influenza Vaccine (1) 06/30/2022 (Originally 9/1/2021)    Diabetic Foot Exam  07/28/2022    Fall Risk  11/10/2022    Depression Screening  11/10/2022    Medicare Annual Wellness Visit (AWV)  11/10/2022    BMI: Followup Plan  11/10/2022    DXA SCAN  11/20/2022    BMI: Adult  04/04/2023    Pneumococcal Vaccine: 65+ Years (2 of 2 - PPSV23) 03/26/2024    Colorectal Cancer Screening  01/28/2026    Hepatitis C Screening  Completed    Osteoporosis Screening  Completed    COVID-19 Vaccine  Completed    HIB Vaccine  Aged Out    Hepatitis B Vaccine  Aged Out    IPV Vaccine  Aged Out    Hepatitis A Vaccine  Aged Out    Meningococcal ACWY Vaccine  Aged Out    HPV Vaccine  Aged Out     Immunization History   Administered Date(s) Administered    COVID-19 PFIZER VACCINE 0 3 ML IM 03/13/2021, 04/03/2021, 01/11/2022    Influenza Quadrivalent Preservative Free 3 years and older IM 11/05/2015    Influenza, high dose seasonal 0 7 mL 11/12/2019    Influenza, seasonal, injectable, preservative free 02/06/2018    Pneumococcal Conjugate 13-Valent 07/26/2016    Pneumococcal Polysaccharide PPV23 03/26/2019    influenza, trivalent, adjuvanted 09/21/2020       Jacquelyn Mcclure DO   Internal Medicine - [id number]    4/4/2022 5:03 PM

## 2022-04-06 ENCOUNTER — ANESTHESIA EVENT (OUTPATIENT)
Dept: ANESTHESIOLOGY | Facility: HOSPITAL | Age: 67
End: 2022-04-06

## 2022-04-06 ENCOUNTER — ANESTHESIA (OUTPATIENT)
Dept: ANESTHESIOLOGY | Facility: HOSPITAL | Age: 67
End: 2022-04-06

## 2022-04-12 DIAGNOSIS — E11.43 DIABETIC GASTROPARESIS (HCC): ICD-10-CM

## 2022-04-12 DIAGNOSIS — K31.84 DIABETIC GASTROPARESIS (HCC): ICD-10-CM

## 2022-04-12 RX ORDER — METOCLOPRAMIDE 10 MG/1
10 TABLET ORAL 4 TIMES DAILY
Qty: 60 TABLET | Refills: 1 | Status: SHIPPED | OUTPATIENT
Start: 2022-04-12 | End: 2022-05-26 | Stop reason: SDUPTHER

## 2022-04-14 ENCOUNTER — TELEPHONE (OUTPATIENT)
Dept: INTERNAL MEDICINE CLINIC | Age: 67
End: 2022-04-14

## 2022-04-14 NOTE — TELEPHONE ENCOUNTER
Will increase her Zoloft to 1 and half that is 150 mg and see how she does and then we will go from there I sent in a new prescription to the pharmacy

## 2022-04-14 NOTE — TELEPHONE ENCOUNTER
Patient has had a recent increase in stress and anxiety  Called to see if Dr Donato Alvarez could increase her Zoloft or add another prescription to help  Currently, her zoloft is not helping with the increased anxiety she is experiencing  Please advise

## 2022-04-19 ENCOUNTER — TELEPHONE (OUTPATIENT)
Dept: GASTROENTEROLOGY | Facility: CLINIC | Age: 67
End: 2022-04-19

## 2022-04-19 ENCOUNTER — TELEPHONE (OUTPATIENT)
Dept: INTERNAL MEDICINE CLINIC | Age: 67
End: 2022-04-19

## 2022-04-19 NOTE — TELEPHONE ENCOUNTER
She can do that or take both together, she need to wait for 2-3 week , does not work right away may need a follow up in next 3-4 week

## 2022-04-19 NOTE — TELEPHONE ENCOUNTER
Patients GI provider:  Dr Kimberlye García    Number to return call: [phone]     Reason for call: Pt calling to cancel her colonoscopy and EGD, pt will callback when she is ready to reschedule      Scheduled procedure/appointment date if applicable: Procedure: 4/35/4663

## 2022-04-19 NOTE — TELEPHONE ENCOUNTER
Patient is calling to state that the increase that you have her taking of the Zoloft is not helping her at all  She states that she is feeling no different when take the 1 5 mg of the 100 mg tablets  Can she increase it to 2 tablets a day  Should she take them both together or once in the morning and one in the evening if this is ok to do      Please advise and call her back at the mobile phone

## 2022-04-25 DIAGNOSIS — F41.8 ANXIOUS DEPRESSION: ICD-10-CM

## 2022-04-25 NOTE — TELEPHONE ENCOUNTER
Patient was prescribed Clonazepam 0 5 mg take 0 5 mg bid  Patient states it is helping somewhat, but would like to have it increased to 1 tablet bid now  I did state that I would send it out to the other providers since Dr Anjel Ross is not in this week        Would anyone be able to do this for the patient or would she need to wait till Dr Anjel Ross comes back next week    Please call her back at the mobile phone

## 2022-04-25 NOTE — TELEPHONE ENCOUNTER
Nothing was done on her medication list still stating 0 5mg bid  She wants to know if ok to change it to 1 tablet BID    Please advise

## 2022-04-25 NOTE — TELEPHONE ENCOUNTER
Patient is calling back to find out if any changes will be done for the medication that she is requesting to be increased      Please call her mobile phone for an answer if she can or not    Mobile phone

## 2022-04-26 NOTE — TELEPHONE ENCOUNTER
Patient called back again  I stated to her that she needs to take the 0 5 mg twice a day till you here back from us  It may not be this week if no one feels comfortable changing the dosage till Dr Germania Sawant comes back  Patient understood even though she did state to me that she did take one tablet by mouth bid without our consent to do      Please advise if anyone will do the changes to the medication or if patient would need to wait till Dr Germania Sawant comes back next week

## 2022-05-03 RX ORDER — CLONAZEPAM 0.5 MG/1
0.5 TABLET ORAL 2 TIMES DAILY
Qty: 60 TABLET | Refills: 0 | Status: SHIPPED | OUTPATIENT
Start: 2022-05-03 | End: 2022-05-26 | Stop reason: SDUPTHER

## 2022-05-03 NOTE — TELEPHONE ENCOUNTER
Pt called again about increasing her Clonazepam from 1/2 tab BID to 1 tab BID  She "has had a lot of stressful situations at home, which is why she needs her meds increased  Please advise if this would be ok to increase

## 2022-05-06 ENCOUNTER — TELEPHONE (OUTPATIENT)
Dept: PSYCHIATRY | Facility: CLINIC | Age: 67
End: 2022-05-06

## 2022-05-09 ENCOUNTER — OFFICE VISIT (OUTPATIENT)
Dept: INTERNAL MEDICINE CLINIC | Age: 67
End: 2022-05-09
Payer: MEDICARE

## 2022-05-09 VITALS
WEIGHT: 196 LBS | OXYGEN SATURATION: 96 % | TEMPERATURE: 97.4 F | HEART RATE: 79 BPM | BODY MASS INDEX: 36.07 KG/M2 | SYSTOLIC BLOOD PRESSURE: 132 MMHG | HEIGHT: 62 IN | DIASTOLIC BLOOD PRESSURE: 64 MMHG

## 2022-05-09 DIAGNOSIS — F41.9 ANXIETY: ICD-10-CM

## 2022-05-09 DIAGNOSIS — E11.9 TYPE 2 DIABETES MELLITUS WITHOUT COMPLICATION, WITH LONG-TERM CURRENT USE OF INSULIN (HCC): ICD-10-CM

## 2022-05-09 DIAGNOSIS — E03.9 HYPOTHYROIDISM, UNSPECIFIED TYPE: ICD-10-CM

## 2022-05-09 DIAGNOSIS — E78.2 MIXED HYPERLIPIDEMIA: ICD-10-CM

## 2022-05-09 DIAGNOSIS — K21.9 GASTROESOPHAGEAL REFLUX DISEASE WITHOUT ESOPHAGITIS: Primary | ICD-10-CM

## 2022-05-09 DIAGNOSIS — I10 ESSENTIAL HYPERTENSION: ICD-10-CM

## 2022-05-09 DIAGNOSIS — Z79.4 TYPE 2 DIABETES MELLITUS WITHOUT COMPLICATION, WITH LONG-TERM CURRENT USE OF INSULIN (HCC): ICD-10-CM

## 2022-05-09 DIAGNOSIS — E66.01 OBESITY, MORBID (HCC): ICD-10-CM

## 2022-05-09 PROCEDURE — 99214 OFFICE O/P EST MOD 30 MIN: CPT | Performed by: INTERNAL MEDICINE

## 2022-05-09 NOTE — PROGRESS NOTES
Diabetic Foot Exam    Patient's shoes and socks removed  Right Foot/Ankle   Right Foot Inspection  Skin Exam: dry skin  Sensory   Monofilament testing: intact    Vascular  The right DP pulse is 2+  The right PT pulse is 2+  Left Foot/Ankle  Left Foot Inspection  Skin Exam: dry skin  Sensory   Monofilament testing: intact    Vascular  The left DP pulse is 2+  The left PT pulse is 2+       Assign Risk Category  No deformity present  No loss of protective sensation  No weak pulses  Risk: 0

## 2022-05-26 DIAGNOSIS — E11.43 DIABETIC GASTROPARESIS (HCC): ICD-10-CM

## 2022-05-26 DIAGNOSIS — K31.84 DIABETIC GASTROPARESIS (HCC): ICD-10-CM

## 2022-05-27 DIAGNOSIS — F41.8 ANXIOUS DEPRESSION: ICD-10-CM

## 2022-05-27 RX ORDER — METOCLOPRAMIDE 10 MG/1
10 TABLET ORAL 4 TIMES DAILY
Qty: 60 TABLET | Refills: 1 | Status: SHIPPED | OUTPATIENT
Start: 2022-05-27 | End: 2022-07-04 | Stop reason: SDUPTHER

## 2022-05-27 RX ORDER — CLONAZEPAM 0.5 MG/1
0.5 TABLET ORAL 2 TIMES DAILY
Qty: 60 TABLET | Refills: 0 | Status: CANCELLED | OUTPATIENT
Start: 2022-05-27

## 2022-06-05 DIAGNOSIS — Z79.4 TYPE 2 DIABETES MELLITUS WITH COMPLICATION, WITH LONG-TERM CURRENT USE OF INSULIN (HCC): ICD-10-CM

## 2022-06-05 DIAGNOSIS — E11.8 TYPE 2 DIABETES MELLITUS WITH COMPLICATION, WITH LONG-TERM CURRENT USE OF INSULIN (HCC): ICD-10-CM

## 2022-06-06 RX ORDER — GLIMEPIRIDE 4 MG/1
4 TABLET ORAL 2 TIMES DAILY
Qty: 180 TABLET | Refills: 1 | Status: SHIPPED | OUTPATIENT
Start: 2022-06-06

## 2022-06-07 ENCOUNTER — TELEPHONE (OUTPATIENT)
Dept: PSYCHIATRY | Facility: CLINIC | Age: 67
End: 2022-06-07

## 2022-06-10 ENCOUNTER — OFFICE VISIT (OUTPATIENT)
Dept: INTERNAL MEDICINE CLINIC | Age: 67
End: 2022-06-10
Payer: MEDICARE

## 2022-06-10 VITALS
DIASTOLIC BLOOD PRESSURE: 76 MMHG | HEIGHT: 62 IN | WEIGHT: 203 LBS | OXYGEN SATURATION: 95 % | HEART RATE: 70 BPM | TEMPERATURE: 97.7 F | BODY MASS INDEX: 37.36 KG/M2 | SYSTOLIC BLOOD PRESSURE: 128 MMHG

## 2022-06-10 DIAGNOSIS — B96.89 BACTERIAL CONJUNCTIVITIS OF BOTH EYES: ICD-10-CM

## 2022-06-10 DIAGNOSIS — E11.9 TYPE 2 DIABETES MELLITUS WITHOUT COMPLICATION, WITH LONG-TERM CURRENT USE OF INSULIN (HCC): ICD-10-CM

## 2022-06-10 DIAGNOSIS — H10.9 BACTERIAL CONJUNCTIVITIS OF BOTH EYES: ICD-10-CM

## 2022-06-10 DIAGNOSIS — I10 ESSENTIAL HYPERTENSION: ICD-10-CM

## 2022-06-10 DIAGNOSIS — Z79.4 TYPE 2 DIABETES MELLITUS WITHOUT COMPLICATION, WITH LONG-TERM CURRENT USE OF INSULIN (HCC): ICD-10-CM

## 2022-06-10 DIAGNOSIS — H10.12 ACUTE ATOPIC CONJUNCTIVITIS OF LEFT EYE: Primary | ICD-10-CM

## 2022-06-10 PROCEDURE — 99213 OFFICE O/P EST LOW 20 MIN: CPT | Performed by: INTERNAL MEDICINE

## 2022-06-10 RX ORDER — OLOPATADINE HYDROCHLORIDE 1 MG/ML
1 SOLUTION/ DROPS OPHTHALMIC 2 TIMES DAILY
Qty: 5 ML | Refills: 1 | Status: SHIPPED | OUTPATIENT
Start: 2022-06-10

## 2022-06-10 RX ORDER — OFLOXACIN 3 MG/ML
1 SOLUTION/ DROPS OPHTHALMIC 4 TIMES DAILY
Qty: 5 ML | Refills: 0 | Status: SHIPPED | OUTPATIENT
Start: 2022-06-10

## 2022-06-10 NOTE — PROGRESS NOTES
Assessment/Plan:    Allergic conjunctivitis  -will start patient on Patanol 0 1% ophthalmic solution  -she was counseled to avoid rubbing her eyes    Bacterial conjunctivitis  -will start patient on ofloxacillin 0 3% solution  -patient was counseled to avoid rubbing her eyes  -she should avoid using makeup on her eyes and should discard all her current eye makeup    Diabetes mellitus type 2  -fairly well controlled, last hemoglobin A1c done on March 16th, 2022 was 7 3, down from 6 8  -patient was counseled to try and keep a blood sugar well controlled to avoid infections  -continue with glimepiride, insulin glargine, insulin aspart with niacinamide and with a diabetic diet and Accu-Cheks    Essential hypertension  -well controlled  -continue with lisinopril-hydrochlorothiazide  -continue low-salt diet     Diagnoses and all orders for this visit:    Acute atopic conjunctivitis of left eye  -     olopatadine (PATANOL) 0 1 % ophthalmic solution; Administer 1 drop to both eyes 2 (two) times a day    Bacterial conjunctivitis of both eyes  -     ofloxacin (OCUFLOX) 0 3 % ophthalmic solution; Administer 1 drop to both eyes 4 (four) times a day Use for 7 days    Type 2 diabetes mellitus without complication, with long-term current use of insulin ([facility] [address] )    Essential hypertension            Depression Screening and Follow-up Plan: Patient was screened for depression during today's encounter  They screened negative with a PHQ-2 score of 0  Subjective:      Patient ID: Delaney Batres is a 78 y o  female  HPI  Pt presents with c/o of eye discharge from the left eye for the past 2 days     Eye is reportedly red all the time with sticky discharge and eye lashes stuck together only in the am   + itchy, no change in vision, feels like there is sand in her eye but no pain  Does not wear contacts, no photophobia,   affected eye smaller than the right eye  No hx of contact but she was in the yard doing a lot of yardwork and a bug flew in her eye about 2 days ago  She has been rubbing eyes more than usual and admits to occasional tearing  + hx of seasonal allergies  No runny nose or stuffy nose but when she is outside mowing the lawn she startes coughing more and has to wear a face mask  Does not take anything for her allergies as they are not "that bad"  She is diabetic but states that her diabetes is well controlled and she usually gets blood sugars in the 90s to 120s in the morning  She admits to chronic and unchanged dizziness and occasional cough when she goes outside to mother long denies fever, chills, night sweats, headache, nausea, vomiting abdominal pain, diarrhea, constipation, chest pain, shortness of breath  The following portions of the patient's history were reviewed and updated as appropriate:   She  has a past medical history of Acid reflux, Anxiety, Diabetes mellitus ([address] ), Disease of thyroid gland (Years), Gastroparesis, GERD (gastroesophageal reflux disease) (Years), Hypertension, Hypothyroid, Obesity (Years), and Shingles (2 years ago)    She   Patient Active Problem List    Diagnosis Date Noted    Acute atopic conjunctivitis of left eye 06/10/2022    Bacterial conjunctivitis of both eyes 06/10/2022    Mixed hyperlipidemia 05/09/2022    Gastroparesis 01/24/2022    Constipation 01/24/2022    Gastroesophageal reflux disease without esophagitis 01/24/2022    Achilles tendinitis, right leg 08/18/2021    Statin intolerance 07/28/2021    Chronic pain of right ankle 07/28/2021    UTI (urinary tract infection) 03/08/2021    Obesity, morbid ([address] ) 02/15/2021    Viral upper respiratory tract infection 12/02/2020    LFT elevation 09/23/2020    Gastroenteritis 04/08/2019    Seasonal allergies 09/14/2018    Fatty liver 12/05/2017    Splenomegaly 12/05/2017    Diabetic gastroparesis ([address] ) 08/22/2017    Hand paresthesia 11/11/2016    Type 2 diabetes mellitus, with long-term current use of insulin ([address] ) 12/10/2015    Irritable bowel syndrome 2015    Osteoarthritis, hip, bilateral 2015    Anxiety 2015    GERD (gastroesophageal reflux disease) 2015    Hypothyroidism 2015    Obesity 2011    Essential hypertension 2011     She  has a past surgical history that includes Cholecystectomy; Dilation and curettage of uterus; Esophagogastroduodenoscopy; pr revise median n/carpal tunnel surg (Right, 2017); pr incise finger tendon sheath (Right, 2017); pr incise finger tendon sheath (Right, 2016); pr colonoscopy flx dx w/collj spec when pfrmd (N/A, 2016); Hysterectomy; Oophorectomy; Colonoscopy; Upper gastrointestinal endoscopy; Root canal; pr open rx femur fx+intramed carolyn (Left, 2/3/2021);  section (); and Fracture surgery ()  Her family history includes Alcohol abuse in her son; Arthritis in her maternal grandmother; Breast cancer (age of onset: 62) in her paternal aunt; Cirrhosis in her mother; Colon cancer (age of onset: 28) in her paternal uncle; Diabetes in her brother and father; Diabetes type II in her father; Hearing loss in her father; Heart attack in her father; Heart disease in her father and paternal grandfather; No Known Problems in her maternal aunt, maternal aunt, maternal grandfather, paternal grandmother, sister, and sister; Stroke in her paternal grandfather; Vision loss in her father  She  reports that she quit smoking about 34 years ago  Her smoking use included cigarettes  She started smoking about 57 years ago  She has a 20 00 pack-year smoking history  She has never used smokeless tobacco  She reports current alcohol use  She reports that she does not use drugs    Current Outpatient Medications   Medication Sig Dispense Refill    atorvastatin (LIPITOR) 20 mg tablet Take 1 tablet (20 mg total) by mouth daily 30 tablet 5    Cholecalciferol (VITAMIN D) 2000 units tablet TAKE ONE TABLET BY MOUTH EVERY DAY 30 tablet 0    clonazePAM (KlonoPIN) 0 5 mg tablet Take 1 tablet (0 5 mg total) by mouth 2 (two) times a day 60 tablet 3    cyanocobalamin (VITAMIN B-12) 1,000 mcg tablet Take 1,000 mcg by mouth daily      docusate sodium (COLACE) 100 mg capsule Take 1 capsule (100 mg total) by mouth 2 (two) times a day 30 capsule 3    famotidine (PEPCID) 20 mg tablet Take 1 tablet (20 mg total) by mouth 2 (two) times a day 60 tablet 11    glimepiride (AMARYL) 4 mg tablet Take 1 tablet (4 mg total) by mouth 2 (two) times a day 180 tablet 1    glucose blood (FREESTYLE LITE) test strip Use 1 each 4 (four) times a day Use 4 times daily DM2-- free style lite test strips 400 each 1    insulin aspart, w/niacinamide, (FIASP) 100 Units/mL injection pen Inject 12 Units under the skin 4 (four) times a day Please send 5  pens with 5 refills 3 mL 5    insulin glargine (Lantus SoloStar) 100 units/mL injection pen 25 units in AM and 35 units in PM 15 mL 5    Insulin Pen Needle (BD Pen Needle Kelly U/F) 32G X 4 MM MISC Use 4 (four) times a day 400 each 0    Lancets (freestyle) lancets Use 3 (three) times a day Test blood glucose 90 each 5    levothyroxine 100 mcg tablet Take 1 tablet (100 mcg total) by mouth daily 90 tablet 0    lisinopril-hydrochlorothiazide (PRINZIDE,ZESTORETIC) 20-12 5 MG per tablet Take 0 5 tablets by mouth daily 15 tablet 5    methocarbamol (ROBAXIN) 500 mg tablet Take 1 tablet (500 mg total) by mouth 4 (four) times a day 30 tablet 0    metoclopramide (Reglan) 10 mg tablet Take 1 tablet (10 mg total) by mouth 4 (four) times a day 60 tablet 1    ofloxacin (OCUFLOX) 0 3 % ophthalmic solution Administer 1 drop to both eyes 4 (four) times a day Use for 7 days 5 mL 0    olopatadine (PATANOL) 0 1 % ophthalmic solution Administer 1 drop to both eyes 2 (two) times a day 5 mL 1    ondansetron (ZOFRAN) 4 mg tablet Take 1 tablet (4 mg total) by mouth every 8 (eight) hours as needed for nausea or vomiting 20 tablet 0    sertraline (ZOLOFT) 100 mg tablet Take 1 5 tablets (150 mg total) by mouth daily (Patient taking differently: Take 150 mg by mouth 2 (two) times a day) 90 tablet 2    Blood Glucose Monitoring Suppl (ACCU-CHEK MAT PLUS) w/Device KIT Test 4 times daily (Patient not taking: No sig reported) 1 kit 0    magnesium citrate (CITROMA) 1 745 g/30 mL oral solution Take 296 mL by mouth once for 1 dose (Patient not taking: Reported on 4/4/2022 ) 296 mL 0    omeprazole (PriLOSEC) 40 MG capsule Take 1 capsule (40 mg total) by mouth daily (Patient not taking: No sig reported) 90 capsule 1    polyethylene glycol (GOLYTELY) 4000 mL solution Take 4,000 mL by mouth once for 1 dose (Patient not taking: Reported on 4/4/2022 ) 4000 mL 0     No current facility-administered medications for this visit       Current Outpatient Medications on File Prior to Visit   Medication Sig    atorvastatin (LIPITOR) 20 mg tablet Take 1 tablet (20 mg total) by mouth daily    Cholecalciferol (VITAMIN D) 2000 units tablet TAKE ONE TABLET BY MOUTH EVERY DAY    clonazePAM (KlonoPIN) 0 5 mg tablet Take 1 tablet (0 5 mg total) by mouth 2 (two) times a day    cyanocobalamin (VITAMIN B-12) 1,000 mcg tablet Take 1,000 mcg by mouth daily    docusate sodium (COLACE) 100 mg capsule Take 1 capsule (100 mg total) by mouth 2 (two) times a day    famotidine (PEPCID) 20 mg tablet Take 1 tablet (20 mg total) by mouth 2 (two) times a day    glimepiride (AMARYL) 4 mg tablet Take 1 tablet (4 mg total) by mouth 2 (two) times a day    glucose blood (FREESTYLE LITE) test strip Use 1 each 4 (four) times a day Use 4 times daily DM2-- free style lite test strips    insulin aspart, w/niacinamide, (FIASP) 100 Units/mL injection pen Inject 12 Units under the skin 4 (four) times a day Please send 5  pens with 5 refills    insulin glargine (Lantus SoloStar) 100 units/mL injection pen 25 units in AM and 35 units in PM    Insulin Pen Needle (BD Pen Needle Kelly U/F) 32G X 4 MM MISC Use 4 (four) times a day    Lancets (freestyle) lancets Use 3 (three) times a day Test blood glucose    levothyroxine 100 mcg tablet Take 1 tablet (100 mcg total) by mouth daily    lisinopril-hydrochlorothiazide (PRINZIDE,ZESTORETIC) 20-12 5 MG per tablet Take 0 5 tablets by mouth daily    methocarbamol (ROBAXIN) 500 mg tablet Take 1 tablet (500 mg total) by mouth 4 (four) times a day    metoclopramide (Reglan) 10 mg tablet Take 1 tablet (10 mg total) by mouth 4 (four) times a day    ondansetron (ZOFRAN) 4 mg tablet Take 1 tablet (4 mg total) by mouth every 8 (eight) hours as needed for nausea or vomiting    sertraline (ZOLOFT) 100 mg tablet Take 1 5 tablets (150 mg total) by mouth daily (Patient taking differently: Take 150 mg by mouth 2 (two) times a day)    Blood Glucose Monitoring Suppl (ACCU-CHEK MAT PLUS) w/Device KIT Test 4 times daily (Patient not taking: No sig reported)    magnesium citrate (CITROMA) 1 745 g/30 mL oral solution Take 296 mL by mouth once for 1 dose (Patient not taking: Reported on 4/4/2022 )    omeprazole (PriLOSEC) 40 MG capsule Take 1 capsule (40 mg total) by mouth daily (Patient not taking: No sig reported)    polyethylene glycol (GOLYTELY) 4000 mL solution Take 4,000 mL by mouth once for 1 dose (Patient not taking: Reported on 4/4/2022 )     No current facility-administered medications on file prior to visit  She is allergic to metformin       Review of Systems   Constitutional: Negative for activity change, chills, fatigue, fever and unexpected weight change  HENT: Negative for congestion, ear pain, postnasal drip, rhinorrhea, sinus pressure and sore throat  Eyes: Positive for discharge (milky discharge that sticks her left eye shut), redness and itching  Negative for photophobia, pain and visual disturbance  Respiratory: Positive for cough (when she goes outside to mow the lawn)  Negative for choking, chest tightness, shortness of breath and wheezing  Cardiovascular: Negative for chest pain, palpitations and leg swelling  Gastrointestinal: Negative for abdominal pain, constipation, diarrhea, nausea and vomiting  Genitourinary: Negative for dysuria and hematuria  Musculoskeletal: Negative for arthralgias, back pain, gait problem, joint swelling, myalgias and neck stiffness  Skin: Negative for pallor and rash  Neurological: Positive for dizziness (chronic and unchanged)  Negative for tremors, seizures, syncope, light-headedness and headaches  Hematological: Negative for adenopathy  Psychiatric/Behavioral: Negative for behavioral problems  Objective:      /76 (BP Location: Left arm, Patient Position: Sitting, Cuff Size: Standard)   Pulse 70   Temp 97 7 °F (36 5 °C) (Temporal)   Ht 5' 2" (1 575 m)   Wt 92 1 kg (203 lb)   SpO2 95%   BMI 37 13 kg/m²          Physical Exam  Constitutional:       General: She is not in acute distress  Appearance: She is well-developed  She is not diaphoretic  HENT:      Head: Normocephalic and atraumatic  Right Ear: External ear normal  Tympanic membrane is not erythematous (Erythema of the external auditory canal on the right without tympanic membrane erythema)  Left Ear: External ear normal       Nose: Nose normal       Mouth/Throat:      Mouth: Mucous membranes are dry  Pharynx: Posterior oropharyngeal erythema ( oropharyngeal erythema with dry mucous membranes) present  No oropharyngeal exudate  Eyes:      General: No scleral icterus  Right eye: No discharge  Left eye: No discharge  Conjunctiva/sclera:      Right eye: Right conjunctiva is injected ( very mild injection of the conjunctiva)  Left eye: Left conjunctiva is injected ( mild injected conjunctiva prominent vessels but no visible discharge)  Pupils: Pupils are equal, round, and reactive to light  Neck:      Thyroid: No thyromegaly  Vascular: No JVD        Trachea: No tracheal deviation  Cardiovascular:      Rate and Rhythm: Normal rate and regular rhythm  Heart sounds: Murmur ( 1/6 systolic murmur maximal in the aortic valve region) heard  Systolic murmur is present with a grade of 1/6  No friction rub  No gallop  Pulmonary:      Effort: Pulmonary effort is normal  No respiratory distress  Breath sounds: Normal breath sounds  No wheezing or rales  Chest:      Chest wall: No tenderness  Abdominal:      General: Bowel sounds are normal  There is no distension  Palpations: Abdomen is soft  There is no mass  Tenderness: There is no abdominal tenderness  There is no guarding or rebound  Musculoskeletal:         General: No tenderness or deformity  Normal range of motion  Cervical back: Normal range of motion and neck supple  Lymphadenopathy:      Head:      Right side of head: Submandibular ( right-sided submandibular lymphadenopathy) adenopathy present  Cervical: No cervical adenopathy  Skin:     General: Skin is warm and dry  Coloration: Skin is not pale  Findings: No erythema or rash  Neurological:      Mental Status: She is alert and oriented to person, place, and time  Cranial Nerves: No cranial nerve deficit  Motor: No abnormal muscle tone  Coordination: Coordination normal       Deep Tendon Reflexes: Reflexes are normal and symmetric     Psychiatric:         Behavior: Behavior normal            Orders Only on 04/14/2022   Component Date Value Ref Range Status    Right Eye Diabetic Retinopathy 04/14/2022 None   Final    Left Eye Diabetic Retinopathy 04/14/2022 None   Final   Orders Only on 03/16/2022   Component Date Value Ref Range Status    Total Cholesterol 03/16/2022 215 (A) <200 mg/dL Final    HDL 03/16/2022 51  > OR = 50 mg/dL Final    Triglycerides 03/16/2022 131  <150 mg/dL Final    LDL Calculated 03/16/2022 138 (A) mg/dL (calc) Final    Comment: Reference range: <100     Desirable range <100 mg/dL for primary prevention;    <70 mg/dL for patients with CHD or diabetic patients   with > or = 2 CHD risk factors  LDL-C is now calculated using the Jefe-Massey   calculation, which is a validated novel method providing   better accuracy than the Friedewald equation in the   estimation of LDL-C  Bre Francis  Jasvir Stanley;183(12): 9427-3533   (http://Nexio/faq/UDZ479)      Chol HDLC Ratio 03/16/2022 4 2  <5 0 (calc) Final    Non-HDL Cholesterol 03/16/2022 164 (A) <130 mg/dL (calc) Final    Comment: For patients with diabetes plus 1 major ASCVD risk   factor, treating to a non-HDL-C goal of <100 mg/dL   (LDL-C of <70 mg/dL) is considered a therapeutic   option   Creatinine, Urine 03/16/2022 105  20 - 275 mg/dL Final    Microalbum  ,U,Random 03/16/2022 0 8  See Note: mg/dL Final    Comment: Reference Range:    Reference Range  Not established      Microalb/Creat Ratio 03/16/2022 8  <30 mcg/mg creat Final    Comment:    The ADA defines abnormalities in albumin  excretion as follows: Albuminuria Category        Result (mcg/mg creatinine)     Normal to Mildly increased   <30  Moderately increased            Severely increased           > OR = 300     The ADA recommends that at least two of three  specimens collected within a 3-6 month period be  abnormal before considering a patient to be  within a diagnostic category        Glucose, Random 03/16/2022 147 (A) 65 - 99 mg/dL Final    Comment:               Fasting reference interval     For someone without known diabetes, a glucose  value >125 mg/dL indicates that they may have  diabetes and this should be confirmed with a  follow-up test          Hemoglobin A1C 03/16/2022 7 3 (A) <5 7 % of total Hgb Final    Comment: For someone without known diabetes, a hemoglobin A1c  value of 6 5% or greater indicates that they may have   diabetes and this should be confirmed with a follow-up   test      For someone with known diabetes, a value <7% indicates   that their diabetes is well controlled and a value   greater than or equal to 7% indicates suboptimal   control  A1c targets should be individualized based on   duration of diabetes, age, comorbid conditions, and   other considerations  Currently, no consensus exists regarding use of  hemoglobin A1c for diagnosis of diabetes for children  Orders Only on 01/27/2022   Component Date Value Ref Range Status    SARS-CoV-2 01/27/2022 Negative  Negative Final   Orders Only on 11/09/2021   Component Date Value Ref Range Status    Glucose, Random 11/09/2021 179 (A) 65 - 99 mg/dL Final    Comment:               Fasting reference interval     For someone without known diabetes, a glucose  value >125 mg/dL indicates that they may have  diabetes and this should be confirmed with a  follow-up test          BUN 11/09/2021 20  7 - 25 mg/dL Final    Creatinine 11/09/2021 0 64  0 50 - 0 99 mg/dL Final    Comment: For patients >52years of age, the reference limit  for Creatinine is approximately 13% higher for people  identified as -American           eGFR Non  11/09/2021 92  > OR = 60 mL/min/1 73m2 Final    eGFR  11/09/2021 107  > OR = 60 mL/min/1 73m2 Final    SL AMB BUN/CREATININE RATIO 21/36/5246 NOT APPLICABLE  6 - 22 (calc) Final    Sodium 11/09/2021 139  135 - 146 mmol/L Final    Potassium 11/09/2021 4 7  3 5 - 5 3 mmol/L Final    Chloride 11/09/2021 104  98 - 110 mmol/L Final    CO2 11/09/2021 28  20 - 32 mmol/L Final    Calcium 11/09/2021 9 8  8 6 - 10 4 mg/dL Final    Hemoglobin A1C 11/09/2021 6 8 (A) <5 7 % of total Hgb Final    Comment: For someone without known diabetes, a hemoglobin A1c  value of 6 5% or greater indicates that they may have   diabetes and this should be confirmed with a follow-up   test      For someone with known diabetes, a value <7% indicates   that their diabetes is well controlled and a value   greater than or equal to 7% indicates suboptimal   control  A1c targets should be individualized based on   duration of diabetes, age, comorbid conditions, and   other considerations  Currently, no consensus exists regarding use of  hemoglobin A1c for diagnosis of diabetes for children  Orders Only on 07/26/2021   Component Date Value Ref Range Status    Total Cholesterol 07/26/2021 199  <200 mg/dL Final    HDL 07/26/2021 48 (A) > OR = 50 mg/dL Final    Triglycerides 07/26/2021 122  <150 mg/dL Final    LDL Calculated 07/26/2021 128 (A) mg/dL (calc) Final    Comment: Reference range: <100     Desirable range <100 mg/dL for primary prevention;    <70 mg/dL for patients with CHD or diabetic patients   with > or = 2 CHD risk factors  LDL-C is now calculated using the Jefe-Massey   calculation, which is a validated novel method providing   better accuracy than the Friedewald equation in the   estimation of LDL-C  Mevelyn Dakins et al  Sheridan Whitehead  6327;894(01): 9791-5082   (http://MyWishBoard/faq/IMB891)      Chol HDLC Ratio 07/26/2021 4 1  <5 0 (calc) Final    Non-HDL Cholesterol 07/26/2021 151 (A) <130 mg/dL (calc) Final    Comment: For patients with diabetes plus 1 major ASCVD risk   factor, treating to a non-HDL-C goal of <100 mg/dL   (LDL-C of <70 mg/dL) is considered a therapeutic   option   Glucose, Random 07/26/2021 156 (A) 65 - 99 mg/dL Final    Comment:               Fasting reference interval     For someone without known diabetes, a glucose  value >125 mg/dL indicates that they may have  diabetes and this should be confirmed with a  follow-up test          BUN 07/26/2021 20  7 - 25 mg/dL Final    Creatinine 07/26/2021 0 65  0 50 - 0 99 mg/dL Final    Comment: For patients >52years of age, the reference limit  for Creatinine is approximately 13% higher for people  identified as -American           eGFR Non  07/26/2021 92  > OR = 60 mL/min/1 73m2 Final  eGFR  07/26/2021 106  > OR = 60 mL/min/1 73m2 Final    SL AMB BUN/CREATININE RATIO 29/78/1195 NOT APPLICABLE  6 - 22 (calc) Final    Sodium 07/26/2021 139  135 - 146 mmol/L Final    Potassium 07/26/2021 4 1  3 5 - 5 3 mmol/L Final    Chloride 07/26/2021 102  98 - 110 mmol/L Final    CO2 07/26/2021 30  20 - 32 mmol/L Final    Calcium 07/26/2021 10 2  8 6 - 10 4 mg/dL Final    Protein, Total 07/26/2021 7 6  6 1 - 8 1 g/dL Final    Albumin 07/26/2021 4 3  3 6 - 5 1 g/dL Final    Globulin 07/26/2021 3 3  1 9 - 3 7 g/dL (calc) Final    Albumin/Globulin Ratio 07/26/2021 1 3  1 0 - 2 5 (calc) Final    TOTAL BILIRUBIN 07/26/2021 0 3  0 2 - 1 2 mg/dL Final    Alkaline Phosphatase 07/26/2021 75  37 - 153 U/L Final    AST 07/26/2021 31  10 - 35 U/L Final    ALT 07/26/2021 30 (A) 6 - 29 U/L Final    Creatine Kinase, Total 07/26/2021 68  29 - 143 U/L Final    Hemoglobin A1C 07/26/2021 6 8 (A) <5 7 % of total Hgb Final    Comment: For someone without known diabetes, a hemoglobin A1c  value of 6 5% or greater indicates that they may have   diabetes and this should be confirmed with a follow-up   test      For someone with known diabetes, a value <7% indicates   that their diabetes is well controlled and a value   greater than or equal to 7% indicates suboptimal   control  A1c targets should be individualized based on   duration of diabetes, age, comorbid conditions, and   other considerations  Currently, no consensus exists regarding use of  hemoglobin A1c for diagnosis of diabetes for children

## 2022-06-15 DIAGNOSIS — F41.9 ANXIETY: ICD-10-CM

## 2022-06-15 RX ORDER — SERTRALINE HYDROCHLORIDE 100 MG/1
100 TABLET, FILM COATED ORAL 2 TIMES DAILY
Qty: 180 TABLET | Refills: 1 | Status: SHIPPED | OUTPATIENT
Start: 2022-06-15

## 2022-06-15 NOTE — TELEPHONE ENCOUNTER
Patient would need the following medication refilled    It was increased to 2 times a day the Sertraline 100 mg twice a day    Please call when sent    [address] in [place]

## 2022-06-27 ENCOUNTER — HOSPITAL ENCOUNTER (OUTPATIENT)
Dept: RADIOLOGY | Age: 67
Discharge: HOME | End: 2022-06-27
Payer: MEDICARE

## 2022-06-27 VITALS — WEIGHT: 203 LBS | HEIGHT: 62 IN | BODY MASS INDEX: 37.36 KG/M2

## 2022-06-27 DIAGNOSIS — Z12.31 ENCOUNTER FOR SCREENING MAMMOGRAM FOR MALIGNANT NEOPLASM OF BREAST: ICD-10-CM

## 2022-06-27 PROCEDURE — 77067 SCR MAMMO BI INCL CAD: CPT

## 2022-06-27 PROCEDURE — 77063 BREAST TOMOSYNTHESIS BI: CPT

## 2022-07-08 ENCOUNTER — TELEPHONE (OUTPATIENT)
Dept: INTERNAL MEDICINE CLINIC | Age: 67
End: 2022-07-08

## 2022-07-08 DIAGNOSIS — E11.00 UNCONTROLLED TYPE 2 DIABETES MELLITUS WITH HYPEROSMOLARITY WITHOUT COMA, WITHOUT LONG-TERM CURRENT USE OF INSULIN (HCC): ICD-10-CM

## 2022-07-08 RX ORDER — PEN NEEDLE, DIABETIC 32GX 5/32"
NEEDLE, DISPOSABLE MISCELLANEOUS 4 TIMES DAILY
Qty: 400 EACH | Refills: 1 | Status: SHIPPED | OUTPATIENT
Start: 2022-07-08

## 2022-07-08 NOTE — TELEPHONE ENCOUNTER
Patient is requesting the following medication to be sent      Insulin Pen Needle BD Needle uses 4 times a day    Send over to [facility] in [address] [facility]: 07/18/2022

## 2022-09-07 ENCOUNTER — TELEPHONE (OUTPATIENT)
Dept: INTERNAL MEDICINE CLINIC | Age: 67
End: 2022-09-07

## 2022-09-07 LAB
EST. AVERAGE GLUCOSE BLD GHB EST-MCNC: 146 MG/DL
EST. AVERAGE GLUCOSE BLD GHB EST-SCNC: 8.1 MMOL/L
HBA1C MFR BLD: 6.7 % OF TOTAL HGB

## 2022-09-07 NOTE — TELEPHONE ENCOUNTER
Hemoglobin A1c is better than before I do not know what she talking it was more than 7 now it is less than 7

## 2022-09-07 NOTE — TELEPHONE ENCOUNTER
Pt called office, got her blood work done and got the results, she states her A1C is high and would like you to call her in regards to it  I tried to get her to make an appt but she gave me the run around about not scheduling

## 2022-09-08 DIAGNOSIS — M54.50 ACUTE BILATERAL LOW BACK PAIN WITHOUT SCIATICA: ICD-10-CM

## 2022-09-08 DIAGNOSIS — K31.84 GASTROPARESIS: ICD-10-CM

## 2022-09-08 DIAGNOSIS — E11.9 TYPE 2 DIABETES MELLITUS WITHOUT COMPLICATION, WITHOUT LONG-TERM CURRENT USE OF INSULIN (HCC): ICD-10-CM

## 2022-09-08 DIAGNOSIS — E11.43 DIABETIC GASTROPARESIS (HCC): ICD-10-CM

## 2022-09-08 DIAGNOSIS — I10 ESSENTIAL HYPERTENSION, BENIGN: ICD-10-CM

## 2022-09-08 DIAGNOSIS — E11.649 UNCONTROLLED TYPE 2 DIABETES MELLITUS WITH HYPOGLYCEMIA WITHOUT COMA (HCC): ICD-10-CM

## 2022-09-08 DIAGNOSIS — K59.00 CONSTIPATION, UNSPECIFIED CONSTIPATION TYPE: ICD-10-CM

## 2022-09-08 DIAGNOSIS — K31.84 DIABETIC GASTROPARESIS (HCC): ICD-10-CM

## 2022-09-08 DIAGNOSIS — F41.8 ANXIOUS DEPRESSION: ICD-10-CM

## 2022-09-08 RX ORDER — LISINOPRIL AND HYDROCHLOROTHIAZIDE 20; 12.5 MG/1; MG/1
0.5 TABLET ORAL DAILY
Qty: 15 TABLET | Refills: 0 | Status: SHIPPED | OUTPATIENT
Start: 2022-09-08 | End: 2022-09-12 | Stop reason: SDUPTHER

## 2022-09-08 RX ORDER — BLOOD-GLUCOSE METER
1 KIT MISCELLANEOUS 4 TIMES DAILY
Qty: 400 EACH | Refills: 1 | Status: SHIPPED | OUTPATIENT
Start: 2022-09-08

## 2022-09-08 RX ORDER — METOCLOPRAMIDE 10 MG/1
10 TABLET ORAL 4 TIMES DAILY
Qty: 60 TABLET | Refills: 0 | Status: SHIPPED | OUTPATIENT
Start: 2022-09-08

## 2022-09-08 RX ORDER — METHOCARBAMOL 500 MG/1
500 TABLET, FILM COATED ORAL 4 TIMES DAILY
Qty: 30 TABLET | Refills: 0 | Status: SHIPPED | OUTPATIENT
Start: 2022-09-08 | End: 2022-09-12 | Stop reason: ALTCHOICE

## 2022-09-08 RX ORDER — FAMOTIDINE 20 MG/1
20 TABLET, FILM COATED ORAL 2 TIMES DAILY
Qty: 60 TABLET | Refills: 0 | Status: SHIPPED | OUTPATIENT
Start: 2022-09-08

## 2022-09-08 RX ORDER — LANCETS 28 GAUGE
EACH MISCELLANEOUS 3 TIMES DAILY
Qty: 90 EACH | Refills: 5 | Status: SHIPPED | OUTPATIENT
Start: 2022-09-08

## 2022-09-08 RX ORDER — CLONAZEPAM 0.5 MG/1
0.5 TABLET ORAL 2 TIMES DAILY
Qty: 60 TABLET | Refills: 0 | Status: SHIPPED | OUTPATIENT
Start: 2022-09-08 | End: 2022-10-18 | Stop reason: SDUPTHER

## 2022-09-08 RX ORDER — LISINOPRIL AND HYDROCHLOROTHIAZIDE 20; 12.5 MG/1; MG/1
0.5 TABLET ORAL DAILY
Qty: 15 TABLET | Refills: 0 | Status: SHIPPED | OUTPATIENT
Start: 2022-09-08 | End: 2022-10-19 | Stop reason: SDUPTHER

## 2022-09-12 ENCOUNTER — TELEMEDICINE (OUTPATIENT)
Dept: INTERNAL MEDICINE CLINIC | Age: 67
End: 2022-09-12
Payer: MEDICARE

## 2022-09-12 DIAGNOSIS — D22.9 NUMEROUS MOLES: ICD-10-CM

## 2022-09-12 DIAGNOSIS — E11.9 TYPE 2 DIABETES MELLITUS WITHOUT COMPLICATION, WITH LONG-TERM CURRENT USE OF INSULIN (HCC): Primary | ICD-10-CM

## 2022-09-12 DIAGNOSIS — Z79.4 TYPE 2 DIABETES MELLITUS WITHOUT COMPLICATION, WITH LONG-TERM CURRENT USE OF INSULIN (HCC): Primary | ICD-10-CM

## 2022-09-12 DIAGNOSIS — K76.0 FATTY LIVER: ICD-10-CM

## 2022-09-12 DIAGNOSIS — F41.9 ANXIETY: ICD-10-CM

## 2022-09-12 DIAGNOSIS — E11.43 DIABETIC GASTROPARESIS (HCC): ICD-10-CM

## 2022-09-12 DIAGNOSIS — I10 ESSENTIAL HYPERTENSION: ICD-10-CM

## 2022-09-12 DIAGNOSIS — E11.649 UNCONTROLLED TYPE 2 DIABETES MELLITUS WITH HYPOGLYCEMIA WITHOUT COMA (HCC): ICD-10-CM

## 2022-09-12 DIAGNOSIS — K31.84 DIABETIC GASTROPARESIS (HCC): ICD-10-CM

## 2022-09-12 DIAGNOSIS — E03.9 HYPOTHYROIDISM, UNSPECIFIED TYPE: ICD-10-CM

## 2022-09-12 PROCEDURE — 99213 OFFICE O/P EST LOW 20 MIN: CPT | Performed by: INTERNAL MEDICINE

## 2022-09-12 RX ORDER — BLOOD-GLUCOSE METER
1 KIT MISCELLANEOUS 4 TIMES DAILY
Qty: 400 EACH | Refills: 0 | Status: CANCELLED | OUTPATIENT
Start: 2022-09-12

## 2022-09-12 NOTE — PROGRESS NOTES
Virtual Regular Visit    Verification of patient location:    Patient is located in the following state in which I hold an active license [place]      Assessment/Plan:    Problem List Items Addressed This Visit        Digestive    Diabetic gastroparesis ([facility] [address] )    Fatty liver       Endocrine    Type 2 diabetes mellitus, with long-term current use of insulin ([facility] [address] ) - Primary    Relevant Orders    CBC and differential    Lipid panel    Hemoglobin A1C    Microalbumin / creatinine urine ratio    UA w Reflex to Microscopic w Reflex to Culture    Hypothyroidism    Relevant Orders    TSH, 3rd generation       Cardiovascular and Mediastinum    Essential hypertension    Relevant Orders    Comprehensive metabolic panel       Other    Anxiety      Other Visit Diagnoses     Numerous moles        Relevant Orders    Ambulatory Referral to Dermatology               Reason for visit is   Chief Complaint   Patient presents with    Virtual Regular Visit     3 m f/u visit, review labs from 9/6/22  Encounter provider Cassy Campbell MD    Provider located at [address] [address]      Recent Visits  Date Type Provider Dept   09/07/22 Telephone Cassy Campbell, [address] recent visits within past 7 days and meeting all other requirements  Today's Visits  Date Type Provider Dept   09/12/22 [address], MD [facility]   Showing today's visits and meeting all other requirements  Future Appointments  No visits were found meeting these conditions  Showing future appointments within next 150 days and meeting all other requirements       The patient was identified by name and date of birth   Samuel Gaines was informed that this is a telemedicine visit and that the visit is being conducted through [facility] and patient was informed that this is not a secure, HIPAA-compliant platform  She agrees to proceed     My office door was closed  No one else was in the room  She acknowledged consent and understanding of privacy and security of the video platform  The patient has agreed to participate and understands they can discontinue the visit at any time  Patient is aware this is a billable service  Subjective  Lelia Johnson is a 75 y o  female follow-up for diabetes and other medical condition as given above   [de-identified] years young lady who is getting the virtual visit she is doing well complaining of some skin moles she wanted to see the dermatologist I put the consult for Dermatology for evaluation otherwise she is doing well review of system is essentially unremarkable her blood sugars are very well controlled type 2 diabetes mellitus hemoglobin A1c is 6 7  I reviewed the blood workup and I ordered the blood workup for the next time she will see me back  In about 3 months   lower back pain is much better  No chest pain or shortness of breath review of system is essentially unremarkable as given above  Past Medical History:   Diagnosis Date    Acid reflux     Anxiety     Diabetes mellitus (HCC)     Disease of thyroid gland Years    Gastroparesis     GERD (gastroesophageal reflux disease) Years    Hypertension     Hypothyroid     Obesity Years    Shingles 2 years ago       Past Surgical History:   Procedure Laterality Date     SECTION      CHOLECYSTECTOMY      COLONOSCOPY      DILATION AND CURETTAGE OF UTERUS      ESOPHAGOGASTRODUODENOSCOPY      FRACTURE SURGERY      HYSTERECTOMY      44    OOPHORECTOMY      38    DE COLONOSCOPY FLX DX W/COLLJ SPEC WHEN PFRMD N/A 2016    Procedure: EGD AND COLONOSCOPY;  Surgeon: Arminda Ziegler MD;  [facility];   Service: Gastroenterology    DE INCISE FINGER TENDON SHEATH Right 2017    Procedure: LONG FINGER TRIGGER RELEASE ;  Surgeon: Cj Rao MD; [facility];  Service: Orthopedics    WA INCISE FINGER TENDON SHEATH Right 7/21/2016    Procedure: RELEASE TRIGGER FINGER - LONG FINGER;  Surgeon: Karen Ríos MD;  [facility];  Service: Orthopedics    WA OPEN RX FEMUR FX+INTRAMED SHERRY Left 2/3/2021    Procedure: INSERTION NAIL IM FEMUR ANTEGRADE (TROCHANTERIC);   Surgeon: Louise Cisneros MD;  [facility];  Service: Orthopedics    WA REVISE MEDIAN N/CARPAL TUNNEL SURG Right 1/31/2017    Procedure: WRIST CARPAL TUNNEL RELEASE ; TENOLYSIS OF FPL, FDS 2-5, FDP 2-5;  APPLICATION OF Ulises Roper;  Surgeon: Karen Ríos MD;  [facility];  Service: Orthopedics    ROOT CANAL      UPPER GASTROINTESTINAL ENDOSCOPY         Current Outpatient Medications   Medication Sig Dispense Refill    atorvastatin (LIPITOR) 20 mg tablet Take 1 tablet (20 mg total) by mouth daily 30 tablet 5    Cholecalciferol (VITAMIN D) 2000 units tablet TAKE ONE TABLET BY MOUTH EVERY DAY 30 tablet 0    clonazePAM (KlonoPIN) 0 5 mg tablet Take 1 tablet (0 5 mg total) by mouth 2 (two) times a day 60 tablet 0    cyanocobalamin (VITAMIN B-12) 1,000 mcg tablet Take 1,000 mcg by mouth daily      docusate sodium (COLACE) 100 mg capsule Take 1 capsule (100 mg total) by mouth 2 (two) times a day 30 capsule 3    famotidine (PEPCID) 20 mg tablet Take 1 tablet (20 mg total) by mouth 2 (two) times a day 60 tablet 0    glimepiride (AMARYL) 4 mg tablet Take 1 tablet (4 mg total) by mouth 2 (two) times a day 180 tablet 1    glucose blood (FREESTYLE LITE) test strip Use 1 each 4 (four) times a day Use 4 times daily DM2-- free style lite test strips 400 each 1    insulin aspart, w/niacinamide, (FIASP) 100 Units/mL injection pen Inject 12 Units under the skin 4 (four) times a day Please send 5  pens with 5 refills 3 mL 5    insulin glargine (Lantus SoloStar) 100 units/mL injection pen 25 units in AM and 35 units in PM 15 mL 5    Insulin Pen Needle (BD Pen Needle Kelly U/F) 32G X 4 MM MISC Use 4 (four) times a day 400 each 1    Lancets (freestyle) lancets Use 3 (three) times a day Test blood glucose 90 each 5    levothyroxine 100 mcg tablet Take 1 tablet (100 mcg total) by mouth daily 90 tablet 0    lisinopril-hydrochlorothiazide (PRINZIDE,ZESTORETIC) 20-12 5 MG per tablet Take 0 5 tablets by mouth daily 15 tablet 0    metoclopramide (Reglan) 10 mg tablet Take 1 tablet (10 mg total) by mouth 4 (four) times a day 60 tablet 0    ondansetron (ZOFRAN) 4 mg tablet Take 1 tablet (4 mg total) by mouth every 8 (eight) hours as needed for nausea or vomiting 20 tablet 0    sertraline (ZOLOFT) 100 mg tablet Take 1 tablet (100 mg total) by mouth 2 (two) times a day 180 tablet 1     No current facility-administered medications for this visit  Allergies   Allergen Reactions    Metformin Diarrhea       Review of Systems   Constitutional: Negative for chills and fatigue  HENT: Negative for congestion, ear pain, hearing loss, postnasal drip, sinus pressure, sore throat and voice change  Eyes: Negative for pain, discharge and visual disturbance  Respiratory: Negative for cough, chest tightness and shortness of breath  Cardiovascular: Negative for chest pain, palpitations and leg swelling  Gastrointestinal: Negative for abdominal pain, blood in stool, diarrhea, nausea and rectal pain  Genitourinary: Negative for difficulty urinating, dysuria and urgency  Musculoskeletal: Negative for arthralgias and joint swelling  Skin: Positive for color change  Negative for rash  Allergic/Immunologic: Negative for environmental allergies and food allergies  Neurological: Negative for dizziness, tremors, weakness, numbness and headaches  Hematological: Negative for adenopathy  Psychiatric/Behavioral: Negative for behavioral problems and hallucinations  Video Exam    There were no vitals filed for this visit      Physical Exam  Constitutional:       Appearance: She is well-developed  HENT:      Head: Normocephalic and atraumatic  Mouth/Throat:      Mouth: Mucous membranes are moist    Musculoskeletal:      Cervical back: Normal range of motion  Neurological:      General: No focal deficit present  Mental Status: She is alert and oriented to person, place, and time     Psychiatric:         Mood and Affect: Mood normal           I spent 15 minutes directly with the patient during this visit 20

## 2022-09-15 DIAGNOSIS — K52.9 GASTROENTERITIS: ICD-10-CM

## 2022-09-15 DIAGNOSIS — E03.9 HYPOTHYROIDISM, UNSPECIFIED TYPE: ICD-10-CM

## 2022-09-15 RX ORDER — LEVOTHYROXINE SODIUM 0.1 MG/1
100 TABLET ORAL DAILY
Qty: 90 TABLET | Refills: 1 | Status: SHIPPED | OUTPATIENT
Start: 2022-09-15

## 2022-09-15 RX ORDER — ONDANSETRON 4 MG/1
4 TABLET, FILM COATED ORAL EVERY 8 HOURS PRN
Qty: 20 TABLET | Refills: 0 | Status: SHIPPED | OUTPATIENT
Start: 2022-09-15

## 2022-09-21 DIAGNOSIS — E11.649 UNCONTROLLED TYPE 2 DIABETES MELLITUS WITH HYPOGLYCEMIA WITHOUT COMA (HCC): ICD-10-CM

## 2022-09-21 RX ORDER — INSULIN GLARGINE 100 [IU]/ML
INJECTION, SOLUTION SUBCUTANEOUS
Qty: 15 ML | Refills: 5 | Status: SHIPPED | OUTPATIENT
Start: 2022-09-21

## 2022-10-11 PROBLEM — H10.9 BACTERIAL CONJUNCTIVITIS OF BOTH EYES: Status: RESOLVED | Noted: 2022-06-10 | Resolved: 2022-10-11

## 2022-10-11 PROBLEM — H10.12 ACUTE ATOPIC CONJUNCTIVITIS OF LEFT EYE: Status: RESOLVED | Noted: 2022-06-10 | Resolved: 2022-10-11

## 2022-10-11 PROBLEM — B96.89 BACTERIAL CONJUNCTIVITIS OF BOTH EYES: Status: RESOLVED | Noted: 2022-06-10 | Resolved: 2022-10-11

## 2022-10-12 PROBLEM — N39.0 UTI (URINARY TRACT INFECTION): Status: RESOLVED | Noted: 2021-03-08 | Resolved: 2022-10-12

## 2022-10-12 PROBLEM — J06.9 VIRAL UPPER RESPIRATORY TRACT INFECTION: Status: RESOLVED | Noted: 2020-12-02 | Resolved: 2022-10-12

## 2022-10-18 ENCOUNTER — PATIENT MESSAGE (OUTPATIENT)
Dept: INTERNAL MEDICINE CLINIC | Age: 67
End: 2022-10-18

## 2022-10-18 DIAGNOSIS — E78.2 MIXED HYPERLIPIDEMIA: ICD-10-CM

## 2022-10-18 DIAGNOSIS — F41.8 ANXIOUS DEPRESSION: ICD-10-CM

## 2022-10-18 RX ORDER — CLONAZEPAM 0.5 MG/1
0.5 TABLET ORAL 2 TIMES DAILY
Qty: 60 TABLET | Refills: 0 | Status: SHIPPED | OUTPATIENT
Start: 2022-10-18

## 2022-10-18 RX ORDER — ATORVASTATIN CALCIUM 20 MG/1
20 TABLET, FILM COATED ORAL DAILY
Qty: 30 TABLET | Refills: 5 | Status: SHIPPED | OUTPATIENT
Start: 2022-10-18

## 2022-10-19 DIAGNOSIS — I10 ESSENTIAL HYPERTENSION, BENIGN: ICD-10-CM

## 2022-10-19 RX ORDER — LISINOPRIL AND HYDROCHLOROTHIAZIDE 20; 12.5 MG/1; MG/1
0.5 TABLET ORAL DAILY
Qty: 45 TABLET | Refills: 1 | Status: SHIPPED | OUTPATIENT
Start: 2022-10-19

## 2022-11-16 DIAGNOSIS — F41.8 ANXIOUS DEPRESSION: ICD-10-CM

## 2022-11-16 DIAGNOSIS — E11.43 DIABETIC GASTROPARESIS (HCC): ICD-10-CM

## 2022-11-16 DIAGNOSIS — I10 ESSENTIAL HYPERTENSION, BENIGN: ICD-10-CM

## 2022-11-16 DIAGNOSIS — E11.00 UNCONTROLLED TYPE 2 DIABETES MELLITUS WITH HYPEROSMOLARITY WITHOUT COMA, WITHOUT LONG-TERM CURRENT USE OF INSULIN (HCC): ICD-10-CM

## 2022-11-16 DIAGNOSIS — K31.84 GASTROPARESIS: ICD-10-CM

## 2022-11-16 DIAGNOSIS — E03.9 HYPOTHYROIDISM, UNSPECIFIED TYPE: ICD-10-CM

## 2022-11-16 DIAGNOSIS — K59.00 CONSTIPATION, UNSPECIFIED CONSTIPATION TYPE: ICD-10-CM

## 2022-11-16 DIAGNOSIS — K31.84 DIABETIC GASTROPARESIS (HCC): ICD-10-CM

## 2022-11-16 RX ORDER — LISINOPRIL AND HYDROCHLOROTHIAZIDE 20; 12.5 MG/1; MG/1
0.5 TABLET ORAL DAILY
Qty: 45 TABLET | Refills: 0 | Status: CANCELLED | OUTPATIENT
Start: 2022-11-16

## 2022-11-16 RX ORDER — METOCLOPRAMIDE 10 MG/1
10 TABLET ORAL 4 TIMES DAILY
Qty: 60 TABLET | Refills: 0 | Status: SHIPPED | OUTPATIENT
Start: 2022-11-16

## 2022-11-16 RX ORDER — LEVOTHYROXINE SODIUM 0.1 MG/1
100 TABLET ORAL DAILY
Qty: 90 TABLET | Refills: 0 | Status: CANCELLED | OUTPATIENT
Start: 2022-11-16

## 2022-11-16 RX ORDER — PEN NEEDLE, DIABETIC 32GX 5/32"
NEEDLE, DISPOSABLE MISCELLANEOUS 4 TIMES DAILY
Qty: 400 EACH | Refills: 0 | Status: SHIPPED | OUTPATIENT
Start: 2022-11-16

## 2022-11-17 RX ORDER — FAMOTIDINE 20 MG/1
20 TABLET, FILM COATED ORAL 2 TIMES DAILY
Qty: 60 TABLET | Refills: 3 | Status: SHIPPED | OUTPATIENT
Start: 2022-11-17

## 2022-11-17 RX ORDER — CLONAZEPAM 0.5 MG/1
0.5 TABLET ORAL 2 TIMES DAILY
Qty: 60 TABLET | Refills: 0 | Status: SHIPPED | OUTPATIENT
Start: 2022-11-17

## 2022-11-29 ENCOUNTER — VBI (OUTPATIENT)
Dept: ADMINISTRATIVE | Facility: OTHER | Age: 67
End: 2022-11-29

## 2022-12-15 DIAGNOSIS — E11.649 UNCONTROLLED TYPE 2 DIABETES MELLITUS WITH HYPOGLYCEMIA WITHOUT COMA (HCC): ICD-10-CM

## 2022-12-16 DIAGNOSIS — E11.649 UNCONTROLLED TYPE 2 DIABETES MELLITUS WITH HYPOGLYCEMIA WITHOUT COMA (HCC): ICD-10-CM

## 2023-01-03 DIAGNOSIS — E11.00 UNCONTROLLED TYPE 2 DIABETES MELLITUS WITH HYPEROSMOLARITY WITHOUT COMA, WITHOUT LONG-TERM CURRENT USE OF INSULIN (HCC): ICD-10-CM

## 2023-01-03 RX ORDER — PEN NEEDLE, DIABETIC 32GX 5/32"
NEEDLE, DISPOSABLE MISCELLANEOUS 4 TIMES DAILY
Qty: 400 EACH | Refills: 0 | Status: SHIPPED | OUTPATIENT
Start: 2023-01-03

## 2023-01-06 ENCOUNTER — OFFICE VISIT (OUTPATIENT)
Dept: INTERNAL MEDICINE CLINIC | Age: 68
End: 2023-01-06

## 2023-01-06 VITALS
SYSTOLIC BLOOD PRESSURE: 120 MMHG | DIASTOLIC BLOOD PRESSURE: 68 MMHG | HEIGHT: 62 IN | BODY MASS INDEX: 38.09 KG/M2 | OXYGEN SATURATION: 98 % | WEIGHT: 207 LBS | TEMPERATURE: 97.3 F | HEART RATE: 72 BPM

## 2023-01-06 DIAGNOSIS — L02.91 ABSCESS: Primary | ICD-10-CM

## 2023-01-06 DIAGNOSIS — M85.89 OSTEOPENIA OF MULTIPLE SITES: ICD-10-CM

## 2023-01-06 DIAGNOSIS — E78.2 MIXED HYPERLIPIDEMIA: ICD-10-CM

## 2023-01-06 RX ORDER — DOXYCYCLINE HYCLATE 100 MG/1
100 CAPSULE ORAL EVERY 12 HOURS SCHEDULED
Qty: 14 CAPSULE | Refills: 0 | Status: SHIPPED | OUTPATIENT
Start: 2023-01-06 | End: 2023-01-13

## 2023-01-06 NOTE — PATIENT INSTRUCTIONS
- start doxycycline twice a day for 7 days   - use warm compresses 3-4 times a day  - wash with antibacterial soap once daily  - follow up 1 week

## 2023-01-06 NOTE — PROGRESS NOTES
Assessment/Plan:    Problem List Items Addressed This Visit        Other    Mixed hyperlipidemia    Relevant Orders    Lipid Panel with Direct LDL reflex    Comprehensive metabolic panel    Abscess - Primary     - perineal abscess   - will start doxycycline bid x 7 days   - advised warm compresses 3-4 times a day  - wash once daily with antibacterial soap  - follow up 1 week, sooner for any worsening symptoms          Relevant Medications    doxycycline hyclate (VIBRAMYCIN) 100 mg capsule   Other Visit Diagnoses     Osteopenia of multiple sites        Relevant Orders    DXA bone density spine hip and pelvis             M*Modal software was used to dictate this note  It may contain errors with dictating incorrect words or incorrect spelling  Please contact the provider directly with any questions  Subjective:      Patient ID: Ann Marie Flannery is a 75 y o  female  HPI     Patient presents today with a lump on her left labia  Onset was 1 week ago, area is tender, no drainage  She has been taking warm baths instead of showers  She does not shave  The following portions of the patient's history were reviewed and updated as appropriate: allergies, current medications, past family history, past medical history, past social history, past surgical history and problem list     Review of Systems   Constitutional: Negative for chills and fever           Past Medical History:   Diagnosis Date   • Acid reflux    • Anxiety    • Diabetes mellitus (HCC)    • Disease of thyroid gland Years   • Gastroparesis    • GERD (gastroesophageal reflux disease) Years   • Hypertension    • Hypothyroid    • Obesity Years   • Shingles 2 years ago         Current Outpatient Medications:   •  atorvastatin (LIPITOR) 20 mg tablet, Take 1 tablet (20 mg total) by mouth daily, Disp: 30 tablet, Rfl: 5  •  Cholecalciferol (VITAMIN D) 2000 units tablet, TAKE ONE TABLET BY MOUTH EVERY DAY, Disp: 30 tablet, Rfl: 0  •  clonazePAM (KlonoPIN) 0 5 mg tablet, Take 1 tablet (0 5 mg total) by mouth 2 (two) times a day, Disp: 60 tablet, Rfl: 0  •  cyanocobalamin (VITAMIN B-12) 1,000 mcg tablet, Take 1,000 mcg by mouth daily, Disp: , Rfl:   •  docusate sodium (COLACE) 100 mg capsule, Take 1 capsule (100 mg total) by mouth 2 (two) times a day, Disp: 30 capsule, Rfl: 3  •  doxycycline hyclate (VIBRAMYCIN) 100 mg capsule, Take 1 capsule (100 mg total) by mouth every 12 (twelve) hours for 7 days, Disp: 14 capsule, Rfl: 0  •  famotidine (PEPCID) 20 mg tablet, Take 1 tablet (20 mg total) by mouth 2 (two) times a day, Disp: 60 tablet, Rfl: 3  •  glimepiride (AMARYL) 4 mg tablet, Take 1 tablet (4 mg total) by mouth 2 (two) times a day, Disp: 180 tablet, Rfl: 1  •  glucose blood (FREESTYLE LITE) test strip, Use 1 each 4 (four) times a day Use 4 times daily DM2-- free style lite test strips, Disp: 400 each, Rfl: 1  •  insulin aspart, w/niacinamide, (FIASP) 100 Units/mL injection pen, Inject 12 Units under the skin 4 (four) times a day Please send 5  pens with 5 refills, Disp: 3 mL, Rfl: 5  •  Insulin Glargine Solostar (Lantus SoloStar) 100 UNIT/ML SOPN, 25 units in AM and 35 units in PM, Disp: 15 mL, Rfl: 5  •  Insulin Pen Needle (BD Pen Needle Kelly U/F) 32G X 4 MM MISC, Use 4 (four) times a day, Disp: 400 each, Rfl: 0  •  Lancets (freestyle) lancets, Use 3 (three) times a day Test blood glucose, Disp: 90 each, Rfl: 5  •  levothyroxine 100 mcg tablet, Take 1 tablet (100 mcg total) by mouth daily, Disp: 90 tablet, Rfl: 1  •  lisinopril-hydrochlorothiazide (PRINZIDE,ZESTORETIC) 20-12 5 MG per tablet, Take 0 5 tablets by mouth daily, Disp: 45 tablet, Rfl: 1  •  metoclopramide (Reglan) 10 mg tablet, Take 1 tablet (10 mg total) by mouth 4 (four) times a day, Disp: 60 tablet, Rfl: 0  •  ondansetron (ZOFRAN) 4 mg tablet, Take 1 tablet (4 mg total) by mouth every 8 (eight) hours as needed for nausea or vomiting, Disp: 20 tablet, Rfl: 0  •  sertraline (ZOLOFT) 100 mg tablet, Take 1 tablet (100 mg total) by mouth 2 (two) times a day, Disp: 180 tablet, Rfl: 1    Allergies   Allergen Reactions   • Metformin Diarrhea       Social History   Past Surgical History:   Procedure Laterality Date   •  SECTION     • CHOLECYSTECTOMY     • COLONOSCOPY     • DILATION AND CURETTAGE OF UTERUS     • ESOPHAGOGASTRODUODENOSCOPY     • FRACTURE SURGERY     • HYSTERECTOMY      44   • OOPHORECTOMY      44   • WI COLONOSCOPY FLX DX W/COLLJ SPEC WHEN PFRMD N/A 2016    Procedure: EGD AND COLONOSCOPY;  Surgeon: Familia Cristobal MD;  [facility]; Service: Gastroenterology   • WI NEUROPLASTY &/TRANSPOS MEDIAN NRV CARPAL TUNNE Right 2017    Procedure: WRIST CARPAL TUNNEL RELEASE ; TENOLYSIS OF FPL, FDS 2-5, FDP 2-5;  APPLICATION OF [place];  Surgeon: Amanda Noonan MD;  [facility];  Service: Orthopedics   • WI OPTX FEM SHFT FX W/INSJ IMED IMPLT W/WO SCREW Left 2/3/2021    Procedure: INSERTION NAIL IM FEMUR ANTEGRADE (TROCHANTERIC);   Surgeon: Filomena Chen MD;  [facility];  Service: Orthopedics   • WI TENDON SHEATH INCISION Right 2017    Procedure: LONG FINGER TRIGGER RELEASE ;  Surgeon: Amanda Noonan MD;  [facility];  Service: Orthopedics   • WI TENDON SHEATH INCISION Right 2016    Procedure: RELEASE TRIGGER FINGER - LONG FINGER;  Surgeon: Amanda Noonan MD;  [facility];  Service: Orthopedics   • ROOT CANAL     • UPPER GASTROINTESTINAL ENDOSCOPY       Family History   Problem Relation Age of Onset   • Cirrhosis Mother    • Heart attack Father    • Diabetes type II Father    • Hearing loss Father    • Vision loss Father    • Diabetes Father    • Heart disease Father    • No Known Problems Sister    • No Known Problems Sister    • Arthritis Maternal Grandmother    • No Known Problems Maternal Grandfather    • No Known Problems Paternal Grandmother    • Heart disease Paternal Grandfather    • Stroke Paternal Grandfather    • Diabetes Brother    • Alcohol abuse Son    • No Known Problems Maternal Aunt    • No Known Problems Maternal Aunt    • Breast cancer Paternal Aunt 62   • Colon cancer Paternal Uncle 28   • Testicular cancer Cousin 18       Objective:  /68 (BP Location: Left arm, Patient Position: Sitting, Cuff Size: Large)   Pulse 72   Temp (!) 97 3 °F (36 3 °C) (Temporal)   Ht 5' 2" (1 575 m)   Wt 93 9 kg (207 lb)   SpO2 98% Comment: room air  BMI 37 86 kg/m²      Physical Exam  Vitals reviewed  Constitutional:       General: She is not in acute distress  Appearance: Normal appearance  She is obese  She is not diaphoretic  HENT:      Head: Normocephalic and atraumatic  Cardiovascular:      Rate and Rhythm: Normal rate and regular rhythm  Heart sounds: Normal heart sounds  Pulmonary:      Effort: Pulmonary effort is normal  No respiratory distress  Breath sounds: Normal breath sounds  No wheezing, rhonchi or rales  Genitourinary:      Neurological:      Mental Status: She is alert and oriented to person, place, and time  Mental status is at baseline     Psychiatric:         Mood and Affect: Mood normal          Behavior: Behavior normal

## 2023-01-06 NOTE — ASSESSMENT & PLAN NOTE
- perineal abscess   - will start doxycycline bid x 7 days   - advised warm compresses 3-4 times a day  - wash once daily with antibacterial soap  - follow up 1 week, sooner for any worsening symptoms

## 2023-02-01 DIAGNOSIS — K52.9 GASTROENTERITIS: ICD-10-CM

## 2023-02-01 DIAGNOSIS — F41.8 ANXIOUS DEPRESSION: ICD-10-CM

## 2023-02-17 RX ORDER — ONDANSETRON 4 MG/1
4 TABLET, FILM COATED ORAL EVERY 8 HOURS PRN
Qty: 20 TABLET | Refills: 0 | Status: SHIPPED | OUTPATIENT
Start: 2023-02-17

## 2023-02-17 RX ORDER — CLONAZEPAM 0.5 MG/1
0.5 TABLET ORAL 2 TIMES DAILY
Qty: 60 TABLET | Refills: 0 | Status: SHIPPED | OUTPATIENT
Start: 2023-02-17

## 2023-03-15 DIAGNOSIS — E11.649 UNCONTROLLED TYPE 2 DIABETES MELLITUS WITH HYPOGLYCEMIA WITHOUT COMA (HCC): ICD-10-CM

## 2023-03-15 DIAGNOSIS — E11.00 UNCONTROLLED TYPE 2 DIABETES MELLITUS WITH HYPEROSMOLARITY WITHOUT COMA, WITHOUT LONG-TERM CURRENT USE OF INSULIN (HCC): ICD-10-CM

## 2023-03-15 RX ORDER — PEN NEEDLE, DIABETIC 32GX 5/32"
NEEDLE, DISPOSABLE MISCELLANEOUS 4 TIMES DAILY
Qty: 400 EACH | Refills: 0 | Status: SHIPPED | OUTPATIENT
Start: 2023-03-15

## 2023-03-15 RX ORDER — BLOOD-GLUCOSE METER
1 KIT MISCELLANEOUS 4 TIMES DAILY
Qty: 400 EACH | Refills: 1 | Status: SHIPPED | OUTPATIENT
Start: 2023-03-15

## 2023-03-20 ENCOUNTER — TELEPHONE (OUTPATIENT)
Dept: INTERNAL MEDICINE CLINIC | Age: 68
End: 2023-03-20

## 2023-03-20 DIAGNOSIS — F41.9 ANXIETY: ICD-10-CM

## 2023-03-20 DIAGNOSIS — E11.649 UNCONTROLLED TYPE 2 DIABETES MELLITUS WITH HYPOGLYCEMIA WITHOUT COMA (HCC): ICD-10-CM

## 2023-03-20 RX ORDER — SERTRALINE HYDROCHLORIDE 100 MG/1
100 TABLET, FILM COATED ORAL 2 TIMES DAILY
Qty: 180 TABLET | Refills: 1 | Status: SHIPPED | OUTPATIENT
Start: 2023-03-20

## 2023-03-20 RX ORDER — INSULIN GLARGINE 100 [IU]/ML
INJECTION, SOLUTION SUBCUTANEOUS
Qty: 15 ML | Refills: 5 | Status: SHIPPED | OUTPATIENT
Start: 2023-03-20

## 2023-03-20 NOTE — TELEPHONE ENCOUNTER
rx refill for Insulin Glargine Solostar (Lantus SoloStar) 100 UNIT/ML SOPN      Send to [facility] [address] [place] [address] - [address]     Nov- 3/22/23

## 2023-03-28 LAB
ALBUMIN SERPL-MCNC: 4.1 G/DL (ref 3.6–5.1)
ALBUMIN/GLOB SERPL: 1.4 (CALC) (ref 1–2.5)
ALP SERPL-CCNC: 53 U/L (ref 37–153)
ALT SERPL-CCNC: 18 U/L (ref 6–29)
AST SERPL-CCNC: 16 U/L (ref 10–35)
BILIRUB SERPL-MCNC: 0.2 MG/DL (ref 0.2–1.2)
BUN SERPL-MCNC: 16 MG/DL (ref 7–25)
BUN/CREAT SERPL: ABNORMAL (CALC) (ref 6–22)
CALCIUM SERPL-MCNC: 9.4 MG/DL (ref 8.6–10.4)
CHLORIDE SERPL-SCNC: 105 MMOL/L (ref 98–110)
CHOLEST SERPL-MCNC: 132 MG/DL
CHOLEST/HDLC SERPL: 3.8 (CALC)
CO2 SERPL-SCNC: 28 MMOL/L (ref 20–32)
CREAT SERPL-MCNC: 0.67 MG/DL (ref 0.5–1.05)
GFR/BSA.PRED SERPLBLD CYS-BASED-ARV: 95 ML/MIN/1.73M2
GLOBULIN SER CALC-MCNC: 2.9 G/DL (CALC) (ref 1.9–3.7)
GLUCOSE SERPL-MCNC: 110 MG/DL (ref 65–99)
HDLC SERPL-MCNC: 35 MG/DL
LDLC SERPL CALC-MCNC: 75 MG/DL (CALC)
NONHDLC SERPL-MCNC: 97 MG/DL (CALC)
POTASSIUM SERPL-SCNC: 4.1 MMOL/L (ref 3.5–5.3)
PROT SERPL-MCNC: 7 G/DL (ref 6.1–8.1)
SODIUM SERPL-SCNC: 140 MMOL/L (ref 135–146)
TRIGL SERPL-MCNC: 137 MG/DL

## 2023-03-29 ENCOUNTER — OFFICE VISIT (OUTPATIENT)
Dept: INTERNAL MEDICINE CLINIC | Age: 68
End: 2023-03-29

## 2023-03-29 VITALS
WEIGHT: 206.9 LBS | SYSTOLIC BLOOD PRESSURE: 132 MMHG | DIASTOLIC BLOOD PRESSURE: 80 MMHG | HEART RATE: 71 BPM | HEIGHT: 61 IN | BODY MASS INDEX: 39.06 KG/M2 | TEMPERATURE: 97.7 F | OXYGEN SATURATION: 97 %

## 2023-03-29 DIAGNOSIS — Z79.4 TYPE 2 DIABETES MELLITUS WITH COMPLICATION, WITH LONG-TERM CURRENT USE OF INSULIN (HCC): ICD-10-CM

## 2023-03-29 DIAGNOSIS — E03.9 HYPOTHYROIDISM, UNSPECIFIED TYPE: ICD-10-CM

## 2023-03-29 DIAGNOSIS — F41.8 ANXIOUS DEPRESSION: ICD-10-CM

## 2023-03-29 DIAGNOSIS — N39.0 URINARY TRACT INFECTION WITHOUT HEMATURIA, SITE UNSPECIFIED: ICD-10-CM

## 2023-03-29 DIAGNOSIS — E11.9 TYPE 2 DIABETES MELLITUS WITHOUT COMPLICATION, WITH LONG-TERM CURRENT USE OF INSULIN (HCC): Primary | ICD-10-CM

## 2023-03-29 DIAGNOSIS — E11.649 UNCONTROLLED TYPE 2 DIABETES MELLITUS WITH HYPOGLYCEMIA WITHOUT COMA (HCC): ICD-10-CM

## 2023-03-29 DIAGNOSIS — Z00.00 MEDICARE ANNUAL WELLNESS VISIT, SUBSEQUENT: ICD-10-CM

## 2023-03-29 DIAGNOSIS — S72.002A CLOSED FRACTURE OF NECK OF LEFT FEMUR, INITIAL ENCOUNTER (HCC): ICD-10-CM

## 2023-03-29 DIAGNOSIS — Z79.4 TYPE 2 DIABETES MELLITUS WITHOUT COMPLICATION, WITH LONG-TERM CURRENT USE OF INSULIN (HCC): Primary | ICD-10-CM

## 2023-03-29 DIAGNOSIS — E11.8 TYPE 2 DIABETES MELLITUS WITH COMPLICATION, WITH LONG-TERM CURRENT USE OF INSULIN (HCC): ICD-10-CM

## 2023-03-29 DIAGNOSIS — Z12.31 ENCOUNTER FOR SCREENING MAMMOGRAM FOR MALIGNANT NEOPLASM OF BREAST: ICD-10-CM

## 2023-03-29 LAB
ALBUMIN/CREAT UR: 6 MCG/MG CREAT
APPEARANCE UR: ABNORMAL
BACTERIA UR QL AUTO: ABNORMAL /HPF
BASOPHILS # BLD AUTO: 11 CELLS/UL (ref 0–200)
BASOPHILS NFR BLD AUTO: 0.2 %
BILIRUB UR QL STRIP: NEGATIVE
CAOX CRY #/AREA URNS HPF: ABNORMAL /HPF
COLOR UR: YELLOW
CREAT UR-MCNC: 133 MG/DL (ref 20–275)
EOSINOPHIL # BLD AUTO: 112 CELLS/UL (ref 15–500)
EOSINOPHIL NFR BLD AUTO: 2 %
ERYTHROCYTE [DISTWIDTH] IN BLOOD BY AUTOMATED COUNT: 14.3 % (ref 11–15)
GLUCOSE UR QL STRIP: NEGATIVE
HCT VFR BLD AUTO: 36.7 % (ref 35–45)
HGB BLD-MCNC: 12.4 G/DL (ref 11.7–15.5)
HGB UR QL STRIP: NEGATIVE
HYALINE CASTS #/AREA URNS LPF: ABNORMAL /LPF
KETONES UR QL STRIP: NEGATIVE
LEUKOCYTE ESTERASE UR QL STRIP: ABNORMAL
LYMPHOCYTES # BLD AUTO: 1198 CELLS/UL (ref 850–3900)
LYMPHOCYTES NFR BLD AUTO: 21.4 %
MCH RBC QN AUTO: 27.5 PG (ref 27–33)
MCHC RBC AUTO-ENTMCNC: 33.8 G/DL (ref 32–36)
MCV RBC AUTO: 81.4 FL (ref 80–100)
MICROALBUMIN UR-MCNC: 0.8 MG/DL
MONOCYTES # BLD AUTO: 347 CELLS/UL (ref 200–950)
MONOCYTES NFR BLD AUTO: 6.2 %
NEUTROPHILS # BLD AUTO: 3931 CELLS/UL (ref 1500–7800)
NEUTROPHILS NFR BLD AUTO: 70.2 %
NITRITE UR QL STRIP: NEGATIVE
PH UR STRIP: 5.5 [PH] (ref 5–8)
PLATELET # BLD AUTO: 150 THOUSAND/UL (ref 140–400)
PMV BLD REES-ECKER: 10.6 FL (ref 7.5–12.5)
PROT UR QL STRIP: NEGATIVE
RBC # BLD AUTO: 4.51 MILLION/UL (ref 3.8–5.1)
RBC #/AREA URNS HPF: ABNORMAL /HPF
SL AMB POCT HEMOGLOBIN AIC: 6.7 (ref ?–6.5)
SP GR UR STRIP: 1.02 (ref 1–1.03)
SQUAMOUS #/AREA URNS HPF: ABNORMAL /HPF
TSH SERPL-ACNC: 7.05 MIU/L (ref 0.4–4.5)
WBC # BLD AUTO: 5.6 THOUSAND/UL (ref 3.8–10.8)
WBC #/AREA URNS HPF: ABNORMAL /HPF

## 2023-03-29 RX ORDER — DOCUSATE SODIUM 100 MG/1
100 CAPSULE, LIQUID FILLED ORAL 2 TIMES DAILY PRN
Qty: 30 CAPSULE | Refills: 3 | Status: SHIPPED | OUTPATIENT
Start: 2023-03-29

## 2023-03-29 RX ORDER — GLIMEPIRIDE 4 MG/1
4 TABLET ORAL DAILY
Qty: 180 TABLET | Refills: 1 | Status: SHIPPED | OUTPATIENT
Start: 2023-03-29

## 2023-03-29 RX ORDER — CIPROFLOXACIN 250 MG/1
250 TABLET, FILM COATED ORAL EVERY 12 HOURS SCHEDULED
Qty: 10 TABLET | Refills: 0 | Status: SHIPPED | OUTPATIENT
Start: 2023-03-29 | End: 2023-04-03

## 2023-03-29 NOTE — PROGRESS NOTES
Diabetic Foot Exam    Patient's shoes and socks removed  Right Foot/Ankle   Right Foot Inspection  Skin Exam: skin normal, skin intact, dry skin, callus and callus  No warmth, no erythema, no maceration, no abnormal color, no pre-ulcer and no ulcer  Toe Exam: ROM and strength within normal limits  Sensory   Monofilament testing: intact    Vascular  The right DP pulse is 2+  The right PT pulse is 2+  Left Foot/Ankle  Left Foot Inspection  Skin Exam: skin normal, skin intact, dry skin and callus  No warmth, no erythema, no maceration, normal color, no pre-ulcer and no ulcer  Toe Exam: ROM and strength within normal limits  Sensory   Monofilament testing: intact    Vascular  The left DP pulse is 2+  The left PT pulse is 2+       Assign Risk Category  No deformity present  Risk: 0

## 2023-03-29 NOTE — PROGRESS NOTES
Assessment and Plan:     Problem List Items Addressed This Visit        Endocrine    Type 2 diabetes mellitus, with long-term current use of insulin (HCC) - Primary    Relevant Medications    insulin aspart, w/niacinamide, (FIASP) 100 Units/mL injection pen    glimepiride (AMARYL) 4 mg tablet    Other Relevant Orders    POCT hemoglobin A1c (Completed)    Basic metabolic panel    HEMOGLOBIN A1C W/ EAG ESTIMATION    Hypothyroidism    Relevant Orders    TSH, 3rd generation with Free T4 reflex   Other Visit Diagnoses     Encounter for screening mammogram for malignant neoplasm of breast        Relevant Orders    Mammo screening bilateral w 3d & cad    Anxious depression        Closed fracture of neck of left femur, initial encounter ([facility] [address] )        Relevant Medications    docusate sodium (COLACE) 100 mg capsule    Uncontrolled type 2 diabetes mellitus with hypoglycemia without coma (HCC)        Relevant Medications    insulin aspart, w/niacinamide, (FIASP) 100 Units/mL injection pen    glimepiride (AMARYL) 4 mg tablet    Type 2 diabetes mellitus with complication, with long-term current use of insulin (HCC)        Relevant Medications    insulin aspart, w/niacinamide, (FIASP) 100 Units/mL injection pen    glimepiride (AMARYL) 4 mg tablet    Urinary tract infection without hematuria, site unspecified        Relevant Medications    ciprofloxacin (CIPRO) 250 mg tablet        Urinary Incontinence  WBC 10-20 on UA  Will treat with 5 day course of Cipro 250mg BID  If unresolved, patient to call office for further evaluation  Possible urge incontinence  Diabetes  Patient taking Glimepiride 4mg once a day  A1c 6 7%  Discussed dietary and lifestyle changes to keep A1c from rising  Hypothyroidism  TSH increased from prior, patient admitting to poor medication compliance due to life stressors  Agreeable to be more consistent moving forward  Will re-assess at 3 month follow up  Advised dangers of not taking thyroid medication  Asymptomatic at this time  Hypertension  Well-controlled    Mammogram screening    Obesity  Patient motivated to lose weight and has a goal of 196 lb  Discussed at length with patient that weight loss is primarily due to burning more calories than he has in taking  Discussed that this would involve either reducing food intake, or increasing amount of exercise, or combination of the two  If she is able to get a good guage on his calorie intake and calorie dense foods that he eats frequently, she will be able to make adjustments to her diet which could lead to weight loss improvement  Advised patient that 1 lb/week is an appropriate level of weight loss moving forward  Encouraged patient that she is making good steps that will be beneficial to her and her underlying conditions  Preventive health issues were discussed with patient, and age appropriate screening tests were ordered as noted in patient's After Visit Summary  Personalized health advice and appropriate referrals for health education or preventive services given if needed, as noted in patient's After Visit Summary  History of Present Illness:     Patient presents for a Medicare Wellness Visit    Patient doing well overall  Does note some urinary incontinence but denies any dysuria, increased urinary frequency, fever, chills, nausea, vomiting, fatigue  Denies any chest pain, shortness of breath  Patient notes noncompliance with medications of the last few months due to the birth of her granddaughter who she is taking care of  Patient has been trying to be more on track recently  BMI Counseling: Body mass index is 39 09 kg/m²  The BMI is above normal  Nutrition recommendations include reducing portion sizes, decreasing overall calorie intake, consuming healthier snacks and decreasing soda and/or juice intake   Exercise recommendations include moderate aerobic physical activity for 150 minutes/week, exercising 3-5 times per week, joining a gym and strength training exercises  Patient has made goal of 10 pound weight loss by next visit in 3 months  Patient Care Team:  Rossi Mcelroy MD as PCP - General (Internal Medicine)  Jerome Hoover, MD Merriam Gowers, MD as Endoscopist     Review of Systems:     Review of Systems   Constitutional: Negative for chills, fatigue and fever  Eyes: Negative for visual disturbance  Respiratory: Negative for cough, chest tightness, shortness of breath and wheezing  Cardiovascular: Negative for chest pain, palpitations and leg swelling  Gastrointestinal: Negative for abdominal distention, abdominal pain, constipation, diarrhea, nausea and vomiting  Endocrine: Negative for cold intolerance and heat intolerance  Genitourinary: Negative for difficulty urinating, dysuria, flank pain, hematuria, urgency and vaginal pain  Musculoskeletal: Negative for arthralgias and back pain  Skin: Negative for color change  Neurological: Negative for dizziness, weakness, light-headedness, numbness and headaches  Psychiatric/Behavioral: Negative for confusion and decreased concentration          Problem List:     Patient Active Problem List   Diagnosis   • Anxiety   • Type 2 diabetes mellitus, with long-term current use of insulin ([facility])   • Diabetic gastroparesis (HCC)   • GERD (gastroesophageal reflux disease)   • Hypothyroidism   • Fatty liver   • Hand paresthesia   • Irritable bowel syndrome   • Obesity   • Osteoarthritis, hip, bilateral   • Splenomegaly   • Essential hypertension   • Seasonal allergies   • Gastroenteritis   • LFT elevation   • Obesity, morbid (HCC)   • Statin intolerance   • Chronic pain of right ankle   • Achilles tendinitis, right leg   • Gastroparesis   • Constipation   • Gastroesophageal reflux disease without esophagitis   • Mixed hyperlipidemia   • Abscess      Past Medical and Surgical History:     Past Medical History:   Diagnosis Date   • Acid reflux    • Anxiety    • Diabetes mellitus ([facility] [address] )    • Disease of thyroid gland Years   • Gastroparesis    • GERD (gastroesophageal reflux disease) Years   • Hypertension    • Hypothyroid    • Obesity Years   • Shingles 2 years ago     Past Surgical History:   Procedure Laterality Date   •  SECTION     • CHOLECYSTECTOMY     • COLONOSCOPY     • DILATION AND CURETTAGE OF UTERUS     • ESOPHAGOGASTRODUODENOSCOPY     • FRACTURE SURGERY     • HYSTERECTOMY      44   • OOPHORECTOMY      44   • MI COLONOSCOPY FLX DX W/COLLJ SPEC WHEN PFRMD N/A 2016    Procedure: EGD AND COLONOSCOPY;  Surgeon: Apple Villalpando MD;  Location: [facility]; Service: Gastroenterology   • MI NEUROPLASTY &/TRANSPOS MEDIAN NRV CARPAL TUNNE Right 2017    Procedure: WRIST CARPAL TUNNEL RELEASE ; TENOLYSIS OF FPL, FDS 2-5, FDP 2-5;  APPLICATION OF Samella Hazard;  Surgeon: Manuela Ramirez MD;  Location[facility];  Service: Orthopedics   • MI OPTX FEM SHFT FX W/INSJ IMED IMPLT W/WO SCREW Left 2/3/2021    Procedure: INSERTION NAIL IM FEMUR ANTEGRADE (TROCHANTERIC);   Surgeon: Jake Russell MD;  Location[facility];  Service: Orthopedics   • MI TENDON SHEATH INCISION Right 2017    Procedure: LONG FINGER TRIGGER RELEASE ;  Surgeon: Manuela Ramirez MD;  [facility];  Service: Orthopedics   • MI TENDON SHEATH INCISION Right 2016    Procedure: RELEASE TRIGGER FINGER - LONG FINGER;  Surgeon: Manuela Ramirez MD;  Location: [facility];  Service: Orthopedics   • ROOT CANAL     • UPPER GASTROINTESTINAL ENDOSCOPY        Family History:     Family History   Problem Relation Age of Onset   • Cirrhosis Mother    • Heart attack Father    • Diabetes type II Father    • Hearing loss Father    • Vision loss Father    • Diabetes Father    • Heart disease Father    • No Known Problems Sister    • No Known Problems Sister    • Arthritis Maternal Grandmother    • No Known Problems Maternal Grandfather    • No Known Problems Paternal Grandmother    • Heart disease Paternal Grandfather    • Stroke Paternal Grandfather    • Diabetes Brother    • Alcohol abuse Son    • No Known Problems Maternal Aunt    • No Known Problems Maternal Aunt    • Breast cancer Paternal Aunt 62   • Colon cancer Paternal Uncle 28   • Testicular cancer Cousin 25      Social History:     Social History     Socioeconomic History   • Marital status:      Spouse name: None   • Number of children: None   • Years of education: None   • Highest education level: None   Occupational History   • None   Tobacco Use   • Smoking status: Former     Packs/day: 2 00     Years: 10 00     Pack years: 20 00     Types: Cigarettes     Start date: 7/3/1964     Quit date:      Years since quittin 3   • Smokeless tobacco: Never   Vaping Use   • Vaping Use: Never used   Substance and Sexual Activity   • Alcohol use: Yes     Alcohol/week: 0 0 standard drinks     Comment: Rarely   • Drug use: No   • Sexual activity: Not Currently   Other Topics Concern   • None   Social History Narrative   • None     Social Determinants of Health     Financial Resource Strain: Low Risk    • Difficulty of Paying Living Expenses: Not hard at all   Food Insecurity: Not on file   Transportation Needs: No Transportation Needs   • Lack of Transportation (Medical): No   • Lack of Transportation (Non-Medical):  No   Physical Activity: Not on file   Stress: Not on file   Social Connections: Not on file   Intimate Partner Violence: Not on file   Housing Stability: Not on file      Medications and Allergies:     Current Outpatient Medications   Medication Sig Dispense Refill   • atorvastatin (LIPITOR) 20 mg tablet Take 1 tablet (20 mg total) by mouth daily 30 tablet 5   • Cholecalciferol (VITAMIN D) 2000 units tablet TAKE ONE TABLET BY MOUTH EVERY DAY 30 tablet 0   • ciprofloxacin (CIPRO) 250 mg tablet Take 1 tablet (250 mg total) by mouth every 12 (twelve) hours for 5 days 10 tablet 0   • clonazePAM (KlonoPIN) 0 5 mg tablet Take 1 tablet (0 5 mg total) by mouth 2 (two) times a day (Patient taking differently: Take 0 5 mg by mouth if needed) 60 tablet 0   • cyanocobalamin (VITAMIN B-12) 1,000 mcg tablet Take 1,000 mcg by mouth daily     • docusate sodium (COLACE) 100 mg capsule Take 1 capsule (100 mg total) by mouth 2 (two) times a day as needed for constipation 30 capsule 3   • famotidine (PEPCID) 20 mg tablet Take 1 tablet (20 mg total) by mouth 2 (two) times a day 60 tablet 3   • glimepiride (AMARYL) 4 mg tablet Take 1 tablet (4 mg total) by mouth in the morning 180 tablet 1   • glucose blood (FREESTYLE LITE) test strip Use 1 each 4 (four) times a day Use 4 times daily DM2-- free style lite test strips 400 each 1   • insulin aspart, w/niacinamide, (FIASP) 100 Units/mL injection pen Inject 12 Units under the skin 3 (three) times a day with meals Please send 5  pens with 5 refills 3 mL 5   • Insulin Glargine Solostar (Lantus SoloStar) 100 UNIT/ML SOPN 25 units in AM and 35 units in PM 15 mL 5   • Insulin Pen Needle (BD Pen Needle Kelly U/F) 32G X 4 MM MISC Use 4 (four) times a day 400 each 0   • Lancets (freestyle) lancets Use 3 (three) times a day Test blood glucose 90 each 5   • levothyroxine 100 mcg tablet Take 1 tablet (100 mcg total) by mouth daily 90 tablet 1   • lisinopril-hydrochlorothiazide (PRINZIDE,ZESTORETIC) 20-12 5 MG per tablet Take 0 5 tablets by mouth daily 45 tablet 1   • metoclopramide (Reglan) 10 mg tablet Take 1 tablet (10 mg total) by mouth 4 (four) times a day 60 tablet 0   • ondansetron (ZOFRAN) 4 mg tablet Take 1 tablet (4 mg total) by mouth every 8 (eight) hours as needed for nausea or vomiting 20 tablet 0   • sertraline (ZOLOFT) 100 mg tablet Take 1 tablet (100 mg total) by mouth 2 (two) times a day 180 tablet 1     No current facility-administered medications for this visit       Allergies   Allergen Reactions   • Metformin Diarrhea      Immunizations:     Immunization History   Administered Date(s) Administered   • COVID-19 PFIZER VACCINE 0 3 ML IM 03/13/2021, 04/03/2021, 01/11/2022   • Influenza Quadrivalent Preservative Free 3 years and older IM 11/05/2015   • Influenza, high dose seasonal 0 7 mL 11/12/2019   • Influenza, seasonal, injectable, preservative free 02/06/2018   • Pneumococcal Conjugate 13-Valent 07/26/2016   • Pneumococcal Polysaccharide PPV23 03/26/2019   • influenza, trivalent, adjuvanted 09/21/2020      Health Maintenance:         Topic Date Due   • DXA SCAN  11/20/2022   • Breast Cancer Screening: Mammogram  06/27/2023   • Colorectal Cancer Screening  01/28/2026   • Hepatitis C Screening  Completed         Topic Date Due   • COVID-19 Vaccine (4 - Booster for Yovany Khoury series) 03/08/2022   • Influenza Vaccine (1) 09/01/2022      Medicare Screening Tests and Risk Assessments:     Mago David is here for her Subsequent Wellness visit  Health Risk Assessment:   Patient rates overall health as good  Patient feels that their physical health rating is same  Patient is satisfied with their life  Eyesight was rated as same  Hearing was rated as same  Patient feels that their emotional and mental health rating is same  Patients states they are sometimes angry  Patient states they are sometimes unusually tired/fatigued  Pain experienced in the last 7 days has been some  Patient's pain rating has been 5/10  Patient states that she has experienced no weight loss or gain in last 6 months  Depression Screening:   PHQ-2 Score: 0      Fall Risk Screening: In the past year, patient has experienced: no history of falling in past year      Urinary Incontinence Screening:   Patient has leaked urine accidently in the last six months  Feels urge to go and unable to get to the bathroom in time  No increased frequency  Full voiding  Home Safety:  Patient does not have trouble with stairs inside or outside of their home  Patient has working smoke alarms and has working carbon monoxide detector   Home safety hazards include: none  Nutrition:   Current diet is Diabetic  Cooks own food at home  Medications:   Patient is not currently taking any over-the-counter supplements  Patient is able to manage medications  Pt taking clonazepam     Activities of Daily Living (ADLs)/Instrumental Activities of Daily Living (IADLs):   Walk and transfer into and out of bed and chair?: Yes  Dress and groom yourself?: Yes    Bathe or shower yourself?: Yes    Feed yourself? Yes  Do your laundry/housekeeping?: Yes  Manage your money, pay your bills and track your expenses?: Yes  Make your own meals?: Yes    Do your own shopping?: Yes    Previous Hospitalizations:   Any hospitalizations or ED visits within the last 12 months?: No      Advance Care Planning:   Living will: No    Durable POA for healthcare: No    Advanced directive: No    Five wishes given: Yes      PREVENTIVE SCREENINGS      Cardiovascular Screening:    General: Screening Not Indicated and History Lipid Disorder      Diabetes Screening:     General: Screening Not Indicated and History Diabetes      Colorectal Cancer Screening:     General: Screening Current      Breast Cancer Screening:     General: Screening Current      Cervical Cancer Screening:    General: Screening Not Indicated      Lung Cancer Screening:     General: Screening Not Indicated      Hepatitis C Screening:    General: Screening Current    Screening, Brief Intervention, and Referral to Treatment (SBIRT)    Screening  Typical number of drinks in a day: 0  Typical number of drinks in a week: 0  Interpretation: Low risk drinking behavior      AUDIT-C Screenin) How often did you have a drink containing alcohol in the past year? never  2) How many drinks did you have on a typical day when you were drinking in the past year? 0  3) How often did you have 6 or more drinks on one occasion in the past year? never    AUDIT-C Score: 0  Interpretation: Score 0-2 (female): Negative screen for alcohol "misuse    Single Item Drug Screening:  How often have you used an illegal drug (including marijuana) or a prescription medication for non-medical reasons in the past year? never    Single Item Drug Screen Score: 0  Interpretation: Negative screen for possible drug use disorder    No results found  Physical Exam:     /80 (BP Location: Left arm, Patient Position: Sitting, Cuff Size: Standard)   Pulse 71   Temp 97 7 °F (36 5 °C) (Temporal)   Ht 5' 1\" (1 549 m)   Wt 93 8 kg (206 lb 14 4 oz)   SpO2 97% Comment: room air  BMI 39 09 kg/m²     Physical Exam  Vitals reviewed  Constitutional:       General: She is not in acute distress  Appearance: Normal appearance  HENT:      Head: Normocephalic and atraumatic  Right Ear: Tympanic membrane, ear canal and external ear normal       Left Ear: Tympanic membrane, ear canal and external ear normal       Nose: Nose normal       Mouth/Throat:      Mouth: Mucous membranes are moist       Pharynx: Oropharynx is clear  Eyes:      Conjunctiva/sclera: Conjunctivae normal    Cardiovascular:      Rate and Rhythm: Normal rate and regular rhythm  Pulses: Normal pulses  Heart sounds: Normal heart sounds  No murmur heard  Pulmonary:      Effort: Pulmonary effort is normal  No respiratory distress  Breath sounds: Normal breath sounds  Abdominal:      General: Abdomen is flat  Bowel sounds are normal  There is no distension  Palpations: Abdomen is soft  Tenderness: There is no abdominal tenderness  There is no guarding  Musculoskeletal:         General: No swelling  Right lower leg: No edema  Left lower leg: No edema  Skin:     General: Skin is warm  Neurological:      Mental Status: She is alert     Psychiatric:         Mood and Affect: Mood normal          Behavior: Behavior normal           Patrice Roof, DO  "

## 2023-04-28 ENCOUNTER — HOSPITAL ENCOUNTER (EMERGENCY)
Facility: HOSPITAL | Age: 68
Discharge: HOME | End: 2023-04-28
Attending: EMERGENCY MEDICINE

## 2023-04-28 ENCOUNTER — APPOINTMENT (EMERGENCY)
Dept: CT IMAGING | Facility: HOSPITAL | Age: 68
End: 2023-04-28

## 2023-04-28 VITALS
DIASTOLIC BLOOD PRESSURE: 72 MMHG | SYSTOLIC BLOOD PRESSURE: 174 MMHG | TEMPERATURE: 97.8 F | WEIGHT: 205.03 LBS | HEART RATE: 90 BPM | OXYGEN SATURATION: 96 % | RESPIRATION RATE: 18 BRPM | BODY MASS INDEX: 38.74 KG/M2
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
## 2023-04-28 DIAGNOSIS — S09.90XA INJURY OF HEAD, INITIAL ENCOUNTER: ICD-10-CM

## 2023-04-28 NOTE — ED PROVIDER NOTES
History  Chief Complaint   Patient presents with   • Fall     Pt fell out of bed this morning  + head strike on carpeted floor, - thinners, - aspirin, - LOC  Pt reports headache  No other complaints       History provided by:  Patient   used: No    Fall  Associated symptoms: headaches    Associated symptoms: no abdominal pain, no chest pain, no nausea, no neck pain and no vomiting      75year-old presenting to the ER with injury  Rolled out of bed  Hit back of head  Complaining of headache  Not on blood thinners  Woke up when she fell to the ground  No chest pain, shortness of breath, abdominal pain, lightheadedness or dizziness  No back pain  No other complaints  No pain or burning with urination  No fevers or chills  Prior to Admission Medications   Prescriptions Last Dose Informant Patient Reported? Taking?    Cholecalciferol (VITAMIN D) 2000 units tablet   No No   Sig: TAKE ONE TABLET BY MOUTH EVERY DAY   Insulin Glargine Solostar (Lantus SoloStar) 100 UNIT/ML SOPN   No No   Si units in AM and 35 units in PM   Insulin Pen Needle (BD Pen Needle Kelly U/F) 32G X 4 MM MISC   No No   Sig: Use 4 (four) times a day   Lancets (freestyle) lancets   No No   Sig: Use 3 (three) times a day Test blood glucose   atorvastatin (LIPITOR) 20 mg tablet   No No   Sig: Take 1 tablet (20 mg total) by mouth daily   clonazePAM (KlonoPIN) 0 5 mg tablet   No No   Sig: Take 1 tablet (0 5 mg total) by mouth 2 (two) times a day   Patient taking differently: Take 0 5 mg by mouth if needed   cyanocobalamin (VITAMIN B-12) 1,000 mcg tablet   Yes No   Sig: Take 1,000 mcg by mouth daily   docusate sodium (COLACE) 100 mg capsule   No No   Sig: Take 1 capsule (100 mg total) by mouth 2 (two) times a day as needed for constipation   famotidine (PEPCID) 20 mg tablet   No No   Sig: Take 1 tablet (20 mg total) by mouth 2 (two) times a day   glimepiride (AMARYL) 4 mg tablet   No No   Sig: Take 1 tablet (4 mg total) by mouth in the morning   glucose blood (FREESTYLE LITE) test strip   No No   Sig: Use 1 each 4 (four) times a day Use 4 times daily DM2-- free style lite test strips   insulin aspart, w/niacinamide, (FIASP) 100 Units/mL injection pen   No No   Sig: Inject 12 Units under the skin 3 (three) times a day with meals Please send 5  pens with 5 refills   levothyroxine 100 mcg tablet   No No   Sig: Take 1 tablet (100 mcg total) by mouth daily   lisinopril-hydrochlorothiazide (PRINZIDE,ZESTORETIC) 20-12 5 MG per tablet   No No   Sig: Take 0 5 tablets by mouth daily   metoclopramide (Reglan) 10 mg tablet   No No   Sig: Take 1 tablet (10 mg total) by mouth 4 (four) times a day   ondansetron (ZOFRAN) 4 mg tablet   No No   Sig: Take 1 tablet (4 mg total) by mouth every 8 (eight) hours as needed for nausea or vomiting   sertraline (ZOLOFT) 100 mg tablet   No No   Sig: Take 1 tablet (100 mg total) by mouth 2 (two) times a day      Facility-Administered Medications: None       Past Medical History:   Diagnosis Date   • Acid reflux    • Anxiety    • Diabetes mellitus (HCC)    • Disease of thyroid gland Years   • Gastroparesis    • GERD (gastroesophageal reflux disease) Years   • Hypertension    • Hypothyroid    • Obesity Years   • Shingles 2 years ago       Past Surgical History:   Procedure Laterality Date   •  SECTION     • CHOLECYSTECTOMY     • COLONOSCOPY     • DILATION AND CURETTAGE OF UTERUS     • ESOPHAGOGASTRODUODENOSCOPY     • FRACTURE SURGERY     • HYSTERECTOMY      44   • OOPHORECTOMY      44   • NE COLONOSCOPY FLX DX W/COLLJ SPEC WHEN PFRMD N/A 2016    Procedure: EGD AND COLONOSCOPY;  Surgeon: Michelle Mott MD;  Location: [facility];   Service: Gastroenterology   • NE NEUROPLASTY &/TRANSPOS MEDIAN NRV CARPAL TUNNE Right 2017    Procedure: WRIST CARPAL TUNNEL RELEASE ; TENOLYSIS OF FPL, FDS 2-5, FDP 2-5;  APPLICATION OF [place] Punter;  Surgeon: Candi Alegre MD;  [facility];  Service: Orthopedics   • WI OPTX FEM SHFT FX W/INSJ IMED IMPLT W/WO SCREW Left 2/3/2021    Procedure: INSERTION NAIL IM FEMUR ANTEGRADE (TROCHANTERIC); Surgeon: Dontae Funes MD;  [facility];  Service: Orthopedics   • WI TENDON SHEATH INCISION Right 2017    Procedure: LONG FINGER TRIGGER RELEASE ;  Surgeon: Almond Libman, MD;  [facility];  Service: Orthopedics   • WI TENDON SHEATH INCISION Right 2016    Procedure: RELEASE TRIGGER FINGER - LONG FINGER;  Surgeon: Almond Libman, MD;  [facility];  Service: Orthopedics   • ROOT CANAL     • UPPER GASTROINTESTINAL ENDOSCOPY         Family History   Problem Relation Age of Onset   • Cirrhosis Mother    • Heart attack Father    • Diabetes type II Father    • Hearing loss Father    • Vision loss Father    • Diabetes Father    • Heart disease Father    • No Known Problems Sister    • No Known Problems Sister    • Arthritis Maternal Grandmother    • No Known Problems Maternal Grandfather    • No Known Problems Paternal Grandmother    • Heart disease Paternal Grandfather    • Stroke Paternal Grandfather    • Diabetes Brother    • Alcohol abuse Son    • No Known Problems Maternal Aunt    • No Known Problems Maternal Aunt    • Breast cancer Paternal Aunt 62   • Colon cancer Paternal Uncle 28   • Testicular cancer Cousin 25     I have reviewed and agree with the history as documented  E-Cigarette/Vaping   • E-Cigarette Use Never User      E-Cigarette/Vaping Substances   • Nicotine No    • THC No    • CBD No    • Flavoring No    • Other No    • Unknown No      Social History     Tobacco Use   • Smoking status: Former     Packs/day: 2 00     Years: 10 00     Pack years: 20 00     Types: Cigarettes     Start date: 7/3/1964     Quit date:      Years since quittin 4   • Smokeless tobacco: Never   Vaping Use   • Vaping Use: Never used   Substance Use Topics   • Alcohol use:  Yes     Alcohol/week: 0 0 standard drinks     Comment: Rarely • Drug use: No       Review of Systems   Constitutional: Negative for chills, diaphoresis and fever  HENT: Negative for congestion and sore throat  Respiratory: Negative for cough, shortness of breath, wheezing and stridor  Cardiovascular: Negative for chest pain, palpitations and leg swelling  Gastrointestinal: Negative for abdominal pain, blood in stool, diarrhea, nausea and vomiting  Genitourinary: Negative for dysuria, frequency and urgency  Musculoskeletal: Negative for neck pain and neck stiffness  Skin: Negative for pallor and rash  Neurological: Positive for headaches  Negative for dizziness, syncope, weakness and light-headedness  All other systems reviewed and are negative  Physical Exam  Physical Exam  Vitals reviewed  Constitutional:       Appearance: Normal appearance  She is well-developed  HENT:      Head: Normocephalic and atraumatic  Comments: Bruising noted to occipital area  Eyes:      Extraocular Movements: Extraocular movements intact  Pupils: Pupils are equal, round, and reactive to light  Neck:      Comments: No CTL spine tenderness  Cardiovascular:      Rate and Rhythm: Normal rate and regular rhythm  Heart sounds: Normal heart sounds  Pulmonary:      Effort: Pulmonary effort is normal  No respiratory distress  Breath sounds: Normal breath sounds  Abdominal:      General: Bowel sounds are normal       Palpations: Abdomen is soft  Tenderness: There is no abdominal tenderness  Musculoskeletal:         General: No swelling or tenderness  Normal range of motion  Cervical back: Normal range of motion and neck supple  Skin:     General: Skin is warm and dry  Capillary Refill: Capillary refill takes less than 2 seconds  Neurological:      General: No focal deficit present  Mental Status: She is alert and oriented to person, place, and time  Cranial Nerves: No cranial nerve deficit        Sensory: No sensory deficit  Motor: No weakness  Coordination: Coordination normal       Gait: Gait normal          Vital Signs  ED Triage Vitals   Temperature Pulse Respirations Blood Pressure SpO2   04/28/23 0622 04/28/23 0622 04/28/23 0622 04/28/23 0622 04/28/23 0622   97 8 °F (36 6 °C) 88 18 (!) 179/85 96 %      Temp Source Heart Rate Source Patient Position - Orthostatic VS BP Location FiO2 (%)   04/28/23 0622 04/28/23 0622 04/28/23 0630 04/28/23 0630 --   Oral Monitor Sitting Right arm       Pain Score       04/28/23 0622       5           Vitals:    04/28/23 0622 04/28/23 0630   BP: (!) 179/85 (!) 174/72   Pulse: 88 90   Patient Position - Orthostatic VS:  Sitting         Visual Acuity      ED Medications  Medications - No data to display    Diagnostic Studies  Results Reviewed     None                 CT head without contrast   Final Result by Juan Alberto Graham DO (04/28 0720)      Small left occipital scalp hematoma is suspected  No calvarial fracture or acute intracranial abnormality is seen  Other findings as above  CT CERVICAL SPINE - WITHOUT CONTRAST      INDICATION:   Head trauma, moderate-severe   Head injury  COMPARISON:  None  TECHNIQUE:  CT examination of the cervical spine was performed without intravenous contrast   Contiguous axial images were obtained  Multiplanar 2D reformatted images were created from the source data  Radiation dose length product (DLP) for this visit:  966 mGy-cm (accession [id number]), 430 mGy-cm (accession [id number])  This examination, like all CT scans performed in the [facility], was performed utilizing techniques to minimize    radiation dose exposure, including the use of iterative reconstruction and automated exposure control  IMAGE QUALITY:  Diagnostic  FINDINGS:      ALIGNMENT:  Normal alignment of the cervical spine  No subluxation        VERTEBRAE: No acute fracture      DEGENERATIVE CHANGES: Intervertebral disc space narrowing at C6/C7 with anterior, marginal, and uncovertebral osteophytosis  Facet joint arthropathy at C2/C3 and C3/C4 on the left  PREVERTEBRAL AND PARASPINAL SOFT TISSUES: Unremarkable      THORACIC INLET:  Normal          IMPRESSION:      Degenerative changes as described but no evidence of acute cervical spine injury  Other findings as above               Workstation performed: [id number]         CT cervical spine without contrast   Final Result by Claudia Bermudez DO (04/28 0720)      Small left occipital scalp hematoma is suspected  No calvarial fracture or acute intracranial abnormality is seen  Other findings as above  CT CERVICAL SPINE - WITHOUT CONTRAST      INDICATION:   Head trauma, moderate-severe   Head injury  COMPARISON:  None  TECHNIQUE:  CT examination of the cervical spine was performed without intravenous contrast   Contiguous axial images were obtained  Multiplanar 2D reformatted images were created from the source data  Radiation dose length product (DLP) for this visit:  966 mGy-cm (accession [id number]), 430 mGy-cm (accession [id number])  This examination, like all CT scans performed in the [facility], was performed utilizing techniques to minimize    radiation dose exposure, including the use of iterative reconstruction and automated exposure control  IMAGE QUALITY:  Diagnostic  FINDINGS:      ALIGNMENT:  Normal alignment of the cervical spine  No subluxation  VERTEBRAE: No acute fracture      DEGENERATIVE CHANGES: Intervertebral disc space narrowing at C6/C7 with anterior, marginal, and uncovertebral osteophytosis  Facet joint arthropathy at C2/C3 and C3/C4 on the left  PREVERTEBRAL AND PARASPINAL SOFT TISSUES: Unremarkable      THORACIC INLET:  Normal          IMPRESSION:      Degenerative changes as described but no evidence of acute cervical spine injury        Other findings as above Workstation performed: [id number]                    Procedures  Procedures         ED Course                                             Medical Decision Making  68-year-old, fall out of bed, no systemic signs or symptoms, trauma work-up, CT head and C-spine    Scans without acute findings needing intervention  Outpatient follow-up  Amount and/or Complexity of Data Reviewed  Radiology: ordered  Disposition  Final diagnoses:   Fall, initial encounter   Injury of head, initial encounter     Time reflects when diagnosis was documented in both MDM as applicable and the Disposition within this note     Time User Action Codes Description Comment    4/28/2023  7:22 AM Nadia Alvarez [[id number]] Fall, initial encounter     4/28/2023  7:22 AM Nadia Howe [[id number]] Injury of head, initial encounter       ED Disposition     ED Disposition   Discharge    Condition   Stable    Date/Time   Fri Apr 28, 2023  7:22 AM    Comment   Flores Fong discharge to home/self care                 Follow-up Information     Follow up With Specialties Details Why Contact Info Additional [id number] [facility] [place], MD Internal Medicine In 3 days Reevaluation [facility]  [address] [facility] Emergency Department Emergency Medicine  As needed, If symptoms worsen [address] Emergency Department, [address], [place], [place], [address]          Discharge Medication List as of 4/28/2023  7:23 AM      CONTINUE these medications which have NOT CHANGED    Details   atorvastatin (LIPITOR) 20 mg tablet Take 1 tablet (20 mg total) by mouth daily, Starting Tue 10/18/2022, Normal      Cholecalciferol (VITAMIN D) 2000 units tablet TAKE ONE TABLET BY MOUTH EVERY DAY, Normal      clonazePAM (KlonoPIN) 0 5 mg tablet Take 1 tablet (0 5 mg total) by mouth 2 (two) times a day, Starting Fri 2/17/2023, Normal      cyanocobalamin (VITAMIN B-12) 1,000 mcg tablet Take 1,000 mcg by mouth daily, Historical Med      docusate sodium (COLACE) 100 mg capsule Take 1 capsule (100 mg total) by mouth 2 (two) times a day as needed for constipation, Starting Wed 3/29/2023, Normal      famotidine (PEPCID) 20 mg tablet Take 1 tablet (20 mg total) by mouth 2 (two) times a day, Starting Thu 11/17/2022, Normal      glimepiride (AMARYL) 4 mg tablet Take 1 tablet (4 mg total) by mouth in the morning, Starting Wed 3/29/2023, Normal      glucose blood (FREESTYLE LITE) test strip Use 1 each 4 (four) times a day Use 4 times daily DM2-- free style lite test strips, Starting Wed 3/15/2023, Normal      insulin aspart, w/niacinamide, (FIASP) 100 Units/mL injection pen Inject 12 Units under the skin 3 (three) times a day with meals Please send 5  pens with 5 refills, Starting Wed 3/29/2023, Normal      Insulin Glargine Solostar (Lantus SoloStar) 100 UNIT/ML SOPN 25 units in AM and 35 units in PM, Normal      Insulin Pen Needle (BD Pen Needle Kelly U/F) 32G X 4 MM MISC Use 4 (four) times a day, Starting Wed 3/15/2023, Normal      Lancets (freestyle) lancets Use 3 (three) times a day Test blood glucose, Starting Thu 9/8/2022, Normal      levothyroxine 100 mcg tablet Take 1 tablet (100 mcg total) by mouth daily, Starting Thu 9/15/2022, Normal      lisinopril-hydrochlorothiazide (PRINZIDE,ZESTORETIC) 20-12 5 MG per tablet Take 0 5 tablets by mouth daily, Starting Wed 10/19/2022, Normal      metoclopramide (Reglan) 10 mg tablet Take 1 tablet (10 mg total) by mouth 4 (four) times a day, Starting Wed 11/16/2022, Normal      ondansetron (ZOFRAN) 4 mg tablet Take 1 tablet (4 mg total) by mouth every 8 (eight) hours as needed for nausea or vomiting, Starting Fri 2/17/2023, Normal      sertraline (ZOLOFT) 100 mg tablet Take 1 tablet (100 mg total) by mouth 2 (two) times a day, Starting Mon 3/20/2023, Normal             No discharge procedures on file      PDMP Review       Value Time User    PDMP Reviewed  Yes 2/17/2021  3:26 PM Yeison Arriola MD          ED Provider  Electronically Signed by           Tevin Ferris MD  04/28/23 0988

## 2023-05-01 ENCOUNTER — VBI (OUTPATIENT)
Dept: ADMINISTRATIVE | Facility: OTHER | Age: 68
End: 2023-05-01

## 2023-05-01 NOTE — TELEPHONE ENCOUNTER
Meme Perea    ED Visit Information     Ed visit date: 4/28/23  Diagnosis Description: fall head injury  In Network? Yes Salome Luna  Discharge status: Home  Discharged with meds ? No  Number of ED visits to date: 1  ED Severity:3     Outreach Information    Outreach successful: yes 2  Date letter mailedmy chart letter sent  Date Finalized: 5/2/23    Care Coordination    Follow up appointment with pcp: no not able to reach patient  Transportation issues ?  No    [facility] Alexi

## 2023-05-01 NOTE — LETTER
[facility]  [address] [facility] [id number]    Date: 05/02/23  Mannie Lyon  [address] [place] [id number]    Dear Connie Villalpando:                                                                                                                              Thank you for choosing [facility] emergency department for care  Your primary care provider wants to make sure that your ongoing medical care is being addressed  If you require follow up care as a result of your emergency department visit, there are a few things the practice would like you to know  As part of the network's continuing commitment to caring for our patients, we have added more same day appointments and have extended office hours to meet your medical needs  After hours, on-call physicians are available via your primary care provider's main office line  We encourage you to contact our office prior to seeking treatment to discuss your symptoms with the medical staff  Together, we can determine the correct course of action  A majority of non-emergent conditions such as: common cold, flu-like symptoms, fevers, strains/sprains, dislocations, minor burns, cuts and animal bites can be treated [facility]  Diagnostic testing is available at some sites  Of course, if you are experiencing a life threatening medical emergency call 911 or proceed directly to the nearest emergency room      Your nearest [facility] facility is conveniently located at:    [facility] [address] [place] offered at most Care Now locations  [employer] your spot online at www [email or web address] or on the [facility] [address]    Sincerely,    [facility]

## 2023-05-16 ENCOUNTER — TELEPHONE (OUTPATIENT)
Dept: INTERNAL MEDICINE CLINIC | Facility: OTHER | Age: 68
End: 2023-05-16

## 2023-05-16 NOTE — TELEPHONE ENCOUNTER
Received coding correction form from [employer] [facility]  Form has been scanned into patients chart

## 2023-06-07 DIAGNOSIS — E11.00 UNCONTROLLED TYPE 2 DIABETES MELLITUS WITH HYPEROSMOLARITY WITHOUT COMA, WITHOUT LONG-TERM CURRENT USE OF INSULIN (HCC): ICD-10-CM

## 2023-06-07 DIAGNOSIS — E03.9 HYPOTHYROIDISM, UNSPECIFIED TYPE: ICD-10-CM

## 2023-06-07 DIAGNOSIS — F41.8 ANXIOUS DEPRESSION: ICD-10-CM

## 2023-06-08 RX ORDER — CLONAZEPAM 0.5 MG/1
0.5 TABLET ORAL 2 TIMES DAILY
Qty: 60 TABLET | Refills: 0 | Status: SHIPPED | OUTPATIENT
Start: 2023-06-08

## 2023-06-08 RX ORDER — PEN NEEDLE, DIABETIC 32GX 5/32"
NEEDLE, DISPOSABLE MISCELLANEOUS 4 TIMES DAILY
Qty: 400 EACH | Refills: 1 | Status: SHIPPED | OUTPATIENT
Start: 2023-06-08

## 2023-06-08 RX ORDER — LEVOTHYROXINE SODIUM 0.1 MG/1
100 TABLET ORAL DAILY
Qty: 90 TABLET | Refills: 1 | Status: SHIPPED | OUTPATIENT
Start: 2023-06-08

## 2023-06-13 ENCOUNTER — TELEPHONE (OUTPATIENT)
Dept: GASTROENTEROLOGY | Facility: CLINIC | Age: 68
End: 2023-06-13

## 2023-06-26 ENCOUNTER — TELEPHONE (OUTPATIENT)
Dept: INTERNAL MEDICINE CLINIC | Age: 68
End: 2023-06-26

## 2023-06-26 ENCOUNTER — HOSPITAL ENCOUNTER (EMERGENCY)
Facility: HOSPITAL | Age: 68
Discharge: HOME | End: 2023-06-26
Attending: EMERGENCY MEDICINE
Payer: MEDICARE

## 2023-06-26 ENCOUNTER — APPOINTMENT (EMERGENCY)
Dept: RADIOLOGY | Facility: HOSPITAL | Age: 68
End: 2023-06-26
Payer: MEDICARE

## 2023-06-26 VITALS
OXYGEN SATURATION: 96 % | RESPIRATION RATE: 18 BRPM | TEMPERATURE: 97.8 F | SYSTOLIC BLOOD PRESSURE: 175 MMHG | DIASTOLIC BLOOD PRESSURE: 79 MMHG | HEART RATE: 82 BPM

## 2023-06-26 DIAGNOSIS — S42.022D CLOSED DISPLACED FRACTURE OF SHAFT OF LEFT CLAVICLE WITH ROUTINE HEALING, SUBSEQUENT ENCOUNTER: Primary | ICD-10-CM

## 2023-06-26 DIAGNOSIS — S42.022A CLOSED DISPLACED FRACTURE OF SHAFT OF LEFT CLAVICLE, INITIAL ENCOUNTER: Primary | ICD-10-CM

## 2023-06-26 PROCEDURE — 73030 X-RAY EXAM OF SHOULDER: CPT

## 2023-06-26 PROCEDURE — 99284 EMERGENCY DEPT VISIT MOD MDM: CPT

## 2023-06-26 PROCEDURE — 71045 X-RAY EXAM CHEST 1 VIEW: CPT

## 2023-06-26 RX ORDER — ACETAMINOPHEN 325 MG/1
650 TABLET ORAL ONCE
Status: COMPLETED | OUTPATIENT
Start: 2023-06-26 | End: 2023-06-26

## 2023-06-26 RX ORDER — NALOXONE HYDROCHLORIDE 4 MG/.1ML
SPRAY NASAL
Qty: 1 EACH | Refills: 1 | Status: SHIPPED | OUTPATIENT
Start: 2023-06-26 | End: 2024-06-25

## 2023-06-26 RX ORDER — TRAMADOL HYDROCHLORIDE 50 MG/1
50 TABLET ORAL EVERY 8 HOURS PRN
Qty: 30 TABLET | Refills: 0 | Status: SHIPPED | OUTPATIENT
Start: 2023-06-26

## 2023-06-26 RX ADMIN — ACETAMINOPHEN 650 MG: 325 TABLET ORAL at 07:24

## 2023-06-26 NOTE — ED PROVIDER NOTES
History  Chief Complaint   Patient presents with   • Fall     Pt states she rolled out of bed this AM  Denies thinners, LOC, HS  Pt c/o L shoulder pain / rib pain  The patient is a 69-year-old female with a past medical history of diabetes and hypertension who presents to the emergency department with complaint of fall  The patient states she was sleeping and her granddaughters that, which is elevated, when she rolled out and landed on her left side this morning  She denied head strike, loss of consciousness or taking anticoagulation medication  She also denied headache, lightheadedness or neck pain  She reports she is only experiencing left shoulder pain as well as left rib pain  She states that she was able to get up on her own and ambulate without tenderness  She denies swelling or bruising  She says that she did not take any medication for pain control  She has no other complaints  Prior to Admission Medications   Prescriptions Last Dose Informant Patient Reported? Taking?    Cholecalciferol (VITAMIN D) 2000 units tablet  Self No No   Sig: TAKE ONE TABLET BY MOUTH EVERY DAY   Insulin Glargine Solostar (Lantus SoloStar) 100 UNIT/ML SOPN  Self No No   Si units in AM and 35 units in PM   Insulin Pen Needle (BD Pen Needle Kelly U/F) 32G X 4 MM MISC   No No   Sig: Use 4 (four) times a day   Lancets (freestyle) lancets  Self No No   Sig: Use 3 (three) times a day Test blood glucose   atorvastatin (LIPITOR) 20 mg tablet  Self No No   Sig: Take 1 tablet (20 mg total) by mouth daily   clonazePAM (KlonoPIN) 0 5 mg tablet   No No   Sig: Take 1 tablet (0 5 mg total) by mouth 2 (two) times a day   cyanocobalamin (VITAMIN B-12) 1,000 mcg tablet  Self Yes No   Sig: Take 1,000 mcg by mouth daily   docusate sodium (COLACE) 100 mg capsule   No No   Sig: Take 1 capsule (100 mg total) by mouth 2 (two) times a day as needed for constipation   famotidine (PEPCID) 20 mg tablet  Self No No   Sig: Take 1 tablet (20 mg total) by mouth 2 (two) times a day   glimepiride (AMARYL) 4 mg tablet   No No   Sig: Take 1 tablet (4 mg total) by mouth in the morning   glucose blood (FREESTYLE LITE) test strip  Self No No   Sig: Use 1 each 4 (four) times a day Use 4 times daily DM2-- free style lite test strips   insulin aspart, w/niacinamide, (FIASP) 100 Units/mL injection pen   No No   Sig: Inject 12 Units under the skin 3 (three) times a day with meals Please send 5  pens with 5 refills   levothyroxine 100 mcg tablet   No No   Sig: Take 1 tablet (100 mcg total) by mouth daily   lisinopril-hydrochlorothiazide (PRINZIDE,ZESTORETIC) 20-12 5 MG per tablet  Self No No   Sig: Take 0 5 tablets by mouth daily   metoclopramide (Reglan) 10 mg tablet  Self No No   Sig: Take 1 tablet (10 mg total) by mouth 4 (four) times a day   ondansetron (ZOFRAN) 4 mg tablet  Self No No   Sig: Take 1 tablet (4 mg total) by mouth every 8 (eight) hours as needed for nausea or vomiting   sertraline (ZOLOFT) 100 mg tablet  Self No No   Sig: Take 1 tablet (100 mg total) by mouth 2 (two) times a day      Facility-Administered Medications: None       Past Medical History:   Diagnosis Date   • Acid reflux    • Anxiety    • Diabetes mellitus (HCC)    • Disease of thyroid gland Years   • Gastroparesis    • GERD (gastroesophageal reflux disease) Years   • Hypertension    • Hypothyroid    • Obesity Years   • Shingles 2 years ago       Past Surgical History:   Procedure Laterality Date   •  SECTION     • CHOLECYSTECTOMY     • COLONOSCOPY     • DILATION AND CURETTAGE OF UTERUS     • ESOPHAGOGASTRODUODENOSCOPY     • FRACTURE SURGERY     • HYSTERECTOMY      44   • OOPHORECTOMY      44   • OR COLONOSCOPY FLX DX W/COLLJ SPEC WHEN PFRMD N/A 2016    Procedure: EGD AND COLONOSCOPY;  Surgeon: Jayla Jeronimo MD;  Location: [facility];   Service: Gastroenterology   • OR NEUROPLASTY &/TRANSPOS MEDIAN NRV CARPAL TUNNE Right 2017    Procedure: WRIST CARPAL TUNNEL RELEASE ; TENOLYSIS OF FPL, FDS 2-5, FDP 2-5;  APPLICATION OF Cammie Abler;  Surgeon: Steffany Samuel MD;  [facility];  Service: Orthopedics   • IL OPTX FEM SHFT FX W/INSJ IMED IMPLT W/WO SCREW Left 2/3/2021    Procedure: INSERTION NAIL IM FEMUR ANTEGRADE (TROCHANTERIC); Surgeon: Christiano Ferreira MD;  [facility];  Service: Orthopedics   • IL TENDON SHEATH INCISION Right 2017    Procedure: LONG FINGER TRIGGER RELEASE ;  Surgeon: Steffany Samuel MD;  [facility];  Service: Orthopedics   • IL TENDON SHEATH INCISION Right 2016    Procedure: RELEASE TRIGGER FINGER - LONG FINGER;  Surgeon: Steffany Samuel MD;  [facility];  Service: Orthopedics   • ROOT CANAL     • UPPER GASTROINTESTINAL ENDOSCOPY         Family History   Problem Relation Age of Onset   • Cirrhosis Mother    • Heart attack Father    • Diabetes type II Father    • Hearing loss Father    • Vision loss Father    • Diabetes Father    • Heart disease Father    • No Known Problems Sister    • No Known Problems Sister    • Arthritis Maternal Grandmother    • No Known Problems Maternal Grandfather    • No Known Problems Paternal Grandmother    • Heart disease Paternal Grandfather    • Stroke Paternal Grandfather    • Diabetes Brother    • Alcohol abuse Son    • No Known Problems Maternal Aunt    • No Known Problems Maternal Aunt    • Breast cancer Paternal Aunt 62   • Colon cancer Paternal Uncle 28   • Testicular cancer Cousin 25     I have reviewed and agree with the history as documented      E-Cigarette/Vaping   • E-Cigarette Use Never User      E-Cigarette/Vaping Substances   • Nicotine No    • THC No    • CBD No    • Flavoring No    • Other No    • Unknown No      Social History     Tobacco Use   • Smoking status: Former     Packs/day: 2 00     Years: 10 00     Total pack years: 20 00     Types: Cigarettes     Start date: 7/3/1964     Quit date:      Years since quittin 6   • Smokeless tobacco: Never   Vaping Use   • Vaping Use: Never used   Substance Use Topics   • Alcohol use: Yes     Alcohol/week: 0 0 standard drinks of alcohol     Comment: Rarely   • Drug use: No        Review of Systems   Constitutional: Negative for chills, fatigue and fever  HENT: Negative for congestion, ear pain and sore throat  Eyes: Negative for photophobia, pain and visual disturbance  Respiratory: Negative for cough, shortness of breath, wheezing and stridor  Cardiovascular: Negative for chest pain, palpitations and leg swelling  Gastrointestinal: Negative for abdominal distention, abdominal pain, constipation, diarrhea, nausea and vomiting  Endocrine: Negative  Genitourinary: Negative for dysuria and hematuria  Musculoskeletal: Positive for arthralgias  Negative for back pain, neck pain and neck stiffness  Skin: Negative for color change and rash  Allergic/Immunologic: Negative  Neurological: Negative for dizziness, seizures, syncope, weakness, light-headedness, numbness and headaches  Hematological: Negative  Psychiatric/Behavioral: Negative  All other systems reviewed and are negative  Physical Exam  ED Triage Vitals [06/26/23 0657]   Temperature Pulse Respirations Blood Pressure SpO2   97 8 °F (36 6 °C) 82 18 (!) 175/79 96 %      Temp Source Heart Rate Source Patient Position - Orthostatic VS BP Location FiO2 (%)   Oral Monitor Lying Right arm --      Pain Score       7             Orthostatic Vital Signs  Vitals:    06/26/23 0657   BP: (!) 175/79   Pulse: 82   Patient Position - Orthostatic VS: Lying       Physical Exam  Vitals and nursing note reviewed  Constitutional:       General: She is not in acute distress  Appearance: She is well-developed  She is obese  She is not ill-appearing  HENT:      Head: Normocephalic and atraumatic  Nose: Nose normal       Mouth/Throat:      Mouth: Mucous membranes are moist       Pharynx: Oropharynx is clear     Eyes:      Extraocular Movements: Extraocular movements intact  Conjunctiva/sclera: Conjunctivae normal       Pupils: Pupils are equal, round, and reactive to light  Cardiovascular:      Rate and Rhythm: Normal rate and regular rhythm  Pulses: Normal pulses  Heart sounds: Normal heart sounds  No murmur heard  No friction rub  Pulmonary:      Effort: Pulmonary effort is normal  No respiratory distress  Breath sounds: Normal breath sounds  No stridor  No wheezing, rhonchi or rales  Abdominal:      General: Abdomen is flat  Bowel sounds are normal  There is no distension  Palpations: Abdomen is soft  Tenderness: There is no abdominal tenderness  There is no right CVA tenderness, left CVA tenderness, guarding or rebound  Musculoskeletal:         General: Tenderness present  No swelling, deformity or signs of injury  Cervical back: Normal range of motion and neck supple  No rigidity or tenderness  Right lower leg: No edema  Left lower leg: No edema  Comments: Positive drop arm test on the left  Tenderness to palpation of the left shoulder with no obvious point tenderness  Tenderness to the lateral thoracic wall without evidence of ecchymosis or flail chest    Lymphadenopathy:      Cervical: No cervical adenopathy  Skin:     General: Skin is warm and dry  Capillary Refill: Capillary refill takes less than 2 seconds  Coloration: Skin is not jaundiced  Findings: No bruising, erythema or rash  Neurological:      General: No focal deficit present  Mental Status: She is alert and oriented to person, place, and time  Mental status is at baseline  Cranial Nerves: No cranial nerve deficit  Sensory: No sensory deficit  Motor: No weakness     Psychiatric:         Mood and Affect: Mood normal          ED Medications  Medications   acetaminophen (TYLENOL) tablet 650 mg (650 mg Oral Given 6/26/23 5524)       Diagnostic Studies  Results Reviewed     None XR shoulder 2+ views LEFT   ED Interpretation by Iban Holman DO (06/26 8453)   Displaced lateral left clavicle fracture per my interpretation  XR chest 1 view portable   ED Interpretation by Iban Holman DO (06/26 5917)   Left clavicle fracture is noted  No cardiopulmonary disease per my interpretation  Procedures  Procedures      ED Course  ED Course as of 06/26/23 0741   Mon Jun 26, 2023   0729 XR shoulder 2+ views LEFT  Displaced lateral left clavicle fracture per my interpretation  0729 XR chest 1 view portable  Left clavicle fracture is noted  No cardiopulmonary disease per my interpretation  5728 Patient's left upper extremity will be placed in a sling  She will be discharged for outpatient follow-up with Ortho  Return precautions will be discussed  Further instructions per discharge orders  Medical Decision Making  The patient is a 56-year-old female with a past medical history of diabetes and hypertension who presents to the emergency department with complaint of fall  Upon initial presentation the patient was alert and oriented x4 and in no acute distress  The patient did have a positive drop arm test on the left as well as tenderness to palpation of the left lateral thoracic rib cage  There is no evidence of ecchymosis or edema and the patient had clear breath sounds bilaterally  The remainder of her physical exam was grossly unremarkable  The differential includes but is not limited to shoulder strain, supraspinatus strain, shoulder fracture or dislocation  The differential also includes left-sided rib fractures or contusion  I have ordered x-rays of the left shoulder and rib cage  Have also given Tylenol for pain  Results pending  Amount and/or Complexity of Data Reviewed  Radiology: ordered  Risk  OTC drugs              Disposition  Final diagnoses:   Closed displaced fracture of shaft of left clavicle, initial encounter     Time reflects when diagnosis was documented in both MDM as applicable and the Disposition within this note     Time User Action Codes Description Comment    6/26/2023  7:32 AM Mariza Villavicencio Add [S42 022A] Closed displaced fracture of shaft of left clavicle, initial encounter       ED Disposition     ED Disposition   Discharge    Condition   Stable    Date/Time   Mon Jun 26, 2023  7:30 AM    Comment   Kitty Vazquezsing Ajit discharge to home/self care  Follow-up Information     Follow up With Specialties Details Why Contact Info Additional [phone] [facility] Schedule an appointment as soon as possible for a visit  For left displaced clavicular fracture [address] [phone] [address] [facility] [address], [address], [place], [place], [address] [place] [address] 107 Emergency Department Emergency Medicine  As needed [address] [facility] [id number] [place] [address] Emergency Department,  [address], [place], [place], [address]          Patient's Medications   Discharge Prescriptions    No medications on file         PDMP Review       Value Time User    PDMP Reviewed  Yes 2/17/2021  3:26 PM Sarah Tamayo MD           ED Provider  Attending physically available and evaluated Raphael Hubert RAMOS managed the patient along with the ED Attending      Electronically Signed by         Cande Dutta DO  06/26/23 9002

## 2023-06-26 NOTE — ED ATTENDING ATTESTATION
6/26/2023  Kaylynn RAMOS, , saw and evaluated the patient  I have discussed the patient with the resident/non-physician practitioner and agree with the resident's/non-physician practitioner's findings, Plan of Care, and MDM as documented in the resident's/non-physician practitioner's note, except where noted  All available labs and Radiology studies were reviewed  I was present for key portions of any procedure(s) performed by the resident/non-physician practitioner and I was immediately available to provide assistance  At this point I agree with the current assessment done in the Emergency Department  I have conducted an independent evaluation of this patient a history and physical is as follows:    Patient is a 68-year-old female with a history of hypertension who presents after fall  Patient states that she was laying with her granddaughter when patient rolled out of bed  She fell onto her left shoulder and left ribs  She denies head injury or loss of consciousness  She is able to ambulate  She states that she has left shoulder pain and left rib pain  She denies antiplatelets or anticoagulants  On exam, patient is in no acute distress  No external signs of head trauma  No midline cervical tenderness  Mild tenderness to palpation over the [facility] joint and left anterior shoulder  Full range of motion in left shoulder with mild pain, particularly with forward flexion  Mild tenderness to palpation over the left lateral ribs 6 through 8  No bruising or ecchymosis  Abdomen is soft, nontender, nondistended  No rebound or guarding  Hips nontender  Pelvis stable  X-ray reveals fracture of the distal clavicle  There is displacement  However there is no skin tenting on exam   Neurovascularly intact  Will place in arm sling  Will provide analgesia  Patient will follow-up with PCP and orthopedic surgery  However she is stable for discharge at this time      Portions of the above record "have been created with voice recognition software  Occasional wrong word or \"sound alike\" substitutions may have occurred due to the inherent limitations of voice recognition software  Read the chart carefully and recognize, using context, where substitutions may have occurred        ED Course         Critical Care Time  Procedures      "

## 2023-06-26 NOTE — DISCHARGE INSTRUCTIONS
You have been diagnosed with a clavicular fracture and your arm has been placed in a sling  Please leave your arm in the sling until you can be evaluated by orthopedics  You may take ibuprofen or Tylenol alternating every 3 hours as needed for pain  Should you develop pain uncontrolled with medication, numbness, increased swelling, shortness of breath, chest pain, lightheadedness or any other symptoms you find concerning please return to the emergency department immediately

## 2023-06-26 NOTE — TELEPHONE ENCOUNTER
Patient is calling to see if you can prescribe something for her for the pain that she is having. She was seen today by the ED and has a FX Clavicle and doesn't see the Orthopedic till Thursday. May you send something for the pain for her to take before the appointment.     Please call her back mobile phone    [facility] on [address] in [facility]

## 2023-07-03 ENCOUNTER — TELEPHONE (OUTPATIENT)
Dept: OBGYN CLINIC | Facility: HOSPITAL | Age: 68
End: 2023-07-03

## 2023-07-03 ENCOUNTER — OFFICE VISIT (OUTPATIENT)
Dept: OBGYN CLINIC | Facility: CLINIC | Age: 68
End: 2023-07-03
Payer: MEDICARE

## 2023-07-03 ENCOUNTER — APPOINTMENT (OUTPATIENT)
Dept: RADIOLOGY | Facility: AMBULARY SURGERY CENTER | Age: 68
End: 2023-07-03
Attending: STUDENT IN AN ORGANIZED HEALTH CARE EDUCATION/TRAINING PROGRAM
Payer: MEDICARE

## 2023-07-03 VITALS
BODY MASS INDEX: 38.71 KG/M2 | HEART RATE: 80 BPM | WEIGHT: 205 LBS | RESPIRATION RATE: 17 BRPM | DIASTOLIC BLOOD PRESSURE: 78 MMHG | HEIGHT: 61 IN | SYSTOLIC BLOOD PRESSURE: 139 MMHG

## 2023-07-03 DIAGNOSIS — M25.512 LEFT SHOULDER PAIN, UNSPECIFIED CHRONICITY: ICD-10-CM

## 2023-07-03 DIAGNOSIS — S42.022A CLOSED DISPLACED FRACTURE OF SHAFT OF LEFT CLAVICLE, INITIAL ENCOUNTER: Primary | ICD-10-CM

## 2023-07-03 PROCEDURE — 99214 OFFICE O/P EST MOD 30 MIN: CPT | Performed by: STUDENT IN AN ORGANIZED HEALTH CARE EDUCATION/TRAINING PROGRAM

## 2023-07-03 PROCEDURE — 73000 X-RAY EXAM OF COLLAR BONE: CPT

## 2023-07-03 NOTE — TELEPHONE ENCOUNTER
Caller: patient    Doctor: Ricardo Knox    Reason for call: patient is requesting a sooner appt.  Patient was schedule for next available on 7/17    Call back#: [phone]

## 2023-07-03 NOTE — PROGRESS NOTES
Ortho Sports Medicine Shoulder New Patient Visit     Assesment:   70 y.o. female left type IIA distal third clavicle fracture    Plan:  The patient's diagnosis and treatment were discussed at length today. We discussed no treatment, non-operative treatment, and operative treatment. Explained to Steve King that she demonstrated a mildly displaced distal third clavicle fracture. Her fracture line appears to be medial to the CC ligaments, and therefore places her at higher risk of nonunion due to potential for superior migration of the medial fragment. While it is affecting her nondominant extremity, she does use her arm to help take care of her grand daughter several days per week. I explained the challenges associated with treatment of clavicle fractures both operatively and nonoperatively, particularly in the setting of taking care of a young 5month-old. I explained risks of nonunion with nonoperative treatment. The patient would prefer to proceed with nonoperative treatment at this time. I feel this is reasonable given the minimal displacement of the medial fragment. We will plan for repeat radiographs in 3 to 4 weeks time to evaluate for the presence of callus formation. If she has minimal callus formation and/or there is significant motion at the fracture site at that time we will again have another discussion regarding the risk of ongoing nonoperative treatment. She should otherwise remain nonweightbearing in a sling for an additional 3 to 4 weeks until reevaluation. I demonstrated gentle home exercises with pendulums in addition to demonstrating hygiene practice without lifting of the shoulder. Conservative treatment:    Ice to shoulder 1-2 times daily, for 20 minutes at a time. Home exercise program for shoulder, including ROM and strenthening. Instructions given to patient of what exercises to perform. Let pain guide return to activities. Imaging:     All imaging from today was reviewed by myself and explained to the patient. Injection:    No Injection planned at this time. Surgery:     No surgery is recommended at this point, continue with conservative treatment plan as noted. Follow up:    No follow-ups on file. Chief Complaint   Patient presents with   • Left Shoulder - Pain     Clavicle        History of Present Illness: The patient is a 70 y.o., right hand dominant female whose occupation is retired, referred to me by Dr. Robert Gutierrez, seen in clinic for consultation of left shoulder pain. She states on 2023 she rolled out of bed landing on her left shoulder. She states that she did not have immediate pain, however she did have some bruising and swelling diffusely of her left clavicle. She states she was later seen in emergency department who obtained images that demonstrated distal clavicle fracture. Provided her with a sling which she has been compliant wearing. She states that her pain continues to be very dull and achy and rates it a 1 out of 10. She is currently attempting to manage it with ice and over-the-counter medication. She denies any numbness or tingling. She denies any previous history of injury or trauma to her left upper extremity.       Shoulder Surgical History:  None    Past Medical, Social and Family History:  Past Medical History:   Diagnosis Date   • Acid reflux    • Anxiety    • Diabetes mellitus ([address])    • Disease of thyroid gland Years   • Gastroparesis    • GERD (gastroesophageal reflux disease) Years   • Hypertension    • Hypothyroid    • Obesity Years   • Shingles 2 years ago     Past Surgical History:   Procedure Laterality Date   •  SECTION     • CHOLECYSTECTOMY     • COLONOSCOPY     • DILATION AND CURETTAGE OF UTERUS     • ESOPHAGOGASTRODUODENOSCOPY     • FRACTURE SURGERY     • HYSTERECTOMY      44   • OOPHORECTOMY      44   • MO COLONOSCOPY FLX DX W/COLLJ SPEC WHEN PFRMD N/A 2016    Procedure: EGD AND COLONOSCOPY;  Surgeon: Inocencia Aceves MD;  [facility]; Service: Gastroenterology   • AR NEUROPLASTY &/TRANSPOS MEDIAN NRV CARPAL TUNNE Right 1/31/2017    Procedure: WRIST CARPAL TUNNEL RELEASE ; TENOLYSIS OF FPL, FDS 2-5, FDP 2-5;  APPLICATION OF Katherleen Boop;  Surgeon: Ender Ortega MD;  [facility];  Service: Orthopedics   • AR OPTX FEM SHFT FX W/INSJ IMED IMPLT W/WO SCREW Left 2/3/2021    Procedure: INSERTION NAIL IM FEMUR ANTEGRADE (TROCHANTERIC);   Surgeon: Shannan Vargas MD;  [facility];  Service: Orthopedics   • AR TENDON SHEATH INCISION Right 1/31/2017    Procedure: LONG FINGER TRIGGER RELEASE ;  Surgeon: Ender Ortega MD;  [facility];  Service: Orthopedics   • AR TENDON SHEATH INCISION Right 7/21/2016    Procedure: RELEASE TRIGGER FINGER - LONG FINGER;  Surgeon: Ender Ortega MD;  [facility];  Service: Orthopedics   • ROOT CANAL     • UPPER GASTROINTESTINAL ENDOSCOPY       Allergies   Allergen Reactions   • Metformin Diarrhea     Current Outpatient Medications on File Prior to Visit   Medication Sig Dispense Refill   • atorvastatin (LIPITOR) 20 mg tablet Take 1 tablet (20 mg total) by mouth daily 30 tablet 5   • Cholecalciferol (VITAMIN D) 2000 units tablet TAKE ONE TABLET BY MOUTH EVERY DAY 30 tablet 0   • clonazePAM (KlonoPIN) 0.5 mg tablet Take 1 tablet (0.5 mg total) by mouth 2 (two) times a day 60 tablet 0   • cyanocobalamin (VITAMIN B-12) 1,000 mcg tablet Take 1,000 mcg by mouth daily     • docusate sodium (COLACE) 100 mg capsule Take 1 capsule (100 mg total) by mouth 2 (two) times a day as needed for constipation 30 capsule 3   • famotidine (PEPCID) 20 mg tablet Take 1 tablet (20 mg total) by mouth 2 (two) times a day 60 tablet 3   • glimepiride (AMARYL) 4 mg tablet Take 1 tablet (4 mg total) by mouth in the morning 180 tablet 1   • glucose blood (FREESTYLE LITE) test strip Use 1 each 4 (four) times a day Use 4 times daily DM2-- free style lite test strips 400 each 1   • insulin aspart, w/niacinamide, (FIASP) 100 Units/mL injection pen Inject 12 Units under the skin 3 (three) times a day with meals Please send 5  pens with 5 refills 3 mL 5   • Insulin Glargine Solostar (Lantus SoloStar) 100 UNIT/ML SOPN 25 units in AM and 35 units in PM 15 mL 5   • Insulin Pen Needle (BD Pen Needle Kelly U/F) 32G X 4 MM MISC Use 4 (four) times a day 400 each 1   • Lancets (freestyle) lancets Use 3 (three) times a day Test blood glucose 90 each 5   • levothyroxine 100 mcg tablet Take 1 tablet (100 mcg total) by mouth daily 90 tablet 1   • lisinopril-hydrochlorothiazide (PRINZIDE,ZESTORETIC) 20-12.5 MG per tablet Take 0.5 tablets by mouth daily 45 tablet 1   • metoclopramide (Reglan) 10 mg tablet Take 1 tablet (10 mg total) by mouth 4 (four) times a day 60 tablet 0   • naloxone (NARCAN) 4 mg/0.1 mL nasal spray Administer 1 spray into a nostril. If no response after 2-3 minutes, give another dose in the other nostril using a new spray. 1 each 1   • ondansetron (ZOFRAN) 4 mg tablet Take 1 tablet (4 mg total) by mouth every 8 (eight) hours as needed for nausea or vomiting 20 tablet 0   • sertraline (ZOLOFT) 100 mg tablet Take 1 tablet (100 mg total) by mouth 2 (two) times a day 180 tablet 1   • traMADol (Ultram) 50 mg tablet Take 1 tablet (50 mg total) by mouth every 8 (eight) hours as needed for moderate pain 30 tablet 0     No current facility-administered medications on file prior to visit.      Social History     Socioeconomic History   • Marital status:      Spouse name: Not on file   • Number of children: Not on file   • Years of education: Not on file   • Highest education level: Not on file   Occupational History   • Not on file   Tobacco Use   • Smoking status: Former     Packs/day: 2.00     Years: 10.00     Total pack years: 20.00     Types: Cigarettes     Start date: 7/3/1964     Quit date:      Years since quittin.6   • Smokeless tobacco: Never   Vaping Use   • Vaping Use: Never used   Substance and Sexual Activity   • Alcohol use: Yes     Alcohol/week: 0.0 standard drinks of alcohol     Comment: Rarely   • Drug use: No   • Sexual activity: Not Currently   Other Topics Concern   • Not on file   Social History Narrative   • Not on file     Social Determinants of Health     Financial Resource Strain: Low Risk  (3/29/2023)    Overall Financial Resource Strain (CARDIA)    • Difficulty of Paying Living Expenses: Not hard at all   Food Insecurity: Not on file   Transportation Needs: No Transportation Needs (3/29/2023)    PRAPARE - Transportation    • Lack of Transportation (Medical): No    • Lack of Transportation (Non-Medical): No   Physical Activity: Not on file   Stress: Not on file   Social Connections: Not on file   Intimate Partner Violence: Not on file   Housing Stability: Not on file         I have reviewed the past medical, surgical, social and family history, medications and allergies as documented in the EMR. Review of systems: ROS is negative other than that noted in the HPI. Constitutional: Negative for fatigue and fever. HENT: Negative for sore throat. Respiratory: Negative for shortness of breath. Cardiovascular: Negative for chest pain. Gastrointestinal: Negative for abdominal pain. Endocrine: Negative for cold intolerance and heat intolerance. Genitourinary: Negative for flank pain. Musculoskeletal: Negative for back pain. Skin: Negative for rash. Allergic/Immunologic: Negative for immunocompromised state. Neurological: Negative for dizziness. Psychiatric/Behavioral: Negative for agitation. Physical Exam:    Blood pressure 139/78, pulse 80, resp. rate 17, height 5' 1" (1.549 m), weight 93 kg (205 lb), not currently breastfeeding.     General/Constitutional: NAD, well developed, well nourished  HENT: Normocephalic, atraumatic  CV: Intact distal pulses, regular rate  Resp: No respiratory distress or labored breathing  Lymphatic: No lymphadenopathy palpated  Neuro: Alert and Oriented x 3, no focal deficits  Psych: Normal mood, normal affect, normal judgement, normal behavior  Skin: Warm, dry, no rashes, no erythema      Shoulder focused exam:     Visual inspection of the shoulder demonstrates ecchymosis and edema  There is tenderness palpation over a prominent palpable fracture site of the distal third clavicle  Range of motion not formally assessed  Motor and sensory function are intact. Compartments are soft compressible. Cap refills brisk. Hand is warm and well perfused. UE NV Exam: +2 Radial pulses bilaterally  Sensation intact to light touch C5-T1 bilaterally, Radial/median/ulnar nerve motor intact      Bilateral elbow, wrist, and and forearm ROM full, painless with passive ROM, no ttp or crepitance throughout extremities below shoulder joint    Cervical ROM is full without pain, numbness or tingling      Shoulder Imaging    X-rays of the left clavicle were reviewed, which demonstrate comminuted distal third clavicle fracture l medial to the CC ligaments with intact CC ligaments and possibly unstable medial clavicle. Fracture line is approximately 5 cm lateral to the [facility] joint. I have reviewed the radiology report and do not currently have a radiology reading from  [facility], but will check the result once the reading is performed.         Scribe Attestation    I,:  Alexi Colby am acting as a scribe while in the presence of the attending physician.:       I,:  Bhavana May, DO personally performed the services described in this documentation    as scribed in my presence.:

## 2023-07-17 ENCOUNTER — TELEPHONE (OUTPATIENT)
Dept: OBGYN CLINIC | Facility: HOSPITAL | Age: 68
End: 2023-07-17

## 2023-07-17 NOTE — TELEPHONE ENCOUNTER
Caller: Patient    Doctor: Javier Haley    Reason for call: Patient's appt was cxl today with Dr. Zannie Hamman because Dr. Zannie Hamman stated she was seeing Dr. Javier Haley. Patient was very upset because she was told to see Dr. Zannie Hamman. Can we please clarify this with the doctors and reschedule the patient. Thank you!     Call back#: [phone]

## 2023-08-03 DIAGNOSIS — S42.022A CLOSED DISPLACED FRACTURE OF SHAFT OF LEFT CLAVICLE, INITIAL ENCOUNTER: Primary | ICD-10-CM

## 2023-08-09 ENCOUNTER — OFFICE VISIT (OUTPATIENT)
Dept: OBGYN CLINIC | Facility: HOSPITAL | Age: 68
End: 2023-08-09
Payer: MEDICARE

## 2023-08-09 ENCOUNTER — HOSPITAL ENCOUNTER (OUTPATIENT)
Dept: RADIOLOGY | Facility: HOSPITAL | Age: 68
Discharge: HOME | End: 2023-08-09
Attending: ORTHOPAEDIC SURGERY
Payer: MEDICARE

## 2023-08-09 VITALS
BODY MASS INDEX: 37.46 KG/M2 | HEART RATE: 75 BPM | WEIGHT: 198.4 LBS | SYSTOLIC BLOOD PRESSURE: 143 MMHG | DIASTOLIC BLOOD PRESSURE: 83 MMHG | HEIGHT: 61 IN

## 2023-08-09 DIAGNOSIS — S42.022A CLOSED DISPLACED FRACTURE OF SHAFT OF LEFT CLAVICLE, INITIAL ENCOUNTER: ICD-10-CM

## 2023-08-09 DIAGNOSIS — S42.022A CLOSED DISPLACED FRACTURE OF SHAFT OF LEFT CLAVICLE, INITIAL ENCOUNTER: Primary | ICD-10-CM

## 2023-08-09 PROCEDURE — 99213 OFFICE O/P EST LOW 20 MIN: CPT | Performed by: ORTHOPAEDIC SURGERY

## 2023-08-09 PROCEDURE — 73000 X-RAY EXAM OF COLLAR BONE: CPT

## 2023-08-09 NOTE — PROGRESS NOTES
Orthopaedics Office Visit - 1st Patient Visit    ASSESSMENT/PLAN:    Assessment:   Left lateral 1/3 clavicle shaft fracture, significant progression of callus since previous exam    DOI  6/26/23   Healing with minimal pain  Able to lift arms overhead with minimal pain  No ttp over fracture site      Plan:   - Discussed conservative treatment and surgical intervention with patient at length   -Will continue with conservative treatment at this time. - Weight bearing as tolerated left upper extremity   - Begin physical therapy as directed   - Over the counter analgesics as needed / directed   - Ice / heat as directed   - Follow up 6 weeks with repeat XR       To Do Next Visit:  XR left clavicle     _____________________________________________________  CHIEF COMPLAINT:  Chief Complaint   Patient presents with   • Left Shoulder - Pain         SUBJECTIVE:  Latanya Smith is a 70 y.o. female who presents the office for initial valuation of her left clavicle. Patient states that she sustained an injury on 6/26/2023 resulting in a clavicle fracture. Patient was seen and evaluated at a local emergency room where x-rays were taken confirming the diagnosis. Patient was seen and evaluated by Dr. Hemalatha Parsons on 7/3/2023 referred the patient to Dr. Vanna Garza for further evaluation. Patient was unfortunately unable to see Dr. Vanna Garza secondary to scheduling confusion. Patient states that she was advised to be nonweightbearing on the left upper extremity which she was maintaining until a few days ago. Patient states that her clavicle overall is doing well. Patient states that she has minimal pain in the clavicle currently. Patient has been performing a home exercise program as directed with no complaints. Patient denies any numbness or tingling in the arm. Patient offers no other complaints at this time.     PAST MEDICAL HISTORY:  Past Medical History:   Diagnosis Date   • Acid reflux    • Anxiety    • Diabetes mellitus [facility])    • Disease of thyroid gland Years   • Gastroparesis    • GERD (gastroesophageal reflux disease) Years   • Hypertension    • Hypothyroid    • Obesity Years   • Shingles 2 years ago       PAST SURGICAL HISTORY:  Past Surgical History:   Procedure Laterality Date   •  SECTION     • CHOLECYSTECTOMY     • COLONOSCOPY     • DILATION AND CURETTAGE OF UTERUS     • ESOPHAGOGASTRODUODENOSCOPY     • FRACTURE SURGERY     • HYSTERECTOMY      44   • OOPHORECTOMY      44   • OH COLONOSCOPY FLX DX W/COLLJ SPEC WHEN PFRMD N/A 2016    Procedure: EGD AND COLONOSCOPY;  Surgeon: Marci Slade MD;  [facility]; Service: Gastroenterology   • OH NEUROPLASTY &/TRANSPOS MEDIAN NRV CARPAL TUNNE Right 2017    Procedure: WRIST CARPAL TUNNEL RELEASE ; TENOLYSIS OF FPL, FDS 2-5, FDP 2-5;  APPLICATION OF Sonya Hanly;  Surgeon: Eliana Schmitt MD;  [facility];  Service: Orthopedics   • OH OPTX FEM SHFT FX W/INSJ IMED IMPLT W/WO SCREW Left 2/3/2021    Procedure: INSERTION NAIL IM FEMUR ANTEGRADE (TROCHANTERIC);   Surgeon: Gita Hernandez MD;  [facility];  Service: Orthopedics   • OH TENDON SHEATH INCISION Right 2017    Procedure: LONG FINGER TRIGGER RELEASE ;  Surgeon: Eliana Schmitt MD;  [facility];  Service: Orthopedics   • OH TENDON SHEATH INCISION Right 2016    Procedure: RELEASE TRIGGER FINGER - LONG FINGER;  Surgeon: Eliana Schmitt MD;  [facility];  Service: Orthopedics   • ROOT CANAL     • UPPER GASTROINTESTINAL ENDOSCOPY         FAMILY HISTORY:  Family History   Problem Relation Age of Onset   • Cirrhosis Mother    • Heart attack Father    • Diabetes type II Father    • Hearing loss Father    • Vision loss Father    • Diabetes Father    • Heart disease Father    • No Known Problems Sister    • No Known Problems Sister    • Arthritis Maternal Grandmother    • No Known Problems Maternal Grandfather    • No Known Problems Paternal Grandmother • Heart disease Paternal Grandfather    • Stroke Paternal Grandfather    • Diabetes Brother    • Alcohol abuse Son    • No Known Problems Maternal Aunt    • No Known Problems Maternal Aunt    • Breast cancer Paternal Aunt 62   • Colon cancer Paternal Uncle 28   • Testicular cancer Cousin 25       SOCIAL HISTORY:  Social History     Tobacco Use   • Smoking status: Former     Packs/day: 2.00     Years: 10.00     Total pack years: 20.00     Types: Cigarettes     Start date: 7/3/1964     Quit date:      Years since quittin.7   • Smokeless tobacco: Never   Vaping Use   • Vaping Use: Never used   Substance Use Topics   • Alcohol use:  Yes     Alcohol/week: 0.0 standard drinks of alcohol     Comment: Rarely   • Drug use: No       MEDICATIONS:    Current Outpatient Medications:   •  Cholecalciferol (VITAMIN D) 2000 units tablet, TAKE ONE TABLET BY MOUTH EVERY DAY, Disp: 30 tablet, Rfl: 0  •  clonazePAM (KlonoPIN) 0.5 mg tablet, Take 1 tablet (0.5 mg total) by mouth 2 (two) times a day, Disp: 60 tablet, Rfl: 0  •  cyanocobalamin (VITAMIN B-12) 1,000 mcg tablet, Take 1,000 mcg by mouth daily, Disp: , Rfl:   •  docusate sodium (COLACE) 100 mg capsule, Take 1 capsule (100 mg total) by mouth 2 (two) times a day as needed for constipation, Disp: 30 capsule, Rfl: 3  •  famotidine (PEPCID) 20 mg tablet, Take 1 tablet (20 mg total) by mouth 2 (two) times a day, Disp: 60 tablet, Rfl: 3  •  glimepiride (AMARYL) 4 mg tablet, Take 1 tablet (4 mg total) by mouth in the morning, Disp: 180 tablet, Rfl: 1  •  glucose blood (FREESTYLE LITE) test strip, Use 1 each 4 (four) times a day Use 4 times daily DM2-- free style lite test strips, Disp: 400 each, Rfl: 1  •  insulin aspart, w/niacinamide, (FIASP) 100 Units/mL injection pen, Inject 12 Units under the skin 3 (three) times a day with meals Please send 5  pens with 5 refills, Disp: 3 mL, Rfl: 5  •  Insulin Glargine Solostar (Lantus SoloStar) 100 UNIT/ML SOPN, 25 units in AM and 35 units in PM, Disp: 15 mL, Rfl: 5  •  Insulin Pen Needle (BD Pen Needle Kelly U/F) 32G X 4 MM MISC, Use 4 (four) times a day, Disp: 400 each, Rfl: 1  •  Lancets (freestyle) lancets, Use 3 (three) times a day Test blood glucose, Disp: 90 each, Rfl: 5  •  levothyroxine 100 mcg tablet, Take 1 tablet (100 mcg total) by mouth daily, Disp: 90 tablet, Rfl: 1  •  lisinopril-hydrochlorothiazide (PRINZIDE,ZESTORETIC) 20-12.5 MG per tablet, Take 0.5 tablets by mouth daily, Disp: 45 tablet, Rfl: 1  •  metoclopramide (Reglan) 10 mg tablet, Take 1 tablet (10 mg total) by mouth 4 (four) times a day, Disp: 60 tablet, Rfl: 0  •  ondansetron (ZOFRAN) 4 mg tablet, Take 1 tablet (4 mg total) by mouth every 8 (eight) hours as needed for nausea or vomiting, Disp: 20 tablet, Rfl: 0  •  sertraline (ZOLOFT) 100 mg tablet, Take 1 tablet (100 mg total) by mouth 2 (two) times a day, Disp: 180 tablet, Rfl: 1  •  atorvastatin (LIPITOR) 20 mg tablet, Take 1 tablet (20 mg total) by mouth daily, Disp: 30 tablet, Rfl: 5  •  naloxone (NARCAN) 4 mg/0.1 mL nasal spray, Administer 1 spray into a nostril. If no response after 2-3 minutes, give another dose in the other nostril using a new spray., Disp: 1 each, Rfl: 1  •  traMADol (Ultram) 50 mg tablet, Take 1 tablet (50 mg total) by mouth every 8 (eight) hours as needed for moderate pain, Disp: 30 tablet, Rfl: 0    ALLERGIES:  Allergies   Allergen Reactions   • Metformin Diarrhea       REVIEW OF SYSTEMS:  MSK: left clavicle pain   Neuro: WNL   Pertinent items are otherwise noted in HPI. A comprehensive review of systems was otherwise negative.     LABS:  HgA1c:   Lab Results   Component Value Date    HGBA1C 6.7 (A) 03/29/2023     BMP:   Lab Results   Component Value Date    GLUCOSE 185 (H) 03/21/2015    CALCIUM 9.4 03/28/2023     12/19/2016    K 4.1 03/28/2023    CO2 28 03/28/2023     03/28/2023    BUN 16 03/28/2023    CREATININE 0.67 03/28/2023     CBC: No components found for: "CBC"    _____________________________________________________  PHYSICAL EXAMINATION:  Vital signs: /83   Pulse 75   Ht 5' 1" (1.549 m)   Wt 90 kg (198 lb 6.4 oz)   BMI 37.49 kg/m²   General: No acute distress, awake and alert  Psychiatric: Mood and affect appear appropriate  HEENT: Trachea Midline, No torticollis, no apparent facial trauma  Cardiovascular: No audible murmurs; Extremities appear perfused  Pulmonary: No audible wheezing or stridor  Skin: No open lesions; see further details (if any) below      MUSCULOSKELETAL EXAMINATION:  Left clavicle examination:  - Patient sitting comfortably in the office in no acute distress   - No acute visible advised present to left upper extremity. Extremity appears well-perfused overall.  - No tenderness to palpation over the distal clavicle. No other bony or soft tissue tenderness palpation noted at this time.  - Near full range of motion of the left shoulder in all planes motion with minimal pain  - NV intact    _____________________________________________________  STUDIES REVIEWED:  I personally reviewed the images and interpretation is as follows:  Left clavicle XR 2 views:  Healing distal clavicle fracture in acceptable position with increased callous formation present           PROCEDURES PERFORMED:  No procedures were performed at this time. Sofiya Madera PA-C - assisting  Curly Baker MD                      Portions of the record may have been created with voice recognition software. Occasional wrong word or "sound a like" substitutions may have occurred due to the inherent limitations of voice recognition software. Read the chart carefully and recognize, using context, where substitutions have occurred.

## 2023-08-09 NOTE — PATIENT INSTRUCTIONS
- Discussed conservative treatment and surgical intervention with patient at length   -Will continue with conservative treatment at this time.   - Weight bearing as tolerated left upper extremity   - Begin physical therapy as directed   - Over the counter analgesics as needed / directed   - Ice / heat as directed   - Follow up 6 weeks with repeat XR

## 2023-08-11 ENCOUNTER — TELEPHONE (OUTPATIENT)
Dept: GASTROENTEROLOGY | Facility: AMBULARY SURGERY CENTER | Age: 68
End: 2023-08-11

## 2023-08-14 ENCOUNTER — TELEPHONE (OUTPATIENT)
Dept: INTERNAL MEDICINE CLINIC | Age: 68
End: 2023-08-14

## 2023-08-18 DIAGNOSIS — E11.649 UNCONTROLLED TYPE 2 DIABETES MELLITUS WITH HYPOGLYCEMIA WITHOUT COMA (HCC): ICD-10-CM

## 2023-08-18 DIAGNOSIS — E03.9 HYPOTHYROIDISM, UNSPECIFIED TYPE: ICD-10-CM

## 2023-08-18 DIAGNOSIS — F41.9 ANXIETY: ICD-10-CM

## 2023-08-18 DIAGNOSIS — E11.00 UNCONTROLLED TYPE 2 DIABETES MELLITUS WITH HYPEROSMOLARITY WITHOUT COMA, WITHOUT LONG-TERM CURRENT USE OF INSULIN (HCC): ICD-10-CM

## 2023-08-18 DIAGNOSIS — F41.8 ANXIOUS DEPRESSION: ICD-10-CM

## 2023-08-18 DIAGNOSIS — E11.9 TYPE 2 DIABETES MELLITUS WITHOUT COMPLICATION, WITHOUT LONG-TERM CURRENT USE OF INSULIN (HCC): ICD-10-CM

## 2023-08-18 RX ORDER — LEVOTHYROXINE SODIUM 0.1 MG/1
100 TABLET ORAL DAILY
Qty: 90 TABLET | Refills: 0 | Status: CANCELLED | OUTPATIENT
Start: 2023-08-18

## 2023-08-18 RX ORDER — PEN NEEDLE, DIABETIC 32GX 5/32"
NEEDLE, DISPOSABLE MISCELLANEOUS 4 TIMES DAILY
Qty: 400 EACH | Refills: 0 | Status: CANCELLED | OUTPATIENT
Start: 2023-08-18

## 2023-08-21 RX ORDER — CLONAZEPAM 0.5 MG/1
0.5 TABLET ORAL 2 TIMES DAILY
Qty: 60 TABLET | Refills: 0 | Status: SHIPPED | OUTPATIENT
Start: 2023-08-21

## 2023-08-21 RX ORDER — BLOOD-GLUCOSE METER
1 KIT MISCELLANEOUS 4 TIMES DAILY
Qty: 400 EACH | Refills: 1 | Status: SHIPPED | OUTPATIENT
Start: 2023-08-21

## 2023-08-21 RX ORDER — SERTRALINE HYDROCHLORIDE 100 MG/1
100 TABLET, FILM COATED ORAL 2 TIMES DAILY
Qty: 180 TABLET | Refills: 1 | Status: SHIPPED | OUTPATIENT
Start: 2023-08-21 | End: 2023-08-28 | Stop reason: SDUPTHER

## 2023-08-21 RX ORDER — INSULIN GLARGINE 100 [IU]/ML
INJECTION, SOLUTION SUBCUTANEOUS
Qty: 15 ML | Refills: 5 | Status: SHIPPED | OUTPATIENT
Start: 2023-08-21

## 2023-08-21 RX ORDER — LANCETS 28 GAUGE
EACH MISCELLANEOUS 4 TIMES DAILY
Qty: 120 EACH | Refills: 2 | Status: SHIPPED | OUTPATIENT
Start: 2023-08-21 | End: 2023-08-28 | Stop reason: SDUPTHER

## 2023-08-24 NOTE — TELEPHONE ENCOUNTER
Yes we can Detail Level: Detailed Depth Of Biopsy: dermis Was A Bandage Applied: Yes Size Of Lesion In Cm: 0.2 Biopsy Type: H and E Biopsy Method: Personna blade Anesthesia Type: 1% lidocaine with epinephrine Anesthesia Volume In Cc (Will Not Render If 0): 0.5 Additional Anesthesia Volume In Cc (Will Not Render If 0): 0 Hemostasis: Drysol Wound Care: Petrolatum Dressing: bandage Destruction After The Procedure: No Type Of Destruction Used: Curettage Curettage Text: The wound bed was treated with curettage after the biopsy was performed. Cryotherapy Text: The wound bed was treated with cryotherapy after the biopsy was performed. Electrodesiccation Text: The wound bed was treated with electrodesiccation after the biopsy was performed. Electrodesiccation And Curettage Text: The wound bed was treated with electrodesiccation and curettage after the biopsy was performed. Silver Nitrate Text: The wound bed was treated with silver nitrate after the biopsy was performed. Lab: 6 Lab Facility: 3 Consent: Verbal consent was obtained after risks were reviewed including but not limited to scarring, infection, bleeding, pain scabbing, incomplete removal, nerve damage, allergy/reaction to anesthesia/cleanser/other, and possible need for further treatment including but not limited to additional/repeat biopsy, definitive treatment, and/or other procedure. Post-Care Instructions: I reviewed with the patient in detail post-care instructions. Patient is to keep the biopsy site dry overnight, and then apply bacitracin twice daily until healed. Patient may apply hydrogen peroxide soaks to remove any crusting. Notification Instructions: Patient will be notified of biopsy results. However, patient instructed to call the office if not contacted within 2 weeks. Billing Type: Third-Party Bill Information: Selecting Yes will display possible errors in your note based on the variables you have selected. This validation is only offered as a suggestion for you. PLEASE NOTE THAT THE VALIDATION TEXT WILL BE REMOVED WHEN YOU FINALIZE YOUR NOTE. IF YOU WANT TO FAX A PRELIMINARY NOTE YOU WILL NEED TO TOGGLE THIS TO 'NO' IF YOU DO NOT WANT IT IN YOUR FAXED NOTE. Size Of Lesion In Cm: 0.8 X Size Of Lesion In Cm: 0.7 Size Of Lesion In Cm: 0.9

## 2023-08-28 DIAGNOSIS — E11.9 TYPE 2 DIABETES MELLITUS WITHOUT COMPLICATION, WITHOUT LONG-TERM CURRENT USE OF INSULIN (HCC): ICD-10-CM

## 2023-08-28 DIAGNOSIS — F41.9 ANXIETY: ICD-10-CM

## 2023-08-28 RX ORDER — LANCETS 28 GAUGE
EACH MISCELLANEOUS 4 TIMES DAILY
Qty: 120 EACH | Refills: 3 | Status: SHIPPED | OUTPATIENT
Start: 2023-08-28

## 2023-08-28 RX ORDER — SERTRALINE HYDROCHLORIDE 100 MG/1
100 TABLET, FILM COATED ORAL 2 TIMES DAILY
Qty: 180 TABLET | Refills: 1 | Status: SHIPPED | OUTPATIENT
Start: 2023-08-28

## 2023-09-12 NOTE — PROGRESS NOTES
Daily Note     Today's date: 3/30/2021  Patient name: Ady Foote  : 1955  MRN: [id number]  Referring provider: Myles Randall PA-C  Dx:   Encounter Diagnosis     ICD-10-CM    1  Closed fracture of neck of left femur, initial encounter ([facility] [address] )  S72 002A    2  Left hip pain  M25 552                   Subjective:  Pt reports she's walking outside for about 1/2 mile with cane on level, no hills with no increased sx/problems, pt reports she would like RTW end of month, light house cleaning      Objective: See treatment diary below      Assessment: progressing strengthening and functional ex as dario, pt performed with min fatigue, pt cont to amb with  Trendelenburg with SPC,       Plan: Continue per plan of care  Progress treatment as tolerated         Precautions: Left hip fx      Manuals  3/12/21 3/16 3/25/21 3/30/21        PROM L LE  VK JF JF VK                                               Neuro Re-Ed   3/16 3/25/21 3/30/21        Mini squats   3*10 3x10x3" 3x10x3"        Standing abduction   L 3x10 B/L 3x10 3x10 BL        Bridging     3x10x5" 3x10x5"        STS     2x10        Isometric hip flexor     VK 10x5"        TrA activation      2x10x5"        sidestepping     nv        Ther Ex 3/9 3/12/21 3/16 3/25/21 3/30/21        Adductor squeeze HEP 3x10x5" 3*10*5" 2x15x5" 3x15x5"        Quad set HEP 3x10x5" 3*10*5" 2x15x5" 30x5"        SL clamshell HEP 3x10x3"  3*10 ytb 3x10x3"        Nu Step nv 5' seat 7 10' seat 7 10' 10'        Mini squats nv 2x10x3" 2*10*3" above         Standing hip abd nv 3x10 L 3*10 above                                   Ther Activity                                       Gait Training 3/9 3/12/21 3/16 3/25 3/30/21        SPC  nv np due to subjective (NV) JF JV         Step up      4" 2x10        Stair training nv nv nv  4 steps x1 rail/cane        Modalities axillary

## 2023-09-13 ENCOUNTER — TELEPHONE (OUTPATIENT)
Dept: INTERNAL MEDICINE CLINIC | Age: 68
End: 2023-09-13

## 2023-09-13 DIAGNOSIS — K52.9 GASTROENTERITIS: ICD-10-CM

## 2023-09-13 RX ORDER — ONDANSETRON 4 MG/1
4 TABLET, FILM COATED ORAL EVERY 8 HOURS PRN
Qty: 20 TABLET | Refills: 5 | Status: SHIPPED | OUTPATIENT
Start: 2023-09-13

## 2023-09-13 NOTE — TELEPHONE ENCOUNTER
rx refill for ondansetron (ZOFRAN) 4 mg tablet.     Send to [address], [place] - [address] [facility]    Nov- 3/05/6288

## 2023-09-17 DIAGNOSIS — S42.022A CLOSED DISPLACED FRACTURE OF SHAFT OF LEFT CLAVICLE, INITIAL ENCOUNTER: Primary | ICD-10-CM

## 2023-10-11 ENCOUNTER — OFFICE VISIT (OUTPATIENT)
Dept: INTERNAL MEDICINE CLINIC | Age: 68
End: 2023-10-11
Payer: MEDICARE

## 2023-10-11 VITALS
HEART RATE: 81 BPM | BODY MASS INDEX: 36.44 KG/M2 | SYSTOLIC BLOOD PRESSURE: 134 MMHG | WEIGHT: 193 LBS | TEMPERATURE: 97 F | DIASTOLIC BLOOD PRESSURE: 88 MMHG | OXYGEN SATURATION: 95 % | HEIGHT: 61 IN

## 2023-10-11 DIAGNOSIS — E11.9 TYPE 2 DIABETES MELLITUS WITHOUT COMPLICATION, WITH LONG-TERM CURRENT USE OF INSULIN (HCC): Primary | ICD-10-CM

## 2023-10-11 DIAGNOSIS — J06.9 URTI (ACUTE UPPER RESPIRATORY INFECTION): ICD-10-CM

## 2023-10-11 DIAGNOSIS — E66.01 OBESITY, MORBID (HCC): ICD-10-CM

## 2023-10-11 DIAGNOSIS — Z79.4 TYPE 2 DIABETES MELLITUS WITHOUT COMPLICATION, WITH LONG-TERM CURRENT USE OF INSULIN (HCC): Primary | ICD-10-CM

## 2023-10-11 DIAGNOSIS — H10.33 ACUTE BACTERIAL CONJUNCTIVITIS OF BOTH EYES: ICD-10-CM

## 2023-10-11 DIAGNOSIS — F41.8 ANXIOUS DEPRESSION: ICD-10-CM

## 2023-10-11 LAB — SL AMB POCT HEMOGLOBIN AIC: 6.4 (ref ?–6.5)

## 2023-10-11 PROCEDURE — 83036 HEMOGLOBIN GLYCOSYLATED A1C: CPT | Performed by: INTERNAL MEDICINE

## 2023-10-11 PROCEDURE — 99214 OFFICE O/P EST MOD 30 MIN: CPT | Performed by: INTERNAL MEDICINE

## 2023-10-11 RX ORDER — CLONAZEPAM 0.5 MG/1
0.5 TABLET ORAL 2 TIMES DAILY
Qty: 60 TABLET | Refills: 0 | Status: SHIPPED | OUTPATIENT
Start: 2023-10-11

## 2023-10-11 RX ORDER — OFLOXACIN 3 MG/ML
1 SOLUTION/ DROPS OPHTHALMIC 4 TIMES DAILY
Qty: 5 ML | Refills: 0 | Status: SHIPPED | OUTPATIENT
Start: 2023-10-11

## 2023-10-11 RX ORDER — AMOXICILLIN AND CLAVULANATE POTASSIUM 875; 125 MG/1; MG/1
1 TABLET, FILM COATED ORAL EVERY 12 HOURS SCHEDULED
Qty: 14 TABLET | Refills: 0 | Status: SHIPPED | OUTPATIENT
Start: 2023-10-11 | End: 2023-10-18

## 2023-10-11 NOTE — PROGRESS NOTES
Assessment/Plan:    Upper respiratory tract infection  - likely viral with bacterial superinfection vs bacterial  - we will start pt on Augmentin 875-125 mg twice daily for 7 days, and may useFlonase nasal spray for rhinitis and Mucinex for productive cough  - Pt may use OTC tylenol or ibuprofen with meals for aches and pains, warm salt water gargles for any sore throat and was encouraged to drink lots of fluids, get plenty of rest and wash her hands liberally. - pt was also counseled to take antibiotics with food and also to use a probiotic like yogurt daily as long as she is taking antibiotics  - She should return to the clinic if her symptoms worsen or do not improve. Bacterial conjunctivitis  - we will start pt on Ofloxacin eye drops  - she should avoid wearing make up till infection resolves    Anxiety with depression  - stable  - c/w sertraline and clonazepam which we will refill today as requested    Diabetes mellitus 2  - blood glucose is well controlled , POCT hemoglobin A1c was 6.4  -continue with insulin glargine and glimepiride  -continue with a diabetic diet low in carbohydrates and simply sugars  -continue with exercise  - diabetic foot exam was done today         Diagnoses and all orders for this visit:    Type 2 diabetes mellitus without complication, with long-term current use of insulin (HCC)  -     POCT hemoglobin A1c    Anxious depression  -     clonazePAM (KlonoPIN) 0.5 mg tablet; Take 1 tablet (0.5 mg total) by mouth 2 (two) times a day    Acute bacterial conjunctivitis of both eyes  -     ofloxacin (OCUFLOX) 0.3 % ophthalmic solution; Administer 1 drop to both eyes 4 (four) times a day Use for 7 days    URTI (acute upper respiratory infection)  -     amoxicillin-clavulanate (AUGMENTIN) 875-125 mg per tablet; Take 1 tablet by mouth every 12 (twelve) hours for 7 days    Obesity, morbid (HCC)             Subjective:      Patient ID: Boogie Tafoya is a 70 y.o. female.     Pt presents with complaints that she has had a cold and pink eye for over a week  Granddaughter was sick first and she was taking care of her granddaughter before her symptoms started. She admits to associated sinus pain and pressure, sore throat, intermittent productive cough but denies an nasal congestion, runny nose, pnd, ear ache, chest pain, sob, palpitation, wheezing, nausea, vomiting, diarrhea, constipation, myalgias but admits to chronic and unchanged arthralgias. Also c/o of pink eye and a sensation of sand in her eyes with a crusting of the eye lashes in the am. She admits to eye pain sometimes and redness of the eyes. Luis  needs a refill of her clonazepam  States that her anxiety is doing better   She uses the clonazepam about once a day usually at night   Does not want to take any sleeping pill for her insomnia      The following portions of the patient's history were reviewed and updated as appropriate: She  has a past medical history of Acid reflux, Allergic, Anxiety, Diabetes mellitus ([address]), Disease of thyroid gland (Years), Gastroparesis, GERD (gastroesophageal reflux disease) (Years), Hypertension, Hypothyroid, Obesity (Years), and Shingles (2 years ago).   She   Patient Active Problem List    Diagnosis Date Noted   • Closed displaced fracture of shaft of left clavicle 08/09/2023   • Abscess 01/06/2023   • Mixed hyperlipidemia 05/09/2022   • Gastroparesis 01/24/2022   • Constipation 01/24/2022   • Gastroesophageal reflux disease without esophagitis 01/24/2022   • Achilles tendinitis, right leg 08/18/2021   • Statin intolerance 07/28/2021   • Chronic pain of right ankle 07/28/2021   • Obesity, morbid ([address]) 02/15/2021   • LFT elevation 09/23/2020   • Gastroenteritis 04/08/2019   • Seasonal allergies 09/14/2018   • Fatty liver 12/05/2017   • Splenomegaly 12/05/2017   • Diabetic gastroparesis  08/22/2017   • Hand paresthesia 11/11/2016   • Type 2 diabetes mellitus, with long-term current use of insulin ([address]) 12/10/2015   • Irritable bowel syndrome 2015   • Osteoarthritis, hip, bilateral 2015   • Anxiety 2015   • GERD (gastroesophageal reflux disease) 2015   • Hypothyroidism 2015   • Obesity 2011   • Essential hypertension 2011     She  has a past surgical history that includes Cholecystectomy; Dilation and curettage of uterus; Esophagogastroduodenoscopy; pr neuroplasty &/transpos median nrv carpal tunne (Right, 2017); pr tendon sheath incision (Right, 2017); pr tendon sheath incision (Right, 2016); pr colonoscopy flx dx w/collj spec when pfrmd (N/A, 2016); Hysterectomy; Oophorectomy; Colonoscopy; Upper gastrointestinal endoscopy; Root canal; pr optx fem shft fx w/insj imed implt w/wo screw (Left, 2/3/2021);  section (); and Fracture surgery (). Her family history includes Alcohol abuse in her son; Arthritis in her maternal grandmother; Breast cancer (age of onset: 62) in her paternal aunt; Cirrhosis in her mother; Colon cancer (age of onset: 28) in her paternal uncle; Diabetes in her brother and father; Diabetes type II in her father; Hearing loss in her father; Heart attack in her father; Heart disease in her father and paternal grandfather; No Known Problems in her maternal aunt, maternal aunt, maternal grandfather, paternal grandmother, sister, and sister; Stroke in her paternal grandfather; Testicular cancer (age of onset: 25) in her cousin; Vision loss in her father. She  reports that she quit smoking about 35 years ago. Her smoking use included cigarettes. She started smoking about 59 years ago. She has a 46.00 pack-year smoking history. She has never used smokeless tobacco. She reports current alcohol use. She reports that she does not use drugs.   Current Outpatient Medications   Medication Sig Dispense Refill   • amoxicillin-clavulanate (AUGMENTIN) 875-125 mg per tablet Take 1 tablet by mouth every 12 (twelve) hours for 7 days 14 tablet 0   • Cholecalciferol (VITAMIN D) 2000 units tablet TAKE ONE TABLET BY MOUTH EVERY DAY 30 tablet 0   • clonazePAM (KlonoPIN) 0.5 mg tablet Take 1 tablet (0.5 mg total) by mouth 2 (two) times a day 60 tablet 0   • cyanocobalamin (VITAMIN B-12) 1,000 mcg tablet Take 1,000 mcg by mouth daily     • docusate sodium (COLACE) 100 mg capsule Take 1 capsule (100 mg total) by mouth 2 (two) times a day as needed for constipation 30 capsule 3   • famotidine (PEPCID) 20 mg tablet Take 1 tablet (20 mg total) by mouth 2 (two) times a day 60 tablet 3   • glimepiride (AMARYL) 4 mg tablet Take 1 tablet (4 mg total) by mouth in the morning 180 tablet 1   • glucose blood (FREESTYLE LITE) test strip Use 1 each 4 (four) times a day Use 4 times daily DM2-- free style lite test strips 400 each 1   • insulin aspart, w/niacinamide, (FIASP) 100 Units/mL injection pen Inject 12 Units under the skin 3 (three) times a day with meals Please send 5  pens with 5 refills 3 mL 5   • Insulin Glargine Solostar (Lantus SoloStar) 100 UNIT/ML SOPN 25 units in AM and 35 units in PM 15 mL 5   • Insulin Pen Needle (BD Pen Needle Kelly U/F) 32G X 4 MM MISC Use 4 (four) times a day 400 each 1   • Lancets (freestyle) lancets Use 4 (four) times a day Test blood glucose 120 each 3   • levothyroxine 100 mcg tablet Take 1 tablet (100 mcg total) by mouth daily 90 tablet 1   • lisinopril-hydrochlorothiazide (PRINZIDE,ZESTORETIC) 20-12.5 MG per tablet Take 0.5 tablets by mouth daily 45 tablet 1   • metoclopramide (Reglan) 10 mg tablet Take 1 tablet (10 mg total) by mouth 4 (four) times a day 60 tablet 0   • ofloxacin (OCUFLOX) 0.3 % ophthalmic solution Administer 1 drop to both eyes 4 (four) times a day Use for 7 days 5 mL 0   • ondansetron (ZOFRAN) 4 mg tablet Take 1 tablet (4 mg total) by mouth every 8 (eight) hours as needed for nausea or vomiting 20 tablet 5   • sertraline (ZOLOFT) 100 mg tablet Take 1 tablet (100 mg total) by mouth 2 (two) times a day 180 tablet 1     No current facility-administered medications for this visit.      Current Outpatient Medications on File Prior to Visit   Medication Sig   • Cholecalciferol (VITAMIN D) 2000 units tablet TAKE ONE TABLET BY MOUTH EVERY DAY   • cyanocobalamin (VITAMIN B-12) 1,000 mcg tablet Take 1,000 mcg by mouth daily   • docusate sodium (COLACE) 100 mg capsule Take 1 capsule (100 mg total) by mouth 2 (two) times a day as needed for constipation   • famotidine (PEPCID) 20 mg tablet Take 1 tablet (20 mg total) by mouth 2 (two) times a day   • glimepiride (AMARYL) 4 mg tablet Take 1 tablet (4 mg total) by mouth in the morning   • glucose blood (FREESTYLE LITE) test strip Use 1 each 4 (four) times a day Use 4 times daily DM2-- free style lite test strips   • insulin aspart, w/niacinamide, (FIASP) 100 Units/mL injection pen Inject 12 Units under the skin 3 (three) times a day with meals Please send 5  pens with 5 refills   • Insulin Glargine Solostar (Lantus SoloStar) 100 UNIT/ML SOPN 25 units in AM and 35 units in PM   • Insulin Pen Needle (BD Pen Needle Kelly U/F) 32G X 4 MM MISC Use 4 (four) times a day   • Lancets (freestyle) lancets Use 4 (four) times a day Test blood glucose   • levothyroxine 100 mcg tablet Take 1 tablet (100 mcg total) by mouth daily   • lisinopril-hydrochlorothiazide (PRINZIDE,ZESTORETIC) 20-12.5 MG per tablet Take 0.5 tablets by mouth daily   • metoclopramide (Reglan) 10 mg tablet Take 1 tablet (10 mg total) by mouth 4 (four) times a day   • ondansetron (ZOFRAN) 4 mg tablet Take 1 tablet (4 mg total) by mouth every 8 (eight) hours as needed for nausea or vomiting   • sertraline (ZOLOFT) 100 mg tablet Take 1 tablet (100 mg total) by mouth 2 (two) times a day   • [DISCONTINUED] clonazePAM (KlonoPIN) 0.5 mg tablet Take 1 tablet (0.5 mg total) by mouth 2 (two) times a day   • [DISCONTINUED] atorvastatin (LIPITOR) 20 mg tablet Take 1 tablet (20 mg total) by mouth daily   • [DISCONTINUED] naloxone (NARCAN) 4 mg/0.1 mL nasal spray Administer 1 spray into a nostril. If no response after 2-3 minutes, give another dose in the other nostril using a new spray. • [DISCONTINUED] traMADol (Ultram) 50 mg tablet Take 1 tablet (50 mg total) by mouth every 8 (eight) hours as needed for moderate pain     No current facility-administered medications on file prior to visit. She is allergic to other and metformin. .    Review of Systems   Constitutional:  Negative for activity change, chills, fatigue, fever and unexpected weight change. HENT:  Positive for sinus pressure, sinus pain and sore throat. Negative for ear pain, postnasal drip and rhinorrhea. Eyes:  Positive for pain, redness and itching (with crusting of the eye lashes in the am - greenish yellow). Negative for photophobia and visual disturbance. Respiratory:  Positive for cough (intermittently and productive but she swallows it cos it does not come up). Negative for choking, chest tightness, shortness of breath and wheezing. Cardiovascular:  Negative for chest pain, palpitations and leg swelling. Gastrointestinal:  Negative for abdominal pain, constipation, diarrhea, nausea and vomiting. Genitourinary:  Negative for dysuria and hematuria. Musculoskeletal:  Positive for arthralgias (chronic). Negative for back pain, gait problem, joint swelling, myalgias and neck stiffness. Skin:  Negative for pallor and rash. Neurological:  Negative for dizziness, tremors, seizures, syncope, light-headedness and headaches. Hematological:  Negative for adenopathy. Psychiatric/Behavioral:  Positive for sleep disturbance. Negative for behavioral problems. The patient is nervous/anxious.           Objective:      /88 (BP Location: Left arm, Patient Position: Sitting, Cuff Size: Standard)   Pulse 81   Temp (!) 97 °F (36.1 °C) (Temporal)   Ht 5' 1" (1.549 m)   Wt 87.5 kg (193 lb)   SpO2 95%   BMI 36.47 kg/m²          Physical Exam  Constitutional: General: She is not in acute distress. Appearance: She is well-developed. She is not diaphoretic. HENT:      Head: Normocephalic and atraumatic. Right Ear: External ear normal.      Left Ear: External ear normal. There is impacted cerumen. Nose: Congestion present. Mouth/Throat:      Mouth: Mucous membranes are dry. Pharynx: Posterior oropharyngeal erythema present. No oropharyngeal exudate. Eyes:      General: No scleral icterus. Right eye: No discharge. Left eye: No discharge. Conjunctiva/sclera:      Right eye: Right conjunctiva is injected. Left eye: Left conjunctiva is injected (with prominent vessels). Pupils: Pupils are equal, round, and reactive to light. Neck:      Thyroid: No thyromegaly. Vascular: No JVD. Trachea: No tracheal deviation. Cardiovascular:      Rate and Rhythm: Normal rate and regular rhythm. Heart sounds: Normal heart sounds. No murmur heard. No friction rub. No gallop. Pulmonary:      Effort: Pulmonary effort is normal. No respiratory distress. Breath sounds: Normal breath sounds. No wheezing or rales. Chest:      Chest wall: No tenderness. Abdominal:      General: Bowel sounds are normal. There is no distension. Palpations: Abdomen is soft. There is no mass. Tenderness: There is no abdominal tenderness. There is no guarding or rebound. Musculoskeletal:         General: No tenderness or deformity. Normal range of motion. Cervical back: Normal range of motion and neck supple. Lymphadenopathy:      Head:      Right side of head: Submandibular adenopathy present. Left side of head: Submandibular adenopathy present. Cervical: No cervical adenopathy. Skin:     General: Skin is warm and dry. Coloration: Skin is not pale. Findings: No erythema or rash. Neurological:      Mental Status: She is alert and oriented to person, place, and time. Cranial Nerves:  No cranial nerve deficit. Motor: No abnormal muscle tone. Coordination: Coordination normal.      Deep Tendon Reflexes: Reflexes are normal and symmetric. Psychiatric:         Behavior: Behavior normal.           Office Visit on 10/11/2023   Component Date Value Ref Range Status   • Hemoglobin A1C 10/11/2023 6.4  6.5 Final   Office Visit on 03/29/2023   Component Date Value Ref Range Status   • Hemoglobin A1C 03/29/2023 6.7 (A)  6.5 Final   Orders Only on 03/28/2023   Component Date Value Ref Range Status   • Creatinine, Urine 03/28/2023 133  20 - 275 mg/dL Final   • Albumin,U,Random 03/28/2023 0.8  See Note: mg/dL Final    Comment: Reference Range:    Reference Range  Not established     • Microalb/Creat Ratio 03/28/2023 6  <30 mcg/mg creat Final    Comment:    The ADA defines abnormalities in albumin  excretion as follows: Albuminuria Category        Result (mcg/mg creatinine)     Normal to Mildly increased   <30  Moderately increased            Severely increased           > OR = 300     The ADA recommends that at least two of three  specimens collected within a 3-6 month period be  abnormal before considering a patient to be  within a diagnostic category.      • Color UA 03/28/2023 YELLOW  YELLOW Final   • Urine Appearance 03/28/2023 CLOUDY (A)  CLEAR Final   • Specific Gravity 03/28/2023 1.022  1.001 - 1.035 Final   • Ph 03/28/2023 5.5  5.0 - 8.0 Final   • Glucose, Urine 03/28/2023 NEGATIVE  NEGATIVE Final   • Bilirubin, Urine 03/28/2023 NEGATIVE  NEGATIVE Final   • Ketone, Urine 03/28/2023 NEGATIVE  NEGATIVE Final   • Blood, Urine 03/28/2023 NEGATIVE  NEGATIVE Final   • Protein, Urine 03/28/2023 NEGATIVE  NEGATIVE Final   • Nitrites Urine 03/28/2023 NEGATIVE  NEGATIVE Final   • Leukocyte Esterase 03/28/2023 2+ (A)  NEGATIVE Final   • SL AMB WBC, URINE 03/28/2023 10-20 (A)  < OR = 5 /HPF Final   • RBC, Urine 03/28/2023 10-20 (A)  < OR = 2 /HPF Final   • Squamous Epithelial Cells 03/28/2023 0-5 < OR = 5 /HPF Final   • Bacteria, UA 03/28/2023 NONE SEEN  NONE SEEN /HPF Final   • Calcium Oxalate Crystals 03/28/2023 MANY (A)  NONE OR FEW /HPF Final   • Hyaline Casts 03/28/2023 NONE SEEN  NONE SEEN /LPF Final   • Comment(s) 03/28/2023    Final    Comment: This urine was analyzed for the presence of WBC,   RBC, bacteria, casts, and other formed elements. Only those elements seen were reported. • White Blood Cell Count 03/28/2023 5.6  3.8 - 10.8 Thousand/uL Final   • Red Blood Cell Count 03/28/2023 4.51  3.80 - 5.10 Million/uL Final   • Hemoglobin 03/28/2023 12.4  11.7 - 15.5 g/dL Final   • HCT 03/28/2023 36.7  35.0 - 45.0 % Final   • MCV 03/28/2023 81.4  80.0 - 100.0 fL Final   • MCH 03/28/2023 27.5  27.0 - 33.0 pg Final   • MCHC 03/28/2023 33.8  32.0 - 36.0 g/dL Final   • RDW 03/28/2023 14.3  11.0 - 15.0 % Final   • Platelet Count 36/78/6454 150  140 - 400 Thousand/uL Final   • SL AMB MPV 03/28/2023 10.6  7.5 - 12.5 fL Final   • Neutrophils (Absolute) 03/28/2023 3,931  1,500 - 7,800 cells/uL Final   • Lymphocytes (Absolute) 03/28/2023 1,198  850 - 3,900 cells/uL Final   • Monocytes (Absolute) 03/28/2023 347  200 - 950 cells/uL Final   • Eosinophils (Absolute) 03/28/2023 112  15 - 500 cells/uL Final   • Basophils ABS 03/28/2023 11  0 - 200 cells/uL Final   • Neutrophils 03/28/2023 70.2  % Final   • Lymphocytes 03/28/2023 21.4  % Final   • Monocytes 03/28/2023 6.2  % Final   • Eosinophils 03/28/2023 2.0  % Final   • Basophils PCT 03/28/2023 0.2  % Final   • TSH 03/28/2023 7.05 (H)  0.40 - 4.50 mIU/L Final   • Urine Culture, Comprehensive 03/28/2023    Final    CULTURE INDICATED - RESULTS TO FOLLOW   • Urine Culture Result 03/28/2023    Final    Comment:     CULTURE, URINE, ROUTINE         Micro Number:      [id number]    Test Status:       Final    Specimen Source:   Urine    Specimen Quality:  Adequate    Result:            Mixed genital toni isolated.  These superficial                       bacteria are not indicative of a urinary tract                       infection. No further organism identification is                       warranted on this specimen. If clinically                       indicated, recollect clean-catch, mid-stream                       urine and transfer immediately to Urine Culture                       Transport Tube. Orders Only on 03/28/2023   Component Date Value Ref Range Status   • Total Cholesterol 03/28/2023 132  <200 mg/dL Final   • HDL 03/28/2023 35 (L)  > OR = 50 mg/dL Final   • Triglycerides 03/28/2023 137  <150 mg/dL Final   • LDL Calculated 03/28/2023 75  mg/dL (calc) Final    Comment: Reference range: <100     Desirable range <100 mg/dL for primary prevention;    <70 mg/dL for patients with CHD or diabetic patients   with > or = 2 CHD risk factors. LDL-C is now calculated using the Jefe-Massey   calculation, which is a validated novel method providing   better accuracy than the Friedewald equation in the   estimation of LDL-C. Delores Johnson et al. [facility]. 2685;393(05): 1144-3613   (http://education. [employer]. com/faq/UJJ443)     • Chol HDLC Ratio 03/28/2023 3.8  <5.0 (calc) Final   • Non-HDL Cholesterol 03/28/2023 97  <130 mg/dL (calc) Final    Comment: For patients with diabetes plus 1 major ASCVD risk   factor, treating to a non-HDL-C goal of <100 mg/dL   (LDL-C of <70 mg/dL) is considered a therapeutic   option. • Glucose, Random 03/28/2023 110 (H)  65 - 99 mg/dL Final    Comment:               Fasting reference interval     For someone without known diabetes, a glucose value  between 100 and 125 mg/dL is consistent with  prediabetes and should be confirmed with a  follow-up test.        • BUN 03/28/2023 16  7 - 25 mg/dL Final   • Creatinine 03/28/2023 0.67  0.50 - 1.05 mg/dL Final   • eGFR 03/28/2023 95  > OR = 60 mL/min/1.73m2 Final    Comment: The eGFR is based on the CKD-EPI 2021 equation.  To calculate   the new eGFR from a previous Creatinine or Cystatin C  result, go to [email or web address]  [email or web address]     • SL AMB BUN/CREATININE RATIO 28/71/7893 NOT APPLICABLE  6 - 22 (calc) Final   • Sodium 03/28/2023 140  135 - 146 mmol/L Final   • Potassium 03/28/2023 4.1  3.5 - 5.3 mmol/L Final   • Chloride 03/28/2023 105  98 - 110 mmol/L Final   • CO2 03/28/2023 28  20 - 32 mmol/L Final   • Calcium 03/28/2023 9.4  8.6 - 10.4 mg/dL Final   • Protein, Total 03/28/2023 7.0  6.1 - 8.1 g/dL Final   • Albumin 03/28/2023 4.1  3.6 - 5.1 g/dL Final   • Globulin 03/28/2023 2.9  1.9 - 3.7 g/dL (calc) Final   • Albumin/Globulin Ratio 03/28/2023 1.4  1.0 - 2.5 (calc) Final   • TOTAL BILIRUBIN 03/28/2023 0.2  0.2 - 1.2 mg/dL Final   • Alkaline Phosphatase 03/28/2023 53  37 - 153 U/L Final   • AST 03/28/2023 16  10 - 35 U/L Final   • ALT 03/28/2023 18  6 - 29 U/L Final yes

## 2023-10-11 NOTE — PROGRESS NOTES
Diabetic Foot Exam    Patient's shoes and socks removed. Right Foot/Ankle   Right Foot Inspection  Skin Exam: dry skin. Toe Exam: ROM and strength within normal limits. No swelling, no tenderness, erythema and  no right toe deformity    Sensory   Proprioception: intact  Monofilament testing: intact    Vascular  The right DP pulse is 2+. The right PT pulse is 2+. Left Foot/Ankle  Left Foot Inspection  Skin Exam: dry skin. Toe Exam: ROM and strength within normal limits. No swelling, no tenderness, no erythema and no left toe deformity. Sensory   Proprioception: intact  Monofilament testing: intact    Vascular  The left DP pulse is 2+. The left PT pulse is 2+.      Assign Risk Category  No deformity present  No loss of protective sensation  No weak pulses  Risk: 0

## 2023-10-23 DIAGNOSIS — E11.649 UNCONTROLLED TYPE 2 DIABETES MELLITUS WITH HYPOGLYCEMIA WITHOUT COMA (HCC): ICD-10-CM

## 2023-10-23 DIAGNOSIS — K52.9 GASTROENTERITIS: ICD-10-CM

## 2023-10-23 DIAGNOSIS — E11.9 TYPE 2 DIABETES MELLITUS WITHOUT COMPLICATION, WITHOUT LONG-TERM CURRENT USE OF INSULIN (HCC): ICD-10-CM

## 2023-10-24 RX ORDER — ONDANSETRON 4 MG/1
4 TABLET, FILM COATED ORAL EVERY 8 HOURS PRN
Qty: 20 TABLET | Refills: 0 | Status: SHIPPED | OUTPATIENT
Start: 2023-10-24

## 2023-10-24 RX ORDER — LANCETS 28 GAUGE
EACH MISCELLANEOUS 4 TIMES DAILY
Qty: 120 EACH | Refills: 0 | Status: SHIPPED | OUTPATIENT
Start: 2023-10-24

## 2023-10-24 RX ORDER — BLOOD-GLUCOSE METER
1 KIT MISCELLANEOUS 4 TIMES DAILY
Qty: 400 EACH | Refills: 0 | Status: SHIPPED | OUTPATIENT
Start: 2023-10-24

## 2023-11-07 DIAGNOSIS — E11.649 UNCONTROLLED TYPE 2 DIABETES MELLITUS WITH HYPOGLYCEMIA WITHOUT COMA (HCC): ICD-10-CM

## 2023-11-07 DIAGNOSIS — I10 ESSENTIAL HYPERTENSION, BENIGN: ICD-10-CM

## 2023-11-07 RX ORDER — LISINOPRIL AND HYDROCHLOROTHIAZIDE 20; 12.5 MG/1; MG/1
0.5 TABLET ORAL DAILY
Qty: 45 TABLET | Refills: 1 | Status: SHIPPED | OUTPATIENT
Start: 2023-11-07

## 2023-11-07 RX ORDER — INSULIN GLARGINE 100 [IU]/ML
INJECTION, SOLUTION SUBCUTANEOUS
Qty: 15 ML | Refills: 5 | Status: SHIPPED | OUTPATIENT
Start: 2023-11-07

## 2023-11-09 ENCOUNTER — VBI (OUTPATIENT)
Dept: ADMINISTRATIVE | Facility: OTHER | Age: 68
End: 2023-11-09

## 2023-11-16 ENCOUNTER — HOSPITAL ENCOUNTER (OUTPATIENT)
Dept: RADIOLOGY | Age: 68
Discharge: HOME | End: 2023-11-16
Payer: MEDICARE

## 2023-11-16 VITALS — WEIGHT: 193 LBS | BODY MASS INDEX: 36.44 KG/M2 | HEIGHT: 61 IN

## 2023-11-16 DIAGNOSIS — Z12.31 ENCOUNTER FOR SCREENING MAMMOGRAM FOR MALIGNANT NEOPLASM OF BREAST: ICD-10-CM

## 2023-11-16 PROCEDURE — 77067 SCR MAMMO BI INCL CAD: CPT

## 2023-11-16 PROCEDURE — 77063 BREAST TOMOSYNTHESIS BI: CPT

## 2023-11-17 DIAGNOSIS — F41.9 ANXIETY: ICD-10-CM

## 2023-11-17 DIAGNOSIS — K52.9 GASTROENTERITIS: ICD-10-CM

## 2023-11-17 RX ORDER — ONDANSETRON 4 MG/1
4 TABLET, FILM COATED ORAL EVERY 8 HOURS PRN
Qty: 20 TABLET | Refills: 0 | Status: SHIPPED | OUTPATIENT
Start: 2023-11-17

## 2023-11-17 RX ORDER — SERTRALINE HYDROCHLORIDE 100 MG/1
100 TABLET, FILM COATED ORAL 2 TIMES DAILY
Qty: 180 TABLET | Refills: 0 | Status: CANCELLED | OUTPATIENT
Start: 2023-11-17

## 2023-12-05 DIAGNOSIS — E11.649 UNCONTROLLED TYPE 2 DIABETES MELLITUS WITH HYPOGLYCEMIA WITHOUT COMA (HCC): ICD-10-CM

## 2023-12-05 DIAGNOSIS — I10 ESSENTIAL HYPERTENSION, BENIGN: ICD-10-CM

## 2023-12-05 DIAGNOSIS — E03.9 HYPOTHYROIDISM, UNSPECIFIED TYPE: ICD-10-CM

## 2023-12-05 DIAGNOSIS — E11.00 UNCONTROLLED TYPE 2 DIABETES MELLITUS WITH HYPEROSMOLARITY WITHOUT COMA, WITHOUT LONG-TERM CURRENT USE OF INSULIN (HCC): ICD-10-CM

## 2023-12-05 RX ORDER — LEVOTHYROXINE SODIUM 0.1 MG/1
100 TABLET ORAL DAILY
Qty: 90 TABLET | Refills: 1 | Status: SHIPPED | OUTPATIENT
Start: 2023-12-05

## 2023-12-05 RX ORDER — LISINOPRIL AND HYDROCHLOROTHIAZIDE 20; 12.5 MG/1; MG/1
0.5 TABLET ORAL DAILY
Qty: 45 TABLET | Refills: 1 | Status: SHIPPED | OUTPATIENT
Start: 2023-12-05

## 2023-12-05 RX ORDER — PEN NEEDLE, DIABETIC 32GX 5/32"
NEEDLE, DISPOSABLE MISCELLANEOUS 4 TIMES DAILY
Qty: 400 EACH | Refills: 1 | Status: SHIPPED | OUTPATIENT
Start: 2023-12-05

## 2023-12-05 RX ORDER — INSULIN GLARGINE 100 [IU]/ML
INJECTION, SOLUTION SUBCUTANEOUS
Qty: 15 ML | Refills: 5 | Status: SHIPPED | OUTPATIENT
Start: 2023-12-05

## 2023-12-08 DIAGNOSIS — F41.8 ANXIOUS DEPRESSION: ICD-10-CM

## 2023-12-11 RX ORDER — CLONAZEPAM 0.5 MG/1
0.5 TABLET ORAL 2 TIMES DAILY
Qty: 60 TABLET | Refills: 0 | Status: SHIPPED | OUTPATIENT
Start: 2023-12-11

## 2024-01-30 DIAGNOSIS — F41.8 ANXIOUS DEPRESSION: ICD-10-CM

## 2024-01-30 RX ORDER — CLONAZEPAM 0.5 MG/1
0.5 TABLET ORAL 2 TIMES DAILY
Qty: 60 TABLET | Refills: 0 | Status: SHIPPED | OUTPATIENT
Start: 2024-01-30

## 2024-02-13 DIAGNOSIS — E11.649 UNCONTROLLED TYPE 2 DIABETES MELLITUS WITH HYPOGLYCEMIA WITHOUT COMA (HCC): ICD-10-CM

## 2024-02-13 RX ORDER — BLOOD-GLUCOSE METER
1 KIT MISCELLANEOUS 4 TIMES DAILY
Qty: 400 EACH | Refills: 1 | Status: SHIPPED | OUTPATIENT
Start: 2024-02-13

## 2024-02-21 PROBLEM — K52.9 GASTROENTERITIS: Status: RESOLVED | Noted: 2019-04-08 | Resolved: 2024-02-21

## 2024-02-26 DIAGNOSIS — E03.9 HYPOTHYROIDISM, UNSPECIFIED TYPE: ICD-10-CM

## 2024-02-26 DIAGNOSIS — K52.9 GASTROENTERITIS: ICD-10-CM

## 2024-02-26 RX ORDER — ONDANSETRON 4 MG/1
4 TABLET, FILM COATED ORAL EVERY 8 HOURS PRN
Qty: 20 TABLET | Refills: 0 | Status: SHIPPED | OUTPATIENT
Start: 2024-02-26

## 2024-02-26 RX ORDER — LEVOTHYROXINE SODIUM 0.1 MG/1
100 TABLET ORAL DAILY
Qty: 90 TABLET | Refills: 1 | Status: SHIPPED | OUTPATIENT
Start: 2024-02-26

## 2024-03-07 DIAGNOSIS — F41.8 ANXIOUS DEPRESSION: ICD-10-CM

## 2024-03-07 RX ORDER — CLONAZEPAM 0.5 MG/1
0.5 TABLET ORAL 2 TIMES DAILY
Qty: 60 TABLET | Refills: 0 | Status: SHIPPED | OUTPATIENT
Start: 2024-03-07

## 2024-03-18 DIAGNOSIS — E11.649 UNCONTROLLED TYPE 2 DIABETES MELLITUS WITH HYPOGLYCEMIA WITHOUT COMA (HCC): Primary | ICD-10-CM

## 2024-03-18 DIAGNOSIS — H10.33 ACUTE BACTERIAL CONJUNCTIVITIS OF BOTH EYES: ICD-10-CM

## 2024-03-18 DIAGNOSIS — E11.9 TYPE 2 DIABETES MELLITUS WITHOUT COMPLICATION, WITH LONG-TERM CURRENT USE OF INSULIN (HCC): ICD-10-CM

## 2024-03-18 DIAGNOSIS — E11.9 TYPE 2 DIABETES MELLITUS WITHOUT COMPLICATION, WITHOUT LONG-TERM CURRENT USE OF INSULIN (HCC): ICD-10-CM

## 2024-03-18 DIAGNOSIS — Z79.4 TYPE 2 DIABETES MELLITUS WITHOUT COMPLICATION, WITH LONG-TERM CURRENT USE OF INSULIN (HCC): ICD-10-CM

## 2024-03-18 DIAGNOSIS — E11.00 UNCONTROLLED TYPE 2 DIABETES MELLITUS WITH HYPEROSMOLARITY WITHOUT COMA, WITHOUT LONG-TERM CURRENT USE OF INSULIN (HCC): ICD-10-CM

## 2024-03-18 DIAGNOSIS — Z79.4 TYPE 2 DIABETES MELLITUS WITH COMPLICATION, WITH LONG-TERM CURRENT USE OF INSULIN (HCC): ICD-10-CM

## 2024-03-18 DIAGNOSIS — E11.8 TYPE 2 DIABETES MELLITUS WITH COMPLICATION, WITH LONG-TERM CURRENT USE OF INSULIN (HCC): ICD-10-CM

## 2024-03-18 RX ORDER — LANCETS 33 GAUGE
EACH MISCELLANEOUS
Qty: 300 EACH | Refills: 1 | Status: SHIPPED | OUTPATIENT
Start: 2024-03-18

## 2024-03-18 RX ORDER — LANCETS 33 GAUGE
EACH MISCELLANEOUS
Qty: 400 EACH | Refills: 1 | Status: SHIPPED | OUTPATIENT
Start: 2024-03-18 | End: 2024-03-18

## 2024-03-18 RX ORDER — BLOOD SUGAR DIAGNOSTIC
STRIP MISCELLANEOUS
Qty: 300 STRIP | Refills: 1 | Status: SHIPPED | OUTPATIENT
Start: 2024-03-18

## 2024-03-18 RX ORDER — BLOOD-GLUCOSE METER
EACH MISCELLANEOUS
Qty: 1 KIT | Refills: 0 | Status: SHIPPED | OUTPATIENT
Start: 2024-03-18

## 2024-03-18 RX ORDER — BLOOD SUGAR DIAGNOSTIC
STRIP MISCELLANEOUS
Qty: 400 STRIP | Refills: 1 | Status: SHIPPED | OUTPATIENT
Start: 2024-03-18 | End: 2024-03-18

## 2024-03-18 NOTE — TELEPHONE ENCOUNTER
Patient called the office- per insurance, they will not cover Freestyle test strips- Needs to be one touch product

## 2024-03-21 ENCOUNTER — HOSPITAL ENCOUNTER (OUTPATIENT)
Dept: RADIOLOGY | Age: 69
Discharge: HOME | End: 2024-03-21
Payer: COMMERCIAL

## 2024-03-21 DIAGNOSIS — M85.89 OSTEOPENIA OF MULTIPLE SITES: ICD-10-CM

## 2024-03-21 PROCEDURE — 77080 DXA BONE DENSITY AXIAL: CPT

## 2024-03-29 ENCOUNTER — OFFICE VISIT (OUTPATIENT)
Dept: INTERNAL MEDICINE CLINIC | Age: 69
End: 2024-03-29
Payer: COMMERCIAL

## 2024-03-29 VITALS
TEMPERATURE: 97.7 F | OXYGEN SATURATION: 96 % | HEIGHT: 61 IN | HEART RATE: 78 BPM | BODY MASS INDEX: 36.82 KG/M2 | WEIGHT: 195 LBS | DIASTOLIC BLOOD PRESSURE: 80 MMHG | SYSTOLIC BLOOD PRESSURE: 128 MMHG

## 2024-03-29 DIAGNOSIS — E66.01 OBESITY, MORBID (HCC): ICD-10-CM

## 2024-03-29 DIAGNOSIS — E11.9 TYPE 2 DIABETES MELLITUS WITHOUT COMPLICATION, WITH LONG-TERM CURRENT USE OF INSULIN (HCC): Primary | ICD-10-CM

## 2024-03-29 DIAGNOSIS — K31.84 DIABETIC GASTROPARESIS  (HCC): ICD-10-CM

## 2024-03-29 DIAGNOSIS — E03.9 ACQUIRED HYPOTHYROIDISM: ICD-10-CM

## 2024-03-29 DIAGNOSIS — Z79.4 TYPE 2 DIABETES MELLITUS WITHOUT COMPLICATION, WITH LONG-TERM CURRENT USE OF INSULIN (HCC): Primary | ICD-10-CM

## 2024-03-29 DIAGNOSIS — E11.43 DIABETIC GASTROPARESIS  (HCC): ICD-10-CM

## 2024-03-29 DIAGNOSIS — I10 ESSENTIAL HYPERTENSION: ICD-10-CM

## 2024-03-29 DIAGNOSIS — K21.9 GASTROESOPHAGEAL REFLUX DISEASE WITHOUT ESOPHAGITIS: ICD-10-CM

## 2024-03-29 DIAGNOSIS — S42.032S CLOSED DISPLACED FRACTURE OF ACROMIAL END OF LEFT CLAVICLE, SEQUELA: ICD-10-CM

## 2024-03-29 LAB
CREAT UR-MCNC: 86.2 MG/DL
MICROALBUMIN UR-MCNC: 19.3 MG/L
MICROALBUMIN/CREAT 24H UR: 22 MG/G CREATININE (ref 0–30)
SL AMB POCT HEMOGLOBIN AIC: 6.7 (ref ?–6.5)

## 2024-03-29 PROCEDURE — 99214 OFFICE O/P EST MOD 30 MIN: CPT | Performed by: INTERNAL MEDICINE

## 2024-03-29 PROCEDURE — 82570 ASSAY OF URINE CREATININE: CPT | Performed by: INTERNAL MEDICINE

## 2024-03-29 PROCEDURE — 83036 HEMOGLOBIN GLYCOSYLATED A1C: CPT | Performed by: INTERNAL MEDICINE

## 2024-03-29 PROCEDURE — 82043 UR ALBUMIN QUANTITATIVE: CPT | Performed by: INTERNAL MEDICINE

## 2024-03-29 NOTE — PROGRESS NOTES
Assessment/Plan:    Diabetes mellitus type 2  - blood glucose is well controlled , point-of-care hemoglobin A1c was 6.7  -continue with insulin Lantus, insulin as part with niacinamide 3 times daily with meals and glimepiride  -continue with a diabetic diet low in carbohydrates and simple sugars  -continue with exercise  -Referral was given to ophthalmology for diabetic eye exam  -Follow-up with PCP for Medicare annual wellness visit     Essential hypertension   - blood pressure is well controlled   -continue with lisinopril-hydrochlorothiazide  -continue with a low-salt diet and with exercise    GERD  -Well-controlled  -Continue with famotidine  -Continue to avoid diets and behaviors that trigger symptoms    Left clavicle fracture  -Will refer patient back to orthopedic surgery  -We will reprint the order for repeat x-ray of the clavicle and she was urged to get it done and to follow-up with orthopedic surgery    Hypothyroidism  -Not well controlled, last TSH done on 3/28/2023 was elevated at 7.05 and repeat TSH was ordered but patient is yet to get it done.  She was counseled to get her blood work done.  -continue with levothyroxine at current dose  -Will order a TSH    Diabetic gastroparesis  -Symptoms are much better controlled  -Continue with metoclopramide     Diagnoses and all orders for this visit:    Type 2 diabetes mellitus without complication, with long-term current use of insulin ([facility])  -     Ambulatory Referral to Ophthalmology; Future  -     CBC and differential; Future  -     TSH, 3rd generation with Free T4 reflex; Future  -     Comprehensive metabolic panel; Future  -     Lipid panel; Future  -     POCT hemoglobin A1c  -     Albumin / creatinine urine ratio; Future  -     Albumin / creatinine urine ratio    Essential hypertension  -     CBC and differential; Future  -     TSH, 3rd generation with Free T4 reflex; Future  -     Comprehensive metabolic panel; Future  -     Lipid panel;  Future    Gastroesophageal reflux disease without esophagitis  -     CBC and differential; Future  -     TSH, 3rd generation with Free T4 reflex; Future  -     Comprehensive metabolic panel; Future  -     Lipid panel; Future    Closed displaced fracture of acromial end of left clavicle, sequela  -     Ambulatory Referral to Orthopedic Surgery; Future  -     CBC and differential; Future  -     TSH, 3rd generation with Free T4 reflex; Future  -     Comprehensive metabolic panel; Future  -     Lipid panel; Future    Obesity, morbid (HCC)  -     CBC and differential; Future  -     TSH, 3rd generation with Free T4 reflex; Future  -     Comprehensive metabolic panel; Future  -     Lipid panel; Future    Diabetic gastroparesis   -     CBC and differential; Future  -     TSH, 3rd generation with Free T4 reflex; Future  -     Comprehensive metabolic panel; Future  -     Lipid panel; Future    Acquired hypothyroidism  -     CBC and differential; Future  -     TSH, 3rd generation with Free T4 reflex; Future  -     Comprehensive metabolic panel; Future  -     Lipid panel; Future            Depression Screening and Follow-up Plan: Patient was screened for depression during today's encounter. They screened negative with a PHQ-2 score of 0.    Falls Plan of Care: Vitamin D supplementation was recommended.       Subjective:      Patient ID: Kelsea Fong is a 68 y.o. female.    HPI    Patient presents for a follow-up visit regarding her diabetes mellitus type 2, essential hypertension, GERD, diabetic gastroparesis, hypothyroidism and left clavicular fracture.  She states that she has been taking her medications as prescribed.  Today pt complains of a left fractured clavicle that occurred in June 2023 s/p a fall out of her bed unto her left shoulder.  She states that she went to the ED and they did not do anything but told her that she had to see a specialist and they told her that they do not know if she needs sx and now she  states that there is a swelling in that area of her shoulder and it hurts when she lies on that side and when she has her bra strap on it.  She admits that she was given a sling and states that she did wear it.  Review of her record shows that she did see orthopedic surgery and that they discussed surgical and conservative options and she was told to return to see them in 6 weeks and also given an order for an x-ray to be done but she never went back to see them and she did not get the x-ray done.  She tells me today that she was tired and that was why she did not go back to see the surgeon.  She states that she checks her bg and bp at home and that her Bp has been good and bg has not been good cos she forgets to take her nighttime diabetes medications at home sometimes and when she remembers when she is already in bed and does not want to get out of bed to get her medication.  She states that she has not had a diabetic eye exam in about a year and a half and needs a referral.  Admits to occasional food getting stuck sarai if she eats fast and states that she used to throw up but it has gotten better recently.  She states that she does not take the Reglan any longer.  She stopped taking metformin and it helped       The following portions of the patient's history were reviewed and updated as appropriate: She  has a past medical history of Acid reflux, Allergic, Anxiety, Diabetes mellitus (HCC), Disease of thyroid gland (Years), Gastroparesis, GERD (gastroesophageal reflux disease) (Years), Hypertension, Hypothyroid, Obesity (Years), and Shingles (2 years ago).  She   Patient Active Problem List    Diagnosis Date Noted   • Closed displaced fracture of shaft of left clavicle 08/09/2023   • Abscess 01/06/2023   • Mixed hyperlipidemia 05/09/2022   • Gastroparesis 01/24/2022   • Constipation 01/24/2022   • Gastroesophageal reflux disease without esophagitis 01/24/2022   • Achilles tendinitis, right leg 08/18/2021   •  Statin intolerance 2021   • Chronic pain of right ankle 2021   • Obesity, morbid (HCC) 02/15/2021   • LFT elevation 2020   • Seasonal allergies 2018   • Fatty liver 2017   • Splenomegaly 2017   • Diabetic gastroparesis  2017   • Hand paresthesia 2016   • Type 2 diabetes mellitus, with long-term current use of insulin (HCC) 12/10/2015   • Irritable bowel syndrome 2015   • Osteoarthritis, hip, bilateral 2015   • Anxiety 2015   • GERD (gastroesophageal reflux disease) 2015   • Hypothyroidism 2015   • Obesity 2011   • Essential hypertension 2011     She  has a past surgical history that includes Cholecystectomy; Dilation and curettage of uterus; Esophagogastroduodenoscopy; pr neuroplasty &/transpos median nrv carpal tunne (Right, 2017); pr tendon sheath incision (Right, 2017); pr tendon sheath incision (Right, 2016); pr colonoscopy flx dx w/collj spec when pfrmd (N/A, 2016); Hysterectomy; Oophorectomy; Colonoscopy; Upper gastrointestinal endoscopy; Root canal; pr optx fem shft fx w/insj imed implt w/wo screw (Left, 2/3/2021);  section (); and Fracture surgery ().  Her family history includes Alcohol abuse in her son; Arthritis in her maternal grandmother; Breast cancer (age of onset: 57) in her paternal aunt; Cirrhosis in her mother; Colon cancer (age of onset: 35) in her paternal uncle; Diabetes in her brother and father; Diabetes type II in her father; Hearing loss in her father; Heart attack in her father; Heart disease in her father and paternal grandfather; No Known Problems in her maternal aunt, maternal aunt, maternal grandfather, paternal grandmother, sister, and sister; Stroke in her paternal grandfather; Testicular cancer (age of onset: 18) in her cousin; Vision loss in her father.  She  reports that she quit smoking about 36 years ago. Her smoking use included cigarettes. She started  smoking about 59 years ago. She has a 46.8 pack-year smoking history. She has never used smokeless tobacco. She reports current alcohol use. She reports that she does not use drugs.  Current Outpatient Medications   Medication Sig Dispense Refill   • Blood Glucose Monitoring Suppl (OneTouch Verio) w/Device KIT TEST BLOOD GLUCOSE FOUR TIMES DAILY OR AS DIRECTED BY PROVIDER 1 kit 0   • Cholecalciferol (VITAMIN D) 2000 units tablet TAKE ONE TABLET BY MOUTH EVERY DAY 30 tablet 0   • clonazePAM (KlonoPIN) 0.5 mg tablet Take 1 tablet (0.5 mg total) by mouth 2 (two) times a day 60 tablet 0   • cyanocobalamin (VITAMIN B-12) 1,000 mcg tablet Take 1,000 mcg by mouth daily     • docusate sodium (COLACE) 100 mg capsule Take 1 capsule (100 mg total) by mouth 2 (two) times a day as needed for constipation 30 capsule 3   • famotidine (PEPCID) 20 mg tablet Take 1 tablet (20 mg total) by mouth 2 (two) times a day 60 tablet 3   • glimepiride (AMARYL) 4 mg tablet Take 1 tablet (4 mg total) by mouth in the morning 180 tablet 1   • glucose blood (FREESTYLE LITE) test strip Use 1 each 4 (four) times a day Use 4 times daily DM2-- free style lite test strips 400 each 1   • glucose blood (OneTouch Verio) test strip TEST BLOOD GLUCOSE THREE TIMES DAILY OR AS DIRECTED BY PROVIDER 300 strip 1   • insulin aspart, w/niacinamide, (FIASP) 100 Units/mL injection pen Inject 12 Units under the skin 3 (three) times a day with meals Please send 5  pens with 5 refills 3 mL 5   • Insulin Glargine Solostar (Lantus SoloStar) 100 UNIT/ML SOPN 25 units in AM and 35 units in PM 15 mL 5   • Insulin Pen Needle (BD Pen Needle Kelly U/F) 32G X 4 MM MISC Use 4 (four) times a day 400 each 1   • Lancets (freestyle) lancets Use 4 (four) times a day Test blood glucose 120 each 0   • levothyroxine 100 mcg tablet Take 1 tablet (100 mcg total) by mouth daily 90 tablet 1   • lisinopril-hydrochlorothiazide (PRINZIDE,ZESTORETIC) 20-12.5 MG per tablet Take 0.5 tablets by  mouth daily 45 tablet 1   • metoclopramide (Reglan) 10 mg tablet Take 1 tablet (10 mg total) by mouth 4 (four) times a day 60 tablet 0   • ofloxacin (OCUFLOX) 0.3 % ophthalmic solution Administer 1 drop to both eyes 4 (four) times a day Use for 7 days 5 mL 0   • ondansetron (ZOFRAN) 4 mg tablet Take 1 tablet (4 mg total) by mouth every 8 (eight) hours as needed for nausea or vomiting 20 tablet 0   • OneTouch Delica Lancets 33G MISC TEST BLOOD GLUCOSE THREE TIMES DAILY OR AS DIRECTED BY PROVIDER 300 each 1   • sertraline (ZOLOFT) 100 mg tablet Take 1 tablet (100 mg total) by mouth 2 (two) times a day 180 tablet 1     No current facility-administered medications for this visit.     Current Outpatient Medications on File Prior to Visit   Medication Sig   • Blood Glucose Monitoring Suppl (OneTouch Verio) w/Device KIT TEST BLOOD GLUCOSE FOUR TIMES DAILY OR AS DIRECTED BY PROVIDER   • Cholecalciferol (VITAMIN D) 2000 units tablet TAKE ONE TABLET BY MOUTH EVERY DAY   • clonazePAM (KlonoPIN) 0.5 mg tablet Take 1 tablet (0.5 mg total) by mouth 2 (two) times a day   • cyanocobalamin (VITAMIN B-12) 1,000 mcg tablet Take 1,000 mcg by mouth daily   • docusate sodium (COLACE) 100 mg capsule Take 1 capsule (100 mg total) by mouth 2 (two) times a day as needed for constipation   • famotidine (PEPCID) 20 mg tablet Take 1 tablet (20 mg total) by mouth 2 (two) times a day   • glimepiride (AMARYL) 4 mg tablet Take 1 tablet (4 mg total) by mouth in the morning   • glucose blood (FREESTYLE LITE) test strip Use 1 each 4 (four) times a day Use 4 times daily DM2-- free style lite test strips   • glucose blood (OneTouch Verio) test strip TEST BLOOD GLUCOSE THREE TIMES DAILY OR AS DIRECTED BY PROVIDER   • insulin aspart, w/niacinamide, (FIASP) 100 Units/mL injection pen Inject 12 Units under the skin 3 (three) times a day with meals Please send 5  pens with 5 refills   • Insulin Glargine Solostar (Lantus SoloStar) 100 UNIT/ML SOPN 25 units in  AM and 35 units in PM   • Insulin Pen Needle (BD Pen Needle Kelly U/F) 32G X 4 MM MISC Use 4 (four) times a day   • Lancets (freestyle) lancets Use 4 (four) times a day Test blood glucose   • levothyroxine 100 mcg tablet Take 1 tablet (100 mcg total) by mouth daily   • lisinopril-hydrochlorothiazide (PRINZIDE,ZESTORETIC) 20-12.5 MG per tablet Take 0.5 tablets by mouth daily   • metoclopramide (Reglan) 10 mg tablet Take 1 tablet (10 mg total) by mouth 4 (four) times a day   • ofloxacin (OCUFLOX) 0.3 % ophthalmic solution Administer 1 drop to both eyes 4 (four) times a day Use for 7 days   • ondansetron (ZOFRAN) 4 mg tablet Take 1 tablet (4 mg total) by mouth every 8 (eight) hours as needed for nausea or vomiting   • OneTouch Delica Lancets 33G MISC TEST BLOOD GLUCOSE THREE TIMES DAILY OR AS DIRECTED BY PROVIDER   • sertraline (ZOLOFT) 100 mg tablet Take 1 tablet (100 mg total) by mouth 2 (two) times a day     No current facility-administered medications on file prior to visit.     She is allergic to other and metformin..    Review of Systems   Constitutional:  Negative for activity change, chills, fatigue, fever and unexpected weight change.   HENT:  Negative for ear pain, postnasal drip, rhinorrhea, sinus pressure and sore throat.    Eyes:  Negative for pain.   Respiratory:  Negative for cough, choking, chest tightness, shortness of breath and wheezing.    Cardiovascular:  Negative for chest pain, palpitations and leg swelling.   Gastrointestinal:  Positive for constipation. Negative for abdominal pain, diarrhea, nausea and vomiting.   Genitourinary:  Negative for dysuria and hematuria.   Musculoskeletal:  Positive for arthralgias (Left clavicle swelling and tenderness). Negative for back pain, gait problem, joint swelling, myalgias and neck stiffness.   Skin:  Negative for pallor and rash.   Neurological:  Negative for dizziness, tremors, seizures, syncope, light-headedness and headaches.   Hematological:  Negative  "for adenopathy.   Psychiatric/Behavioral:  Negative for behavioral problems.          Objective:      /80 (BP Location: Left arm, Patient Position: Sitting, Cuff Size: Large)   Pulse 78   Temp 97.7 °F (36.5 °C) (Temporal)   Ht 5' 1\" (1.549 m)   Wt 88.5 kg (195 lb)   SpO2 96%   BMI 36.84 kg/m²          Physical Exam  Constitutional:       General: She is not in acute distress.     Appearance: She is well-developed. She is not diaphoretic.   HENT:      Head: Normocephalic and atraumatic.      Right Ear: External ear normal.      Left Ear: External ear normal.      Nose: Nose normal.      Mouth/Throat:      Pharynx: Posterior oropharyngeal erythema present. No oropharyngeal exudate.   Eyes:      General: No scleral icterus.        Right eye: No discharge.         Left eye: No discharge.      Conjunctiva/sclera: Conjunctivae normal.      Pupils: Pupils are equal, round, and reactive to light.   Neck:      Thyroid: No thyromegaly.      Vascular: No JVD.      Trachea: No tracheal deviation.   Cardiovascular:      Rate and Rhythm: Normal rate and regular rhythm.      Heart sounds: Normal heart sounds. No murmur heard.     No friction rub. No gallop.   Pulmonary:      Effort: Pulmonary effort is normal. No respiratory distress.      Breath sounds: Normal breath sounds. No wheezing or rales.   Chest:      Chest wall: No tenderness.       Abdominal:      General: Bowel sounds are normal. There is no distension.      Palpations: Abdomen is soft. There is no mass.      Tenderness: There is no abdominal tenderness. There is no guarding or rebound.   Musculoskeletal:         General: No deformity. Normal range of motion.      Left shoulder: Swelling and tenderness (hard swelling on the lateral aspect of the clavicle concerning for callus  formation s/p fracture) present.      Cervical back: Normal range of motion and neck supple.   Lymphadenopathy:      Cervical: No cervical adenopathy.   Skin:     General: Skin is " warm and dry.      Coloration: Skin is not pale.      Findings: No erythema or rash.   Neurological:      Mental Status: She is alert and oriented to person, place, and time.      Cranial Nerves: No cranial nerve deficit.      Motor: No abnormal muscle tone.      Coordination: Coordination normal.      Deep Tendon Reflexes: Reflexes are normal and symmetric.   Psychiatric:         Behavior: Behavior normal.           Office Visit on 03/29/2024   Component Date Value Ref Range Status   • Hemoglobin A1C 03/29/2024 6.7 (A)  6.5 Final   Office Visit on 10/11/2023   Component Date Value Ref Range Status   • Hemoglobin A1C 10/11/2023 6.4  6.5 Final

## 2024-04-09 DIAGNOSIS — E11.649 UNCONTROLLED TYPE 2 DIABETES MELLITUS WITH HYPOGLYCEMIA WITHOUT COMA (HCC): ICD-10-CM

## 2024-04-09 RX ORDER — INSULIN ASPART INJECTION 100 [IU]/ML
INJECTION, SOLUTION SUBCUTANEOUS
Qty: 15 ML | Refills: 5 | Status: SHIPPED | OUTPATIENT
Start: 2024-04-09

## 2024-04-09 RX ORDER — INSULIN GLARGINE 100 [IU]/ML
INJECTION, SOLUTION SUBCUTANEOUS
Qty: 15 ML | Refills: 5 | Status: SHIPPED | OUTPATIENT
Start: 2024-04-09

## 2024-04-30 ENCOUNTER — OFFICE VISIT (OUTPATIENT)
Dept: INTERNAL MEDICINE CLINIC | Age: 69
End: 2024-04-30
Payer: COMMERCIAL

## 2024-04-30 ENCOUNTER — TELEPHONE (OUTPATIENT)
Age: 69
End: 2024-04-30

## 2024-04-30 ENCOUNTER — APPOINTMENT (OUTPATIENT)
Dept: RADIOLOGY | Age: 69
End: 2024-04-30
Payer: COMMERCIAL

## 2024-04-30 VITALS
WEIGHT: 193 LBS | OXYGEN SATURATION: 98 % | DIASTOLIC BLOOD PRESSURE: 76 MMHG | HEART RATE: 78 BPM | BODY MASS INDEX: 36.44 KG/M2 | SYSTOLIC BLOOD PRESSURE: 130 MMHG | HEIGHT: 61 IN | TEMPERATURE: 97 F

## 2024-04-30 DIAGNOSIS — M25.561 ACUTE PAIN OF RIGHT KNEE: ICD-10-CM

## 2024-04-30 DIAGNOSIS — M25.561 ACUTE PAIN OF RIGHT KNEE: Primary | ICD-10-CM

## 2024-04-30 DIAGNOSIS — F41.8 ANXIOUS DEPRESSION: ICD-10-CM

## 2024-04-30 PROCEDURE — 1160F RVW MEDS BY RX/DR IN RCRD: CPT | Performed by: PHYSICIAN ASSISTANT

## 2024-04-30 PROCEDURE — 73564 X-RAY EXAM KNEE 4 OR MORE: CPT

## 2024-04-30 PROCEDURE — 1159F MED LIST DOCD IN RCRD: CPT | Performed by: PHYSICIAN ASSISTANT

## 2024-04-30 PROCEDURE — 99213 OFFICE O/P EST LOW 20 MIN: CPT | Performed by: PHYSICIAN ASSISTANT

## 2024-04-30 RX ORDER — MELOXICAM 7.5 MG/1
7.5 TABLET ORAL DAILY
Qty: 7 TABLET | Refills: 0 | Status: SHIPPED | OUTPATIENT
Start: 2024-04-30 | End: 2024-05-07 | Stop reason: SDUPTHER

## 2024-04-30 RX ORDER — CLONAZEPAM 0.5 MG/1
0.5 TABLET ORAL 2 TIMES DAILY
Qty: 60 TABLET | Refills: 0 | Status: SHIPPED | OUTPATIENT
Start: 2024-04-30

## 2024-04-30 NOTE — TELEPHONE ENCOUNTER
Pt looking to see if results from xray in yet; advised not as of yet. Please contact pt to go over once completed.

## 2024-04-30 NOTE — PROGRESS NOTES
Assessment/Plan:         Diagnoses and all orders for this visit:    Acute pain of right knee  Comments:  heat affected area  stat xray  mobic (low dose) take in am with food   may need ortho eval  Orders:  -     XR knee 4+ vw right injury; Future  -     meloxicam (Mobic) 7.5 mg tablet; Take 1 tablet (7.5 mg total) by mouth daily    Anxious depression  -     clonazePAM (KlonoPIN) 0.5 mg tablet; Take 1 tablet (0.5 mg total) by mouth 2 (two) times a day          Subjective:      Patient ID: Kelsea Fong is a 68 y.o. female.    67 y/o c/o R knee pain x 2 weeks  Pt using cane to help ambulate due to instability   Pt reports her 110 lb dog ran into her knee around 2 weeks ago and states it hurt slightly when it happened but since then it has worsened       Knee Pain         The following portions of the patient's history were reviewed and updated as appropriate: allergies, current medications, past family history, past medical history, past social history, past surgical history, and problem list.    Review of Systems   Constitutional:  Negative for activity change, appetite change, chills, diaphoresis, fatigue and fever.   HENT:  Negative for congestion and sore throat.    Eyes:  Negative for pain and redness.   Respiratory:  Negative for cough and shortness of breath.    Cardiovascular:  Negative for chest pain and leg swelling.   Gastrointestinal:  Negative for abdominal distention, abdominal pain, constipation, diarrhea and nausea.   Musculoskeletal:  Positive for arthralgias (R knee pain) and gait problem.   Skin:  Negative for rash.   Neurological:  Negative for dizziness, light-headedness and headaches.   Hematological:  Does not bruise/bleed easily.   Psychiatric/Behavioral:  Negative for sleep disturbance. The patient is not nervous/anxious.          Past Medical History:   Diagnosis Date    Acid reflux     Allergic     Anxiety     Diabetes mellitus (HCC)     Disease of thyroid gland Years    Gastroparesis      GERD (gastroesophageal reflux disease) Years    Hypertension     Hypothyroid     Obesity Years    Shingles 2 years ago         Current Outpatient Medications:     Blood Glucose Monitoring Suppl (OneTouch Verio) w/Device KIT, TEST BLOOD GLUCOSE FOUR TIMES DAILY OR AS DIRECTED BY PROVIDER, Disp: 1 kit, Rfl: 0    Cholecalciferol (VITAMIN D) 2000 units tablet, TAKE ONE TABLET BY MOUTH EVERY DAY, Disp: 30 tablet, Rfl: 0    clonazePAM (KlonoPIN) 0.5 mg tablet, Take 1 tablet (0.5 mg total) by mouth 2 (two) times a day, Disp: 60 tablet, Rfl: 0    cyanocobalamin (VITAMIN B-12) 1,000 mcg tablet, Take 1,000 mcg by mouth daily, Disp: , Rfl:     docusate sodium (COLACE) 100 mg capsule, Take 1 capsule (100 mg total) by mouth 2 (two) times a day as needed for constipation, Disp: 30 capsule, Rfl: 3    famotidine (PEPCID) 20 mg tablet, Take 1 tablet (20 mg total) by mouth 2 (two) times a day, Disp: 60 tablet, Rfl: 3    Fiasp FlexTouch 100 units/mL injection pen, INJECT 12 UNITS SUBCUTANEOUSLY (UNDER THE SKIN) THREE TIMES DAILY WITH MEALS, Disp: 15 mL, Rfl: 5    glimepiride (AMARYL) 4 mg tablet, Take 1 tablet (4 mg total) by mouth in the morning, Disp: 180 tablet, Rfl: 1    glucose blood (FREESTYLE LITE) test strip, Use 1 each 4 (four) times a day Use 4 times daily DM2-- free style lite test strips, Disp: 400 each, Rfl: 1    glucose blood (OneTouch Verio) test strip, TEST BLOOD GLUCOSE THREE TIMES DAILY OR AS DIRECTED BY PROVIDER, Disp: 300 strip, Rfl: 1    Insulin Glargine Solostar (Lantus SoloStar) 100 UNIT/ML SOPN, 25 units in AM and 35 units in PM, Disp: 15 mL, Rfl: 5    Insulin Pen Needle (BD Pen Needle Kelly U/F) 32G X 4 MM MISC, Use 4 (four) times a day, Disp: 400 each, Rfl: 1    Lancets (freestyle) lancets, Use 4 (four) times a day Test blood glucose, Disp: 120 each, Rfl: 0    levothyroxine 100 mcg tablet, Take 1 tablet (100 mcg total) by mouth daily, Disp: 90 tablet, Rfl: 1    lisinopril-hydrochlorothiazide  (PRINZIDE,ZESTORETIC) 20-12.5 MG per tablet, Take 0.5 tablets by mouth daily, Disp: 45 tablet, Rfl: 1    meloxicam (Mobic) 7.5 mg tablet, Take 1 tablet (7.5 mg total) by mouth daily, Disp: 7 tablet, Rfl: 0    metoclopramide (Reglan) 10 mg tablet, Take 1 tablet (10 mg total) by mouth 4 (four) times a day, Disp: 60 tablet, Rfl: 0    ofloxacin (OCUFLOX) 0.3 % ophthalmic solution, Administer 1 drop to both eyes 4 (four) times a day Use for 7 days, Disp: 5 mL, Rfl: 0    ondansetron (ZOFRAN) 4 mg tablet, Take 1 tablet (4 mg total) by mouth every 8 (eight) hours as needed for nausea or vomiting, Disp: 20 tablet, Rfl: 0    OneTouch Delica Lancets 33G MISC, TEST BLOOD GLUCOSE THREE TIMES DAILY OR AS DIRECTED BY PROVIDER, Disp: 300 each, Rfl: 1    sertraline (ZOLOFT) 100 mg tablet, Take 1 tablet (100 mg total) by mouth 2 (two) times a day, Disp: 180 tablet, Rfl: 1    Allergies   Allergen Reactions    Other Other (See Comments) and Cough     Seasonal- grass, hard time to breathe    Metformin Diarrhea       Social History   Past Surgical History:   Procedure Laterality Date     SECTION      CHOLECYSTECTOMY      COLONOSCOPY      DILATION AND CURETTAGE OF UTERUS      ESOPHAGOGASTRODUODENOSCOPY      FRACTURE SURGERY      HYSTERECTOMY      44    OOPHORECTOMY      44    AZ COLONOSCOPY FLX DX W/COLLJ SPEC WHEN PFRMD N/A 2016    Procedure: EGD AND COLONOSCOPY;  Surgeon: Anurag Gu MD;  Location: [facility];  Service: Gastroenterology    AZ NEUROPLASTY &/TRANSPOS MEDIAN NRV CARPAL TUNNE Right 2017    Procedure: WRIST CARPAL TUNNEL RELEASE ; TENOLYSIS OF FPL, FDS 2-5, FDP 2-5;  APPLICATION OF TENOGLYIDE;  Surgeon: Andrzej Tirado MD;  Location: [facility];  Service: Orthopedics    AZ OPTX FEM SHFT FX W/INSJ IMED IMPLT W/WO SCREW Left 2/3/2021    Procedure: INSERTION NAIL IM FEMUR ANTEGRADE (TROCHANTERIC);  Surgeon: Rylan Hitchcock MD;  Location: [facility];  Service: Orthopedics    AZ TENDON SHEATH INCISION  "Right 1/31/2017    Procedure: LONG FINGER TRIGGER RELEASE ;  Surgeon: Andrzej Tirado MD;  Location: [facility];  Service: Orthopedics    AZ TENDON SHEATH INCISION Right 7/21/2016    Procedure: RELEASE TRIGGER FINGER - LONG FINGER;  Surgeon: Andrzej Tirado MD;  Location: [facility];  Service: Orthopedics    ROOT CANAL      UPPER GASTROINTESTINAL ENDOSCOPY       Family History   Problem Relation Age of Onset    Cirrhosis Mother     Heart attack Father     Diabetes type II Father     Hearing loss Father     Vision loss Father     Diabetes Father     Heart disease Father     No Known Problems Sister     No Known Problems Sister     Arthritis Maternal Grandmother     No Known Problems Maternal Grandfather     No Known Problems Paternal Grandmother     Heart disease Paternal Grandfather     Stroke Paternal Grandfather     Diabetes Brother     Alcohol abuse Son     No Known Problems Maternal Aunt     No Known Problems Maternal Aunt     Breast cancer Paternal Aunt 57    Colon cancer Paternal Uncle 35    Testicular cancer Cousin 18       Objective:  /76 (BP Location: Left arm, Patient Position: Sitting, Cuff Size: Standard)   Pulse 78   Temp (!) 97 °F (36.1 °C) (Temporal)   Ht 5' 1\" (1.549 m)   Wt 87.5 kg (193 lb)   SpO2 98%   BMI 36.47 kg/m²        Physical Exam  Vitals reviewed.   Constitutional:       General: She is not in acute distress.  HENT:      Head: Normocephalic and atraumatic.      Nose: Nose normal.      Mouth/Throat:      Mouth: Mucous membranes are moist.   Eyes:      General:         Right eye: No discharge.         Left eye: No discharge.      Extraocular Movements: Extraocular movements intact.      Conjunctiva/sclera: Conjunctivae normal.      Pupils: Pupils are equal, round, and reactive to light.   Cardiovascular:      Rate and Rhythm: Normal rate and regular rhythm.   Pulmonary:      Effort: Pulmonary effort is normal. No respiratory distress.      Breath sounds: Normal breath " sounds. No wheezing or rales.   Musculoskeletal:         General: Swelling and tenderness (R anterior lateral knee - slight swelling lateral to patella) present.      Cervical back: Normal range of motion. No rigidity.      Right lower leg: No edema.      Left lower leg: No edema.   Lymphadenopathy:      Cervical: No cervical adenopathy.   Skin:     General: Skin is warm.      Findings: No bruising, erythema or rash.   Neurological:      General: No focal deficit present.      Mental Status: She is alert.   Psychiatric:         Mood and Affect: Mood normal.

## 2024-05-01 ENCOUNTER — TELEPHONE (OUTPATIENT)
Age: 69
End: 2024-05-01

## 2024-05-01 DIAGNOSIS — M17.11 PRIMARY OSTEOARTHRITIS OF RIGHT KNEE: Primary | ICD-10-CM

## 2024-05-01 NOTE — TELEPHONE ENCOUNTER
PT call in to ask if sh can make the appointment for the orthopedics doctor and the appointment for her neck also at the same time. Please feel free to reach out to PT and advise PT. Thank you

## 2024-05-01 NOTE — TELEPHONE ENCOUNTER
Called and spoke with patient- Patient would like a referral placed for her KNEE - please advise, thank you

## 2024-05-02 NOTE — TELEPHONE ENCOUNTER
Patient called back in regards to voicemail that was left this morning.     Patient stated she is being seen by the Ortho on May 15th, she is already going to be seen for her neck, the clavicle area.     But she needs a referral for her knee. Patient was also wondering if she can see a different doctor at the office that she is going for her neck, as there is so many doctors in this facility. She wants a different one for her knee, patients wants two completely different doctors.     Patient also was speaking to her friend,and her friend was put on a low dose of tramadol for medication with her pain.     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     Please advise back to patient.

## 2024-05-06 ENCOUNTER — TELEPHONE (OUTPATIENT)
Age: 69
End: 2024-05-06

## 2024-05-06 NOTE — TELEPHONE ENCOUNTER
Pt called in stating she is still having pain in her knee. She has been taking the meloxicam (Mobic) 7.5 mg tablet but its not really doing anything. She is also taking Tylenol.  She would like something else called in for pain.     Also she needs something or she won't be able to walk around.  Please call her today if possible to discuss options for other meds..[phone]     Pharm: [id number] - [place], [place] [facility] [address] [phone]

## 2024-05-06 NOTE — TELEPHONE ENCOUNTER
Patient just wanted to add in her message to the  That she called Ortho and they pushed her appointment up to this week on Thursday 5/9/24 instead of June bc her pain is so bad.

## 2024-05-06 NOTE — TELEPHONE ENCOUNTER
Pt called in stating she is still having pain in her knee. She has been taking the meloxicam (Mobic) 7.5 mg tablet but its not really doing anything. She is also taking Tylenol.  She would like something else called in for pain. She has one Meloxicam left.    Pharm: [id number] - [place], [place] - [address] [phone]     Thank you

## 2024-05-06 NOTE — TELEPHONE ENCOUNTER
Patient called last week asking to be prescribed tramadol due to pain. Per Sue she did not recommend medication due to increase risk for confusion/delirium.      Please advise, thank you.      Patient is scheduled with ortho for 6/5/24 for knee

## 2024-05-07 DIAGNOSIS — E11.00 UNCONTROLLED TYPE 2 DIABETES MELLITUS WITH HYPEROSMOLARITY WITHOUT COMA, WITHOUT LONG-TERM CURRENT USE OF INSULIN (HCC): ICD-10-CM

## 2024-05-07 DIAGNOSIS — M25.561 ACUTE PAIN OF RIGHT KNEE: ICD-10-CM

## 2024-05-07 RX ORDER — PEN NEEDLE, DIABETIC 32GX 5/32"
NEEDLE, DISPOSABLE MISCELLANEOUS
Qty: 400 EACH | Refills: 1 | Status: SHIPPED | OUTPATIENT
Start: 2024-05-07

## 2024-05-07 RX ORDER — MELOXICAM 7.5 MG/1
7.5 TABLET ORAL 2 TIMES DAILY
Qty: 10 TABLET | Refills: 0 | Status: SHIPPED | OUTPATIENT
Start: 2024-05-07 | End: 2024-05-13 | Stop reason: SDUPTHER

## 2024-05-07 NOTE — TELEPHONE ENCOUNTER
Spoke with patient and verbalized understanding to increase meloxicam to 2x daily. Patient has an appt this Thursday with ortho.

## 2024-05-07 NOTE — TELEPHONE ENCOUNTER
PT call again and ask if she can get something else for the pain. Or if we can up the dose on the currently medication she currently taking. Thank you

## 2024-05-09 ENCOUNTER — OFFICE VISIT (OUTPATIENT)
Dept: OBGYN CLINIC | Facility: CLINIC | Age: 69
End: 2024-05-09
Payer: COMMERCIAL

## 2024-05-09 VITALS — WEIGHT: 193 LBS | HEIGHT: 61 IN | BODY MASS INDEX: 36.44 KG/M2

## 2024-05-09 DIAGNOSIS — M25.60 LIMITED JOINT RANGE OF MOTION (ROM): ICD-10-CM

## 2024-05-09 DIAGNOSIS — M17.11 PRIMARY OSTEOARTHRITIS OF RIGHT KNEE: ICD-10-CM

## 2024-05-09 PROCEDURE — 99214 OFFICE O/P EST MOD 30 MIN: CPT | Performed by: FAMILY MEDICINE

## 2024-05-09 NOTE — LETTER
May 9, 2024     Ruth Griffith DO  [address]  [address]  [facility] [address]    Patient: Kelsea Fong   YOB: 1955   Date of Visit: 5/9/2024       Dear Dr. Griffith:    Thank you for referring Kelsea Fong to me for evaluation. Below are the relevant portions of my assessment and plan of care.         If you have questions, please do not hesitate to call me. I look forward to following Kelsea along with you.         Sincerely,        Clarisse Ogden DO        CC: No Recipients

## 2024-05-09 NOTE — PROGRESS NOTES
Assessment:     1. BMI 36.0-36.9,adult  US extremity soft tissue    Injection Procedure Prior Authorization    Ambulatory Referral to Physical Therapy    Brace      2. Primary osteoarthritis of right knee  Ambulatory Referral to Orthopedic Surgery    US extremity soft tissue    Injection Procedure Prior Authorization    Ambulatory Referral to Physical Therapy    Brace      3. Limited joint range of motion (ROM)  US extremity soft tissue    Injection Procedure Prior Authorization    Ambulatory Referral to Physical Therapy    Brace        Orders Placed This Encounter   Procedures    Brace    US extremity soft tissue    Injection Procedure Prior Authorization    Ambulatory Referral to Physical Therapy        Impression:   Right knee pain likely secondary to acute exacerbation of chronic osteoarthritis..      Pertinent past medical history  Diabetic, on insulin management.  A1c 6.7 (03/2024)    Conservative Management   We discussed different treatment options:  Reviewed PCP documentation completed on 04/30/2024.  Treatment plan consisted of Mobic, x-rays, referral to Ortho/sports medicine.  Osteoarthritis treatment goal is to minimize pain and optimize function.  First-line management  Ice or Heat Therapy as needed 1-2 times daily for 10-20 minutes. As tolerated.   Over the counter Tylenol and/or NSAIDs  as needed based off your Past Medical Hx. Please follow product label for dosing and maximum limits.  If you have concerns about utilizing Tylenol/NSAIDs please discuss with your primary care physician.  PCP provided patient with Mobic.  Patient may continue this at this time.  Trial of over the counter Topical Analgesics such as Lidocaine cream or Voltaren Gel, as tolerated. If skin becomes irritated, discontinue use.   Please range joint through gentle range of motion, as tolerated.   Initiate Home Exercise Program for Stretching and Strengthening affected area.    Weight management may benefit lower extremity  osteoarthritis.  Normal BMI 18 through 24.  May request referral to weight management or nutritionist/dietitian.  Initiate Formal Physical Therapy at any preferred location.     Prescription provided.  Additional management  Optional treatment measures include the following  For hip/knee osteoarthritis: Medial  brace for patients with medial compartmental osteoarthritis.   Will trial hinged knee brace at this time.  Due to instability.  For hip/knee osteoarthritis assistive ambulation devices: Cane, walker   Continue using cane.  Interarticular glucocorticoid steroid if needing short-term pain relief   Cannot complete steroid at this time due to patient being on insulin management.  May consider viscosupplementation and/or PRP injections   Will complete viscosupplementation.  Pending preauthorization.  This will be completed under ultrasound guidance in case aspiration is necessary.  Referral to an orthopedic surgeon to discuss potential surgical management          Imaging   Reviewed prior xrays obtain by Primary Care Physician. These were reviewed in office with patient today.   04/30/2024 right knee x-ray: No acute osseous abnormality  Pending US to further evaluate for evans cyst   Kellgren-Judson Classification     Present Grade Radiological findings   [] 0 No radiological findings   [] 1 Doubtful narrowing of joint space and possible osteophytic lipping   [x] 2 Definite osteophytes and possible narrowing of joint space   [] 3 Moderate multiple osteophytes, definitive narrowing of joint space, small pseudocystic areas with sclerotic walls and possibly deformity of bone contour   [] 4 Large osteophytes, marked narrowing of joint space, severe sclerosis and definitive deformity of bone contour   **If more than 1 [x] is present patient is between grades       Procedure  Reviewed Visco-supplementation with patient. Order placed. Patient will follow up for procedure.   Viscosupplementation will be  completed under ultrasound guidance for potential aspiration.    Shared decision making, patient agreeable to plan.      Return for Follow up for Ultrasound guided joint injection.    HPI:   Kelsea Fong is a 68 y.o. female  who presents for evaluation of   Chief Complaint   Patient presents with    Right Knee - Swelling, Numbness, Pain     PCP Ruth Griffith DO referred patient for right knee pain.  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    Onset/Mechanism: 3 weeks ago patient was in the way of her [employer] when he bumped into her right knee.  Initiating pain.  Denies any falls onto the knee.  Pain has been worsening..  Location: Posterior knee, lateral joint line.  Severity: Current severity: /10.   Pain described as: Throbbing, pulsating, sore, stiff  Radiation: Pain will radiate up into hip.  Pain then feels like its all over..  Provocative: Squatting, knee flexion walking.  Associated symptoms: Intermittent swelling..  Instability at times.    Denies any hx of fracture of affected limb.   Denies any surgical history of affected limb.      Summary of treatment to-date:   Mobic  Cane for ambulation has been present longer than 3 weeks  Heat therapy  Previously braced with no improvement.    Following History Reviewed and Updated     Past Medical History:   Diagnosis Date    Acid reflux     Allergic     Anxiety     Diabetes mellitus (HCC)     Disease of thyroid gland Years    Gastroparesis     GERD (gastroesophageal reflux disease) Years    Hypertension     Hypothyroid     Obesity Years    Shingles 2 years ago     Past Surgical History:   Procedure Laterality Date     SECTION      CHOLECYSTECTOMY      COLONOSCOPY      DILATION AND CURETTAGE OF UTERUS      ESOPHAGOGASTRODUODENOSCOPY      FRACTURE SURGERY      HYSTERECTOMY      44    OOPHORECTOMY      44    PA COLONOSCOPY FLX DX W/COLLJ SPEC WHEN PFRMD N/A 2016    Procedure: EGD AND COLONOSCOPY;  Surgeon: Anurag  MD Riccardo;  Location: [facility];  Service: Gastroenterology    NH NEUROPLASTY &/TRANSPOS MEDIAN NRV CARPAL TUNNE Right 1/31/2017    Procedure: WRIST CARPAL TUNNEL RELEASE ; TENOLYSIS OF FPL, FDS 2-5, FDP 2-5;  APPLICATION OF TENOGLYIDE;  Surgeon: Andrzej Tirado MD;  Location: [facility];  Service: Orthopedics    NH OPTX FEM SHFT FX W/INSJ IMED IMPLT W/WO SCREW Left 2/3/2021    Procedure: INSERTION NAIL IM FEMUR ANTEGRADE (TROCHANTERIC);  Surgeon: Rylan Hitchcock MD;  Location: [facility];  Service: Orthopedics    NH TENDON SHEATH INCISION Right 1/31/2017    Procedure: LONG FINGER TRIGGER RELEASE ;  Surgeon: Andrzej Tirado MD;  Location: [facility];  Service: Orthopedics    NH TENDON SHEATH INCISION Right 7/21/2016    Procedure: RELEASE TRIGGER FINGER - LONG FINGER;  Surgeon: Andrzej Tirado MD;  Location: [facility];  Service: Orthopedics    ROOT CANAL      UPPER GASTROINTESTINAL ENDOSCOPY       Family History   Problem Relation Age of Onset    Cirrhosis Mother     Heart attack Father     Diabetes type II Father     Hearing loss Father     Vision loss Father     Diabetes Father     Heart disease Father     No Known Problems Sister     No Known Problems Sister     Arthritis Maternal Grandmother     No Known Problems Maternal Grandfather     No Known Problems Paternal Grandmother     Heart disease Paternal Grandfather     Stroke Paternal Grandfather     Diabetes Brother     Alcohol abuse Son     No Known Problems Maternal Aunt     No Known Problems Maternal Aunt     Breast cancer Paternal Aunt 57    Colon cancer Paternal Uncle 35    Testicular cancer Cousin 18       Social History     Substance and Sexual Activity   Alcohol Use Yes    Alcohol/week: 0.0 standard drinks of alcohol    Comment: Rarely     Social History     Substance and Sexual Activity   Drug Use Never     Social History     Tobacco Use   Smoking Status Former    Current packs/day: 0.00    Average packs/day: 2.0 packs/day for 23.4 years (46.8  "ttl pk-yrs)    Types: Cigarettes    Start date: 7/3/1964    Quit date:     Years since quittin.4   Smokeless Tobacco Never       Social Determinants of Health     Tobacco Use: Medium Risk (2024)    Patient History     Smoking Tobacco Use: Former     Smokeless Tobacco Use: Never     Passive Exposure: Not on file   Alcohol Use: Not At Risk (3/29/2023)    AUDIT-C     Frequency of Alcohol Consumption: Never     Average Number of Drinks: Patient does not drink     Frequency of Binge Drinking: Never   Financial Resource Strain: Low Risk  (3/29/2023)    Overall Financial Resource Strain (CARDIA)     Difficulty of Paying Living Expenses: Not hard at all   Food Insecurity: Not on file   Transportation Needs: No Transportation Needs (3/29/2023)    PRAPARE - Transportation     Lack of Transportation (Medical): No     Lack of Transportation (Non-Medical): No   Physical Activity: Not on file   Stress: Not on file   Social Connections: Not on file   Intimate Partner Violence: Not on file   Depression: Not at risk (3/29/2024)    PHQ-2     PHQ-2 Score: 0   Housing Stability: Not on file   Utilities: Not on file   Health Literacy: Not on file        Allergies   Allergen Reactions    Other Other (See Comments) and Cough     Seasonal- grass, hard time to breathe    Metformin Diarrhea       Review of Systems      Review of Systems     Review of Systems   Constitutional: Negative for chills and fever.   HENT: Negative for drooling and sneezing.    Eyes: Negative for redness.   Respiratory: Negative for cough and wheezing.    Gastrointestinal: Negative for vomiting.   Psychiatric/Behavioral: Negative for behavioral problems. The patient is not nervous/anxious.      All other systems negative.   Physical Exam   Physical Exam    Vitals and nursing note reviewed.  Constitutional:   Appearance. Normal Appearance.  Ht 5' 1\" (1.549 m)   Wt 87.5 kg (193 lb)   BMI 36.47 kg/m²     Body mass index is 36.47 kg/m².   HENT:  Head: " Atraumatic.  Nose: Nose normal  Eyes: Conjunctiva/sclera: Conjunctivae normal.  Cardiovascular:   Rate and Rhythm: Bilateral equal distal pulses  Pulmonary:   Effort: Pulmonary effort is normal  Skin:   General: Skin is warm and dry.  Neurological:   General: No focal deficit present.  Mental Status: Alert and oriented to person, place, and time.   Psychiatric:   Mood and Affect: mood normal.  Behavior: Behavior normal     Musculoskeletal Exam     Ortho Exam     Right knee:     Inspection:  Erythema Bruising Effusion Muscle atrophy Retracted muscle   Negative negative moderate Negative Negative     Palpation:  Increased warmth Crepitus Palpable muscle depression   Negative negative Negative     Tenderness:  Quadriceps tendon Patella Patellar tendon  Joint line   Neg. Neg. Neg. Lateral joint line .        Tibial tubercle Vy's tubercle Pes anserine bursa Posterior knee   Neg. Neg. Neg. Positive .       Biceps femoris Semimembranosus/semitendinosis Popliteus tendon Fibular head   Neg. Neg. Neg. Neg.       Bilateral Range of Motion:  Active flexion Passive flexion   (normal 135 degrees) (normal 135 degrees)   Limited 100 degrees      Active extension Passive extension   (normal 0 degrees) (normal 0 degrees)   Limited, 10 degrees        Bilateral strength:  Seated hip flexion Seated knee flexion Seated knee extension   5/5 5/5 5/5     Seated great toe extension Seated ankle dorsiflexion Seated ankle plantarflexion   5/5 5/5 5/5     Seated hip adduction Seated hip abduction Prone knee flexion              Ligament testing:  Anterior cruciate ligament (ACL)  Posterior cruciate ligament (PCL) Medial Collateral Ligament (MCL)  Lateral Collateral Ligament (LCL):   Lachman's Posterior drawer's Valgus Varus   Negative for laxity Negative for laxity Mild for laxity, minimal discomfort  Negative for laxity     Meniscal testing:  Medial Atif Lateral Atif Apley's Thessaly's Bounce home test     N/A N/A N/A  "    Neurovascular:  Sensation to light touch Posterior tibial artery   Intact and equal bilaterally Intact and equal bilaterally     (Test not completed if space left blank )           Procedures       Portions of the record may have been created with voice recognition software. Occasional wrong word or \"sound alike\" substitutions may have occurred due to the inherent limitations of voice recognition software. Please review the chart carefully and recognize, using context, where substitutions/typographical errors may have occurred.   "

## 2024-05-09 NOTE — PATIENT INSTRUCTIONS
P.R.I.C.E. Treatment   WHAT YOU NEED TO KNOW:   What is P.R.I.C.E. treatment?  P.R.I.C.E. treatment is a 5-step process used to decrease swelling and pain caused by an injury. P.R.I.C.E. stands for protect, rest, ice, compress, and elevate. Start P.R.I.C.E. within 24 to 48 hours of an injury.  How do I use P.R.I.C.E. treatment?       Protect  your injury from more damage. Support the injured area with a brace or splint. Your healthcare provider will tell you how long to use the brace or splint.    Rest  your injured area as directed. You may need to stop using, or keep weight off, the injury for 48 hours or longer. Your healthcare provider may recommend crutches or another device. Return to your usual activities as directed.    Apply ice  on your injured area for 15 to 20 minutes every 4 hours or as directed. Use an ice pack, or put crushed ice in a plastic bag. Cover the bag with a towel before you apply it to your skin. Ice helps prevent tissue damage and decreases swelling and pain.    Compress  (keep pressure on) the injured area. Compression will help decrease swelling and support the injured area. Use an elastic bandage, air stirrup, splint, or sling as directed. If you use an elastic bandage, make sure the bandage is not too tight. You should be able to slip 2 fingers between the bandage and your skin.    Elevate  the injured area above the level of your heart as often as you can. This will help decrease swelling and pain. Prop the injured area on pillows or blankets to keep it elevated comfortably.  When should I seek immediate care?   Your pain is severe.    You have severe swelling or deformity.    You have numbness in the injured area.    When should I call my doctor?   Your pain and swelling do not go away after a few days.    You have questions or concerns about your condition or care.    CARE AGREEMENT:   You have the right to help plan your care. Learn about your health condition and how it may be  treated. Discuss treatment options with your healthcare providers to decide what care you want to receive. You always have the right to refuse treatment. The above information is an  only. It is not intended as medical advice for individual conditions or treatments. Talk to your doctor, nurse or pharmacist before following any medical regimen to see if it is safe and effective for you.  © Copyright [employer]ative 2023 Information is for End User's use only and may not be sold, redistributed or otherwise used for commercial purposes.  Knee Exercises   WHAT YOU NEED TO KNOW:   What do I need to know about knee exercises?  Knee exercises help strengthen the muscles around your knee. Strong muscles can help reduce pain and decrease your risk of future injury. Knee exercises also help you heal after an injury or surgery. These are beginning exercises. Ask your healthcare provider if you need to see a physical therapist for more advanced exercises.  What are some general guidelines for knee exercises?   Start slowly.  As you get stronger, you may be able to do more sets of each exercise or add weights.    Stop if you feel pain.  It is normal to feel some discomfort at first, but you should not feel pain. Tell your provider or physical therapist if you have pain while you exercise. Regular exercise will help decrease your discomfort over time.    Do the exercises on both legs.  Do this so both knees remain strong.    Warm up before you do knee exercises.  Walk or ride a stationary bike for 5 or 10 minutes to warm your muscles.    How do I perform knee stretches safely?  Always stretch before you do strengthening exercises. Do these stretching exercises again after you do the strengthening exercises. Do these stretches 4 or 5 days a week, or as directed.  Standing calf stretch:  Face a wall and place both palms flat on the wall, or hold the back of a chair for balance. Keep a slight bend in your knees. Take a big  step backward with one leg. Keep your other leg directly under you. Keep both heels flat and press your hips forward. Hold the stretch for 30 seconds, and then relax for 30 seconds. Switch legs. Repeat 2 or 3 times on each leg.         Standing quadriceps stretch:  Stand and place one hand against a wall or hold the back of a chair for balance. With your weight on one leg, bend your other leg and grab your ankle. Bring your heel toward your buttocks. Hold the stretch for 30 to 60 seconds. Switch legs. Repeat 2 or 3 times on each leg.         Sitting hamstring stretch:  Sit with both legs straight in front of you. Do not point or flex your toes. Place your palms on the floor and slide your hands forward until you feel the stretch. Do not round your back. Hold the stretch for 30 seconds. Repeat 2 or 3 times.       How do I perform knee strengthening exercises safely?  Do these exercises 4 or 5 days a week, or as directed.  Standing half squats:  Stand with your feet shoulder-width apart. Lean your back against a wall or hold the back of a chair for balance, if needed. Slowly sit down about 10 inches, as if you are going to sit in a chair. Your body weight should be mostly over your heels. Hold the squat for 5 seconds, then rise to a standing position. Do 3 sets of 10 squats to strengthen your buttocks and thighs.         Standing hamstring curls:  Face a wall and place both palms flat on the wall, or hold the back of a chair for balance. With your weight on one leg, lift your other foot as close to your buttocks as you can. Hold for 5 seconds and then lower your leg. Do 2 sets of 10 curls on each leg. This exercise strengthens the muscles in the back of your thigh.         Standing calf raises:  Face a wall and place both palms flat on the wall, or hold the back of a chair for balance. Stand up straight, and do not lean. Place all your weight on one leg by lifting the other foot off the floor. Raise the heel of the  foot that is on the floor as high as you can and then lower it. Do 2 sets of 10 calf raises on each leg to strengthen your calf muscles.         Straight leg lifts:  Lie on your stomach with straight legs. Fold your arms in front of you and rest your head in your arms. Tighten your leg muscles and raise one leg as high as you can. Hold for 5 seconds, then lower your leg. Do 2 sets of 10 lifts on each leg to strengthen your buttocks.         Sitting leg lifts:  Sit in a chair. Slowly straighten and raise one leg. Squeeze your thigh muscles and hold for 5 seconds. Relax and return your foot to the floor. Do 2 sets of 10 lifts on each leg. This helps strengthen the muscles in the front of your thigh.       When should I call my doctor or physical therapist?   You have new pain or your pain becomes worse.    You have questions or concerns about your condition or care.    CARE AGREEMENT:   You have the right to help plan your care. Learn about your health condition and how it may be treated. Discuss treatment options with your healthcare providers to decide what care you want to receive. You always have the right to refuse treatment. The above information is an  only. It is not intended as medical advice for individual conditions or treatments. Talk to your doctor, nurse or pharmacist before following any medical regimen to see if it is safe and effective for you.  © Copyright Merative 2023 Information is for End User's use only and may not be sold, redistributed or otherwise used for commercial purposes.  Hyaluronic Acid (By injection)   Hyaluronic Acid ([id number]-qu-zdz-ON-ate AS-id)  Treats severe pain in your knee due to osteoarthritis.   Brand Name(s): Durolane, Euflexxa, Gel-One, GelSyn-3, GenVisc 850, Hyalgan, Hymovis, Monovisc, Orthovisc, Supartz FX, TriLURON, TriVisc, Visco-3   There may be other brand names for this medicine.  When This Medicine Should Not Be Used:   This medicine is not right for  everyone. You should not receive it if you had an allergic reaction to hyaluronic acid or if you have a bleeding disorder.  How to Use This Medicine:   Injectable  Your doctor will tell you how many injections you will need. This medicine is injected into your knee joint.  A nurse or other health provider will give you this medicine.  Drugs and Foods to Avoid:      Ask your doctor or pharmacist before using any other medicine, including over-the-counter medicines, vitamins, and herbal products.      Warnings While Using This Medicine:   Tell your doctor if you are pregnant or breastfeeding, or if you have any allergies, including to birds, feathers, or eggs.  Rest your knee for 48 hours after you receive an injection. Do not do strenuous, weightbearing activities, such as jogging or tennis. Avoid activities that keep you standing for longer than 1 hour.  Possible Side Effects While Using This Medicine:   Call your doctor right away if you notice any of these side effects:  Allergic reaction: Itching or hives, swelling in your face or hands, swelling or tingling in your mouth or throat, chest tightness, trouble breathing  If you notice these less serious side effects, talk with your doctor:   Mild increase in pain or swelling in your knee  Pain, redness, or swelling where the medicine is injected  If you notice other side effects that you think are caused by this medicine, tell your doctor.   Call your doctor for medical advice about side effects. You may report side effects to FDA at 5-886-FDA-1182  © Copyright Merative 2023 Information is for End User's use only and may not be sold, redistributed or otherwise used for commercial purposes.  The above information is an  only. It is not intended as medical advice for individual conditions or treatments. Talk to your doctor, nurse or pharmacist before following any medical regimen to see if it is safe and effective for you.

## 2024-05-09 NOTE — LETTER
May 9, 2024     Ruth Griffith DO  [address]  [address]  [facility] [address]    Patient: Kelsea Fong   YOB: 1955   Date of Visit: 5/9/2024       Dear Dr. Griffith:    Thank you for referring Kelsea Fong to me for evaluation. Below are the relevant portions of my assessment and plan of care.         If you have questions, please do not hesitate to call me. I look forward to following Kelsea along with you.         Sincerely,        Clarisse Ogden DO        CC: No Recipients

## 2024-05-11 DIAGNOSIS — S42.022A CLOSED DISPLACED FRACTURE OF SHAFT OF LEFT CLAVICLE, INITIAL ENCOUNTER: Primary | ICD-10-CM

## 2024-05-13 ENCOUNTER — EVALUATION (OUTPATIENT)
Dept: PHYSICAL THERAPY | Age: 69
End: 2024-05-13
Payer: COMMERCIAL

## 2024-05-13 VITALS — SYSTOLIC BLOOD PRESSURE: 115 MMHG | DIASTOLIC BLOOD PRESSURE: 80 MMHG

## 2024-05-13 DIAGNOSIS — M25.60 LIMITED JOINT RANGE OF MOTION (ROM): ICD-10-CM

## 2024-05-13 DIAGNOSIS — M25.561 ACUTE PAIN OF RIGHT KNEE: ICD-10-CM

## 2024-05-13 DIAGNOSIS — M17.11 PRIMARY OSTEOARTHRITIS OF RIGHT KNEE: ICD-10-CM

## 2024-05-13 PROCEDURE — 97110 THERAPEUTIC EXERCISES: CPT

## 2024-05-13 PROCEDURE — 97161 PT EVAL LOW COMPLEX 20 MIN: CPT

## 2024-05-13 RX ORDER — MELOXICAM 7.5 MG/1
7.5 TABLET ORAL DAILY
Qty: 10 TABLET | Refills: 0 | Status: SHIPPED | OUTPATIENT
Start: 2024-05-13 | End: 2024-05-17 | Stop reason: SDUPTHER

## 2024-05-13 NOTE — TELEPHONE ENCOUNTER
Pt called looking for update on med refill; stated she had her first PT session today and without the meloxicam she is in a lot of pain. Please contact pt to advise once prescription sent.

## 2024-05-13 NOTE — PROGRESS NOTES
PT Evaluation     Today's date: 2024  Patient name: Kelsea Fong  : 1955  MRN: [id number]  Referring provider: Vernon gOden*  Dx:   Encounter Diagnosis     ICD-10-CM    1. Primary osteoarthritis of right knee  M17.11 Ambulatory Referral to Physical Therapy      2. BMI 36.0-36.9,adult  Z68.36 Ambulatory Referral to Physical Therapy      3. Limited joint range of motion (ROM)  M25.60 Ambulatory Referral to Physical Therapy          Start Time: 930  Stop Time: 101  Total time in clinic (min): 40 minutes    Assessment  Assessment details: Pat is a 69 yo female presenting to OP PT secondary to Right Knee contusion due to impact from dog while patient was walking (W/o fall) 3 weeks ago. Pt functional deficits include,limited tolerance to STS from low surfaces, non-reciprocal stair climbing, limited ability to bend knee when putting on shoes/socks, pain at rest, and discomfort with prolonged amb/standing. Pt presents with R knee AROM/PROM deficits, presence of mild to mod edema, and sig strength/gait deviations. Pt was advised to continue to utilize SPC until gait mechanics improve, and HEP was given at [facility] and reviewed this session. Pt would benefit from skilled PT to address stated deficits and improve return to PLOF.  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  2. Increase Right lower extremity strength ihiji to 4/5 in 6 weeks.  3. Improve gait mechanics when amb w/o AD to improve tolerance to PLOF.   4. Pt will demonstrate 20 deg improvement in Right knee flex/ext to increase tolerance to getting into/out of her chair, and putting on shoes/socks    1. Return to Prior Level of Function in 8 weeks  2. Pt will demonstrate  [place] with progressive home exercise program to improve carryover and decrease recurrence of symptoms.  3. Pt will demonstrate 20% improvement in FOTO score to increase tolerance to functional activities   4. Pt will demonstrate 4+/5 in LE strength to allow for improved tolerance to prolonged amb/standing in 6-8 weeks        Plan  Patient would benefit from: PT eval and skilled physical therapy  Planned modality interventions: thermotherapy: hydrocollator packs, TENS, manual electrical stimulation and unattended electrical stimulation  Planned therapy interventions: IASTM, joint mobilization, manual therapy, massage, patient education, neuromuscular re-education, stretching, strengthening, therapeutic activities, therapeutic exercise, functional ROM exercises, home exercise program, gait training and balance  Frequency: 2x week  Plan of Care beginning date: 2024  Plan of Care expiration date: 2024  Treatment plan discussed with: patient        Subjective Evaluation    History of Present Illness  Mechanism of injury: Stephani is a 69 yo female presenting to OP PT secondary to Right knee pain after dog ran into patient's leg. She waited a little while than followed up with orthopedic due to cont elevation in symptoms. She has been using  cane for amb, and has been taking a break from work as  of homes. She has been taking meloxicam 2x a day with relief of sx. Current limitations include difficulty with getting off toilet, squatting, kneeling, and getting on/off floor.           Recurrent probem    Quality of life: good    Patient Goals  Patient goals for therapy: increased strength, independence with ADLs/IADLs, return to work, increased motion and decreased pain    Pain  Current pain ratin  At best pain ratin  At worst pain rating: 10  Location: Lateral aspect of right knee, and popliteal fossa  Quality: sharp  Relieving factors: rest, medications and heat  Aggravating factors: sitting,  standing and walking    Social Support  Steps to enter house: yes  3  Stairs in house: no   Lives in: one-story house  Lives with: adult children    Employment status: working  Hand dominance: right    Treatments  Current treatment: medication        Objective     Active Range of Motion     Right Knee   Flexion: 60 degrees with pain  Extensor lag: -20 degrees with pain    Passive Range of Motion     Right Knee   Flexion: 70 degrees   Extension: -20 degrees     Strength/Myotome Testing     Right Knee   Flexion: 3+  Extension: 3+  Quadriceps contraction: fair      Flowsheet Rows      Flowsheet Row Most Recent Value   PT/OT G-Codes    Current Score 55   Projected Score 67               Precautions:   Past Medical History:   Diagnosis Date    Acid reflux     Allergic     Anxiety     Diabetes mellitus (HCC)     Disease of thyroid gland Years    Gastroparesis     GERD (gastroesophageal reflux disease) Years    Hypertension     Hypothyroid     Obesity Years    Shingles 2 years ago     Scan     OR     Visit  [employer]  Access Code: [email or web address]      Manuals 5/13                                                                Neuro Re-Ed             SLS             Standing side steps             Hurtles             Step ups fwd/lat             HR/TR                                       Ther Ex             Quad set 10''x10            Heel slide 5''x15            TKE set 5''x15            Gastroc st 10''x5            LAQ  1x15            NS/Bike  NV            Standing SLR 3 way                           Ther Activity                                       Gait Training                                       Modalities

## 2024-05-13 NOTE — TELEPHONE ENCOUNTER
Medication:  meloxicam (Mobic)     Dose/Frequency: Take 1 tablet (7.5 mg total) by mouth 2 (two) times a day    Quantity: 10    Pharmacy:  [id number] - [place], [place] - [address]    Office:   [x] PCP/Provider -   [] Speciality/Provider -     Does the patient have enough for 3 days?   [] Yes   [x] No - Send as HP to POD

## 2024-05-14 NOTE — TELEPHONE ENCOUNTER
Patient called to check on refill status.  Informed patient that medication was sent to pharmacy.

## 2024-05-14 NOTE — TELEPHONE ENCOUNTER
Patient is calling stating that her refill for  meloxicam (Mobic)      Dose/Frequency: Take 1 tablet (7.5 mg total) by mouth 2 (two) times a day was called in wrong to the pharmacy, she is supposed to take it 2 times x day and she only got for 1 time x day, please review  Thank you

## 2024-05-16 ENCOUNTER — TELEPHONE (OUTPATIENT)
Age: 69
End: 2024-05-16

## 2024-05-16 ENCOUNTER — OFFICE VISIT (OUTPATIENT)
Dept: PHYSICAL THERAPY | Age: 69
End: 2024-05-16
Payer: COMMERCIAL

## 2024-05-16 DIAGNOSIS — M17.11 PRIMARY OSTEOARTHRITIS OF RIGHT KNEE: Primary | ICD-10-CM

## 2024-05-16 DIAGNOSIS — M25.60 LIMITED JOINT RANGE OF MOTION (ROM): ICD-10-CM

## 2024-05-16 PROCEDURE — 97112 NEUROMUSCULAR REEDUCATION: CPT

## 2024-05-16 PROCEDURE — 97110 THERAPEUTIC EXERCISES: CPT

## 2024-05-16 NOTE — TELEPHONE ENCOUNTER
Caller: Kelsea    Doctor: Geeta    Reason for call: Calling to schedule her USGI    Call back#: [phone]

## 2024-05-16 NOTE — TELEPHONE ENCOUNTER
Spoke with [facility] - Dr. Nicole and MA are out today and will be reaching back out to patient to schedule when they are back in the office. Patient aware and will wait for return call

## 2024-05-16 NOTE — PROGRESS NOTES
"Daily Note     Today's date: 2024  Patient name: Kelsea Fong  : 1955  MRN: [id number]  Referring provider: Vernon Ogden*  Dx:   Encounter Diagnosis     ICD-10-CM    1. Primary osteoarthritis of right knee  M17.11       2. BMI 36.0-36.9,adult  Z68.36       3. Limited joint range of motion (ROM)  M25.60                      Subjective:  Patient states she is feeling some pain today.       Objective: See treatment diary below      Assessment: Tolerated treatment well. Initiated POC as able today. No increase in pain was noted throughout session. Continue to progress as able. Patient demonstrated fatigue post treatment, exhibited good technique with therapeutic exercises, and would benefit from continued PT      Plan: Continue per plan of care.      Precautions:   Past Medical History:   Diagnosis Date    Acid reflux     Allergic     Anxiety     Diabetes mellitus (HCC)     Disease of thyroid gland Years    Gastroparesis     GERD (gastroesophageal reflux disease) Years    Hypertension     Hypothyroid     Obesity Years    Shingles 2 years ago     Scan     OR     Visit  [facility] [email or web address]  Access Code: [email or web address]      Manuals                                                                 Neuro Re-Ed             SLS             Standing side steps             Hurtles             Step ups fwd/lat             HR/TR  2x10                                      Ther Ex             Quad set 10''x10 10\"X 10            Heel slide 5''x15 5\"x 20           TKE set 5''x15 5\"           Gastroc st 10''x5 10\"x 5            LAQ  1x15 2x10           NS/Bike  NV 6' NS           Standing SLR 3 way   2x10                        Ther Activity                                       Gait Training                                       Modalities                                            "

## 2024-05-17 ENCOUNTER — TELEPHONE (OUTPATIENT)
Dept: OBGYN CLINIC | Facility: CLINIC | Age: 69
End: 2024-05-17

## 2024-05-17 DIAGNOSIS — M25.561 ACUTE PAIN OF RIGHT KNEE: ICD-10-CM

## 2024-05-17 DIAGNOSIS — F41.8 ANXIOUS DEPRESSION: ICD-10-CM

## 2024-05-17 RX ORDER — CLONAZEPAM 0.5 MG/1
0.5 TABLET ORAL 2 TIMES DAILY
Qty: 60 TABLET | Refills: 0 | Status: CANCELLED | OUTPATIENT
Start: 2024-05-17

## 2024-05-17 RX ORDER — MELOXICAM 7.5 MG/1
7.5 TABLET ORAL 2 TIMES DAILY
Qty: 60 TABLET | Refills: 0 | Status: SHIPPED | OUTPATIENT
Start: 2024-05-17

## 2024-05-17 RX ORDER — MELOXICAM 7.5 MG/1
7.5 TABLET ORAL DAILY
Qty: 10 TABLET | Refills: 0 | Status: CANCELLED | OUTPATIENT
Start: 2024-05-17

## 2024-05-17 NOTE — TELEPHONE ENCOUNTER
Too soon to fill Clonazepam. Let patient know to call when due.     Patient also called about Meloxicam.     She states she had been told she can take 2 a day since only one was not helping.    Please update prescription if appropriate

## 2024-05-17 NOTE — TELEPHONE ENCOUNTER
Patient called stating that she needs Meloxicam refill called into [facility] but she does not take one pill daily.  She takes 2 pills daily.  Current med list stated 1 pill.  Please advise and contact patient when called in.

## 2024-05-20 NOTE — TELEPHONE ENCOUNTER
Patient called to get status of refill request, called the clinical staff for clarification, medicine was sent to her local pharmacy on 5/17/2024 at 7:59 pm and was confirmed by the pharmacy, patient will follow up with them.

## 2024-05-21 ENCOUNTER — APPOINTMENT (OUTPATIENT)
Dept: PHYSICAL THERAPY | Age: 69
End: 2024-05-21
Payer: COMMERCIAL

## 2024-05-23 ENCOUNTER — APPOINTMENT (OUTPATIENT)
Dept: PHYSICAL THERAPY | Age: 69
End: 2024-05-23
Payer: COMMERCIAL

## 2024-05-24 NOTE — ASSESSMENT & PLAN NOTE
Continue Zoloft
Continue levothyroxine
Controlled   will hold lisinopril -hydrochlorothiazide combo pill until after surgical intervention
Lab Results   Component Value Date    HGBA1C 8 0 (H) 09/16/2020       No results for input(s): POCGLU in the last 72 hours  Blood Sugar Average: Last 72 hrs:      Will continue patients basal bolus regimen, SSI, diabetic diet
Lab Results   Component Value Date    HGBA1C 8 0 (H) 09/16/2020       Recent Labs     02/02/21 2124 02/03/21  0715   POCGLU 300* 205*       Blood Sugar Average: Last 72 hrs:  (P) 252 5     · Hold oral antihyperglycemics while inpatient  · Endocrine following, appreciate recommendations   · Continue home insulin regimen: Lantus 25 units AM and 25 units PM + SSI  · QID accuchecks with SSI coverage   · Diabetic diet
Management as per Orthopedic surgery   plans for fixation 2/3 a m 
Patient able to complete > 4Mets w/o difficulty  Patient is of mild- moderate risk for surgical intervention, given her history of uncontrolled diabetes  Patient is medically cleared for surgery as benefits outweigh risks     Will hold lisinopril-HCTZ to avoid alec preop then may resume postop
Patient able to complete > 4Mets w/o difficulty  Patient is of mild- moderate risk for surgical intervention, given her history of uncontrolled diabetes  Patient is medically cleared for surgery as benefits outweigh risks     Will hold lisinopril-HCTZ to avoid alec preop then may resume postop
· BP acceptable, monitor routinely   · Ensure adequate pain control   · Hold lisinopril-hydrochlorothiazide perioperatively, resume when appropriate
· Continue Zoloft
· Management as per primary, Orthopedic surgery  · S/p operative fixation with left IMN today   · Monitor for postop ABLA  · Analgesics as needed  · PT/OT postoperatively
· Noted history  · Continue bowel regimen as to avoid opioid induce constipation
· TSH elevated, free T4 within normal limits   · Continue home dose levothyroxine
stated

## 2024-05-27 DIAGNOSIS — E11.649 UNCONTROLLED TYPE 2 DIABETES MELLITUS WITH HYPOGLYCEMIA WITHOUT COMA (HCC): ICD-10-CM

## 2024-05-28 ENCOUNTER — HOSPITAL ENCOUNTER (OUTPATIENT)
Dept: RADIOLOGY | Age: 69
Discharge: HOME | End: 2024-05-28
Payer: COMMERCIAL

## 2024-05-28 ENCOUNTER — APPOINTMENT (OUTPATIENT)
Dept: PHYSICAL THERAPY | Age: 69
End: 2024-05-28
Payer: COMMERCIAL

## 2024-05-28 DIAGNOSIS — M25.60 LIMITED JOINT RANGE OF MOTION (ROM): ICD-10-CM

## 2024-05-28 DIAGNOSIS — M17.11 PRIMARY OSTEOARTHRITIS OF RIGHT KNEE: ICD-10-CM

## 2024-05-28 DIAGNOSIS — Z79.4 TYPE 2 DIABETES MELLITUS WITH COMPLICATION, WITH LONG-TERM CURRENT USE OF INSULIN (HCC): ICD-10-CM

## 2024-05-28 DIAGNOSIS — E11.649 UNCONTROLLED TYPE 2 DIABETES MELLITUS WITH HYPOGLYCEMIA WITHOUT COMA (HCC): ICD-10-CM

## 2024-05-28 DIAGNOSIS — E11.8 TYPE 2 DIABETES MELLITUS WITH COMPLICATION, WITH LONG-TERM CURRENT USE OF INSULIN (HCC): ICD-10-CM

## 2024-05-28 PROCEDURE — 76882 US LMTD JT/FCL EVL NVASC XTR: CPT

## 2024-05-28 RX ORDER — GLIMEPIRIDE 4 MG/1
4 TABLET ORAL DAILY
Qty: 180 TABLET | Refills: 1 | Status: CANCELLED | OUTPATIENT
Start: 2024-05-28

## 2024-05-28 RX ORDER — GLIMEPIRIDE 4 MG/1
4 TABLET ORAL DAILY
Qty: 180 TABLET | Refills: 1 | Status: SHIPPED | OUTPATIENT
Start: 2024-05-28

## 2024-05-28 RX ORDER — INSULIN GLARGINE 100 [IU]/ML
INJECTION, SOLUTION SUBCUTANEOUS
Qty: 15 ML | Refills: 5 | Status: SHIPPED | OUTPATIENT
Start: 2024-05-28

## 2024-05-28 NOTE — TELEPHONE ENCOUNTER
Medication:     glimepiride (AMARYL) 4 mg tablet       Dose/Frequency: Take 1 tablet (4 mg total) by mouth in the morning     Quantity: 180 tablet     Pharmacy: [id number]  [place], [place]  [address]     Office:   [x] PCP/Provider -   [] Speciality/Provider -     Does the patient have enough for 3 days?   [x] Yes   [] No - Send as HP to POD      Medication: Insulin Glargine Solostar (Lantus SoloStar) 100 UNIT/ML SOPN     Dose/Frequency: 25 units in AM and 35 units in PM,     Quantity: 5 ordered     Pharmacy: [id number] - [place], [place] - [address]     Office:   [x] PCP/Provider -   [] Speciality/Provider -     Does the patient have enough for 3 days?   [] Yes   [x] No - Send as HP to POD

## 2024-05-28 NOTE — TELEPHONE ENCOUNTER
Told her prescription say receipt confirmed by pharmacy both the ones she asked about     glimepiride (AMARYL) 4 mg tablet       Insulin Glargine Solostar (Lantus SoloStar) 100 UNIT/ML SOPN

## 2024-05-29 RX ORDER — INSULIN GLARGINE 100 [IU]/ML
INJECTION, SOLUTION SUBCUTANEOUS
Qty: 15 ML | Refills: 0 | OUTPATIENT
Start: 2024-05-29

## 2024-05-30 ENCOUNTER — APPOINTMENT (OUTPATIENT)
Dept: PHYSICAL THERAPY | Age: 69
End: 2024-05-30
Payer: COMMERCIAL

## 2024-05-30 ENCOUNTER — PROCEDURE VISIT (OUTPATIENT)
Dept: OBGYN CLINIC | Facility: CLINIC | Age: 69
End: 2024-05-30
Payer: COMMERCIAL

## 2024-05-30 VITALS — BODY MASS INDEX: 36.44 KG/M2 | WEIGHT: 193 LBS | HEIGHT: 61 IN

## 2024-05-30 DIAGNOSIS — M25.561 ACUTE PAIN OF RIGHT KNEE: ICD-10-CM

## 2024-05-30 DIAGNOSIS — M17.11 PRIMARY OSTEOARTHRITIS OF RIGHT KNEE: ICD-10-CM

## 2024-05-30 PROCEDURE — 20611 DRAIN/INJ JOINT/BURSA W/US: CPT | Performed by: FAMILY MEDICINE

## 2024-05-30 RX ORDER — LIDOCAINE HYDROCHLORIDE 10 MG/ML
1 INJECTION, SOLUTION INFILTRATION; PERINEURAL
Status: COMPLETED | OUTPATIENT
Start: 2024-05-30 | End: 2024-05-30

## 2024-05-30 RX ADMIN — LIDOCAINE HYDROCHLORIDE 1 ML: 10 INJECTION, SOLUTION INFILTRATION; PERINEURAL at 14:00

## 2024-05-30 NOTE — PROGRESS NOTES
Assessment:     1. BMI 36.0-36.9,adult  Large joint arthrocentesis: R supra patellar bursa    sodium hyaluronate (DUROLANE) injection 3 mL    lidocaine (XYLOCAINE) 1 % injection 1 mL      2. Primary osteoarthritis of right knee  Large joint arthrocentesis: R supra patellar bursa    sodium hyaluronate (DUROLANE) injection 3 mL    lidocaine (XYLOCAINE) 1 % injection 1 mL      3. Acute pain of right knee          Orders Placed This Encounter   Procedures    Large joint arthrocentesis: R supra patellar bursa        Impression:   Right knee pain likely secondary to acute exacerbation of chronic osteoarthritis..       Pertinent past medical history  Diabetic, on insulin management.  A1c 6.7 (03/2024)     Conservative Management   We discussed different treatment options:  Reviewed PCP documentation completed on 04/30/2024.  Treatment plan consisted of Mobic, x-rays, referral to Ortho/sports medicine.  Osteoarthritis treatment goal is to minimize pain and optimize function.  First-line management  Ice or Heat Therapy as needed 1-2 times daily for 10-20 minutes. As tolerated.   Over the counter Tylenol and/or NSAIDs  as needed based off your Past Medical Hx. Please follow product label for dosing and maximum limits.  If you have concerns about utilizing Tylenol/NSAIDs please discuss with your primary care physician.  PCP provided patient with Mobic.  Patient may continue this at this time.  Trial of over the counter Topical Analgesics such as Lidocaine cream or Voltaren Gel, as tolerated. If skin becomes irritated, discontinue use.   Please range joint through gentle range of motion, as tolerated.   Initiate Home Exercise Program for Stretching and Strengthening affected area.    Weight management may benefit lower extremity osteoarthritis.  Normal BMI 18 through 24.  May request referral to weight management or nutritionist/dietitian.  Initiate Formal Physical Therapy at any preferred location.                                 Previously prescription provided.  Additional management  Optional treatment measures include the following  For hip/knee osteoarthritis: Medial  brace for patients with medial compartmental osteoarthritis.                 Will trial hinged knee brace at this time.  Due to instability.  Medial  was previously prescribed  For hip/knee osteoarthritis assistive ambulation devices: Cane, walker                 Patient was not utilizing cane for ambulation today.  Interarticular glucocorticoid steroid if needing short-term pain relief                 Cannot complete steroid at this time due to patient being on insulin management.  May consider viscosupplementation and/or PRP injections                 Viscosupplementation was completed today.  As well as aspiration.  Fluid was not sent.  Patient tolerated well.  Referral to an orthopedic surgeon to discuss potential surgical management              Imaging   Reviewed prior xrays obtain by Primary Care Physician. These were reviewed in office with patient today.   04/30/2024 right knee x-ray: No acute osseous abnormality  Pending US to further evaluate for evans cyst   Kellgren-Judson Classification      Present Grade Radiological findings   []  0 No radiological findings   []  1 Doubtful narrowing of joint space and possible osteophytic lipping   [x]  2 Definite osteophytes and possible narrowing of joint space   []  3 Moderate multiple osteophytes, definitive narrowing of joint space, small pseudocystic areas with sclerotic walls and possibly deformity of bone contour   []  4 Large osteophytes, marked narrowing of joint space, severe sclerosis and definitive deformity of bone contour   **If more than 1 [x] is present patient is between grades        Procedure  A viscosupplementation injection was performed in the office today. Please see below for procedure details.   The risks and benefits of the injection (which include but are not limited to:  infection, bleeding, damage to nerve/artery, need for further intervention), as well as the risks and benefits of all alternative treatments were explained and understood.  The patient elected to proceed with injection. The procedure was done with aseptic technique, and the patient tolerated the procedure well with no complications.  The patient should take 1-2 days off of activity, with gradual return to activity as tolerated.  No Submerging joint for 5 -7 days. May take showers as usual. Please avoid hot-tubs, bath soaks, Jacuzzi, ocean/lake swimming for recommended amount of time (5-7 days).   May Ice injection site 1-2 times daily for 20 minutes, for next 24-48 hrs.    Shared decision making, patient agreeable to plan.      Return for Follow up as needed or if symptoms do NOT improve.    HPI:   Kelsea Fong is a 68 y.o. female  who presents for evaluation of   Chief Complaint   Patient presents with    Right Knee - Numbness, Pain, Swelling     Today's visit  Denies any new trauma    Severity: Current severity: 4/10.   Associated symptoms: swelling has significantly improved..    Previous visit 05/09/2024  PCP Ruth Griffith DO referred patient for right knee pain.  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     Onset/Mechanism: 3 weeks ago patient was in the way of her Setswana Weiss when he bumped into her right knee.  Initiating pain.  Denies any falls onto the knee.  Pain has been worsening..  Location: Posterior knee, lateral joint line.  Severity: Current severity: 20/10.   Pain described as: Throbbing, pulsating, sore, stiff  Radiation: Pain will radiate up into hip.  Pain then feels like its all over..  Provocative: Squatting, knee flexion walking.  Associated symptoms: Intermittent swelling..  Instability at times.     Denies any hx of fracture of affected limb.   Denies any surgical history of affected limb.       Summary of treatment to-date:   Mobic  Cane for ambulation has been  present longer than 3 weeks  Heat therapy  Previously braced with no improvement.    Following History Reviewed and Updated     Past Medical History:   Diagnosis Date    Acid reflux     Allergic     Anxiety     Diabetes mellitus (HCC)     Disease of thyroid gland Years    Gastroparesis     GERD (gastroesophageal reflux disease) Years    Hypertension     Hypothyroid     Obesity Years    Shingles 2 years ago     Past Surgical History:   Procedure Laterality Date     SECTION      CHOLECYSTECTOMY      COLONOSCOPY      DILATION AND CURETTAGE OF UTERUS      ESOPHAGOGASTRODUODENOSCOPY      FRACTURE SURGERY      HYSTERECTOMY      44    OOPHORECTOMY      44    MI COLONOSCOPY FLX DX W/COLLJ SPEC WHEN PFRMD N/A 2016    Procedure: EGD AND COLONOSCOPY;  Surgeon: Anurag Gu MD;  Location: [facility];  Service: Gastroenterology    MI NEUROPLASTY &/TRANSPOS MEDIAN NRV CARPAL TUNNE Right 2017    Procedure: WRIST CARPAL TUNNEL RELEASE ; TENOLYSIS OF FPL, FDS 2-5, FDP 2-5;  APPLICATION OF TENOGLYIDE;  Surgeon: Andrzej Tirado MD;  Location[facility];  Service: Orthopedics    MI OPTX FEM SHFT FX W/INSJ IMED IMPLT W/WO SCREW Left 2/3/2021    Procedure: INSERTION NAIL IM FEMUR ANTEGRADE (TROCHANTERIC);  Surgeon: Rylan Hitchcock MD;  Location[facility];  Service: Orthopedics    MI TENDON SHEATH INCISION Right 2017    Procedure: LONG FINGER TRIGGER RELEASE ;  Surgeon: Andrzej Tirado MD;  [facility];  Service: Orthopedics    MI TENDON SHEATH INCISION Right 2016    Procedure: RELEASE TRIGGER FINGER - LONG FINGER;  Surgeon: Andrzej Tirado MD;  Location[facility];  Service: Orthopedics    ROOT CANAL      UPPER GASTROINTESTINAL ENDOSCOPY       Family History   Problem Relation Age of Onset    Cirrhosis Mother     Heart attack Father     Diabetes type II Father     Hearing loss Father     Vision loss Father     Diabetes Father     Heart disease Father     No Known Problems Sister      No Known Problems Sister     Arthritis Maternal Grandmother     No Known Problems Maternal Grandfather     No Known Problems Paternal Grandmother     Heart disease Paternal Grandfather     Stroke Paternal Grandfather     Diabetes Brother     Alcohol abuse Son     No Known Problems Maternal Aunt     No Known Problems Maternal Aunt     Breast cancer Paternal Aunt 57    Colon cancer Paternal Uncle 35    Testicular cancer Cousin 18       Social History     Substance and Sexual Activity   Alcohol Use Yes    Alcohol/week: 0.0 standard drinks of alcohol    Comment: Rarely     Social History     Substance and Sexual Activity   Drug Use Never     Social History     Tobacco Use   Smoking Status Former    Current packs/day: 0.00    Average packs/day: 2.0 packs/day for 23.4 years (46.8 ttl pk-yrs)    Types: Cigarettes    Start date: 7/3/1964    Quit date:     Years since quittin.5   Smokeless Tobacco Never       Social Determinants of Health     Tobacco Use: Medium Risk (2024)    Patient History     Smoking Tobacco Use: Former     Smokeless Tobacco Use: Never     Passive Exposure: Not on file   Alcohol Use: Not At Risk (3/29/2023)    AUDIT-C     Frequency of Alcohol Consumption: Never     Average Number of Drinks: Patient does not drink     Frequency of Binge Drinking: Never   Financial Resource Strain: Low Risk  (3/29/2023)    Overall Financial Resource Strain (CARDIA)     Difficulty of Paying Living Expenses: Not hard at all   Food Insecurity: Not on file   Transportation Needs: No Transportation Needs (3/29/2023)    PRAPARE - Transportation     Lack of Transportation (Medical): No     Lack of Transportation (Non-Medical): No   Physical Activity: Not on file   Stress: Not on file   Social Connections: Not on file   Intimate Partner Violence: Not on file   Depression: Not at risk (3/29/2024)    PHQ-2     PHQ-2 Score: 0   Housing Stability: Not on file   Utilities: Not on file   Health Literacy: Not on file     "    Allergies   Allergen Reactions    Other Other (See Comments) and Cough     Seasonal- grass, hard time to breathe    Metformin Diarrhea       Review of Systems      Review of Systems     Review of Systems   Constitutional: Negative for chills and fever.   HENT: Negative for drooling and sneezing.    Eyes: Negative for redness.   Respiratory: Negative for cough and wheezing.    Gastrointestinal: Negative for vomiting.   Psychiatric/Behavioral: Negative for behavioral problems. The patient is not nervous/anxious.      All other systems negative.   Physical Exam   Physical Exam    Vitals and nursing note reviewed.  Constitutional:   Appearance. Normal Appearance.  Ht 5' 1\" (1.549 m)   Wt 87.5 kg (193 lb)   BMI 36.47 kg/m²     Body mass index is 36.47 kg/m².   HENT:  Head: Atraumatic.  Nose: Nose normal  Eyes: Conjunctiva/sclera: Conjunctivae normal.  Cardiovascular:   Rate and Rhythm: Bilateral equal distal pulses  Pulmonary:   Effort: Pulmonary effort is normal  Skin:   General: Skin is warm and dry.  Neurological:   General: No focal deficit present.  Mental Status: Alert and oriented to person, place, and time.   Psychiatric:   Mood and Affect: mood normal.  Behavior: Behavior normal     Musculoskeletal Exam     Ortho Exam   right Knee:     Inspection:  Erythema Bruising Effusion Muscle atrophy Retracted muscle   Negative negative minimal Negative Negative     Palpation:  Increased warmth Crepitus Palpable muscle depression   Negative negative Negative     Tenderness:  Quadriceps tendon Patella Patellar tendon  Joint line   Neg. Neg. Neg. Neg.        Tibial tubercle Vy's tubercle Pes anserine bursa Posterior knee   Neg. Neg. Neg. Neg.       Biceps femoris Semimembranosus/semitendinosis Popliteus tendon Fibular head   Neg. Neg. Neg. Neg.       Bilateral Range of Motion:  Active flexion Passive flexion   (normal 135 degrees) (normal 135 degrees)   Grossly intact      Active extension Passive extension " "  (normal 0 degrees) (normal 0 degrees)   Grossly intact        Bilateral strength: grossly intact  Seated hip flexion Seated knee flexion Seated knee extension          Seated great toe extension Seated ankle dorsiflexion Seated ankle plantarflexion          Seated hip adduction Seated hip abduction Prone knee flexion              Neurovascular:  Sensation to light touch skin   Grossly intact Warm and dry      (Test not completed if space left blank )         Large joint arthrocentesis: R supra patellar bursa  Universal Protocol:  Consent: Verbal consent obtained.  Risks and benefits: risks, benefits and alternatives were discussed  Consent given by: patient  Patient understanding: patient states understanding of the procedure being performed  Patient consent: the patient's understanding of the procedure matches consent given  Site marked: the operative site was marked  Radiology Images displayed and confirmed. If images not available, report reviewed: imaging studies available  Required items: required blood products, implants, devices, and special equipment available  Patient identity confirmed: verbally with patient  Supporting Documentation  Indications: pain   Procedure Details  Location: knee - R supra patellar bursa  Preparation: Patient was prepped and draped in the usual sterile fashion  Needle size: 18 G  Ultrasound guidance: yes  Approach: anterolateral  Medications administered: 1 mL lidocaine 1 %; 3 mL sodium hyaluronate 60 MG/3ML (Lidocaine used for needle track anesthetic)    Aspirate amount: 5 mL  Aspirate: clear and yellow  Patient tolerance: patient tolerated the procedure well with no immediate complications  Dressing:  Sterile dressing applied             Portions of the record may have been created with voice recognition software. Occasional wrong word or \"sound alike\" substitutions may have occurred due to the inherent limitations of voice recognition software. Please review the chart " carefully and recognize, using context, where substitutions/typographical errors may have occurred.

## 2024-06-05 DIAGNOSIS — F41.9 ANXIETY: ICD-10-CM

## 2024-06-05 DIAGNOSIS — F41.8 ANXIOUS DEPRESSION: ICD-10-CM

## 2024-06-05 RX ORDER — SERTRALINE HYDROCHLORIDE 100 MG/1
100 TABLET, FILM COATED ORAL 2 TIMES DAILY
Qty: 180 TABLET | Refills: 1 | Status: SHIPPED | OUTPATIENT
Start: 2024-06-05

## 2024-06-05 RX ORDER — CLONAZEPAM 0.5 MG/1
0.5 TABLET ORAL 2 TIMES DAILY
Qty: 60 TABLET | Refills: 0 | Status: SHIPPED | OUTPATIENT
Start: 2024-06-05

## 2024-06-05 NOTE — TELEPHONE ENCOUNTER
Medication: clonazePAM (KlonoPIN) 0.5 mg tablet     Dose/Frequency:   Take 1 tablet (0.5 mg total) by mouth 2 (two) times a day      Quantity:  60 tablet     Pharmacy: [facility] [address] [place] [address], [address]     Office:   [x] PCP/Provider -   [] Speciality/Provider -     Does the patient have enough for 3 days?   [] Yes   [x] No - Send as HP to POD    Medication: sertraline (ZOLOFT) 100 mg tablet     Dose/Frequency: Take 1 tablet (100 mg total) by mouth 2 (two) times a day     Quantity: 180 tablet     Pharmacy: [facility] [address] [place] [address], [place] - [address] [facility]     Office:   [x] PCP/Provider -   [] Speciality/Provider -     Does the patient have enough for 3 days?   [] Yes   [x] No - Send as HP to POD

## 2024-06-27 ENCOUNTER — TELEPHONE (OUTPATIENT)
Age: 69
End: 2024-06-27

## 2024-06-27 DIAGNOSIS — I10 ESSENTIAL HYPERTENSION, BENIGN: ICD-10-CM

## 2024-06-27 RX ORDER — LISINOPRIL AND HYDROCHLOROTHIAZIDE 20; 12.5 MG/1; MG/1
0.5 TABLET ORAL DAILY
Qty: 50 TABLET | Refills: 1 | Status: SHIPPED | OUTPATIENT
Start: 2024-06-27

## 2024-06-27 NOTE — TELEPHONE ENCOUNTER
[facility]    Per insurance they want the medication below to be for 50 tablets, patient is taking 0.5 by mouth daily, per insurance they are using a different metric system now per [facility].    lisinopril-hydrochlorothiazide (PRINZIDE,ZESTORETIC) 20-12.5 MG per tablet       Pharmacy: [facility] [place], [place]  [address]  [place], [place]

## 2024-07-03 DIAGNOSIS — F41.8 ANXIOUS DEPRESSION: ICD-10-CM

## 2024-07-03 RX ORDER — CLONAZEPAM 0.5 MG/1
0.5 TABLET ORAL 2 TIMES DAILY
Qty: 60 TABLET | Refills: 0 | Status: SHIPPED | OUTPATIENT
Start: 2024-07-03

## 2024-07-03 NOTE — TELEPHONE ENCOUNTER
Medication:   clonazePAM (KlonoPIN) 0.5 mg tablet       Dose/Frequency: Take 1 tablet (0.5 mg total) by mouth 2 (two) times a day     Quantity: 60    Pharmacy: [id number] - [place], [place] - [address] [place] [address]     Office:   [x] PCP/Provider - Dr Griffith  [] Speciality/Provider -     Does the patient have enough for 3 days?   [] Yes   [x] No - Send as HP to POD

## 2024-09-12 DIAGNOSIS — Z79.4 TYPE 2 DIABETES MELLITUS WITH COMPLICATION, WITH LONG-TERM CURRENT USE OF INSULIN (HCC): ICD-10-CM

## 2024-09-12 DIAGNOSIS — E11.8 TYPE 2 DIABETES MELLITUS WITH COMPLICATION, WITH LONG-TERM CURRENT USE OF INSULIN (HCC): ICD-10-CM

## 2024-09-12 DIAGNOSIS — F41.8 ANXIOUS DEPRESSION: ICD-10-CM

## 2024-09-12 RX ORDER — CLONAZEPAM 0.5 MG/1
0.5 TABLET ORAL 2 TIMES DAILY
Qty: 60 TABLET | Refills: 0 | Status: SHIPPED | OUTPATIENT
Start: 2024-09-12

## 2024-09-12 RX ORDER — GLIMEPIRIDE 4 MG/1
4 TABLET ORAL DAILY
Qty: 180 TABLET | Refills: 1 | Status: CANCELLED | OUTPATIENT
Start: 2024-09-12

## 2024-09-12 NOTE — TELEPHONE ENCOUNTER
Medication: clonazePAM     Dose/Frequency: (KlonoPIN) 0.5 mg tablet     Quantity: 60    Pharmacy: [id number] - [place], [place] - [address] [phone]    Office:   [x] PCP/Provider -   [] Speciality/Provider -     Does the patient have enough for 3 days?   [] Yes   [x] No - Send as HP to POD    ______________________________________    Medication: glimepiride (AMARYL)     Dose/Frequency: 4 mg tablet    Quantity: 180    Pharmacy: [id number] - [place], [place] - [address] [phone]    Office:   [x] PCP/Provider -   [] Speciality/Provider -     Does the patient have enough for 3 days?   [] Yes   [x] No - Send as HP to POD

## 2024-11-13 DIAGNOSIS — F41.9 ANXIETY: ICD-10-CM

## 2024-11-13 DIAGNOSIS — F41.8 ANXIOUS DEPRESSION: ICD-10-CM

## 2024-11-13 DIAGNOSIS — E03.9 HYPOTHYROIDISM, UNSPECIFIED TYPE: ICD-10-CM

## 2024-11-13 DIAGNOSIS — I10 ESSENTIAL HYPERTENSION, BENIGN: ICD-10-CM

## 2024-11-13 RX ORDER — LISINOPRIL AND HYDROCHLOROTHIAZIDE 12.5; 2 MG/1; MG/1
0.5 TABLET ORAL DAILY
Qty: 15 TABLET | Refills: 0 | Status: SHIPPED | OUTPATIENT
Start: 2024-11-13

## 2024-11-13 RX ORDER — SERTRALINE HYDROCHLORIDE 100 MG/1
100 TABLET, FILM COATED ORAL 2 TIMES DAILY
Qty: 60 TABLET | Refills: 0 | Status: SHIPPED | OUTPATIENT
Start: 2024-11-13

## 2024-11-13 RX ORDER — CLONAZEPAM 0.5 MG/1
0.5 TABLET ORAL 2 TIMES DAILY
Qty: 60 TABLET | Refills: 0 | Status: SHIPPED | OUTPATIENT
Start: 2024-11-13

## 2024-11-13 RX ORDER — LEVOTHYROXINE SODIUM 100 UG/1
100 TABLET ORAL DAILY
Qty: 30 TABLET | Refills: 0 | Status: SHIPPED | OUTPATIENT
Start: 2024-11-13

## 2024-11-18 ENCOUNTER — HOSPITAL ENCOUNTER (OUTPATIENT)
Dept: RADIOLOGY | Age: 69
Discharge: HOME | End: 2024-11-18
Payer: COMMERCIAL

## 2024-11-18 DIAGNOSIS — Z12.31 ENCOUNTER FOR SCREENING MAMMOGRAM FOR MALIGNANT NEOPLASM OF BREAST: ICD-10-CM

## 2024-11-18 PROCEDURE — 77063 BREAST TOMOSYNTHESIS BI: CPT

## 2024-11-18 PROCEDURE — 77067 SCR MAMMO BI INCL CAD: CPT

## 2024-12-02 DIAGNOSIS — E03.9 HYPOTHYROIDISM, UNSPECIFIED TYPE: ICD-10-CM

## 2024-12-02 DIAGNOSIS — I10 ESSENTIAL HYPERTENSION, BENIGN: ICD-10-CM

## 2024-12-02 DIAGNOSIS — F41.9 ANXIETY: ICD-10-CM

## 2024-12-03 RX ORDER — SERTRALINE HYDROCHLORIDE 100 MG/1
100 TABLET, FILM COATED ORAL 2 TIMES DAILY
Qty: 60 TABLET | Refills: 5 | Status: SHIPPED | OUTPATIENT
Start: 2024-12-03

## 2024-12-04 RX ORDER — LEVOTHYROXINE SODIUM 100 UG/1
100 TABLET ORAL DAILY
Qty: 30 TABLET | Refills: 0 | Status: SHIPPED | OUTPATIENT
Start: 2024-12-04

## 2024-12-04 RX ORDER — LISINOPRIL AND HYDROCHLOROTHIAZIDE 12.5; 2 MG/1; MG/1
0.5 TABLET ORAL DAILY
Qty: 15 TABLET | Refills: 0 | Status: SHIPPED | OUTPATIENT
Start: 2024-12-04

## 2024-12-05 DIAGNOSIS — E11.649 UNCONTROLLED TYPE 2 DIABETES MELLITUS WITH HYPOGLYCEMIA WITHOUT COMA (HCC): ICD-10-CM

## 2024-12-05 DIAGNOSIS — Z79.4 TYPE 2 DIABETES MELLITUS WITH COMPLICATION, WITH LONG-TERM CURRENT USE OF INSULIN (HCC): ICD-10-CM

## 2024-12-05 DIAGNOSIS — E11.8 TYPE 2 DIABETES MELLITUS WITH COMPLICATION, WITH LONG-TERM CURRENT USE OF INSULIN (HCC): ICD-10-CM

## 2024-12-05 NOTE — TELEPHONE ENCOUNTER
Patient is calling to request a refill on the pended medications  Confirmed pharmacy on file  Thank you

## 2024-12-07 RX ORDER — INSULIN GLARGINE 100 [IU]/ML
INJECTION, SOLUTION SUBCUTANEOUS
Qty: 15 ML | Refills: 5 | Status: SHIPPED | OUTPATIENT
Start: 2024-12-07

## 2024-12-07 RX ORDER — GLIMEPIRIDE 4 MG/1
4 TABLET ORAL DAILY
Qty: 180 TABLET | Refills: 1 | Status: SHIPPED | OUTPATIENT
Start: 2024-12-07

## 2024-12-18 LAB
ALBUMIN SERPL-MCNC: 4.3 G/DL (ref 3.6–5.1)
ALBUMIN/GLOB SERPL: 1.4 (CALC) (ref 1–2.5)
ALP SERPL-CCNC: 55 U/L (ref 37–153)
ALT SERPL-CCNC: 12 U/L (ref 6–29)
AST SERPL-CCNC: 15 U/L (ref 10–35)
BASOPHILS # BLD AUTO: 19 CELLS/UL (ref 0–200)
BASOPHILS NFR BLD AUTO: 0.3 %
BILIRUB SERPL-MCNC: 0.4 MG/DL (ref 0.2–1.2)
BUN SERPL-MCNC: 18 MG/DL (ref 7–25)
BUN/CREAT SERPL: ABNORMAL (CALC) (ref 6–22)
CALCIUM SERPL-MCNC: 10.1 MG/DL (ref 8.6–10.4)
CHLORIDE SERPL-SCNC: 104 MMOL/L (ref 98–110)
CHOLEST SERPL-MCNC: 197 MG/DL
CHOLEST/HDLC SERPL: 4 (CALC)
CO2 SERPL-SCNC: 31 MMOL/L (ref 20–32)
CREAT SERPL-MCNC: 0.7 MG/DL (ref 0.6–1)
EOSINOPHIL # BLD AUTO: 186 CELLS/UL (ref 15–500)
EOSINOPHIL NFR BLD AUTO: 3 %
ERYTHROCYTE [DISTWIDTH] IN BLOOD BY AUTOMATED COUNT: 13.9 % (ref 11–15)
GFR/BSA.PRED SERPLBLD CYS-BASED-ARV: 93 ML/MIN/1.73M2
GLOBULIN SER CALC-MCNC: 3 G/DL (CALC) (ref 1.9–3.7)
GLUCOSE SERPL-MCNC: 133 MG/DL (ref 65–99)
HCT VFR BLD AUTO: 38.2 % (ref 35–45)
HDLC SERPL-MCNC: 49 MG/DL
HGB BLD-MCNC: 12.3 G/DL (ref 11.7–15.5)
LDLC SERPL CALC-MCNC: 124 MG/DL (CALC)
LYMPHOCYTES # BLD AUTO: 1432 CELLS/UL (ref 850–3900)
LYMPHOCYTES NFR BLD AUTO: 23.1 %
MCH RBC QN AUTO: 27.7 PG (ref 27–33)
MCHC RBC AUTO-ENTMCNC: 32.2 G/DL (ref 32–36)
MCV RBC AUTO: 86 FL (ref 80–100)
MONOCYTES # BLD AUTO: 440 CELLS/UL (ref 200–950)
MONOCYTES NFR BLD AUTO: 7.1 %
NEUTROPHILS # BLD AUTO: 4123 CELLS/UL (ref 1500–7800)
NEUTROPHILS NFR BLD AUTO: 66.5 %
NONHDLC SERPL-MCNC: 148 MG/DL (CALC)
PLATELET # BLD AUTO: 193 THOUSAND/UL (ref 140–400)
PMV BLD REES-ECKER: 10.5 FL (ref 7.5–12.5)
POTASSIUM SERPL-SCNC: 4.8 MMOL/L (ref 3.5–5.3)
PROT SERPL-MCNC: 7.3 G/DL (ref 6.1–8.1)
RBC # BLD AUTO: 4.44 MILLION/UL (ref 3.8–5.1)
SODIUM SERPL-SCNC: 142 MMOL/L (ref 135–146)
T4 FREE SERPL-MCNC: 0.8 NG/DL (ref 0.8–1.8)
TRIGL SERPL-MCNC: 125 MG/DL
TSH SERPL-ACNC: 9.78 MIU/L (ref 0.4–4.5)
WBC # BLD AUTO: 6.2 THOUSAND/UL (ref 3.8–10.8)

## 2024-12-19 ENCOUNTER — RESULTS FOLLOW-UP (OUTPATIENT)
Dept: INTERNAL MEDICINE CLINIC | Age: 69
End: 2024-12-19

## 2024-12-26 DIAGNOSIS — K52.9 GASTROENTERITIS: ICD-10-CM

## 2024-12-26 DIAGNOSIS — I10 ESSENTIAL HYPERTENSION, BENIGN: ICD-10-CM

## 2024-12-27 RX ORDER — LISINOPRIL AND HYDROCHLOROTHIAZIDE 12.5; 2 MG/1; MG/1
0.5 TABLET ORAL DAILY
Qty: 15 TABLET | Refills: 0 | Status: SHIPPED | OUTPATIENT
Start: 2024-12-27

## 2024-12-27 RX ORDER — ONDANSETRON 4 MG/1
4 TABLET, FILM COATED ORAL EVERY 8 HOURS PRN
Qty: 20 TABLET | Refills: 5 | Status: SHIPPED | OUTPATIENT
Start: 2024-12-27

## 2024-12-27 NOTE — TELEPHONE ENCOUNTER
Patient needs an appointment. Please contact the patient to schedule an appointment. Last office visit: 04/30/24. Patient needs a 6 month appointment. Courtesy refill given.

## 2024-12-31 DIAGNOSIS — F41.8 ANXIOUS DEPRESSION: ICD-10-CM

## 2024-12-31 RX ORDER — CLONAZEPAM 0.5 MG/1
0.5 TABLET ORAL 2 TIMES DAILY
Qty: 60 TABLET | Refills: 0 | Status: SHIPPED | OUTPATIENT
Start: 2024-12-31

## 2024-12-31 NOTE — TELEPHONE ENCOUNTER
Pt called to refill clonazePAM (KlonoPIN) 0.5 mg tablet but doesn't have any refills.     Please ask Dr Griffith to refill and send to the: [id number] - [place], [place] - [address] [facility]   [address] [facility] [id number]     Pt does not have any KlonoPIN left.

## 2025-01-08 ENCOUNTER — OFFICE VISIT (OUTPATIENT)
Dept: INTERNAL MEDICINE CLINIC | Age: 70
End: 2025-01-08
Payer: COMMERCIAL

## 2025-01-08 VITALS
SYSTOLIC BLOOD PRESSURE: 128 MMHG | HEART RATE: 83 BPM | WEIGHT: 196 LBS | DIASTOLIC BLOOD PRESSURE: 80 MMHG | TEMPERATURE: 97 F | HEIGHT: 61 IN | BODY MASS INDEX: 37 KG/M2 | OXYGEN SATURATION: 94 %

## 2025-01-08 DIAGNOSIS — E03.9 ACQUIRED HYPOTHYROIDISM: ICD-10-CM

## 2025-01-08 DIAGNOSIS — E78.2 MIXED HYPERLIPIDEMIA: ICD-10-CM

## 2025-01-08 DIAGNOSIS — Z00.00 MEDICARE ANNUAL WELLNESS VISIT, SUBSEQUENT: Primary | ICD-10-CM

## 2025-01-08 DIAGNOSIS — Z12.31 ENCOUNTER FOR SCREENING MAMMOGRAM FOR BREAST CANCER: ICD-10-CM

## 2025-01-08 DIAGNOSIS — M85.88 OSTEOPENIA OF OTHER SITE: ICD-10-CM

## 2025-01-08 DIAGNOSIS — E66.01 OBESITY, MORBID (HCC): ICD-10-CM

## 2025-01-08 DIAGNOSIS — F41.9 ANXIETY: ICD-10-CM

## 2025-01-08 DIAGNOSIS — K21.9 GASTROESOPHAGEAL REFLUX DISEASE WITHOUT ESOPHAGITIS: ICD-10-CM

## 2025-01-08 DIAGNOSIS — E11.43 TYPE 2 DIABETES MELLITUS WITH DIABETIC AUTONOMIC NEUROPATHY, WITH LONG-TERM CURRENT USE OF INSULIN (HCC): ICD-10-CM

## 2025-01-08 DIAGNOSIS — Z12.11 SCREENING FOR COLORECTAL CANCER: ICD-10-CM

## 2025-01-08 DIAGNOSIS — Z23 ENCOUNTER FOR IMMUNIZATION: ICD-10-CM

## 2025-01-08 DIAGNOSIS — Z12.12 SCREENING FOR COLORECTAL CANCER: ICD-10-CM

## 2025-01-08 DIAGNOSIS — K31.84 GASTROPARESIS: ICD-10-CM

## 2025-01-08 DIAGNOSIS — Z79.4 TYPE 2 DIABETES MELLITUS WITH DIABETIC AUTONOMIC NEUROPATHY, WITH LONG-TERM CURRENT USE OF INSULIN (HCC): ICD-10-CM

## 2025-01-08 DIAGNOSIS — I10 ESSENTIAL HYPERTENSION: ICD-10-CM

## 2025-01-08 LAB — SL AMB POCT HEMOGLOBIN AIC: 7.5 (ref ?–6.5)

## 2025-01-08 PROCEDURE — 99214 OFFICE O/P EST MOD 30 MIN: CPT | Performed by: INTERNAL MEDICINE

## 2025-01-08 PROCEDURE — 83036 HEMOGLOBIN GLYCOSYLATED A1C: CPT | Performed by: INTERNAL MEDICINE

## 2025-01-08 PROCEDURE — G0439 PPPS, SUBSEQ VISIT: HCPCS | Performed by: INTERNAL MEDICINE

## 2025-01-08 NOTE — PROGRESS NOTES
Assessment/Plan:    Medicare annual wellness visit, subsequent  -Medicare annual wellness visit done   - last colonoscopy was done in 2016 and was normal.  Patient will be due for repeat colonoscopy in 2026.  -  Lung cancer screen is not indicated because patient did not smoke long enough and quit over 20 years ago.  - DEXA scan was done in March 2024 and showed osteopenia.  She was counseled to continue with her vitamin D and calcium supplements, as well as weightbearing exercise.  -last mammogram was done in November 2024 and she will be due for repeat mammogram in November 2025.  She has a family history of breast cancer.  -She is up-to-date with 3 COVID vaccines, her flu shot, pneumococcal vaccine.  -follow-up in 3 months.      Hypertension   - blood pressure is well controlled   -continue with lisinopril-hydrochlorothiazide  -continue with a low-salt diet and with exercise      GERD  -Well-controlled  -Continue with famotidine  -Continue to avoid diets and behaviors that trigger symptoms      Gastroparesis  -Improving  - continue with metoclopramide      Diabetes mellitus type 2  - blood glucose is not optimally, point-of-care  hemoglobin A1c was 7.5, up from 6.7 likely secondary to dietary noncompliance  -continue with glimepiride, insulin Lantus,  -continue with a diabetic diet low in carbohydrates and simple sugars  -continue with exercise  - diabetic foot exam was done today      Hypothyroidism  -Not well controlled, last TSH done on 12/17/2024 was elevated at 9.78, up from 5.48 with a free T4 of 0.8 but patient has not been taking her levothyroxine properly.  -She was counseled to take it on an empty stomach, at least 1 hour prior to any other oral intake and counseled not to skip doses.  -continue with levothyroxine at current dose  -Will recheck a TSH in 6 to 8 weeks.      Anxiety  - well controlled   - continue with sertraline and as needed clonazepam      Hyperlipidemia  -Lipids are not well  controlled  - last lipid panel done on 12/17/2024, showed a total cholesterol of 197, up from 132,  A triglyceride of 125, down from 137, and HDL of 49, up from 35 and an LDL of 124, up from 75 on 3/28/2023, likely secondary to medication and dietary noncompliance.  -continue with a heart healthy diet and exercise  -Will repeat a lipid panel in 3 to 6 months       Diagnoses and all orders for this visit:    Medicare annual wellness visit, subsequent    Essential hypertension    Gastroesophageal reflux disease without esophagitis    Gastroparesis    Type 2 diabetes mellitus with diabetic autonomic neuropathy, with long-term current use of insulin (HCC)  -     POCT hemoglobin A1c    Acquired hypothyroidism  -     TSH, 3rd generation with Free T4 reflex; Future    Anxiety    Mixed hyperlipidemia  -     Lipid panel; Future    Obesity, morbid ([facility])    Encounter for screening mammogram for breast cancer  Comments:  last mammogram was in Nov 2024 and she will be due for repeat in Nov 2025. + breast ca in P aunt and P great grand aunt    Screening for colorectal cancer  Comments:  last colonoscopy was in 2016 and was normal with a rec for repeat in 10 years. + family hx of colon ca in P uncle.    Osteopenia of other site  Comments:  last dexa scan was in 3/2024 and showed osteopenia. she takes her vit d and ca    Encounter for immunization  Comments:  she is up to date with her 3 covid vaccines, flu shot and pneumococcal vaccine 13 and 23          Subjective:      Patient ID: Kelsea Fong is a 69 y.o. female.    HPI  Patient presents for a medicare annual wellness visit as well as a follow up visit regarding her diabetes mellitus type 2, hypertension, GERD, gastroparesis, hypothyroidism, osteoarthritis, anxiety.  Does not have a healthcare poa ad her insuarnce will send her paper work to get it done.  She had blood work done and wants to review the results.  She states that she ate a lot during the holidays  She used  "to be on chol medications and it bothered her legs and she stopped taking it.  She missed a lot of her thyroid medication.  She states that she takes care of her grand daughter and sometimes she is busy running around the baby and changing her diaper and does not take her medicine early and off and finds that she has eaten and therefore cannot take it for the day.  States that she was \"very bad\" with her diet this season.  She reports that her abd  was bloated and now is getting better when she started eating better, after she saw her lab results.  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  Does not feel as steady or as strong as she used to be but states that she is not afraid of falling.  Last time she fell was 2 years ago and prior to that 4 years ago.  Takes b 12, vit d and biotin  but not a multivitamin.  Anxiety is okay , takes her sertraline as well as her clonazepam 2 x daily  Knee pain is good   Has occasional left hip pain at the site of the carolyn that was placed in her left hip.    The following portions of the patient's history were reviewed and updated as appropriate: She  has a past medical history of Acid reflux, Allergic, Anxiety, Diabetes mellitus (HCC), Disease of thyroid gland (Years), Gastroparesis, GERD (gastroesophageal reflux disease) (Years), Hypertension, Hypothyroid, Obesity (Years), and Shingles (2 years ago).  She   Patient Active Problem List    Diagnosis Date Noted   • BMI 36.0-36.9,adult 05/09/2024   • Limited joint range of motion (ROM) 05/09/2024   • Primary osteoarthritis of right knee 05/01/2024   • Closed displaced fracture of shaft of left clavicle 08/09/2023   • Abscess 01/06/2023   • Mixed hyperlipidemia 05/09/2022   • Gastroparesis 01/24/2022   • Constipation 01/24/2022   • Gastroesophageal reflux disease without esophagitis 01/24/2022   • Achilles " tendinitis, right leg 2021   • Statin intolerance 2021   • Chronic pain of right ankle 2021   • Obesity, morbid (HCC) 02/15/2021   • LFT elevation 2020   • Seasonal allergies 2018   • Fatty liver 2017   • Splenomegaly 2017   • Diabetic gastroparesis  (HCC) 2017   • Hand paresthesia 2016   • Type 2 diabetes mellitus, with long-term current use of insulin (HCC) 12/10/2015   • Irritable bowel syndrome 2015   • Osteoarthritis, hip, bilateral 2015   • Anxiety 2015   • GERD (gastroesophageal reflux disease) 2015   • Hypothyroidism 2015   • Obesity 2011   • Essential hypertension 2011     She  has a past surgical history that includes Cholecystectomy; Dilation and curettage of uterus; Esophagogastroduodenoscopy; pr neuroplasty &/transpos median nrv carpal tunne (Right, 2017); pr tendon sheath incision (Right, 2017); pr tendon sheath incision (Right, 2016); pr colonoscopy flx dx w/collj spec when pfrmd (N/A, 2016); Hysterectomy; Oophorectomy; Colonoscopy; Upper gastrointestinal endoscopy; Root canal; pr optx fem shft fx w/insj imed implt w/wo screw (Left, 2/3/2021);  section (); and Fracture surgery ().  Her family history includes Alcohol abuse in her son; Arthritis in her maternal grandmother; Breast cancer (age of onset: 57) in her paternal aunt; Cirrhosis in her mother; Colon cancer (age of onset: 35) in her paternal uncle; Diabetes in her brother and father; Diabetes type II in her father; Hearing loss in her father; Heart attack in her father; Heart disease in her father and paternal grandfather; No Known Problems in her maternal aunt, maternal aunt, maternal grandfather, paternal grandmother, sister, and sister; Stroke in her paternal grandfather; Testicular cancer (age of onset: 18) in her cousin; Vision loss in her father.  She  reports that she quit smoking about 37 years ago. Her  smoking use included cigarettes. She started smoking about 60 years ago. She has a 46.8 pack-year smoking history. She has never used smokeless tobacco. She reports current alcohol use. She reports that she does not use drugs.  Current Outpatient Medications   Medication Sig Dispense Refill   • BD Pen Needle Kelly 2nd Gen 32G X 4 MM MISC USE FOUR TIMES DAILY AS DIRECTED 400 each 1   • Blood Glucose Monitoring Suppl (OneTouch Verio) w/Device KIT TEST BLOOD GLUCOSE FOUR TIMES DAILY OR AS DIRECTED BY PROVIDER 1 kit 0   • Cholecalciferol (VITAMIN D) 2000 units tablet TAKE ONE TABLET BY MOUTH EVERY DAY 30 tablet 0   • clonazePAM (KlonoPIN) 0.5 mg tablet Take 1 tablet (0.5 mg total) by mouth 2 (two) times a day 60 tablet 0   • cyanocobalamin (VITAMIN B-12) 1,000 mcg tablet Take 1,000 mcg by mouth daily     • docusate sodium (COLACE) 100 mg capsule Take 1 capsule (100 mg total) by mouth 2 (two) times a day as needed for constipation 30 capsule 3   • famotidine (PEPCID) 20 mg tablet Take 1 tablet (20 mg total) by mouth 2 (two) times a day 60 tablet 3   • Fiasp FlexTouch 100 units/mL injection pen INJECT 12 UNITS SUBCUTANEOUSLY (UNDER THE SKIN) THREE TIMES DAILY WITH MEALS 15 mL 5   • glimepiride (AMARYL) 4 mg tablet Take 1 tablet (4 mg total) by mouth in the morning 180 tablet 1   • glucose blood (FREESTYLE LITE) test strip Use 1 each 4 (four) times a day Use 4 times daily DM2-- free style lite test strips 400 each 1   • glucose blood (OneTouch Verio) test strip TEST BLOOD GLUCOSE THREE TIMES DAILY OR AS DIRECTED BY PROVIDER 300 strip 1   • Insulin Glargine Solostar (Lantus SoloStar) 100 UNIT/ML SOPN 25 units in AM and 35 units in PM 15 mL 5   • Lancets (freestyle) lancets Use 4 (four) times a day Test blood glucose 120 each 0   • levothyroxine 100 mcg tablet TAKE 1 TABLET BY MOUTH EVERY DAY 30 tablet 0   • lisinopril-hydrochlorothiazide (PRINZIDE,ZESTORETIC) 20-12.5 MG per tablet Take 0.5 tablets by mouth daily 15 tablet 0    • meloxicam (Mobic) 7.5 mg tablet Take 1 tablet (7.5 mg total) by mouth 2 (two) times a day 60 tablet 0   • metoclopramide (Reglan) 10 mg tablet Take 1 tablet (10 mg total) by mouth 4 (four) times a day 60 tablet 0   • ondansetron (ZOFRAN) 4 mg tablet Take 1 tablet (4 mg total) by mouth every 8 (eight) hours as needed for nausea or vomiting 20 tablet 5   • OneTouch Delica Lancets 33G MISC TEST BLOOD GLUCOSE THREE TIMES DAILY OR AS DIRECTED BY PROVIDER 300 each 1   • sertraline (ZOLOFT) 100 mg tablet TAKE 1 TABLET BY MOUTH TWO TIMES DAILY 60 tablet 5     No current facility-administered medications for this visit.     Current Outpatient Medications on File Prior to Visit   Medication Sig   • BD Pen Needle Kelly 2nd Gen 32G X 4 MM MISC USE FOUR TIMES DAILY AS DIRECTED   • Blood Glucose Monitoring Suppl (OneTouch Verio) w/Device KIT TEST BLOOD GLUCOSE FOUR TIMES DAILY OR AS DIRECTED BY PROVIDER   • Cholecalciferol (VITAMIN D) 2000 units tablet TAKE ONE TABLET BY MOUTH EVERY DAY   • clonazePAM (KlonoPIN) 0.5 mg tablet Take 1 tablet (0.5 mg total) by mouth 2 (two) times a day   • cyanocobalamin (VITAMIN B-12) 1,000 mcg tablet Take 1,000 mcg by mouth daily   • docusate sodium (COLACE) 100 mg capsule Take 1 capsule (100 mg total) by mouth 2 (two) times a day as needed for constipation   • famotidine (PEPCID) 20 mg tablet Take 1 tablet (20 mg total) by mouth 2 (two) times a day   • Fiasp FlexTouch 100 units/mL injection pen INJECT 12 UNITS SUBCUTANEOUSLY (UNDER THE SKIN) THREE TIMES DAILY WITH MEALS   • glimepiride (AMARYL) 4 mg tablet Take 1 tablet (4 mg total) by mouth in the morning   • glucose blood (FREESTYLE LITE) test strip Use 1 each 4 (four) times a day Use 4 times daily DM2-- free style lite test strips   • glucose blood (OneTouch Verio) test strip TEST BLOOD GLUCOSE THREE TIMES DAILY OR AS DIRECTED BY PROVIDER   • Insulin Glargine Solostar (Lantus SoloStar) 100 UNIT/ML SOPN 25 units in AM and 35 units in PM   •  Lancets (freestyle) lancets Use 4 (four) times a day Test blood glucose   • levothyroxine 100 mcg tablet TAKE 1 TABLET BY MOUTH EVERY DAY   • lisinopril-hydrochlorothiazide (PRINZIDE,ZESTORETIC) 20-12.5 MG per tablet Take 0.5 tablets by mouth daily   • meloxicam (Mobic) 7.5 mg tablet Take 1 tablet (7.5 mg total) by mouth 2 (two) times a day   • metoclopramide (Reglan) 10 mg tablet Take 1 tablet (10 mg total) by mouth 4 (four) times a day   • ondansetron (ZOFRAN) 4 mg tablet Take 1 tablet (4 mg total) by mouth every 8 (eight) hours as needed for nausea or vomiting   • OneTouch Delica Lancets 33G MISC TEST BLOOD GLUCOSE THREE TIMES DAILY OR AS DIRECTED BY PROVIDER   • sertraline (ZOLOFT) 100 mg tablet TAKE 1 TABLET BY MOUTH TWO TIMES DAILY     No current facility-administered medications on file prior to visit.     She is allergic to other and metformin..    Review of Systems   Constitutional:  Positive for fatigue. Negative for activity change, chills, fever and unexpected weight change.   HENT:  Negative for ear pain, postnasal drip, rhinorrhea, sinus pressure and sore throat.    Eyes:  Negative for pain.   Respiratory:  Negative for cough, choking, chest tightness, shortness of breath and wheezing.    Cardiovascular:  Negative for chest pain, palpitations and leg swelling.   Gastrointestinal:  Positive for constipation. Negative for abdominal pain, diarrhea, nausea and vomiting.   Genitourinary:  Negative for dysuria and hematuria.   Musculoskeletal:  Positive for arthralgias and myalgias. Negative for back pain, gait problem, joint swelling and neck stiffness.   Skin:  Negative for pallor and rash.   Neurological:  Negative for dizziness, tremors, seizures, syncope, light-headedness and headaches.   Hematological:  Negative for adenopathy.   Psychiatric/Behavioral:  Positive for sleep disturbance. Negative for behavioral problems and dysphoric mood.          Objective:      /80 (BP Location: Left arm,  "Patient Position: Sitting, Cuff Size: Standard)   Pulse 83   Temp (!) 97 °F (36.1 °C) (Temporal)   Ht 5' 1\" (1.549 m)   Wt 88.9 kg (196 lb)   SpO2 94%   BMI 37.03 kg/m²          Physical Exam  Constitutional:       General: She is not in acute distress.     Appearance: She is well-developed. She is not diaphoretic.   HENT:      Head: Normocephalic and atraumatic.      Right Ear: External ear normal.      Left Ear: External ear normal.      Nose: Nose normal.      Mouth/Throat:      Mouth: Mucous membranes are dry.      Pharynx: Posterior oropharyngeal erythema present. No oropharyngeal exudate.   Eyes:      General: No scleral icterus.        Right eye: No discharge.         Left eye: No discharge.      Conjunctiva/sclera: Conjunctivae normal.      Pupils: Pupils are equal, round, and reactive to light.   Neck:      Thyroid: No thyromegaly.      Vascular: No JVD.      Trachea: No tracheal deviation.   Cardiovascular:      Rate and Rhythm: Normal rate and regular rhythm.      Heart sounds: Murmur heard.      Systolic murmur is present with a grade of 1/6.      No friction rub. No gallop.   Pulmonary:      Effort: Pulmonary effort is normal. No respiratory distress.      Breath sounds: Normal breath sounds. No wheezing or rales.   Chest:      Chest wall: No tenderness.   Abdominal:      General: Bowel sounds are normal. There is no distension.      Palpations: Abdomen is soft. There is no mass.      Tenderness: There is no abdominal tenderness. There is no guarding or rebound.   Musculoskeletal:         General: No tenderness or deformity. Normal range of motion.      Cervical back: Normal range of motion and neck supple.   Lymphadenopathy:      Cervical: No cervical adenopathy.   Skin:     General: Skin is warm and dry.      Coloration: Skin is not pale.      Findings: No erythema or rash.   Neurological:      Mental Status: She is alert and oriented to person, place, and time.      Cranial Nerves: No cranial " nerve deficit.      Motor: No abnormal muscle tone.      Coordination: Coordination normal.      Deep Tendon Reflexes: Reflexes are normal and symmetric.   Psychiatric:         Behavior: Behavior normal.           Office Visit on 01/08/2025   Component Date Value Ref Range Status   • Hemoglobin A1C 01/08/2025 7.5 (A)  <=6.5 Final   Orders Only on 12/17/2024   Component Date Value Ref Range Status   • Total Cholesterol 12/17/2024 197  <200 mg/dL Final   • HDL 12/17/2024 49 (L)  > OR = 50 mg/dL Final   • Triglycerides 12/17/2024 125  <150 mg/dL Final   • LDL Calculated 12/17/2024 124 (H)  mg/dL (calc) Final    Comment: Reference range: <100     Desirable range <100 mg/dL for primary prevention;    <70 mg/dL for patients with CHD or diabetic patients   with > or = 2 CHD risk factors.     LDL-C is now calculated using the Raymon   calculation, which is a validated novel method providing   better accuracy than the Friedewald equation in the   estimation of LDL-C.   Jefe BURKS et al. LIZETH. 2013;310(19): 3988-9961   (http://education.[employer]/faq/VNZ625)     • Chol HDLC Ratio 12/17/2024 4.0  <5.0 (calc) Final   • Non-HDL Cholesterol 12/17/2024 148 (H)  <130 mg/dL (calc) Final    Comment: For patients with diabetes plus 1 major ASCVD risk   factor, treating to a non-HDL-C goal of <100 mg/dL   (LDL-C of <70 mg/dL) is considered a therapeutic   option.     • Glucose, Random 12/17/2024 133 (H)  65 - 99 mg/dL Final    Comment:               Fasting reference interval     For someone without known diabetes, a glucose  value >125 mg/dL indicates that they may have  diabetes and this should be confirmed with a  follow-up test.        • BUN 12/17/2024 18  7 - 25 mg/dL Final   • Creatinine 12/17/2024 0.70  0.60 - 1.00 mg/dL Final   • eGFR 12/17/2024 93  > OR = 60 mL/min/1.73m2 Final   • SL AMB BUN/CREATININE RATIO 12/17/2024 SEE NOTE:  6 - 22 (calc) Final    Comment:    Not Reported: BUN and Creatinine are  within     reference range.           • Sodium 12/17/2024 142  135 - 146 mmol/L Final   • Potassium 12/17/2024 4.8  3.5 - 5.3 mmol/L Final   • Chloride 12/17/2024 104  98 - 110 mmol/L Final   • CO2 12/17/2024 31  20 - 32 mmol/L Final   • Calcium 12/17/2024 10.1  8.6 - 10.4 mg/dL Final   • Protein, Total 12/17/2024 7.3  6.1 - 8.1 g/dL Final   • Albumin 12/17/2024 4.3  3.6 - 5.1 g/dL Final   • Globulin 12/17/2024 3.0  1.9 - 3.7 g/dL (calc) Final   • Albumin/Globulin Ratio 12/17/2024 1.4  1.0 - 2.5 (calc) Final   • TOTAL BILIRUBIN 12/17/2024 0.4  0.2 - 1.2 mg/dL Final   • Alkaline Phosphatase 12/17/2024 55  37 - 153 U/L Final   • AST 12/17/2024 15  10 - 35 U/L Final   • ALT 12/17/2024 12  6 - 29 U/L Final   • White Blood Cell Count 12/17/2024 6.2  3.8 - 10.8 Thousand/uL Final   • Red Blood Cell Count 12/17/2024 4.44  3.80 - 5.10 Million/uL Final   • Hemoglobin 12/17/2024 12.3  11.7 - 15.5 g/dL Final   • HCT 12/17/2024 38.2  35.0 - 45.0 % Final   • MCV 12/17/2024 86.0  80.0 - 100.0 fL Final   • MCH 12/17/2024 27.7  27.0 - 33.0 pg Final   • MCHC 12/17/2024 32.2  32.0 - 36.0 g/dL Final    Comment: For adults, a slight decrease in the calculated MCHC  value (in the range of 30 to 32 g/dL) is most likely  not clinically significant; however, it should be  interpreted with caution in correlation with other  red cell parameters and the patient's clinical  condition.     • RDW 12/17/2024 13.9  11.0 - 15.0 % Final   • Platelet Count 12/17/2024 193  140 - 400 Thousand/uL Final   • SL AMB MPV 12/17/2024 10.5  7.5 - 12.5 fL Final   • Neutrophils (Absolute) 12/17/2024 4,123  1,500 - 7,800 cells/uL Final   • Lymphocytes (Absolute) 12/17/2024 1,432  850 - 3,900 cells/uL Final   • Monocytes (Absolute) 12/17/2024 440  200 - 950 cells/uL Final   • Eosinophils (Absolute) 12/17/2024 186  15 - 500 cells/uL Final   • Basophils ABS 12/17/2024 19  0 - 200 cells/uL Final   • Neutrophils 12/17/2024 66.5  % Final   • Lymphocytes 12/17/2024 23.1   % Final   • Monocytes 12/17/2024 7.1  % Final   • Eosinophils 12/17/2024 3.0  % Final   • Basophils PCT 12/17/2024 0.3  % Final   • TSH W/RFX TO FREE T4 12/17/2024 9.78 (H)  0.40 - 4.50 mIU/L Final   • Free t4 12/17/2024 0.8  0.8 - 1.8 ng/dL Final   Office Visit on 03/29/2024   Component Date Value Ref Range Status   • Hemoglobin A1C 03/29/2024 6.7 (A)  <=6.5 Final   • Creatinine, Ur 03/29/2024 86.2  Reference range not established. mg/dL Final   • Albumin,U,Random 03/29/2024 19.3  <20.0 mg/L Final   • Albumin Creat Ratio 03/29/2024 22  0 - 30 mg/g creatinine Final

## 2025-01-08 NOTE — PROGRESS NOTES
Diabetic Foot Exam    Patient's shoes and socks removed.    Right Foot/Ankle   Right Foot Inspection  Skin Exam: skin normal, skin intact and dry skin (with scaliness of the post foot). No warmth, no callus, no erythema, no maceration, no abnormal color, no pre-ulcer, no ulcer and no callus.     Toe Exam: ROM and strength within normal limits. No swelling, no tenderness, erythema and  no right toe deformity    Sensory   Proprioception: intact  Monofilament testing: intact    Vascular  The right DP pulse is 2+. The right PT pulse is 1+.     Left Foot/Ankle  Left Foot Inspection  Skin Exam: skin normal, skin intact and dry skin (with scaliness of the post foot). No warmth, no erythema, no maceration, normal color, no pre-ulcer, no ulcer and no callus.     Toe Exam: No swelling, no tenderness, no erythema and no left toe deformity.     Sensory   Proprioception: intact  Monofilament testing: intact    Vascular  The left DP pulse is 2+. The left PT pulse is 1+.     Assign Risk Category  No deformity present  No loss of protective sensation  No weak pulses  Risk: 0  Name: Kelsea Fong      : 1955      MRN: [id number]  Encounter Provider: Ruth Griffith DO  Encounter Date: 2025   Encounter department: [facility]    Assessment & Plan  Medicare annual wellness visit, subsequent    -Medicare annual wellness visit done   - last colonoscopy was done in  and was normal.  Patient will be due for repeat colonoscopy in .  -  Lung cancer screen is not indicated because patient did not smoke long enough and quit over 20 years ago.  - DEXA scan was done in 2024 and showed osteopenia.  She was counseled to continue with her vitamin D and calcium supplements, as well as weightbearing exercise.  -last mammogram was done in 2024 and she will be due for repeat mammogram in 2025.  She has a family history of breast cancer.  -She is up-to-date with 3 COVID  vaccines, her flu shot, pneumococcal vaccine.  -follow-up in 3 months.    Essential hypertension         Gastroesophageal reflux disease without esophagitis         Gastroparesis         Type 2 diabetes mellitus with diabetic autonomic neuropathy, with long-term current use of insulin ([facility])    Lab Results   Component Value Date    HGBA1C 7.5 (A) 01/08/2025     Orders:  •  POCT hemoglobin A1c    Acquired hypothyroidism    Orders:  •  TSH, 3rd generation with Free T4 reflex; Future    Anxiety         Mixed hyperlipidemia    Orders:  •  Lipid panel; Future    Obesity, morbid ([facility])         Encounter for screening mammogram for breast cancer         Screening for colorectal cancer         Osteopenia of other site         Encounter for immunization           Depression Screening and Follow-up Plan: Patient was screened for depression during today's encounter. They screened negative with a PHQ-2 score of 0.    Falls Plan of Care: Medications that increase falls were reviewed. Vitamin D supplementation was recommended.     Urinary Incontinence Plan of Care: counseling topics discussed: limit alcohol, caffeine, spicy foods, and acidic foods, limiting fluid intake 3-4 hours before bed, preventing constipation and improving blood sugar control.       Preventive health issues were discussed with patient, and age appropriate screening tests were ordered as noted in patient's After Visit Summary. Personalized health advice and appropriate referrals for health education or preventive services given if needed, as noted in patient's After Visit Summary.    History of Present Illness     Constipation       Patient Care Team:  Ruth Griffith DO as PCP - General (Internal Medicine)  MD Anurag Gonzalez MD as Endoscopist    Review of Systems   Gastrointestinal:  Positive for constipation.     Medical History Reviewed by provider this encounter:  Tobacco  Allergies  Meds  Problems  Med Hx  Surg Hx  Fam Hx        Annual Wellness Visit Questionnaire   Kelsea is here for her Subsequent Wellness visit.     Health Risk Assessment:   Patient rates overall health as fair. Patient feels that their physical health rating is slightly worse. Patient is satisfied with their life. Eyesight was rated as slightly worse. Hearing was rated as same. Patient feels that their emotional and mental health rating is same. Patients states they are never, rarely angry. Patient states they are often unusually tired/fatigued. Pain experienced in the last 7 days has been some. Patient's pain rating has been 3/10. Patient states that she has experienced no weight loss or gain in last 6 months.     Depression Screening:   PHQ-2 Score: 0      Fall Risk Screening:   In the past year, patient has experienced: no history of falling in past year      Urinary Incontinence Screening:   Patient has leaked urine accidently in the last six months.     Home Safety:  Patient does not have trouble with stairs inside or outside of their home. Patient has working smoke alarms and has working carbon monoxide detector. Home safety hazards include: none.     Nutrition:   Current diet is Diabetic.     Medications:   Patient is not currently taking any over-the-counter supplements. Patient is able to manage medications.     Activities of Daily Living (ADLs)/Instrumental Activities of Daily Living (IADLs):   Walk and transfer into and out of bed and chair?: Yes  Dress and groom yourself?: Yes    Bathe or shower yourself?: Yes    Feed yourself? Yes  Do your laundry/housekeeping?: Yes  Manage your money, pay your bills and track your expenses?: Yes  Make your own meals?: Yes    Do your own shopping?: Yes    Previous Hospitalizations:   Any hospitalizations or ED visits within the last 12 months?: No      Advance Care Planning:   Living will: No      PREVENTIVE SCREENINGS      Cardiovascular Screening:    General: Screening Not Indicated and History Lipid Disorder       "Diabetes Screening:     General: Screening Not Indicated and History Diabetes      Colorectal Cancer Screening:     General: Screening Current      Breast Cancer Screening:     General: Screening Current      Cervical Cancer Screening:    General: Screening Not Indicated      Osteoporosis Screening:    General: Screening Current      Lung Cancer Screening:     General: Screening Not Indicated      Hepatitis C Screening:    General: Screening Current    Screening, Brief Intervention, and Referral to Treatment (SBIRT)    Screening      Single Item Drug Screening:  How often have you used an illegal drug (including marijuana) or a prescription medication for non-medical reasons in the past year? never    Single Item Drug Screen Score: 0  Interpretation: Negative screen for possible drug use disorder    Social Drivers of Health     Financial Resource Strain: Low Risk  (3/29/2023)    Overall Financial Resource Strain (CARDIA)    • Difficulty of Paying Living Expenses: Not hard at all   Food Insecurity: No Food Insecurity (1/8/2025)    Hunger Vital Sign    • Worried About Running Out of Food in the Last Year: Never true    • Ran Out of Food in the Last Year: Never true   Transportation Needs: No Transportation Needs (1/8/2025)    PRAPARE - Transportation    • Lack of Transportation (Medical): No    • Lack of Transportation (Non-Medical): No   Housing Stability: Unknown (1/8/2025)    Housing Stability Vital Sign    • Unable to Pay for Housing in the Last Year: No    • Homeless in the Last Year: No   Utilities: Not At Risk (1/8/2025)    [facility] Utilities    • Threatened with loss of utilities: No     No results found.    Objective   /80 (BP Location: Left arm, Patient Position: Sitting, Cuff Size: Standard)   Pulse 83   Temp (!) 97 °F (36.1 °C) (Temporal)   Ht 5' 1\" (1.549 m)   Wt 88.9 kg (196 lb)   SpO2 94%   BMI 37.03 kg/m²     Physical Exam  Cardiovascular:      Pulses: no weak pulses.           Dorsalis pedis " pulses are 2+ on the right side and 2+ on the left side.        Posterior tibial pulses are 1+ on the right side and 1+ on the left side.   Feet:      Right foot:      Skin integrity: Dry skin (with scaliness of the post foot) present. No ulcer, skin breakdown, erythema, warmth or callus.      Left foot:      Skin integrity: Dry skin (with scaliness of the post foot) present. No ulcer, skin breakdown, erythema, warmth or callus.

## 2025-01-08 NOTE — PATIENT INSTRUCTIONS
Medicare Preventive Visit Patient Instructions  Thank you for completing your Welcome to Medicare Visit or Medicare Annual Wellness Visit today. Your next wellness visit will be due in one year (1/9/2026).  The screening/preventive services that you may require over the next 5-10 years are detailed below. Some tests may not apply to you based off risk factors and/or age. Screening tests ordered at today's visit but not completed yet may show as past due. Also, please note that scanned in results may not display below.  Preventive Screenings:  Service Recommendations Previous Testing/Comments   Colorectal Cancer Screening  * Colonoscopy    * Fecal Occult Blood Test (FOBT)/Fecal Immunochemical Test (FIT)  * Fecal DNA/Cologuard Test  * Flexible Sigmoidoscopy Age: 45-75 years old   Colonoscopy: every 10 years (may be performed more frequently if at higher risk)  OR  FOBT/FIT: every 1 year  OR  Cologuard: every 3 years  OR  Sigmoidoscopy: every 5 years  Screening may be recommended earlier than age 45 if at higher risk for colorectal cancer. Also, an individualized decision between you and your healthcare provider will decide whether screening between the ages of 76-85 would be appropriate. Colonoscopy: 01/28/2016  FOBT/FIT: Not on file  Cologuard: Not on file  Sigmoidoscopy: Not on file    Screening Current     Breast Cancer Screening Age: 40+ years old  Frequency: every 1-2 years  Not required if history of left and right mastectomy Mammogram: 11/18/2024    Screening Current   Cervical Cancer Screening Between the ages of 21-29, pap smear recommended once every 3 years.   Between the ages of 30-65, can perform pap smear with HPV co-testing every 5 years.   Recommendations may differ for women with a history of total hysterectomy, cervical cancer, or abnormal pap smears in past. Pap Smear: Not on file    Screening Not Indicated   Hepatitis C Screening Once for adults born between 1945 and 1965  More frequently in  patients at high risk for Hepatitis C Hep C Antibody: 07/15/2019    Screening Current   Diabetes Screening 1-2 times per year if you're at risk for diabetes or have pre-diabetes Fasting glucose: 232 mg/dL (3/23/2020)  A1C: 6.7 (3/29/2024)  Screening Not Indicated  History Diabetes   Cholesterol Screening Once every 5 years if you don't have a lipid disorder. May order more often based on risk factors. Lipid panel: 12/17/2024    Screening Not Indicated  History Lipid Disorder     Other Preventive Screenings Covered by Medicare:  Abdominal Aortic Aneurysm (AAA) Screening: covered once if your at risk. You're considered to be at risk if you have a family history of AAA.  Lung Cancer Screening: covers low dose CT scan once per year if you meet all of the following conditions: (1) Age 55-77; (2) No signs or symptoms of lung cancer; (3) Current smoker or have quit smoking within the last 15 years; (4) You have a tobacco smoking history of at least 20 pack years (packs per day multiplied by number of years you smoked); (5) You get a written order from a healthcare provider.  Glaucoma Screening: covered annually if you're considered high risk: (1) You have diabetes OR (2) Family history of glaucoma OR (3)  aged 50 and older OR (4)  American aged 65 and older  Osteoporosis Screening: covered every 2 years if you meet one of the following conditions: (1) You're estrogen deficient and at risk for osteoporosis based off medical history and other findings; (2) Have a vertebral abnormality; (3) On glucocorticoid therapy for more than 3 months; (4) Have primary hyperparathyroidism; (5) On osteoporosis medications and need to assess response to drug therapy.   Last bone density test (DXA Scan): 03/21/2024.  HIV Screening: covered annually if you're between the age of 15-65. Also covered annually if you are younger than 15 and older than 65 with risk factors for HIV infection. For pregnant patients, it is  covered up to 3 times per pregnancy.    Immunizations:  Immunization Recommendations   Influenza Vaccine Annual influenza vaccination during flu season is recommended for all persons aged >= 6 months who do not have contraindications   Pneumococcal Vaccine   * Pneumococcal conjugate vaccine = PCV13 (Prevnar 13), PCV15 (Vaxneuvance), PCV20 (Prevnar 20)  * Pneumococcal polysaccharide vaccine = PPSV23 (Pneumovax) Adults 19-65 yo with certain risk factors or if 65+ yo  If never received any pneumonia vaccine: recommend Prevnar 20 (PCV20)  Give PCV20 if previously received 1 dose of PCV13 or PPSV23   Hepatitis B Vaccine 3 dose series if at intermediate or high risk (ex: diabetes, end stage renal disease, liver disease)   Respiratory syncytial virus (RSV) Vaccine - COVERED BY MEDICARE PART D  * RSVPreF3 (Arexvy) CDC recommends that adults 60 years of age and older may receive a single dose of RSV vaccine using shared clinical decision-making (SCDM)   Tetanus (Td) Vaccine - COST NOT COVERED BY MEDICARE PART B Following completion of primary series, a booster dose should be given every 10 years to maintain immunity against tetanus. Td may also be given as tetanus wound prophylaxis.   Tdap Vaccine - COST NOT COVERED BY MEDICARE PART B Recommended at least once for all adults. For pregnant patients, recommended with each pregnancy.   Shingles Vaccine (Shingrix) - COST NOT COVERED BY MEDICARE PART B  2 shot series recommended in those 19 years and older who have or will have weakened immune systems or those 50 years and older     Health Maintenance Due:      Topic Date Due   • Breast Cancer Screening: Mammogram  11/18/2025   • Colorectal Cancer Screening  01/28/2026   • DXA SCAN  03/21/2026   • Hepatitis C Screening  Completed     Immunizations Due:      Topic Date Due   • Pneumococcal Vaccine: 65+ Years (3 of 3 - PPSV23 or PCV20) 03/26/2024   • COVID-19 Vaccine (6 - 2024-25 season) 09/01/2024     Advance Directives   What are  advance directives?  Advance directives are legal documents that state your wishes and plans for medical care. These plans are made ahead of time in case you lose your ability to make decisions for yourself. Advance directives can apply to any medical decision, such as the treatments you want, and if you want to donate organs.   What are the types of advance directives?  There are many types of advance directives, and each state has rules about how to use them. You may choose a combination of any of the following:  Living will:  This is a written record of the treatment you want. You can also choose which treatments you do not want, which to limit, and which to stop at a certain time. This includes surgery, medicine, IV fluid, and tube feedings.   Durable power of  for healthcare (DPAHC):  This is a written record that states who you want to make healthcare choices for you when you are unable to make them for yourself. This person, called a proxy, is usually a family member or a friend. You may choose more than 1 proxy.  Do not resuscitate (DNR) order:  A DNR order is used in case your heart stops beating or you stop breathing. It is a request not to have certain forms of treatment, such as CPR. A DNR order may be included in other types of advance directives.  Medical directive:  This covers the care that you want if you are in a coma, near death, or unable to make decisions for yourself. You can list the treatments you want for each condition. Treatment may include pain medicine, surgery, blood transfusions, dialysis, IV or tube feedings, and a ventilator (breathing machine).  Values history:  This document has questions about your views, beliefs, and how you feel and think about life. This information can help others choose the care that you would choose.  Why are advance directives important?  An advance directive helps you control your care. Although spoken wishes may be used, it is better to have your  wishes written down. Spoken wishes can be misunderstood, or not followed. Treatments may be given even if you do not want them. An advance directive may make it easier for your family to make difficult choices about your care.   Urinary Incontinence   Urinary incontinence (UI)  is when you lose control of your bladder. UI develops because your bladder cannot store or empty urine properly. The 3 most common types of UI are stress incontinence, urge incontinence, or both.  Medicines:   May be given to help strengthen your bladder control. Report any side effects of medication to your healthcare provider.  Do pelvic muscle exercises often:  Your pelvic muscles help you stop urinating. Squeeze these muscles tight for 5 seconds, then relax for 5 seconds. Gradually work up to squeezing for 10 seconds. Do 3 sets of 15 repetitions a day, or as directed. This will help strengthen your pelvic muscles and improve bladder control.  Train your bladder:  Go to the bathroom at set times, such as every 2 hours, even if you do not feel the urge to go. You can also try to hold your urine when you feel the urge to go. For example, hold your urine for 5 minutes when you feel the urge to go. As that becomes easier, hold your urine for 10 minutes.   Self-care:   Keep a UI record.  Write down how often you leak urine and how much you leak. Make a note of what you were doing when you leaked urine.  Drink liquids as directed. You may need to limit the amount of liquid you drink to help control your urine leakage. Do not drink any liquid right before you go to bed. Limit or do not have drinks that contain caffeine or alcohol.   Prevent constipation.  Eat a variety of high-fiber foods. Good examples are high-fiber cereals, beans, vegetables, and whole-grain breads. Walking is the best way to trigger your intestines to have a bowel movement.  Exercise regularly and maintain a healthy weight.  Weight loss and exercise will decrease pressure on  your bladder and help you control your leakage.   Use a catheter as directed  to help empty your bladder. A catheter is a tiny, plastic tube that is put into your bladder to drain your urine.   Go to behavior therapy as directed.  Behavior therapy may be used to help you learn to control your urge to urinate.    Weight Management   Why it is important to manage your weight:  Being overweight increases your risk of health conditions such as heart disease, high blood pressure, type 2 diabetes, and certain types of cancer. It can also increase your risk for osteoarthritis, sleep apnea, and other respiratory problems. Aim for a slow, steady weight loss. Even a small amount of weight loss can lower your risk of health problems.  How to lose weight safely:  A safe and healthy way to lose weight is to eat fewer calories and get regular exercise. You can lose up about 1 pound a week by decreasing the number of calories you eat by 500 calories each day.   Healthy meal plan for weight management:  A healthy meal plan includes a variety of foods, contains fewer calories, and helps you stay healthy. A healthy meal plan includes the following:  Eat whole-grain foods more often.  A healthy meal plan should contain fiber. Fiber is the part of grains, fruits, and vegetables that is not broken down by your body. Whole-grain foods are healthy and provide extra fiber in your diet. Some examples of whole-grain foods are whole-wheat breads and pastas, oatmeal, brown rice, and bulgur.  Eat a variety of vegetables every day.  Include dark, leafy greens such as spinach, kale, jg greens, and mustard greens. Eat yellow and orange vegetables such as carrots, sweet potatoes, and winter squash.   Eat a variety of fruits every day.  Choose fresh or canned fruit (canned in its own juice or light syrup) instead of juice. Fruit juice has very little or no fiber.  Eat low-fat dairy foods.  Drink fat-free (skim) milk or 1% milk. Eat fat-free  yogurt and low-fat cottage cheese. Try low-fat cheeses such as mozzarella and other reduced-fat cheeses.  Choose meat and other protein foods that are low in fat.  Choose beans or other legumes such as split peas or lentils. Choose fish, skinless poultry (chicken or turkey), or lean cuts of red meat (beef or pork). Before you cook meat or poultry, cut off any visible fat.   Use less fat and oil.  Try baking foods instead of frying them. Add less fat, such as margarine, sour cream, regular salad dressing and mayonnaise to foods. Eat fewer high-fat foods. Some examples of high-fat foods include french fries, doughnuts, ice cream, and cakes.  Eat fewer sweets.  Limit foods and drinks that are high in sugar. This includes candy, cookies, regular soda, and sweetened drinks.  Exercise:  Exercise at least 30 minutes per day on most days of the week. Some examples of exercise include walking, biking, dancing, and swimming. You can also fit in more physical activity by taking the stairs instead of the elevator or parking farther away from stores. Ask your healthcare provider about the best exercise plan for you.      © Copyright [employer] 2018 Information is for End User's use only and may not be sold, redistributed or otherwise used for commercial purposes. All illustrations and images included in CareNotes® are the copyrighted property of [employer].A[employer]M., Inc. or [employer]

## 2025-01-08 NOTE — ASSESSMENT & PLAN NOTE
Lab Results   Component Value Date    HGBA1C 7.5 (A) 01/08/2025     Orders:  •  POCT hemoglobin A1c

## 2025-01-17 DIAGNOSIS — I10 ESSENTIAL HYPERTENSION, BENIGN: ICD-10-CM

## 2025-01-17 RX ORDER — LISINOPRIL AND HYDROCHLOROTHIAZIDE 12.5; 2 MG/1; MG/1
0.5 TABLET ORAL DAILY
Qty: 15 TABLET | Refills: 1 | Status: SHIPPED | OUTPATIENT
Start: 2025-01-17

## 2025-01-22 ENCOUNTER — TELEPHONE (OUTPATIENT)
Age: 70
End: 2025-01-22

## 2025-01-22 DIAGNOSIS — E11.8 TYPE 2 DIABETES MELLITUS WITH COMPLICATION, WITH LONG-TERM CURRENT USE OF INSULIN (HCC): ICD-10-CM

## 2025-01-22 DIAGNOSIS — Z79.4 TYPE 2 DIABETES MELLITUS WITHOUT COMPLICATION, WITH LONG-TERM CURRENT USE OF INSULIN (HCC): ICD-10-CM

## 2025-01-22 DIAGNOSIS — E11.9 TYPE 2 DIABETES MELLITUS WITHOUT COMPLICATION, WITHOUT LONG-TERM CURRENT USE OF INSULIN (HCC): ICD-10-CM

## 2025-01-22 DIAGNOSIS — E11.649 UNCONTROLLED TYPE 2 DIABETES MELLITUS WITH HYPOGLYCEMIA WITHOUT COMA (HCC): ICD-10-CM

## 2025-01-22 DIAGNOSIS — E11.9 TYPE 2 DIABETES MELLITUS WITHOUT COMPLICATION, WITH LONG-TERM CURRENT USE OF INSULIN (HCC): ICD-10-CM

## 2025-01-22 DIAGNOSIS — Z79.4 TYPE 2 DIABETES MELLITUS WITH COMPLICATION, WITH LONG-TERM CURRENT USE OF INSULIN (HCC): ICD-10-CM

## 2025-01-22 DIAGNOSIS — E11.00 UNCONTROLLED TYPE 2 DIABETES MELLITUS WITH HYPEROSMOLARITY WITHOUT COMA, WITHOUT LONG-TERM CURRENT USE OF INSULIN (HCC): ICD-10-CM

## 2025-01-22 RX ORDER — BLOOD SUGAR DIAGNOSTIC
STRIP MISCELLANEOUS
Qty: 300 STRIP | Refills: 1 | Status: SHIPPED | OUTPATIENT
Start: 2025-01-22

## 2025-01-22 NOTE — TELEPHONE ENCOUNTER
Medication: glucose blood (OneTouch Verio) test strip     Dose/Frequency: TEST BLOOD GLUCOSE THREE TIMES DAILY OR AS DIRECTED BY PROVIDER     Quantity: 300    Pharmacy: [id number] - [place], [place] [facility] [address]     Office:   [x] PCP/Provider - Joey Dillon MD   [] Speciality/Provider -     Does the patient have enough for 3 days?   [] Yes   [x] No - Send as HP to POD

## 2025-01-22 NOTE — TELEPHONE ENCOUNTER
Kelsea asked if the test strips (freestyle Lite) can be removed from the list because she does not use it anymore.

## 2025-02-12 DIAGNOSIS — E11.00 UNCONTROLLED TYPE 2 DIABETES MELLITUS WITH HYPEROSMOLARITY WITHOUT COMA, WITHOUT LONG-TERM CURRENT USE OF INSULIN (HCC): ICD-10-CM

## 2025-02-12 RX ORDER — PEN NEEDLE, DIABETIC 32GX 5/32"
NEEDLE, DISPOSABLE MISCELLANEOUS
Qty: 400 EACH | Refills: 1 | Status: SHIPPED | OUTPATIENT
Start: 2025-02-12

## 2025-02-13 DIAGNOSIS — F41.8 ANXIOUS DEPRESSION: ICD-10-CM

## 2025-02-13 RX ORDER — CLONAZEPAM 0.5 MG/1
0.5 TABLET ORAL 2 TIMES DAILY
Qty: 60 TABLET | Refills: 0 | Status: SHIPPED | OUTPATIENT
Start: 2025-02-13

## 2025-02-13 NOTE — TELEPHONE ENCOUNTER
NOV 2/17/25      Left message on machine for patient to call office to schedule sooner appt to be seen if she wants to discuss stronger pain medication .

## 2025-02-13 NOTE — TELEPHONE ENCOUNTER
Medication: clonazePAM (KlonoPIN) 0.5 mg tablet     Dose/Frequency: Take 1 tablet (0.5 mg total) by mouth 2 (two) times a day     Quantity: 60    Pharmacy: [facility]    Office:   [x] PCP/Provider - Dr Camilo  [] Speciality/Provider -     Does the patient have enough for 3 days?   [x] Yes   [] No - Send as HP to POD

## 2025-02-13 NOTE — TELEPHONE ENCOUNTER
Pt stating she wants to know if before appt Monday can Dr Griffith prescribe something stronger than Tylenol.  It's not helping.    Pt has swollen knee pain, twisted left knee and it's tender to the touch.     [facility] [place].

## 2025-02-17 ENCOUNTER — TELEPHONE (OUTPATIENT)
Age: 70
End: 2025-02-17

## 2025-02-17 ENCOUNTER — APPOINTMENT (OUTPATIENT)
Dept: RADIOLOGY | Age: 70
End: 2025-02-17
Payer: COMMERCIAL

## 2025-02-17 ENCOUNTER — OFFICE VISIT (OUTPATIENT)
Dept: INTERNAL MEDICINE CLINIC | Age: 70
End: 2025-02-17
Payer: COMMERCIAL

## 2025-02-17 VITALS
RESPIRATION RATE: 20 BRPM | DIASTOLIC BLOOD PRESSURE: 76 MMHG | BODY MASS INDEX: 37.27 KG/M2 | SYSTOLIC BLOOD PRESSURE: 128 MMHG | WEIGHT: 197.4 LBS | HEART RATE: 70 BPM | TEMPERATURE: 97.5 F | OXYGEN SATURATION: 97 % | HEIGHT: 61 IN

## 2025-02-17 DIAGNOSIS — E03.9 HYPOTHYROIDISM, UNSPECIFIED TYPE: ICD-10-CM

## 2025-02-17 DIAGNOSIS — M17.12 ARTHRITIS OF KNEE, LEFT: Primary | ICD-10-CM

## 2025-02-17 DIAGNOSIS — E11.43 TYPE 2 DIABETES MELLITUS WITH DIABETIC AUTONOMIC NEUROPATHY, WITH LONG-TERM CURRENT USE OF INSULIN (HCC): ICD-10-CM

## 2025-02-17 DIAGNOSIS — Z79.4 TYPE 2 DIABETES MELLITUS WITH DIABETIC AUTONOMIC NEUROPATHY, WITH LONG-TERM CURRENT USE OF INSULIN (HCC): ICD-10-CM

## 2025-02-17 DIAGNOSIS — M17.12 ARTHRITIS OF KNEE, LEFT: ICD-10-CM

## 2025-02-17 DIAGNOSIS — K59.00 CONSTIPATION, UNSPECIFIED CONSTIPATION TYPE: ICD-10-CM

## 2025-02-17 DIAGNOSIS — E11.649 UNCONTROLLED TYPE 2 DIABETES MELLITUS WITH HYPOGLYCEMIA WITHOUT COMA (HCC): ICD-10-CM

## 2025-02-17 DIAGNOSIS — Z79.4 TYPE 2 DIABETES MELLITUS WITH COMPLICATION, WITH LONG-TERM CURRENT USE OF INSULIN (HCC): ICD-10-CM

## 2025-02-17 DIAGNOSIS — E11.8 TYPE 2 DIABETES MELLITUS WITH COMPLICATION, WITH LONG-TERM CURRENT USE OF INSULIN (HCC): ICD-10-CM

## 2025-02-17 DIAGNOSIS — F41.9 ANXIETY: ICD-10-CM

## 2025-02-17 DIAGNOSIS — K31.84 GASTROPARESIS: ICD-10-CM

## 2025-02-17 PROCEDURE — 99214 OFFICE O/P EST MOD 30 MIN: CPT | Performed by: INTERNAL MEDICINE

## 2025-02-17 PROCEDURE — 73562 X-RAY EXAM OF KNEE 3: CPT

## 2025-02-17 PROCEDURE — G2211 COMPLEX E/M VISIT ADD ON: HCPCS | Performed by: INTERNAL MEDICINE

## 2025-02-17 RX ORDER — INSULIN GLARGINE 100 [IU]/ML
INJECTION, SOLUTION SUBCUTANEOUS
Qty: 15 ML | Refills: 5 | Status: SHIPPED | OUTPATIENT
Start: 2025-02-17

## 2025-02-17 RX ORDER — GLIMEPIRIDE 4 MG/1
4 TABLET ORAL DAILY
Qty: 30 TABLET | Refills: 5 | Status: SHIPPED | OUTPATIENT
Start: 2025-02-17

## 2025-02-17 RX ORDER — FAMOTIDINE 20 MG/1
20 TABLET, FILM COATED ORAL 2 TIMES DAILY
Qty: 60 TABLET | Refills: 3 | Status: SHIPPED | OUTPATIENT
Start: 2025-02-17

## 2025-02-17 RX ORDER — PREDNISONE 20 MG/1
20 TABLET ORAL
Qty: 7 TABLET | Refills: 0 | Status: SHIPPED | OUTPATIENT
Start: 2025-02-17

## 2025-02-17 RX ORDER — LEVOTHYROXINE SODIUM 100 UG/1
100 TABLET ORAL DAILY
Qty: 30 TABLET | Refills: 5 | Status: SHIPPED | OUTPATIENT
Start: 2025-02-17

## 2025-02-17 RX ORDER — SERTRALINE HYDROCHLORIDE 100 MG/1
100 TABLET, FILM COATED ORAL 2 TIMES DAILY
Qty: 60 TABLET | Refills: 5 | Status: SHIPPED | OUTPATIENT
Start: 2025-02-17

## 2025-02-17 RX ORDER — GABAPENTIN 100 MG/1
100 CAPSULE ORAL 3 TIMES DAILY
Qty: 90 CAPSULE | Refills: 1 | Status: SHIPPED | OUTPATIENT
Start: 2025-02-17 | End: 2025-02-17

## 2025-02-17 NOTE — PROGRESS NOTES
I called and discussed with patient.  Prednisone 20 mg daily for 7 days prescribed for patient's arthritis.  She was counseled to take the medication in the morning with food.    Patient states that she cannot take gabapentin that was prescribed earlier during her visit because of the side effects she had in the past (which she cannot remember) so will discontinue gabapentin.  She states that her son wants her  to be prescribed Toradol but I explained to her that because of her GERD and sertraline which the medication will interact with, she cannot have any NSAIDs.    I have prescribed famotidine because of her history of GERD especially as she will be on prednisone.  She was also counseled that prednisone will increase her blood sugar.  Her x-ray of the left knee shows a small effusion.  She was counseled to follow-up ASAP with orthopedic surgery.  Of note, a referral to orthopedic surgery was given to her during her visit in the morning.

## 2025-02-17 NOTE — ASSESSMENT & PLAN NOTE
-We will order an x-ray of the left knee and follow-up with results  -Will prescribe gabapentin for patient and she was counseled to take 1 tablet today, take it 2 times a day tomorrow and then 3 times a day on the third day  -Will also refer her to orthopedic surgery  -Continue with a walking cane.    Orders:  •  XR knee 3 vw left non injury; Future  •  gabapentin (Neurontin) 100 mg capsule; Take 1 capsule (100 mg total) by mouth 3 (three) times a day  •  Ambulatory Referral to Orthopedic Surgery; Future

## 2025-02-17 NOTE — TELEPHONE ENCOUNTER
Pt calling back saying she's in a lot of pain and wanting to  the prescription while she's out now. I advised her it was sent as HP already and still waiting for PCP to review.    Please advise, TY.

## 2025-02-17 NOTE — TELEPHONE ENCOUNTER
Patient calling about the status of the prescription from earlier calls, patient would like for it to be sent over tonight.    Please advise out to patient once this has been sent over to the pharmacy.

## 2025-02-17 NOTE — TELEPHONE ENCOUNTER
Patient called to ask if something other than gabapentin be prescribed for her knee pain. She said that her son told her that she had bad side effects from taking it before. I asked what the side effects were just so I could let the doctor know and her son yelled from the background saying that he works on an ambulance and he's seen patients have bad reactions to the medication and its for neuropathy and not pain. I did let them know that it is prescribed for a bunch of different reasons not just neuropathy but if she has side effects from it, we don't want her to go through that again. I couldn't find in her chart where she had taken it before and it gave her side effects so I am unable to provide what side effects she had.     Patient states she needs something because she is in a lot of pain.       Please advise and call patient.

## 2025-02-17 NOTE — ASSESSMENT & PLAN NOTE
-Stable  -Continue sertraline  Orders:  •  sertraline (ZOLOFT) 100 mg tablet; Take 1 tablet (100 mg total) by mouth 2 (two) times a day

## 2025-02-17 NOTE — ASSESSMENT & PLAN NOTE
- blood glucose is not optimally controlled,  last hemoglobin A1c was 7.5  -continue with glimepiride, insulin Lantus  -continue with a diabetic diet low in carbohydrates and simple sugars  -continue with exercise  -Will order repeat hemoglobin A1c prior to next visit  -Albumin/creatinine ratio in urine was ordered today  -Referral to ophthalmology for diabetic eye exam was done today    Lab Results   Component Value Date    HGBA1C 7.5 (A) 01/08/2025     Orders:  •  Ambulatory Referral to Ophthalmology; Future  •  Hemoglobin A1C; Future  •  Albumin / creatinine urine ratio; Future

## 2025-02-17 NOTE — TELEPHONE ENCOUNTER
Pt called back requesting Toradol for pain called into her [facility] pharmacy please

## 2025-02-17 NOTE — PROGRESS NOTES
Name: Kelsea Fong      : 1955      MRN: [id number]  Encounter Provider: Ruth Griffith DO  Encounter Date: 2025   Encounter department: [facility]  :  Assessment & Plan  Arthritis of knee, left  -We will order an x-ray of the left knee and follow-up with results  -Will prescribe gabapentin for patient and she was counseled to take 1 tablet today, take it 2 times a day tomorrow and then 3 times a day on the third day  -Will also refer her to orthopedic surgery  -Continue with a walking cane.    Orders:  •  XR knee 3 vw left non injury; Future  •  gabapentin (Neurontin) 100 mg capsule; Take 1 capsule (100 mg total) by mouth 3 (three) times a day  •  Ambulatory Referral to Orthopedic Surgery; Future    Type 2 diabetes mellitus with diabetic autonomic neuropathy, with long-term current use of insulin (HCC)  - blood glucose is not optimally controlled,  last hemoglobin A1c was 7.5  -continue with glimepiride, insulin Lantus  -continue with a diabetic diet low in carbohydrates and simple sugars  -continue with exercise  -Will order repeat hemoglobin A1c prior to next visit  -Albumin/creatinine ratio in urine was ordered today  -Referral to ophthalmology for diabetic eye exam was done today    Lab Results   Component Value Date    HGBA1C 7.5 (A) 2025     Orders:  •  Ambulatory Referral to Ophthalmology; Future  •  Hemoglobin A1C; Future  •  Albumin / creatinine urine ratio; Future    Type 2 diabetes mellitus with complication, with long-term current use of insulin (HCC)    Lab Results   Component Value Date    HGBA1C 7.5 (A) 2025     Orders:  •  glimepiride (AMARYL) 4 mg tablet; Take 1 tablet (4 mg total) by mouth in the morning    Hypothyroidism, unspecified type  -Stable  -Continue levothyroxine, which we will refill today as requested  Orders:  •  levothyroxine 100 mcg tablet; Take 1 tablet (100 mcg total) by mouth daily    Anxiety  -Stable  -Continue  sertraline  Orders:  •  sertraline (ZOLOFT) 100 mg tablet; Take 1 tablet (100 mg total) by mouth 2 (two) times a day    Uncontrolled type 2 diabetes mellitus with hypoglycemia without coma ([facility])    Lab Results   Component Value Date    HGBA1C 7.5 (A) 01/08/2025     Orders:  •  Insulin Glargine Solostar (Lantus SoloStar) 100 UNIT/ML SOPN; 25 units in AM and 35 units in PM    Constipation, unspecified constipation type  -Stable  -Keep well-hydrated and eat sufficient fiber             Falls Plan of Care: Recommended assistive device to help with gait and balance. Medications that increase falls were reviewed. Vitamin D supplementation was recommended.       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  Pain is 10/10 and she did not go to the ED cos she does not like to go to the ED  Has been taking OTC analgesics  Blood sugar has been good - this am it was 147 and the other day it was 89  and she states that she has been taking her thyroid meds everyday and needs a refill   She denies any fever, chills, night sweats, headache, dizziness, nasal congestion, runny nose, pnd, sore throat, ear ache, sinus pain or pressure, wheezing, cough, chest pain, sob, palpitations, nausea, vomiting, diarrhea, constipation, hematochezia, hematuria, melena stools,  myalgias, feelings of anxiety, depression or insomnia.      Review of Systems   Constitutional:  Negative for activity change, chills, fatigue, fever and unexpected weight change.   HENT:  Negative for ear pain, postnasal drip, rhinorrhea, sinus  "pressure and sore throat.    Eyes:  Negative for pain.   Respiratory:  Negative for cough, choking, chest tightness, shortness of breath and wheezing.    Cardiovascular:  Negative for chest pain, palpitations and leg swelling.   Gastrointestinal:  Negative for abdominal pain, constipation, diarrhea, nausea and vomiting.   Genitourinary:  Negative for dysuria and hematuria.   Musculoskeletal:  Positive for arthralgias (left knee pain and swelling) and gait problem. Negative for back pain, joint swelling, myalgias and neck stiffness.   Skin:  Negative for pallor and rash.   Neurological:  Negative for dizziness, tremors, seizures, syncope, light-headedness and headaches.   Hematological:  Negative for adenopathy.   Psychiatric/Behavioral:  Negative for behavioral problems.        Objective   /76 (BP Location: Left arm, Patient Position: Sitting, Cuff Size: Large)   Pulse 70   Temp 97.5 °F (36.4 °C) (Temporal)   Resp 20   Ht 5' 1\" (1.549 m)   Wt 89.5 kg (197 lb 6.4 oz)   SpO2 97%   BMI 37.30 kg/m²      Physical Exam  Constitutional:       General: She is not in acute distress.     Appearance: She is well-developed. She is not diaphoretic.   HENT:      Head: Normocephalic and atraumatic.      Right Ear: External ear normal.      Left Ear: External ear normal.      Nose: Nose normal.      Mouth/Throat:      Mouth: Mucous membranes are dry.      Pharynx: No oropharyngeal exudate.   Eyes:      General: No scleral icterus.        Right eye: No discharge.         Left eye: No discharge.      Conjunctiva/sclera: Conjunctivae normal.      Pupils: Pupils are equal, round, and reactive to light.   Neck:      Thyroid: No thyromegaly.      Vascular: No JVD.      Trachea: No tracheal deviation.   Cardiovascular:      Rate and Rhythm: Normal rate and regular rhythm.      Heart sounds: Normal heart sounds. No murmur heard.     No friction rub. No gallop.   Pulmonary:      Effort: Pulmonary effort is normal. No " respiratory distress.      Breath sounds: Normal breath sounds. No wheezing or rales.   Chest:      Chest wall: No tenderness.   Abdominal:      General: Bowel sounds are normal. There is no distension.      Palpations: Abdomen is soft. There is no mass.      Tenderness: There is no abdominal tenderness. There is no guarding or rebound.   Musculoskeletal:         General: No deformity.      Cervical back: Normal range of motion and neck supple.      Left knee: Swelling present. Decreased range of motion. Tenderness present over the medial joint line.   Lymphadenopathy:      Cervical: No cervical adenopathy.   Skin:     General: Skin is warm and dry.      Coloration: Skin is not pale.      Findings: No erythema or rash.   Neurological:      Mental Status: She is alert and oriented to person, place, and time.      Cranial Nerves: No cranial nerve deficit.      Motor: No abnormal muscle tone.      Coordination: Coordination normal.      Gait: Gait abnormal (antalgic gait).      Deep Tendon Reflexes: Reflexes are normal and symmetric.   Psychiatric:         Behavior: Behavior normal.

## 2025-02-17 NOTE — ASSESSMENT & PLAN NOTE
-Stable  -Continue levothyroxine, which we will refill today as requested  Orders:  •  levothyroxine 100 mcg tablet; Take 1 tablet (100 mcg total) by mouth daily

## 2025-02-19 ENCOUNTER — TELEPHONE (OUTPATIENT)
Age: 70
End: 2025-02-19

## 2025-02-19 ENCOUNTER — OFFICE VISIT (OUTPATIENT)
Dept: OBGYN CLINIC | Facility: MEDICAL CENTER | Age: 70
End: 2025-02-19
Payer: COMMERCIAL

## 2025-02-19 VITALS — BODY MASS INDEX: 37.19 KG/M2 | WEIGHT: 197 LBS | HEIGHT: 61 IN

## 2025-02-19 DIAGNOSIS — M17.12 ARTHRITIS OF KNEE, LEFT: Primary | ICD-10-CM

## 2025-02-19 PROCEDURE — 20610 DRAIN/INJ JOINT/BURSA W/O US: CPT | Performed by: PHYSICAL MEDICINE & REHABILITATION

## 2025-02-19 PROCEDURE — 99214 OFFICE O/P EST MOD 30 MIN: CPT | Performed by: PHYSICAL MEDICINE & REHABILITATION

## 2025-02-19 RX ORDER — ROPIVACAINE HYDROCHLORIDE 5 MG/ML
10 INJECTION, SOLUTION EPIDURAL; INFILTRATION; PERINEURAL
Status: COMPLETED | OUTPATIENT
Start: 2025-02-19 | End: 2025-02-19

## 2025-02-19 RX ORDER — TRIAMCINOLONE ACETONIDE 40 MG/ML
80 INJECTION, SUSPENSION INTRA-ARTICULAR; INTRAMUSCULAR
Status: COMPLETED | OUTPATIENT
Start: 2025-02-19 | End: 2025-02-19

## 2025-02-19 RX ORDER — MELOXICAM 15 MG/1
15 TABLET ORAL DAILY PRN
Qty: 60 TABLET | Refills: 0 | Status: SHIPPED | OUTPATIENT
Start: 2025-02-19

## 2025-02-19 RX ADMIN — TRIAMCINOLONE ACETONIDE 80 MG: 40 INJECTION, SUSPENSION INTRA-ARTICULAR; INTRAMUSCULAR at 13:30

## 2025-02-19 RX ADMIN — ROPIVACAINE HYDROCHLORIDE 10 ML: 5 INJECTION, SOLUTION EPIDURAL; INFILTRATION; PERINEURAL at 13:30

## 2025-02-19 NOTE — PROGRESS NOTES
1. Arthritis of knee, left      Orders Placed This Encounter   Procedures    Large joint arthrocentesis     New Medications Ordered This Visit   Medications    meloxicam (MOBIC) 15 mg tablet     Sig: Take 1 tablet (15 mg total) by mouth daily as needed for moderate pain     Dispense:  60 tablet     Refill:  0       Impression:  Left knee pain likely secondary to degenerative medial meniscal derangement and patellofemoral osteoarthritis as below.  We discussed different treatment options and decided to proceed with an intra-articular steroid injection.  We briefly discussed hyaluronic injections which helped her for the other knee.  I will see her back in 3-4 weeks to reassess.  In the interim, she was also prescribed meloxicam to use as needed.    Imaging Studies (I personally reviewed images in PACS and report):  Left knee x-rays most recent to this encounter reviewed.  These images show mild osteoarthritis that predominantly affects the patellofemoral joint space.  There is possibly a trace joint effusion.    No follow-ups on file.    Patient is in agreement with the above plan.    HPI:  Kelsea Fong is a 69 y.o. female  who presents for evaluation of   Chief Complaint   Patient presents with    Left Knee - Pain       Onset/Mechanism: Pain started two weeks ago without an injury.  Location: Medial knee.  Radiation: Denies.  Provocative: Cleaning houses.  Severity: Painful.  Associated Symptoms: Denies.  Treatment so far: No recent treatment.    Following history reviewed and updated:  Past Medical History:   Diagnosis Date    Acid reflux     Allergic     Anxiety     Diabetes mellitus (HCC)     Disease of thyroid gland Years    Gastroparesis     GERD (gastroesophageal reflux disease) Years    Hypertension     Hypothyroid     Obesity Years    Shingles 2 years ago     Past Surgical History:   Procedure Laterality Date     SECTION      CHOLECYSTECTOMY      COLONOSCOPY      DILATION AND CURETTAGE OF  UTERUS      ESOPHAGOGASTRODUODENOSCOPY      FRACTURE SURGERY      HYSTERECTOMY      44    OOPHORECTOMY      44    MT COLONOSCOPY FLX DX W/COLLJ SPEC WHEN PFRMD N/A 2016    Procedure: EGD AND COLONOSCOPY;  Surgeon: Anurag Gu MD;  Location: [facility];  Service: Gastroenterology    MT NEUROPLASTY &/TRANSPOS MEDIAN NRV CARPAL TUNNE Right 2017    Procedure: WRIST CARPAL TUNNEL RELEASE ; TENOLYSIS OF FPL, FDS 2-5, FDP 2-5;  APPLICATION OF TENOGLYIDE;  Surgeon: Andrzej Tirado MD;  Location[facility];  Service: Orthopedics    MT OPTX FEM SHFT FX W/INSJ IMED IMPLT W/WO SCREW Left 2/3/2021    Procedure: INSERTION NAIL IM FEMUR ANTEGRADE (TROCHANTERIC);  Surgeon: Rylan Hitchcock MD;  Location: [facility];  Service: Orthopedics    MT TENDON SHEATH INCISION Right 2017    Procedure: LONG FINGER TRIGGER RELEASE ;  Surgeon: Andrzej Tirado MD;  Location: [facility];  Service: Orthopedics    MT TENDON SHEATH INCISION Right 2016    Procedure: RELEASE TRIGGER FINGER - LONG FINGER;  Surgeon: Andrzej Tirado MD;  Location: [facility];  Service: Orthopedics    ROOT CANAL      UPPER GASTROINTESTINAL ENDOSCOPY       Social History   Social History     Substance and Sexual Activity   Alcohol Use Yes    Alcohol/week: 0.0 standard drinks of alcohol    Comment: Rarely     Social History     Substance and Sexual Activity   Drug Use Never     Social History     Tobacco Use   Smoking Status Former    Current packs/day: 0.00    Average packs/day: 2.0 packs/day for 23.4 years (46.8 ttl pk-yrs)    Types: Cigarettes    Start date: 7/3/1964    Quit date:     Years since quittin.2   Smokeless Tobacco Never     Family History   Problem Relation Age of Onset    Cirrhosis Mother     Heart attack Father     Diabetes type II Father     Hearing loss Father     Vision loss Father     Diabetes Father     Heart disease Father     No Known Problems Sister     No Known Problems Sister     Arthritis Maternal  "Grandmother     No Known Problems Maternal Grandfather     No Known Problems Paternal Grandmother     Heart disease Paternal Grandfather     Stroke Paternal Grandfather     Diabetes Brother     Alcohol abuse Son     No Known Problems Maternal Aunt     No Known Problems Maternal Aunt     Breast cancer Paternal Aunt 57    Colon cancer Paternal Uncle 35    Testicular cancer Cousin 18     Allergies   Allergen Reactions    Other Other (See Comments) and Cough     Seasonal- grass, hard time to breathe    Gabapentin Other (See Comments)     Disorientation     Metformin Diarrhea        Constitutional:  Ht 5' 1\" (1.549 m)   Wt 89.4 kg (197 lb)   BMI 37.22 kg/m²    General: NAD.  Eyes: Anicteric sclerae.  Neck: Supple.  Lungs: Unlabored breathing.  Cardiovascular: No lower extremity edema.  Skin: Intact without erythema.  Neurologic: Sensation intact to light touch.  Psychiatric: Mood and affect are appropriate.    Left Knee Exam     Tenderness   The patient is experiencing tenderness in the medial joint line.    Range of Motion   Extension:  normal   Flexion:  abnormal     Tests   Varus: negative Valgus: negative    Other   Erythema: absent  Scars: absent  Sensation: normal  Pulse: present  Swelling: none  Effusion: no effusion present             Large joint arthrocentesis: L knee  Universal Protocol:  procedure performed by consultantConsent: Verbal consent obtained. Written consent not obtained.  Risks and benefits: risks, benefits and alternatives were discussed  Consent given by: patient  Timeout called at: 2/19/2025 2:08 PM.  Patient understanding: patient states understanding of the procedure being performed  Patient consent: the patient's understanding of the procedure matches consent given  Site marked: the operative site was marked  Radiology Images displayed and confirmed. If images not available, report reviewed: imaging studies available  Patient identity confirmed: verbally with patient  Supporting " Documentation  Indications: pain   Procedure Details  Location: knee - L knee  Needle size: 22 G  Ultrasound guidance: no  Approach: Inferolateral to patella.  Medications administered: 80 mg triamcinolone acetonide 40 mg/mL; 10 mL ropivacaine 0.5 %    Patient tolerance: patient tolerated the procedure well with no immediate complications  Dressing:  Sterile dressing applied    There was little to no resistance encountered during the injection.    Risks of this procedure include:    - Risk of bleeding since a needle is involved.  - Risk of infection (1/10,000 chance as per recent studies).  Signs/symptoms were discussed and they would prompt an urgent evaluation at an emergency department.  - Risk of pigmentation or skin dimpling in the skin (2-3% chance as per recent studies) from the steroid.  - Risk of increased pain from steroid flare (1% chance as per recent studies) that typically lasts 24-48 hours.  - Risk of increased blood sugars from the steroid medication that can last for a few weeks.  If the patient is a diabetic or pre-diabetic, they were encouraged to closely monitor their blood sugars and discuss with PCP if elevated more than usual or if having symptoms.    The benefits outweigh the risks and so the procedure was completed.

## 2025-02-25 ENCOUNTER — TELEPHONE (OUTPATIENT)
Age: 70
End: 2025-02-25

## 2025-02-25 NOTE — TELEPHONE ENCOUNTER
Left message for patient - Dr. Chapa is out of office but she can be scheduled for a follow up upon his return to clinic next week. Please schedule patient when she calls back. Can always been seen by [facility] provider if she does not feel she can wait until next week. Also advised gel injections need to be ordered and authorized prior to scheduled. Please ask patient her pain level on a call of 1-10. Thank you!

## 2025-02-25 NOTE — TELEPHONE ENCOUNTER
Caller: patient    Doctor: Dr. Chapa    Reason for call: pt called stating her left knee is swollen and painful. The CSI injection she had on 2/19/25 did not help. She is asking for an appt ASAP to have her knee drained and she is asking for the gel injection    Call back#: [phone]

## 2025-02-25 NOTE — TELEPHONE ENCOUNTER
Caller: Patient   Doctor/office: Dr Chapa  #: [phone]      Patient called to start auth/delivery for VISCO(Gel) injections.    Body Part: Left Knee  Pain Level (scale 1-10): 8  Date of last Gel Injection: 5/30/2024  Did the last injection work well for you? yes      Educated patient on Visco procedure. Auth team will review insurance and process the request appropriately. Patient will be contacted if the have any questions and when ready to schedule.

## 2025-02-26 NOTE — TELEPHONE ENCOUNTER
Called patient to discuss, but no answer. Left a message to call back.    Dr. Chapa, looks like she had a CSI only one week ago, and usually they can take a couple weeks to take affect. You had advised a 3-4 week FU in your last note. Please advise. If you want me to order this I can.

## 2025-02-28 NOTE — TELEPHONE ENCOUNTER
Caller: patient    Doctor: Dr. Chapa    Reason for call: patient is returning call    Call back#: [phone]

## 2025-02-28 NOTE — TELEPHONE ENCOUNTER
Caller: patient    Doctor: Dr. Chapa    Reason for call: pt is returning your call.     Call back#: [phone] per patient ok to leave detail message

## 2025-02-28 NOTE — TELEPHONE ENCOUNTER
Called and left detailed message for patient know we usually give steroid injection at least 2 weeks to take full affect. If no improvement then we can order visco. She has only had visco for her other knee. Not this one. She will call back 2 week after steroid injection if no improvement.

## 2025-03-04 ENCOUNTER — TELEPHONE (OUTPATIENT)
Age: 70
End: 2025-03-04

## 2025-03-04 DIAGNOSIS — M17.12 ARTHRITIS OF KNEE, LEFT: Primary | ICD-10-CM

## 2025-03-04 RX ORDER — NAPROXEN 500 MG/1
500 TABLET ORAL 2 TIMES DAILY WITH MEALS
Qty: 60 TABLET | Refills: 0 | Status: SHIPPED | OUTPATIENT
Start: 2025-03-04 | End: 2025-04-03

## 2025-03-04 NOTE — TELEPHONE ENCOUNTER
Spoke to patient. Visco was ordered. Advised canceling apt on Thursday as hopefully her injection will be authorized by next week. Advised if she does need the knee aspirated it would be done prior to her  injection. Feels mobic not helping so switched her to naproxen. She can also take tylenol <3000 mg per day. Also may ice. All questions answered.

## 2025-03-04 NOTE — TELEPHONE ENCOUNTER
Caller: Kelsea  Doctor/office: Dr Baldwin  #: [phone]      Patient called to start auth/delivery for VISCO(Gel) injections.    Body Part: Left Knee  Pain Level (scale 1-10): 5/10  Date of last Gel Injection: N/A  Did the last injection work well for you? N/A    ** Patient would also like to know what she can do for the pain until these are approved.    She has an appointment this Thursday she wants Dr. Chapa to check her to see if Fluid needs to be drained off her knee?**

## 2025-03-04 NOTE — TELEPHONE ENCOUNTER
Order placed. Called patient back to discuss her treatment in the meantime but no answer. Left message to call back to discuss.

## 2025-03-04 NOTE — TELEPHONE ENCOUNTER
Patient returned missed call; attempted to warm transfer. Please advise patient with CB - TY!    Phone: [phone]

## 2025-03-04 NOTE — TELEPHONE ENCOUNTER
Caller: Patient     Doctor: Dr. Chapa    Reason for call: Patient was returning a missed tereso //told me she will call the phone number back that was left on the message.// Mention injection procedure prior autho is pending review

## 2025-03-20 DIAGNOSIS — M17.12 ARTHRITIS OF KNEE, LEFT: ICD-10-CM

## 2025-03-20 RX ORDER — NAPROXEN 500 MG/1
500 TABLET ORAL 2 TIMES DAILY WITH MEALS
Qty: 60 TABLET | Refills: 0 | OUTPATIENT
Start: 2025-03-20

## 2025-04-01 ENCOUNTER — NURSE TRIAGE (OUTPATIENT)
Age: 70
End: 2025-04-01

## 2025-04-01 DIAGNOSIS — F41.8 ANXIOUS DEPRESSION: ICD-10-CM

## 2025-04-01 DIAGNOSIS — K52.9 GASTROENTERITIS: ICD-10-CM

## 2025-04-01 RX ORDER — ONDANSETRON 4 MG/1
4 TABLET, FILM COATED ORAL EVERY 8 HOURS PRN
Qty: 20 TABLET | Refills: 5 | Status: SHIPPED | OUTPATIENT
Start: 2025-04-01

## 2025-04-01 RX ORDER — CLONAZEPAM 0.5 MG/1
0.5 TABLET ORAL 2 TIMES DAILY
Qty: 60 TABLET | Refills: 0 | Status: SHIPPED | OUTPATIENT
Start: 2025-04-01

## 2025-04-01 NOTE — TELEPHONE ENCOUNTER
Medication:     ondansetron (ZOFRAN) 4 mg tablet       Dose/Frequency: Take 1 tablet (4 mg total) by mouth every 8 (eight) hours as needed for nausea or vomiting,     Quantity: 20    Pharmacy: [id number] - [place], [place] - [address] [place] [address]        Office:   [x] PCP/Provider -   [] Speciality/Provider -     Does the patient have enough for 3 days?   [] Yes   [x] No - Send as HP to POD

## 2025-04-01 NOTE — TELEPHONE ENCOUNTER
Medication: clonazePAM (KlonoPIN) 0.5 mg tablet     Dose/Frequency:  Take 1 tablet (0.5 mg total) by mouth 2 (two) times a day    Quantity: 60 tablet    Pharmacy:   [facility] [place] [id number] - [place], [place] [facility] [address]  [address] [place], [place] [address]    Office:   [x] PCP/Provider -   [] Speciality/Provider -     Does the patient have enough for 3 days?   [] Yes   [x] No - Send as HP to POD

## 2025-04-01 NOTE — TELEPHONE ENCOUNTER
Regarding: med refill  ----- Message from Rosario WORKMAN sent at 4/1/2025 11:54 AM EDT -----  Medication:   Insulin Glargine Solostar (Lantus SoloStar) 100 UNIT/ML SOPN        Dose/Frequency:  15mL    Quantity: 25 units in AM and 35 units in PM    Pharmacy: [id number] - [place], [place] - [address]  [address] [facility] [place] [address]      Office:   [ X] PCP/Provider -   [ ] Speciality/Provider -     Does the patient have enough for 3 days?   [ ] Yes   [ X] No - Send as HP to POD

## 2025-04-03 DIAGNOSIS — M17.12 ARTHRITIS OF KNEE, LEFT: ICD-10-CM

## 2025-04-03 DIAGNOSIS — I10 ESSENTIAL HYPERTENSION, BENIGN: ICD-10-CM

## 2025-04-03 RX ORDER — MELOXICAM 15 MG/1
TABLET ORAL
Qty: 60 TABLET | Refills: 0 | Status: SHIPPED | OUTPATIENT
Start: 2025-04-03

## 2025-04-03 RX ORDER — LISINOPRIL AND HYDROCHLOROTHIAZIDE 12.5; 2 MG/1; MG/1
0.5 TABLET ORAL DAILY
Qty: 15 TABLET | Refills: 1 | Status: SHIPPED | OUTPATIENT
Start: 2025-04-03

## 2025-04-15 DIAGNOSIS — E11.00 UNCONTROLLED TYPE 2 DIABETES MELLITUS WITH HYPEROSMOLARITY WITHOUT COMA, WITHOUT LONG-TERM CURRENT USE OF INSULIN (HCC): ICD-10-CM

## 2025-04-15 DIAGNOSIS — E11.9 TYPE 2 DIABETES MELLITUS WITHOUT COMPLICATION, WITHOUT LONG-TERM CURRENT USE OF INSULIN (HCC): ICD-10-CM

## 2025-04-15 DIAGNOSIS — E11.649 UNCONTROLLED TYPE 2 DIABETES MELLITUS WITH HYPOGLYCEMIA WITHOUT COMA (HCC): ICD-10-CM

## 2025-04-15 DIAGNOSIS — E11.9 TYPE 2 DIABETES MELLITUS WITHOUT COMPLICATION, WITH LONG-TERM CURRENT USE OF INSULIN (HCC): ICD-10-CM

## 2025-04-15 DIAGNOSIS — E11.8 TYPE 2 DIABETES MELLITUS WITH COMPLICATION, WITH LONG-TERM CURRENT USE OF INSULIN (HCC): ICD-10-CM

## 2025-04-15 DIAGNOSIS — Z79.4 TYPE 2 DIABETES MELLITUS WITH COMPLICATION, WITH LONG-TERM CURRENT USE OF INSULIN (HCC): ICD-10-CM

## 2025-04-15 DIAGNOSIS — Z79.4 TYPE 2 DIABETES MELLITUS WITHOUT COMPLICATION, WITH LONG-TERM CURRENT USE OF INSULIN (HCC): ICD-10-CM

## 2025-04-15 RX ORDER — BLOOD-GLUCOSE METER
EACH MISCELLANEOUS
Qty: 1 KIT | Refills: 0 | Status: SHIPPED | OUTPATIENT
Start: 2025-04-15

## 2025-04-15 NOTE — TELEPHONE ENCOUNTER
Patient states her machine is no longer working and she has not been able to check her blood sugar level. Please advise back to patient if needed.             Medication: Blood Glucose Monitoring Suppl (OneTouch Verio) w/Device KIT     Dose/Frequency: TEST BLOOD GLUCOSE FOUR TIMES DAILY OR AS DIRECTED BY PROVIDER     Quantity: 1 kit    Pharmacy: [id number] - [place], [place] [facility] [address] [facility]     Office:   [x] PCP/Provider -   [] Speciality/Provider -     Does the patient have enough for 3 days?   [] Yes   [x] No - Send as HP to POD

## 2025-04-29 ENCOUNTER — TELEPHONE (OUTPATIENT)
Dept: OBGYN CLINIC | Facility: MEDICAL CENTER | Age: 70
End: 2025-04-29

## 2025-04-29 NOTE — TELEPHONE ENCOUNTER
Ortho appt w/Dr Chapa for 4/30 at 2pm    Appt notes state : FU RT knee//[employer]  Patient was coming in for the VISCO of her left knee/LT knee feels better //Can she have the VISCO for the right knee instead        (4/29-pb- left message per Odalys as pt needs to have her right knee examined prior to visco being ordered  we can keep her appointment and she can be seen for her right, but it won't be visco tomorrow)

## 2025-05-03 LAB
ALBUMIN/CREAT UR: 8 MG/G CREAT
CHOLEST SERPL-MCNC: 193 MG/DL
CHOLEST/HDLC SERPL: 3.6 (CALC)
CREAT UR-MCNC: 95 MG/DL (ref 20–275)
HBA1C MFR BLD: 7.3 %
HDLC SERPL-MCNC: 53 MG/DL
LDLC SERPL CALC-MCNC: 109 MG/DL (CALC)
MICROALBUMIN UR-MCNC: 0.8 MG/DL
NONHDLC SERPL-MCNC: 140 MG/DL (CALC)
T4 FREE SERPL-MCNC: 0.9 NG/DL (ref 0.8–1.8)
TRIGL SERPL-MCNC: 191 MG/DL
TSH SERPL-ACNC: 9.92 MIU/L (ref 0.4–4.5)

## 2025-05-05 DIAGNOSIS — F41.8 ANXIOUS DEPRESSION: ICD-10-CM

## 2025-05-05 RX ORDER — CLONAZEPAM 0.5 MG/1
0.5 TABLET ORAL 2 TIMES DAILY
Qty: 60 TABLET | Refills: 0 | Status: SHIPPED | OUTPATIENT
Start: 2025-05-05

## 2025-05-05 NOTE — TELEPHONE ENCOUNTER
Medication: clonazePAM (KlonoPIN) 0.5 mg tablet     Dose/Frequency: Take 1 tablet (0.5 mg total) by mouth 2 (two) times a day     Quantity: 60    Pharmacy: [facility]    Office:   [x] PCP/Provider -   [] Speciality/Provider -     Does the patient have enough for 3 days?   [] Yes   [x] No - Send as HP to POD

## 2025-05-11 ENCOUNTER — RESULTS FOLLOW-UP (OUTPATIENT)
Dept: INTERNAL MEDICINE CLINIC | Age: 70
End: 2025-05-11

## 2025-05-28 DIAGNOSIS — E11.649 UNCONTROLLED TYPE 2 DIABETES MELLITUS WITH HYPOGLYCEMIA WITHOUT COMA (HCC): ICD-10-CM

## 2025-05-28 RX ORDER — INSULIN GLARGINE 100 [IU]/ML
INJECTION, SOLUTION SUBCUTANEOUS
Qty: 15 ML | Refills: 5 | Status: SHIPPED | OUTPATIENT
Start: 2025-05-28

## 2025-05-28 RX ORDER — INSULIN ASPART INJECTION 100 [IU]/ML
12 INJECTION, SOLUTION SUBCUTANEOUS
Qty: 15 ML | Refills: 1 | Status: SHIPPED | OUTPATIENT
Start: 2025-05-28

## 2025-05-28 NOTE — TELEPHONE ENCOUNTER
Medication: Lantus Solostar 100 unit/ml sopn    Dose/Frequency: inject 25 units in AM and 35 units in PM    Quantity: 15 ml    Pharmacy: [facility]    Office:   [x] PCP/Provider -   [] Speciality/Provider -     Does the patient have enough for 3 days?   [] Yes   [x] No - Send as HP to POD - Urgent request Patient completely out of Insulin    Medication: Fiasp flextouch 100 units/ml injection pen    Dose/Frequency: Inject 12 units subcutaneously (under the skin) three times a daily with meals    Quantity: 15 ml    Pharmacy: [facility]    Office:   [x] PCP/Provider -   [] Speciality/Provider -     Does the patient have enough for 3 days?   [] Yes   [x] No - Send as HP to POD

## 2025-05-29 ENCOUNTER — TELEPHONE (OUTPATIENT)
Age: 70
End: 2025-05-29

## 2025-05-29 NOTE — TELEPHONE ENCOUNTER
Called patient, left message on machine that she does not need the physical card. She can download the [employer] josy to her phone. She does not have to pay the membership, she can use the free version.       
Patient was looking for [employer] discount cards. She stated that she saw them when she visited office.  Please call her back to let her know if she can have them. I did try to reach to office as well.  Thank you!!  
yes

## 2025-06-01 DIAGNOSIS — M17.12 ARTHRITIS OF KNEE, LEFT: ICD-10-CM

## 2025-06-02 RX ORDER — MELOXICAM 15 MG/1
TABLET ORAL
Qty: 60 TABLET | Refills: 5 | Status: SHIPPED | OUTPATIENT
Start: 2025-06-02

## 2025-06-19 DIAGNOSIS — I10 ESSENTIAL HYPERTENSION, BENIGN: ICD-10-CM

## 2025-06-19 DIAGNOSIS — K52.9 GASTROENTERITIS: ICD-10-CM

## 2025-06-19 DIAGNOSIS — E11.8 TYPE 2 DIABETES MELLITUS WITH COMPLICATION, WITH LONG-TERM CURRENT USE OF INSULIN (HCC): ICD-10-CM

## 2025-06-19 DIAGNOSIS — E11.649 UNCONTROLLED TYPE 2 DIABETES MELLITUS WITH HYPOGLYCEMIA WITHOUT COMA (HCC): ICD-10-CM

## 2025-06-19 DIAGNOSIS — Z79.4 TYPE 2 DIABETES MELLITUS WITH COMPLICATION, WITH LONG-TERM CURRENT USE OF INSULIN (HCC): ICD-10-CM

## 2025-06-19 DIAGNOSIS — F41.8 ANXIOUS DEPRESSION: ICD-10-CM

## 2025-06-19 RX ORDER — LISINOPRIL AND HYDROCHLOROTHIAZIDE 12.5; 2 MG/1; MG/1
0.5 TABLET ORAL DAILY
Qty: 15 TABLET | Refills: 5 | Status: SHIPPED | OUTPATIENT
Start: 2025-06-19

## 2025-06-19 RX ORDER — ONDANSETRON 4 MG/1
4 TABLET, FILM COATED ORAL EVERY 8 HOURS PRN
Qty: 20 TABLET | Refills: 5 | Status: CANCELLED | OUTPATIENT
Start: 2025-06-19

## 2025-06-19 RX ORDER — INSULIN GLARGINE 100 [IU]/ML
INJECTION, SOLUTION SUBCUTANEOUS
Qty: 15 ML | Refills: 5 | OUTPATIENT
Start: 2025-06-19

## 2025-06-19 RX ORDER — INSULIN ASPART INJECTION 100 [IU]/ML
12 INJECTION, SOLUTION SUBCUTANEOUS
Qty: 15 ML | Refills: 1 | OUTPATIENT
Start: 2025-06-19

## 2025-06-19 RX ORDER — GLIMEPIRIDE 4 MG/1
4 TABLET ORAL DAILY
Qty: 30 TABLET | Refills: 5 | OUTPATIENT
Start: 2025-06-19

## 2025-06-19 NOTE — TELEPHONE ENCOUNTER
Patient called for refills of the following medications to be sent to [facility].    Clonazepam .5mg  Glimepiride 4mg  Fiasp Flex Touch 100 units  Lantus Solostar 100 units  Lisinopril-hydrochlorothiazide 20-12.5mg  Ondansetron 4mg

## 2025-06-20 RX ORDER — CLONAZEPAM 0.5 MG/1
0.5 TABLET ORAL 2 TIMES DAILY
Qty: 60 TABLET | Refills: 0 | Status: SHIPPED | OUTPATIENT
Start: 2025-06-20

## 2025-06-23 NOTE — TELEPHONE ENCOUNTER
Patient was seen in Feb 2025 and has an appointment scheduled for Jan 2026. Would you like to see her sooner?

## 2025-08-05 DIAGNOSIS — E11.00 UNCONTROLLED TYPE 2 DIABETES MELLITUS WITH HYPEROSMOLARITY WITHOUT COMA, WITHOUT LONG-TERM CURRENT USE OF INSULIN (HCC): ICD-10-CM

## 2025-08-05 DIAGNOSIS — E11.9 TYPE 2 DIABETES MELLITUS WITHOUT COMPLICATION, WITHOUT LONG-TERM CURRENT USE OF INSULIN (HCC): ICD-10-CM

## 2025-08-05 DIAGNOSIS — Z79.4 TYPE 2 DIABETES MELLITUS WITH COMPLICATION, WITH LONG-TERM CURRENT USE OF INSULIN (HCC): ICD-10-CM

## 2025-08-05 DIAGNOSIS — E11.9 TYPE 2 DIABETES MELLITUS WITHOUT COMPLICATION, WITH LONG-TERM CURRENT USE OF INSULIN (HCC): ICD-10-CM

## 2025-08-05 DIAGNOSIS — Z79.4 TYPE 2 DIABETES MELLITUS WITHOUT COMPLICATION, WITH LONG-TERM CURRENT USE OF INSULIN (HCC): ICD-10-CM

## 2025-08-05 DIAGNOSIS — E03.9 HYPOTHYROIDISM, UNSPECIFIED TYPE: ICD-10-CM

## 2025-08-05 DIAGNOSIS — F41.9 ANXIETY: ICD-10-CM

## 2025-08-05 DIAGNOSIS — E11.649 UNCONTROLLED TYPE 2 DIABETES MELLITUS WITH HYPOGLYCEMIA WITHOUT COMA (HCC): ICD-10-CM

## 2025-08-05 DIAGNOSIS — E11.8 TYPE 2 DIABETES MELLITUS WITH COMPLICATION, WITH LONG-TERM CURRENT USE OF INSULIN (HCC): ICD-10-CM

## 2025-08-07 DIAGNOSIS — F41.8 ANXIOUS DEPRESSION: ICD-10-CM

## 2025-08-07 RX ORDER — SERTRALINE HYDROCHLORIDE 100 MG/1
100 TABLET, FILM COATED ORAL 2 TIMES DAILY
Qty: 60 TABLET | Refills: 5 | Status: SHIPPED | OUTPATIENT
Start: 2025-08-07

## 2025-08-07 RX ORDER — GLIMEPIRIDE 4 MG/1
4 TABLET ORAL EVERY MORNING
Qty: 30 TABLET | Refills: 5 | Status: SHIPPED | OUTPATIENT
Start: 2025-08-07

## 2025-08-07 RX ORDER — BLOOD SUGAR DIAGNOSTIC
STRIP MISCELLANEOUS
Qty: 300 STRIP | Refills: 1 | Status: SHIPPED | OUTPATIENT
Start: 2025-08-07

## 2025-08-07 RX ORDER — LEVOTHYROXINE SODIUM 100 UG/1
100 TABLET ORAL DAILY
Qty: 30 TABLET | Refills: 5 | Status: SHIPPED | OUTPATIENT
Start: 2025-08-07

## 2025-08-08 RX ORDER — CLONAZEPAM 0.5 MG/1
0.5 TABLET ORAL 2 TIMES DAILY
Qty: 60 TABLET | Refills: 0 | Status: SHIPPED | OUTPATIENT
Start: 2025-08-08

## (undated) DEVICE — PADDING CAST 4 IN  COTTON STRL

## (undated) DEVICE — SUT VICRYL 3-0 SH 27 IN J416H

## (undated) DEVICE — ACE WRAP 6 IN UNSTERILE

## (undated) DEVICE — CHLORAPREP HI-LITE 26ML ORANGE

## (undated) DEVICE — PACK PLASTIC HAND PBDS

## (undated) DEVICE — DRESSING MEPILEX AG BORDER 4 X 4 IN

## (undated) DEVICE — SUT VICRYL PLUS 1 CTB-1 36 IN VCPB947H

## (undated) DEVICE — DISPOSABLE EQUIPMENT COVER: Brand: SMALL TOWEL DRAPE

## (undated) DEVICE — CUFF TOURNIQUET 18 X 5.5 IN QUICK CONNECT 2BLA

## (undated) DEVICE — 3M™ STERI-DRAPE™  ISOLATION DRAPE WITH INCISE FILM AND POUCH 1017: Brand: STERI-DRAPE™

## (undated) DEVICE — INTENDED FOR TISSUE SEPARATION, AND OTHER PROCEDURES THAT REQUIRE A SHARP SURGICAL BLADE TO PUNCTURE OR CUT.: Brand: BARD-PARKER SAFETY BLADES SIZE 10, STERILE

## (undated) DEVICE — 3.2MM GUIDE WIRE 400MM

## (undated) DEVICE — GLOVE INDICATOR PI UNDERGLOVE SZ 8 BLUE

## (undated) DEVICE — GLOVE SRG BIOGEL 7.5

## (undated) DEVICE — SUT MONOCRYL 4-0 PS-2 18 IN Y496G

## (undated) DEVICE — OCCLUSIVE GAUZE STRIP,3% BISMUTH TRIBROMOPHENATE IN PETROLATUM BLEND: Brand: XEROFORM

## (undated) DEVICE — ACE WRAP 3 IN VELCRO LATEX FREE

## (undated) DEVICE — STERILE ORIF HIP PACK: Brand: CARDINAL HEALTH

## (undated) DEVICE — NEEDLE 25G X 1 1/2

## (undated) DEVICE — SUT VICRYL PLUS 2-0 CTB-1 27 IN VCPB259H

## (undated) DEVICE — 2.5MM REAMING ROD WITH BALL TIP/950MM-STERILE

## (undated) DEVICE — SPONGE PVP SCRUB WING STERILE

## (undated) DEVICE — GLOVE INDICATOR PI UNDERGLOVE SZ 8.5 BLUE

## (undated) DEVICE — ARTHROSCOPY FLOOR MAT

## (undated) DEVICE — INTENDED FOR TISSUE SEPARATION, AND OTHER PROCEDURES THAT REQUIRE A SHARP SURGICAL BLADE TO PUNCTURE OR CUT.: Brand: BARD-PARKER SAFETY BLADES SIZE 15, STERILE

## (undated) DEVICE — DRAPE EQUIPMENT RF WAND

## (undated) DEVICE — GAUZE SPONGES,16 PLY: Brand: CURITY

## (undated) DEVICE — 4.2MM THREE-FLUTED DRILL BIT QC/330MM/100MM CALIBRATION

## (undated) DEVICE — GLOVE SRG BIOGEL 8